# Patient Record
Sex: FEMALE | Race: OTHER | HISPANIC OR LATINO | URBAN - METROPOLITAN AREA
[De-identification: names, ages, dates, MRNs, and addresses within clinical notes are randomized per-mention and may not be internally consistent; named-entity substitution may affect disease eponyms.]

---

## 2017-01-27 ENCOUNTER — OUTPATIENT (OUTPATIENT)
Dept: OUTPATIENT SERVICES | Facility: HOSPITAL | Age: 77
LOS: 1 days | End: 2017-01-27
Payer: MEDICARE

## 2017-01-27 VITALS
RESPIRATION RATE: 16 BRPM | SYSTOLIC BLOOD PRESSURE: 153 MMHG | HEART RATE: 67 BPM | DIASTOLIC BLOOD PRESSURE: 76 MMHG | TEMPERATURE: 98 F | WEIGHT: 177.47 LBS | HEIGHT: 58 IN

## 2017-01-27 DIAGNOSIS — Z98.890 OTHER SPECIFIED POSTPROCEDURAL STATES: Chronic | ICD-10-CM

## 2017-01-27 DIAGNOSIS — R01.1 CARDIAC MURMUR, UNSPECIFIED: ICD-10-CM

## 2017-01-27 DIAGNOSIS — Z01.818 ENCOUNTER FOR OTHER PREPROCEDURAL EXAMINATION: ICD-10-CM

## 2017-01-27 DIAGNOSIS — M25.562 PAIN IN LEFT KNEE: ICD-10-CM

## 2017-01-27 DIAGNOSIS — I10 ESSENTIAL (PRIMARY) HYPERTENSION: ICD-10-CM

## 2017-01-27 LAB
ALBUMIN SERPL ELPH-MCNC: 4.1 G/DL — SIGNIFICANT CHANGE UP (ref 3.3–5.2)
ALP SERPL-CCNC: 100 U/L — SIGNIFICANT CHANGE UP (ref 40–120)
ALT FLD-CCNC: 25 U/L — SIGNIFICANT CHANGE UP
ANION GAP SERPL CALC-SCNC: 13 MMOL/L — SIGNIFICANT CHANGE UP (ref 5–17)
APTT BLD: 31.9 SEC — SIGNIFICANT CHANGE UP (ref 27.5–37.4)
AST SERPL-CCNC: 42 U/L — HIGH
BASOPHILS # BLD AUTO: 0 K/UL — SIGNIFICANT CHANGE UP (ref 0–0.2)
BASOPHILS NFR BLD AUTO: 0.1 % — SIGNIFICANT CHANGE UP (ref 0–2)
BILIRUB SERPL-MCNC: 0.6 MG/DL — SIGNIFICANT CHANGE UP (ref 0.4–2)
BLD GP AB SCN SERPL QL: SIGNIFICANT CHANGE UP
BUN SERPL-MCNC: 16 MG/DL — SIGNIFICANT CHANGE UP (ref 8–20)
CALCIUM SERPL-MCNC: 10.4 MG/DL — HIGH (ref 8.6–10.2)
CHLORIDE SERPL-SCNC: 98 MMOL/L — SIGNIFICANT CHANGE UP (ref 98–107)
CO2 SERPL-SCNC: 27 MMOL/L — SIGNIFICANT CHANGE UP (ref 22–29)
CREAT SERPL-MCNC: 0.7 MG/DL — SIGNIFICANT CHANGE UP (ref 0.5–1.3)
EOSINOPHIL # BLD AUTO: 0.1 K/UL — SIGNIFICANT CHANGE UP (ref 0–0.5)
EOSINOPHIL NFR BLD AUTO: 0.9 % — SIGNIFICANT CHANGE UP (ref 0–6)
GLUCOSE SERPL-MCNC: 93 MG/DL — SIGNIFICANT CHANGE UP (ref 70–115)
HBA1C BLD-MCNC: 7.6 % — HIGH (ref 4–5.6)
HCT VFR BLD CALC: 38 % — SIGNIFICANT CHANGE UP (ref 37–47)
HGB BLD-MCNC: 12.6 G/DL — SIGNIFICANT CHANGE UP (ref 12–16)
INR BLD: 1.21 RATIO — HIGH (ref 0.88–1.16)
LYMPHOCYTES # BLD AUTO: 2.2 K/UL — SIGNIFICANT CHANGE UP (ref 1–4.8)
LYMPHOCYTES # BLD AUTO: 24.4 % — SIGNIFICANT CHANGE UP (ref 20–55)
MCHC RBC-ENTMCNC: 30.8 PG — SIGNIFICANT CHANGE UP (ref 27–31)
MCHC RBC-ENTMCNC: 33.2 G/DL — SIGNIFICANT CHANGE UP (ref 32–36)
MCV RBC AUTO: 92.9 FL — SIGNIFICANT CHANGE UP (ref 81–99)
MONOCYTES # BLD AUTO: 0.8 K/UL — SIGNIFICANT CHANGE UP (ref 0–0.8)
MONOCYTES NFR BLD AUTO: 9.1 % — SIGNIFICANT CHANGE UP (ref 3–10)
MRSA PCR RESULT.: SIGNIFICANT CHANGE UP
NEUTROPHILS # BLD AUTO: 6 K/UL — SIGNIFICANT CHANGE UP (ref 1.8–8)
NEUTROPHILS NFR BLD AUTO: 65.4 % — SIGNIFICANT CHANGE UP (ref 37–73)
PLATELET # BLD AUTO: 191 K/UL — SIGNIFICANT CHANGE UP (ref 150–400)
POTASSIUM SERPL-MCNC: 5.1 MMOL/L — SIGNIFICANT CHANGE UP (ref 3.5–5.3)
POTASSIUM SERPL-SCNC: 5.1 MMOL/L — SIGNIFICANT CHANGE UP (ref 3.5–5.3)
PROT SERPL-MCNC: 8.5 G/DL — SIGNIFICANT CHANGE UP (ref 6.6–8.7)
PROTHROM AB SERPL-ACNC: 13.3 SEC — HIGH (ref 10–13.1)
RBC # BLD: 4.09 M/UL — LOW (ref 4.4–5.2)
RBC # FLD: 13.4 % — SIGNIFICANT CHANGE UP (ref 11–15.6)
S AUREUS DNA NOSE QL NAA+PROBE: SIGNIFICANT CHANGE UP
SODIUM SERPL-SCNC: 138 MMOL/L — SIGNIFICANT CHANGE UP (ref 135–145)
TYPE + AB SCN PNL BLD: SIGNIFICANT CHANGE UP
WBC # BLD: 9.15 K/UL — SIGNIFICANT CHANGE UP (ref 4.8–10.8)
WBC # FLD AUTO: 9.15 K/UL — SIGNIFICANT CHANGE UP (ref 4.8–10.8)

## 2017-01-27 PROCEDURE — 93010 ELECTROCARDIOGRAM REPORT: CPT

## 2017-01-27 PROCEDURE — 85730 THROMBOPLASTIN TIME PARTIAL: CPT

## 2017-01-27 PROCEDURE — 85610 PROTHROMBIN TIME: CPT

## 2017-01-27 PROCEDURE — 85027 COMPLETE CBC AUTOMATED: CPT

## 2017-01-27 PROCEDURE — 87640 STAPH A DNA AMP PROBE: CPT

## 2017-01-27 PROCEDURE — 86850 RBC ANTIBODY SCREEN: CPT

## 2017-01-27 PROCEDURE — 86900 BLOOD TYPING SEROLOGIC ABO: CPT

## 2017-01-27 PROCEDURE — 93005 ELECTROCARDIOGRAM TRACING: CPT

## 2017-01-27 PROCEDURE — 87641 MR-STAPH DNA AMP PROBE: CPT

## 2017-01-27 PROCEDURE — 80053 COMPREHEN METABOLIC PANEL: CPT

## 2017-01-27 PROCEDURE — 83036 HEMOGLOBIN GLYCOSYLATED A1C: CPT

## 2017-01-27 PROCEDURE — 86901 BLOOD TYPING SEROLOGIC RH(D): CPT

## 2017-01-27 PROCEDURE — G0463: CPT

## 2017-01-27 NOTE — H&P PST ADULT - NSANTHOSAYNRD_GEN_A_CORE
No. LUCAS screening performed.  STOP BANG Legend: 0-2 = LOW Risk; 3-4 = INTERMEDIATE Risk; 5-8 = HIGH Risk

## 2017-01-27 NOTE — H&P PST ADULT - PMH
Diabetes    GERD (gastroesophageal reflux disease)    Heart murmur    Hypercholesterolemia    Hypertension    Hypothyroidism    Knee pain, left  arthritis

## 2017-01-27 NOTE — H&P PST ADULT - HISTORY OF PRESENT ILLNESS
77 year old Turkish speaking female presents with a several year history of left knee pain. Uses cane for ambulation , takes tylenol with mild relief. Scheduled for left knee replacement with DR. Beal.

## 2017-01-27 NOTE — PATIENT PROFILE ADULT. - LEARNING ASSESSMENT (PATIENT) ADDITIONAL COMMENTS
Instructed pt verbally in Togolese via Telephone  and in writing in Ivorian on pre-op instructions, tips for  safer surgery, pain management scale, pre-surgical infection prevention instructions, MRSA/MSSA instructions, Flu vaccine risks/benefits info sheet and verbalized understanding of all. Ebola Screening: Negative,

## 2017-01-27 NOTE — PATIENT PROFILE ADULT. - PRO PAIN LIFE ADAPT
inability to sleep/decreased activity level/inability or reluctance to perform ADLs/decreased appetite

## 2017-01-30 DIAGNOSIS — Z01.818 ENCOUNTER FOR OTHER PREPROCEDURAL EXAMINATION: ICD-10-CM

## 2017-01-30 DIAGNOSIS — M17.12 UNILATERAL PRIMARY OSTEOARTHRITIS, LEFT KNEE: ICD-10-CM

## 2017-02-02 RX ORDER — GABAPENTIN 400 MG/1
300 CAPSULE ORAL ONCE
Qty: 0 | Refills: 0 | Status: COMPLETED | OUTPATIENT
Start: 2017-02-17 | End: 2017-02-17

## 2017-02-02 RX ORDER — SCOPALAMINE 1 MG/3D
1.5 PATCH, EXTENDED RELEASE TRANSDERMAL ONCE
Qty: 0 | Refills: 0 | Status: COMPLETED | OUTPATIENT
Start: 2017-02-17 | End: 2017-02-17

## 2017-02-02 RX ORDER — OXYCODONE HYDROCHLORIDE 5 MG/1
10 TABLET ORAL ONCE
Qty: 0 | Refills: 0 | Status: DISCONTINUED | OUTPATIENT
Start: 2017-02-17 | End: 2017-02-17

## 2017-02-16 ENCOUNTER — RESULT REVIEW (OUTPATIENT)
Age: 77
End: 2017-02-16

## 2017-02-17 ENCOUNTER — APPOINTMENT (OUTPATIENT)
Dept: ORTHOPEDIC SURGERY | Facility: HOSPITAL | Age: 77
End: 2017-02-17

## 2017-02-17 ENCOUNTER — TRANSCRIPTION ENCOUNTER (OUTPATIENT)
Age: 77
End: 2017-02-17

## 2017-02-17 ENCOUNTER — INPATIENT (INPATIENT)
Facility: HOSPITAL | Age: 77
LOS: 2 days | Discharge: HOME | DRG: 470 | End: 2017-02-20
Attending: ORTHOPAEDIC SURGERY | Admitting: ORTHOPAEDIC SURGERY
Payer: MEDICARE

## 2017-02-17 VITALS
TEMPERATURE: 98 F | DIASTOLIC BLOOD PRESSURE: 62 MMHG | WEIGHT: 177.47 LBS | HEART RATE: 89 BPM | OXYGEN SATURATION: 100 % | SYSTOLIC BLOOD PRESSURE: 172 MMHG | HEIGHT: 58 IN | RESPIRATION RATE: 16 BRPM

## 2017-02-17 DIAGNOSIS — M17.12 UNILATERAL PRIMARY OSTEOARTHRITIS, LEFT KNEE: ICD-10-CM

## 2017-02-17 DIAGNOSIS — E11.9 TYPE 2 DIABETES MELLITUS WITHOUT COMPLICATIONS: ICD-10-CM

## 2017-02-17 DIAGNOSIS — I10 ESSENTIAL (PRIMARY) HYPERTENSION: ICD-10-CM

## 2017-02-17 DIAGNOSIS — E78.00 PURE HYPERCHOLESTEROLEMIA, UNSPECIFIED: ICD-10-CM

## 2017-02-17 DIAGNOSIS — Z98.890 OTHER SPECIFIED POSTPROCEDURAL STATES: Chronic | ICD-10-CM

## 2017-02-17 DIAGNOSIS — M19.90 UNSPECIFIED OSTEOARTHRITIS, UNSPECIFIED SITE: ICD-10-CM

## 2017-02-17 DIAGNOSIS — K21.9 GASTRO-ESOPHAGEAL REFLUX DISEASE WITHOUT ESOPHAGITIS: ICD-10-CM

## 2017-02-17 DIAGNOSIS — E03.9 HYPOTHYROIDISM, UNSPECIFIED: ICD-10-CM

## 2017-02-17 PROCEDURE — 99222 1ST HOSP IP/OBS MODERATE 55: CPT

## 2017-02-17 PROCEDURE — 73560 X-RAY EXAM OF KNEE 1 OR 2: CPT | Mod: 26,LT

## 2017-02-17 PROCEDURE — 27447 TOTAL KNEE ARTHROPLASTY: CPT | Mod: AS,LT

## 2017-02-17 PROCEDURE — 27447 TOTAL KNEE ARTHROPLASTY: CPT | Mod: LT

## 2017-02-17 PROCEDURE — 88311 DECALCIFY TISSUE: CPT | Mod: 26

## 2017-02-17 PROCEDURE — 88305 TISSUE EXAM BY PATHOLOGIST: CPT | Mod: 26

## 2017-02-17 RX ORDER — LISINOPRIL 2.5 MG/1
1 TABLET ORAL
Qty: 0 | Refills: 0 | COMMUNITY

## 2017-02-17 RX ORDER — GLUCAGON INJECTION, SOLUTION 0.5 MG/.1ML
1 INJECTION, SOLUTION SUBCUTANEOUS ONCE
Qty: 0 | Refills: 0 | Status: DISCONTINUED | OUTPATIENT
Start: 2017-02-17 | End: 2017-02-20

## 2017-02-17 RX ORDER — ACETAMINOPHEN 500 MG
1000 TABLET ORAL ONCE
Qty: 0 | Refills: 0 | Status: COMPLETED | OUTPATIENT
Start: 2017-02-17 | End: 2017-02-17

## 2017-02-17 RX ORDER — SODIUM CHLORIDE 9 MG/ML
3 INJECTION INTRAMUSCULAR; INTRAVENOUS; SUBCUTANEOUS EVERY 8 HOURS
Qty: 0 | Refills: 0 | Status: DISCONTINUED | OUTPATIENT
Start: 2017-02-17 | End: 2017-02-17

## 2017-02-17 RX ORDER — TRANEXAMIC ACID 100 MG/ML
800 INJECTION, SOLUTION INTRAVENOUS ONCE
Qty: 0 | Refills: 0 | Status: DISCONTINUED | OUTPATIENT
Start: 2017-02-17 | End: 2017-02-17

## 2017-02-17 RX ORDER — INSULIN LISPRO 100/ML
VIAL (ML) SUBCUTANEOUS
Qty: 0 | Refills: 0 | Status: DISCONTINUED | OUTPATIENT
Start: 2017-02-17 | End: 2017-02-20

## 2017-02-17 RX ORDER — OXYCODONE HYDROCHLORIDE 5 MG/1
10 TABLET ORAL EVERY 12 HOURS
Qty: 0 | Refills: 0 | Status: DISCONTINUED | OUTPATIENT
Start: 2017-02-17 | End: 2017-02-20

## 2017-02-17 RX ORDER — VANCOMYCIN HCL 1 G
1000 VIAL (EA) INTRAVENOUS ONCE
Qty: 0 | Refills: 0 | Status: COMPLETED | OUTPATIENT
Start: 2017-02-17 | End: 2017-02-17

## 2017-02-17 RX ORDER — AMLODIPINE BESYLATE 2.5 MG/1
5 TABLET ORAL DAILY
Qty: 0 | Refills: 0 | Status: DISCONTINUED | OUTPATIENT
Start: 2017-02-17 | End: 2017-02-20

## 2017-02-17 RX ORDER — FAMOTIDINE 10 MG/ML
40 INJECTION INTRAVENOUS DAILY
Qty: 0 | Refills: 0 | Status: DISCONTINUED | OUTPATIENT
Start: 2017-02-17 | End: 2017-02-20

## 2017-02-17 RX ORDER — OXYCODONE HYDROCHLORIDE 5 MG/1
5 TABLET ORAL
Qty: 0 | Refills: 0 | Status: DISCONTINUED | OUTPATIENT
Start: 2017-02-17 | End: 2017-02-20

## 2017-02-17 RX ORDER — HYDROMORPHONE HYDROCHLORIDE 2 MG/ML
2 INJECTION INTRAMUSCULAR; INTRAVENOUS; SUBCUTANEOUS
Qty: 0 | Refills: 0 | Status: DISCONTINUED | OUTPATIENT
Start: 2017-02-17 | End: 2017-02-20

## 2017-02-17 RX ORDER — ONDANSETRON 8 MG/1
4 TABLET, FILM COATED ORAL ONCE
Qty: 0 | Refills: 0 | Status: DISCONTINUED | OUTPATIENT
Start: 2017-02-17 | End: 2017-02-17

## 2017-02-17 RX ORDER — SODIUM CHLORIDE 9 MG/ML
1000 INJECTION, SOLUTION INTRAVENOUS
Qty: 0 | Refills: 0 | Status: DISCONTINUED | OUTPATIENT
Start: 2017-02-17 | End: 2017-02-18

## 2017-02-17 RX ORDER — ENOXAPARIN SODIUM 100 MG/ML
30 INJECTION SUBCUTANEOUS EVERY 12 HOURS
Qty: 0 | Refills: 0 | Status: DISCONTINUED | OUTPATIENT
Start: 2017-02-18 | End: 2017-02-20

## 2017-02-17 RX ORDER — DEXTROSE 50 % IN WATER 50 %
12.5 SYRINGE (ML) INTRAVENOUS ONCE
Qty: 0 | Refills: 0 | Status: DISCONTINUED | OUTPATIENT
Start: 2017-02-17 | End: 2017-02-20

## 2017-02-17 RX ORDER — KETOROLAC TROMETHAMINE 30 MG/ML
15 SYRINGE (ML) INJECTION EVERY 6 HOURS
Qty: 0 | Refills: 0 | Status: DISCONTINUED | OUTPATIENT
Start: 2017-02-17 | End: 2017-02-17

## 2017-02-17 RX ORDER — ATORVASTATIN CALCIUM 80 MG/1
10 TABLET, FILM COATED ORAL AT BEDTIME
Qty: 0 | Refills: 0 | Status: DISCONTINUED | OUTPATIENT
Start: 2017-02-17 | End: 2017-02-20

## 2017-02-17 RX ORDER — ACETAMINOPHEN 500 MG
975 TABLET ORAL EVERY 8 HOURS
Qty: 0 | Refills: 0 | Status: DISCONTINUED | OUTPATIENT
Start: 2017-02-18 | End: 2017-02-20

## 2017-02-17 RX ORDER — CEFAZOLIN SODIUM 1 G
2000 VIAL (EA) INJECTION ONCE
Qty: 0 | Refills: 0 | Status: DISCONTINUED | OUTPATIENT
Start: 2017-02-17 | End: 2017-02-17

## 2017-02-17 RX ORDER — CARVEDILOL PHOSPHATE 80 MG/1
12.5 CAPSULE, EXTENDED RELEASE ORAL EVERY 12 HOURS
Qty: 0 | Refills: 0 | Status: DISCONTINUED | OUTPATIENT
Start: 2017-02-17 | End: 2017-02-20

## 2017-02-17 RX ORDER — DEXTROSE 50 % IN WATER 50 %
25 SYRINGE (ML) INTRAVENOUS ONCE
Qty: 0 | Refills: 0 | Status: DISCONTINUED | OUTPATIENT
Start: 2017-02-17 | End: 2017-02-20

## 2017-02-17 RX ORDER — ACETAMINOPHEN 500 MG
1000 TABLET ORAL ONCE
Qty: 0 | Refills: 0 | Status: DISCONTINUED | OUTPATIENT
Start: 2017-02-17 | End: 2017-02-17

## 2017-02-17 RX ORDER — HYDROMORPHONE HYDROCHLORIDE 2 MG/ML
0.5 INJECTION INTRAMUSCULAR; INTRAVENOUS; SUBCUTANEOUS
Qty: 0 | Refills: 0 | Status: DISCONTINUED | OUTPATIENT
Start: 2017-02-17 | End: 2017-02-20

## 2017-02-17 RX ORDER — FOLIC ACID 0.8 MG
1 TABLET ORAL DAILY
Qty: 0 | Refills: 0 | Status: DISCONTINUED | OUTPATIENT
Start: 2017-02-17 | End: 2017-02-20

## 2017-02-17 RX ORDER — SODIUM CHLORIDE 9 MG/ML
1000 INJECTION, SOLUTION INTRAVENOUS
Qty: 0 | Refills: 0 | Status: DISCONTINUED | OUTPATIENT
Start: 2017-02-17 | End: 2017-02-20

## 2017-02-17 RX ORDER — MAGNESIUM HYDROXIDE 400 MG/1
30 TABLET, CHEWABLE ORAL DAILY
Qty: 0 | Refills: 0 | Status: DISCONTINUED | OUTPATIENT
Start: 2017-02-17 | End: 2017-02-20

## 2017-02-17 RX ORDER — LEVOTHYROXINE SODIUM 125 MCG
25 TABLET ORAL DAILY
Qty: 0 | Refills: 0 | Status: DISCONTINUED | OUTPATIENT
Start: 2017-02-17 | End: 2017-02-20

## 2017-02-17 RX ORDER — SODIUM CHLORIDE 9 MG/ML
1000 INJECTION, SOLUTION INTRAVENOUS
Qty: 0 | Refills: 0 | Status: DISCONTINUED | OUTPATIENT
Start: 2017-02-17 | End: 2017-02-17

## 2017-02-17 RX ORDER — CELECOXIB 200 MG/1
200 CAPSULE ORAL
Qty: 0 | Refills: 0 | Status: DISCONTINUED | OUTPATIENT
Start: 2017-02-18 | End: 2017-02-18

## 2017-02-17 RX ORDER — SENNA PLUS 8.6 MG/1
2 TABLET ORAL AT BEDTIME
Qty: 0 | Refills: 0 | Status: DISCONTINUED | OUTPATIENT
Start: 2017-02-17 | End: 2017-02-20

## 2017-02-17 RX ORDER — HYDROMORPHONE HYDROCHLORIDE 2 MG/ML
0.5 INJECTION INTRAMUSCULAR; INTRAVENOUS; SUBCUTANEOUS
Qty: 0 | Refills: 0 | Status: DISCONTINUED | OUTPATIENT
Start: 2017-02-17 | End: 2017-02-17

## 2017-02-17 RX ORDER — CEFAZOLIN SODIUM 1 G
2000 VIAL (EA) INJECTION
Qty: 0 | Refills: 0 | Status: COMPLETED | OUTPATIENT
Start: 2017-02-17 | End: 2017-02-17

## 2017-02-17 RX ORDER — ONDANSETRON 8 MG/1
4 TABLET, FILM COATED ORAL EVERY 6 HOURS
Qty: 0 | Refills: 0 | Status: DISCONTINUED | OUTPATIENT
Start: 2017-02-17 | End: 2017-02-20

## 2017-02-17 RX ORDER — OXYCODONE HYDROCHLORIDE 5 MG/1
10 TABLET ORAL
Qty: 0 | Refills: 0 | Status: DISCONTINUED | OUTPATIENT
Start: 2017-02-17 | End: 2017-02-20

## 2017-02-17 RX ORDER — DOCUSATE SODIUM 100 MG
100 CAPSULE ORAL THREE TIMES A DAY
Qty: 0 | Refills: 0 | Status: DISCONTINUED | OUTPATIENT
Start: 2017-02-17 | End: 2017-02-20

## 2017-02-17 RX ORDER — VANCOMYCIN HCL 1 G
1250 VIAL (EA) INTRAVENOUS ONCE
Qty: 0 | Refills: 0 | Status: COMPLETED | OUTPATIENT
Start: 2017-02-17 | End: 2017-02-17

## 2017-02-17 RX ORDER — LISINOPRIL 2.5 MG/1
20 TABLET ORAL DAILY
Qty: 0 | Refills: 0 | Status: DISCONTINUED | OUTPATIENT
Start: 2017-02-17 | End: 2017-02-18

## 2017-02-17 RX ORDER — DEXTROSE 50 % IN WATER 50 %
1 SYRINGE (ML) INTRAVENOUS ONCE
Qty: 0 | Refills: 0 | Status: DISCONTINUED | OUTPATIENT
Start: 2017-02-17 | End: 2017-02-20

## 2017-02-17 RX ORDER — OXYCODONE HYDROCHLORIDE 5 MG/1
10 TABLET ORAL EVERY 12 HOURS
Qty: 0 | Refills: 0 | Status: DISCONTINUED | OUTPATIENT
Start: 2017-02-17 | End: 2017-02-17

## 2017-02-17 RX ORDER — FERROUS SULFATE 325(65) MG
325 TABLET ORAL
Qty: 0 | Refills: 0 | Status: DISCONTINUED | OUTPATIENT
Start: 2017-02-17 | End: 2017-02-20

## 2017-02-17 RX ADMIN — Medication 400 MILLIGRAM(S): at 18:23

## 2017-02-17 RX ADMIN — Medication 325 MILLIGRAM(S): at 18:23

## 2017-02-17 RX ADMIN — Medication 100 MILLIGRAM(S): at 20:18

## 2017-02-17 RX ADMIN — CARVEDILOL PHOSPHATE 12.5 MILLIGRAM(S): 80 CAPSULE, EXTENDED RELEASE ORAL at 20:18

## 2017-02-17 RX ADMIN — Medication 166.67 MILLIGRAM(S): at 07:22

## 2017-02-17 RX ADMIN — ATORVASTATIN CALCIUM 10 MILLIGRAM(S): 80 TABLET, FILM COATED ORAL at 22:10

## 2017-02-17 RX ADMIN — GABAPENTIN 300 MILLIGRAM(S): 400 CAPSULE ORAL at 06:43

## 2017-02-17 RX ADMIN — OXYCODONE HYDROCHLORIDE 10 MILLIGRAM(S): 5 TABLET ORAL at 06:43

## 2017-02-17 RX ADMIN — Medication 1000 MILLIGRAM(S): at 18:23

## 2017-02-17 RX ADMIN — Medication 250 MILLIGRAM(S): at 22:10

## 2017-02-17 RX ADMIN — Medication 100 MILLIGRAM(S): at 15:02

## 2017-02-17 RX ADMIN — SCOPALAMINE 1.5 MILLIGRAM(S): 1 PATCH, EXTENDED RELEASE TRANSDERMAL at 06:43

## 2017-02-17 RX ADMIN — Medication 100 MILLIGRAM(S): at 22:10

## 2017-02-17 NOTE — CONSULT NOTE ADULT - SUBJECTIVE AND OBJECTIVE BOX
CC: Left knee pain  HPI:77 year old Burundian speaking female presents with a several year history of left knee pain. Uses cane for ambulation , takes tylenol with mild relief. S/Pleft knee replacement with DR. Beal.       PAST MEDICAL & SURGICAL HISTORY:  Heart murmur  Diabetes  Knee pain, left: arthritis  Hypercholesterolemia  GERD (gastroesophageal reflux disease)  Hypothyroidism  Hypertension  History of vaginal surgery  S/P tubal ligation      MEDICATIONS  (STANDING):  lactated ringers. 1000milliLiter(s) IV Continuous <Continuous>  ceFAZolin   IVPB 2000milliGRAM(s) IV Intermittent <User Schedule>  vancomycin  IVPB 1000milliGRAM(s) IV Intermittent once  insulin lispro (HumaLOG) corrective regimen sliding scale  SubCutaneous three times a day before meals    MEDICATIONS  (PRN):  HYDROmorphone  Injectable 0.5milliGRAM(s) IV Push every 10 minutes PRN Moderate Pain (4 - 6)  ondansetron Injectable 4milliGRAM(s) IV Push once PRN Nausea and/or Vomiting  promethazine IVPB 6.25milliGRAM(s) IV Intermittent once PRN Nausea and/or Vomiting      Allergies    aspirin (Rash)    Intolerances NONE    Home Medications:     · 	Pepcid 20 mg oral tablet 2 tab(s) orally once a day  · 	glimepiride 2 mg oral tablet, 1 tab(s) orally once a day  · 	amLODIPine 5 mg oral tablet , 1 tab(s) orally once a day  · 	Tylenol 325 mg oral capsule 1 cap(s) orally 3 times a day, As Needed  · 	levothyroxine 25 mcg (0.025 mg) oral capsule , 1 cap(s) orally once a day  · 	metFORMIN 1000 mg oral tablet 1 tab(s) orally 2 times a day  · 	hydrochlorothiazide-lisinopril 25-20 ,  orally  BID  · 	pravastatin 10 mg oral tablet 1 tab(s) orally once a day (at bedtime)  · 	carvedilol 12.5mg oral tablet:  , 1 tab(s) orally 2 times a day  · 	omeprazole 40 mg oral delayed release capsule1 cap(s) orally once a day            SOCIAL HISTORY:  Former smoker  Denies ETOH  Denies IVDA    FAMILY HISTORY:  No pertinent family history in first degree relatives      Vital Signs Last 24 Hrs  T(C): 36, Max: 36.7 (02-17 @ 06:14)  T(F): 96.8, Max: 98.1 (02-17 @ 06:14)  HR: 62 (57 - 89)  BP: 137/70 (134/55 - 236/59)  BP(mean): --  RR: 11 (9 - 16)  SpO2: 98% (98% - 100%)    LABS:                  RADIOLOGY & ADDITIONAL STUDIES:    ROS: + left LE numbness, + left knee pain, + reflux  Constitutional, Eyes, ENT, Cardiovascular, Respiratory, Gastrointestinal, Genitourinary, Musculoskeletal, Integumentary, Psychiatric, Endocrine, Heme/Lymph are otherwise negative. CC: Left knee pain  HPI:77 year old Croatian speaking female presents with a several year history of left knee pain. Uses cane for ambulation , takes tylenol with mild relief. S/P left knee replacement with Dr. Beal.       PAST MEDICAL & SURGICAL HISTORY:  Heart murmur  Diabetes- type 2  Knee pain, left: arthritis  Hypercholesterolemia  GERD (gastroesophageal reflux disease)  Hypothyroidism  Hypertension  History of vaginal surgery  S/P tubal ligation      MEDICATIONS  (STANDING):  lactated ringers. 1000milliLiter(s) IV Continuous <Continuous>  ceFAZolin   IVPB 2000milliGRAM(s) IV Intermittent <User Schedule>  vancomycin  IVPB 1000milliGRAM(s) IV Intermittent once  insulin lispro (HumaLOG) corrective regimen sliding scale  SubCutaneous three times a day before meals    MEDICATIONS  (PRN):  HYDROmorphone  Injectable 0.5milliGRAM(s) IV Push every 10 minutes PRN Moderate Pain (4 - 6)  ondansetron Injectable 4milliGRAM(s) IV Push once PRN Nausea and/or Vomiting  promethazine IVPB 6.25milliGRAM(s) IV Intermittent once PRN Nausea and/or Vomiting      Allergies    aspirin (Rash)    Intolerances NONE    Home Medications:     · 	Pepcid 20 mg oral tablet 2 tab(s) orally once a day  · 	glimepiride 2 mg oral tablet, 1 tab(s) orally once a day  · 	amLODIPine 5 mg oral tablet , 1 tab(s) orally once a day  · 	Tylenol 325 mg oral capsule 1 cap(s) orally 3 times a day, As Needed  · 	levothyroxine 25 mcg (0.025 mg) oral capsule , 1 cap(s) orally once a day  · 	metFORMIN 1000 mg oral tablet 1 tab(s) orally 2 times a day  · 	hydrochlorothiazide-lisinopril 25-20 ,  orally  BID  · 	pravastatin 10 mg oral tablet 1 tab(s) orally once a day (at bedtime)  · 	carvedilol 12.5mg oral tablet:  , 1 tab(s) orally 2 times a day  · 	omeprazole 40 mg oral delayed release capsule1 cap(s) orally once a day            SOCIAL HISTORY:  Former smoker  Denies ETOH  Denies IVDA    FAMILY HISTORY:  No pertinent family history in first degree relatives      Vital Signs Last 24 Hrs  T(C): 36, Max: 36.7 (02-17 @ 06:14)  T(F): 96.8, Max: 98.1 (02-17 @ 06:14)  HR: 62 (57 - 89)  BP: 137/70 (134/55 - 236/59)  BP(mean): --  RR: 11 (9 - 16)  SpO2: 98% (98% - 100%)    LABS: pending      RADIOLOGY & ADDITIONAL STUDIES:       EXAM:  KNEE-LEFT                          PROCEDURE DATE:  02/17/2017        INTERPRETATION:  X-RAY left KNEE:    HISTORY: Post-operative.    DATE: 2/17/2017 10:08 AM    TECHNIQUE: AP and Lateral views were obtained.    FINDINGS: Status post left total knee replacement. Alignment appears to   be essentially anatomic. Air is noted in the soft tissues in keeping with   recent surgery.    IMPRESSION:    Status post total left knee replacement.      PASTORA AJ M.D., ATTENDING RADIOLOGIST  This document has been electronically signed. Feb 17 2017  3:12PM        ROS: + left LE numbness, + left knee pain, + reflux  Constitutional, Eyes, ENT, Cardiovascular, Respiratory, Gastrointestinal, Genitourinary, Musculoskeletal, Integumentary, Psychiatric, Endocrine, Heme/Lymph are otherwise negative.

## 2017-02-17 NOTE — DISCHARGE NOTE ADULT - NS AS ACTIVITY OBS
No Heavy lifting/straining/Walking-Outdoors allowed/Stairs allowed/Walking-Indoors allowed/Showering allowed/Do not drive or operate machinery

## 2017-02-17 NOTE — DISCHARGE NOTE ADULT - PLAN OF CARE
Decrease Pain, Improve Ambulation and Activities of daily living The patient will be seen in the office between 2-3 weeks for wound check. Patient may shower after post-op day #5. The dressing is to be removed on day # 10 (ten).  The patient will contact the office if the wound becomes red, has increasing pain, develops bleeding or discharge, an injury occurs, or has other concerns. The patient will continue PT consistent with total knee replacement. The patient will continue LOVENOX for 4 weeks for DVTP. The patient will take OXYCODONE and TYLENOL for pain control and titrate according to prescription and patient needs. The patient is FULL weight bearing. Elevation of the lower leg is recommended to reduce swelling. The patient will be seen in the office in 2 weeks for wound check. Patient may shower after post-op day #5. The dressing is to be removed on day # 10 (ten).  The patient will contact the office if the wound becomes red, has increasing pain, develops bleeding or discharge, an injury occurs, or has other concerns. The patient will continue PT consistent with total knee replacement. The patient will continue LOVENOX for 4 weeks for DVTP. The patient will take OXYCODONE and TYLENOL for pain control and titrate according to prescription and patient needs. The patient is FULL weight bearing. Elevation of the lower leg is recommended to reduce swelling.

## 2017-02-17 NOTE — CONSULT NOTE ADULT - PROBLEM SELECTOR RECOMMENDATION 9
S/P left Total knee arthroplasty  02/17/2017  Post Op ABX as per SCIP  DVT ppx as per Ortho  pain control  PT as per Ortho  Weight bearing restrictions as per Ortho

## 2017-02-17 NOTE — DISCHARGE NOTE ADULT - PATIENT PORTAL LINK FT
“You can access the [facility] Patient Portal, offered by [facility], by registering with the following website: [email or web address]”

## 2017-02-17 NOTE — DISCHARGE NOTE ADULT - CARE PLAN
Principal Discharge DX:	Primary osteoarthritis of left knee  Goal:	Decrease Pain, Improve Ambulation and Activities of daily living  Instructions for follow-up, activity and diet:	The patient will be seen in the office between 2-3 weeks for wound check. Patient may shower after post-op day #5. The dressing is to be removed on day # 10 (ten).  The patient will contact the office if the wound becomes red, has increasing pain, develops bleeding or discharge, an injury occurs, or has other concerns. The patient will continue PT consistent with total knee replacement. The patient will continue LOVENOX for 4 weeks for DVTP. The patient will take OXYCODONE and TYLENOL for pain control and titrate according to prescription and patient needs. The patient is FULL weight bearing. Elevation of the lower leg is recommended to reduce swelling. Principal Discharge DX:	Primary osteoarthritis of left knee  Goal:	Decrease Pain, Improve Ambulation and Activities of daily living  Instructions for follow-up, activity and diet:	The patient will be seen in the office in 2 weeks for wound check. Patient may shower after post-op day #5. The dressing is to be removed on day # 10 (ten).  The patient will contact the office if the wound becomes red, has increasing pain, develops bleeding or discharge, an injury occurs, or has other concerns. The patient will continue PT consistent with total knee replacement. The patient will continue LOVENOX for 4 weeks for DVTP. The patient will take OXYCODONE and TYLENOL for pain control and titrate according to prescription and patient needs. The patient is FULL weight bearing. Elevation of the lower leg is recommended to reduce swelling.

## 2017-02-17 NOTE — PROGRESS NOTE ADULT - SUBJECTIVE AND OBJECTIVE BOX
Ortho Post Op Check    Name: WILMER CONROY    MR #: [id number]    Procedure: Left Total Knee Arthroplasty  Surgeon: Iván Beal    Pt comfortable without complaints, pain controlled, found patient sitting up in bed with family member at bedside  Denies CP, SOB, N/V, numbness/tingling     General Exam:  Vital Signs Last 24 Hrs  T(C): 36.3, Max: 36.3 (02-17 @ 17:41)  T(F): 97.4, Max: 97.4 (02-17 @ 17:41)  HR: 75 (64 - 75)  BP: 127/71 (127/71 - 136/68)  BP(mean): --  RR: 17 (17 - 17)  SpO2: 95% (95% - 95%)  Wt(kg): --    General: Pt Alert and oriented, NAD, controlled pain.  Dressings C/D/I. No bleeding.  Pulses: 2+ dorsalis pedis pulse. Cap refill < 2 sec.  Sensation: Grossly intact to light touch without deficit.  Motor: + EHL/FHL/TA/GS    Post-op X-Ray:   EXAM:  KNEE-LEFT                          PROCEDURE DATE:  02/17/2017        INTERPRETATION:  X-RAY left KNEE:    HISTORY: Post-operative.    DATE: 2/17/2017 10:08 AM    TECHNIQUE: AP and Lateral views were obtained.    FINDINGS: Status post left total knee replacement. Alignment appears to   be essentially anatomic. Air is noted in the soft tissues in keeping with   recent surgery.    IMPRESSION:    Status post total left knee replacement.    PASTORA AJ M.D., ATTENDING RADIOLOGIST  This document has been electronically signed. Feb 17 2017  3:12PM    A/P: 77yFemale POD#0 s/p   - Stable  - Pain Control  - DVT ppx: Lovenox, SCDs  - Post op abx: Ancef, Vancomycin  - PT eval pending  - Weight bearing status: WBAT

## 2017-02-17 NOTE — DISCHARGE NOTE ADULT - CARE PROVIDERS DIRECT ADDRESSES
[email or web address]

## 2017-02-17 NOTE — DISCHARGE NOTE ADULT - CARE PROVIDER_API CALL
Iván Beal), Orthopaedic Surgery  [address] [place] [id number]  Phone: [phone]  Fax: [phone]

## 2017-02-17 NOTE — CONSULT NOTE ADULT - PROBLEM SELECTOR RECOMMENDATION 6
Would hold HCTZ for now  Continue home medications with hold parameters Would hold HCTZ for  24 hrs  Continue home medications with hold parameters

## 2017-02-17 NOTE — DISCHARGE NOTE ADULT - HOSPITAL COURSE
The patient underwent a LEFT TOTAL KNEE REPLACEMENT on 2/17/2017. The patient received antibiotics consistent with SCIP guidelines. The patient underwent the procedure and had no intra-operative complications. Post-operatively, the patient was seen by medicine and PT. The patient received LOVENOX for DVTP. The patient received pain medications per orthopedic pain management protocol and the pain was appropriately controlled. The patient did not have any post-operative medical complications. The patient was discharged in stable condition.

## 2017-02-17 NOTE — DISCHARGE NOTE ADULT - MEDICATION SUMMARY - MEDICATIONS TO TAKE
I will START or STAY ON the medications listed below when I get home from the hospital:    oxyCODONE 5 mg oral tablet  -- 1-2 tab(s) by mouth every 4 to 6 hours, As Needed - Pain MDD:7  -- Indication: For Pain    Lovenox 30 mg/0.3 mL injectable solution  -- 30 milligram(s) injectable every 12 hours x 28 days  -- It is very important that you take or use this exactly as directed.  Do not skip doses or discontinue unless directed by your doctor.    -- Indication: For Dvt prophylaxis    metFORMIN 1000 mg oral tablet  -- 1 tab(s) by mouth 2 times a day  -- Indication: For Home med    glimepiride 2 mg oral tablet  -- 1 tab(s) by mouth once a day  -- Indication: For Home med    Tradjenta 5 mg oral tablet  -- 1 tab(s) by mouth once a day  -- Indication: For Home med    pravastatin 40 mg oral tablet  -- 1 tab(s) by mouth once a day  -- Indication: For Home med    hydrochlorothiazide-lisinopril  --   25-20 mg by mouth  BID  -- Indication: For Home med    carvedilol 12.5 mg oral tablet  -- 1 tab(s) by mouth 2 times a day  -- Indication: For Home med    amLODIPine 5 mg oral tablet  -- 1 tab(s) by mouth once a day  -- Indication: For Home med    Pepcid 20 mg oral tablet  -- 2 tab(s) by mouth once a day  -- It is very important that you take or use this exactly as directed.  Do not skip doses or discontinue unless directed by your doctor.  Obtain medical advice before taking any non-prescription drugs as some may affect the action of this medication.      -- Indication: For Home med    docusate sodium 100 mg oral capsule  -- 1 cap(s) by mouth 3 times a day  -- Indication: For stool softener    omeprazole 40 mg oral delayed release capsule  -- 1 cap(s) by mouth once a day  -- Indication: For Home med    levothyroxine 25 mcg (0.025 mg) oral capsule  -- 1 cap(s) by mouth once a day  -- Indication: For Home med I will START or STAY ON the medications listed below when I get home from the hospital:    Rolling Walker  -- S/P Left total knee arthroplasty   Z96.652  -- Indication: For Dme    3 in 1 commode  -- S/P Left total knee arthroplasty   z96.652  -- Indication: For Dme    oxyCODONE 5 mg oral tablet  -- 1-2 tab(s) by mouth every 4 to 6 hours, As Needed - Pain MDD:7  -- Indication: For Pain    Lovenox 30 mg/0.3 mL injectable solution  -- 30 milligram(s) injectable every 12 hours x 28 days  -- It is very important that you take or use this exactly as directed.  Do not skip doses or discontinue unless directed by your doctor.    -- Indication: For Dvt prophylaxis    metFORMIN 1000 mg oral tablet  -- 1 tab(s) by mouth 2 times a day  -- Indication: For Home med    glimepiride 2 mg oral tablet  -- 1 tab(s) by mouth once a day  -- Indication: For Home med    Tradjenta 5 mg oral tablet  -- 1 tab(s) by mouth once a day  -- Indication: For Home med    pravastatin 40 mg oral tablet  -- 1 tab(s) by mouth once a day  -- Indication: For Home med    carvedilol 12.5 mg oral tablet  -- 1 tab(s) by mouth 2 times a day  -- Indication: For Home med    amLODIPine 5 mg oral tablet  -- 1 tab(s) by mouth once a day  -- Indication: For Home med    hydroCHLOROthiazide 25 mg oral tablet  -- 1 tab(s) by mouth once a day  -- Indication: For Home med    Pepcid 20 mg oral tablet  -- 2 tab(s) by mouth once a day  -- It is very important that you take or use this exactly as directed.  Do not skip doses or discontinue unless directed by your doctor.  Obtain medical advice before taking any non-prescription drugs as some may affect the action of this medication.      -- Indication: For Home med    docusate sodium 100 mg oral capsule  -- 1 cap(s) by mouth 3 times a day  -- Indication: For stool softener    omeprazole 40 mg oral delayed release capsule  -- 1 cap(s) by mouth once a day  -- Indication: For Home med    levothyroxine 25 mcg (0.025 mg) oral capsule  -- 1 cap(s) by mouth once a day  -- Indication: For Home med

## 2017-02-18 LAB
ANION GAP SERPL CALC-SCNC: 10 MMOL/L — SIGNIFICANT CHANGE UP (ref 5–17)
ANION GAP SERPL CALC-SCNC: 10 MMOL/L — SIGNIFICANT CHANGE UP (ref 5–17)
BLD GP AB SCN SERPL QL: SIGNIFICANT CHANGE UP
BUN SERPL-MCNC: 18 MG/DL — SIGNIFICANT CHANGE UP (ref 8–20)
BUN SERPL-MCNC: 20 MG/DL — SIGNIFICANT CHANGE UP (ref 8–20)
CALCIUM SERPL-MCNC: 8.5 MG/DL — LOW (ref 8.6–10.2)
CALCIUM SERPL-MCNC: 8.7 MG/DL — SIGNIFICANT CHANGE UP (ref 8.6–10.2)
CHLORIDE SERPL-SCNC: 101 MMOL/L — SIGNIFICANT CHANGE UP (ref 98–107)
CHLORIDE SERPL-SCNC: 97 MMOL/L — LOW (ref 98–107)
CO2 SERPL-SCNC: 26 MMOL/L — SIGNIFICANT CHANGE UP (ref 22–29)
CO2 SERPL-SCNC: 27 MMOL/L — SIGNIFICANT CHANGE UP (ref 22–29)
CREAT SERPL-MCNC: 0.97 MG/DL — SIGNIFICANT CHANGE UP (ref 0.5–1.3)
CREAT SERPL-MCNC: 1.02 MG/DL — SIGNIFICANT CHANGE UP (ref 0.5–1.3)
GLUCOSE SERPL-MCNC: 207 MG/DL — HIGH (ref 70–115)
GLUCOSE SERPL-MCNC: 212 MG/DL — HIGH (ref 70–115)
HCT VFR BLD CALC: 33 % — LOW (ref 37–47)
HGB BLD-MCNC: 10.8 G/DL — LOW (ref 12–16)
MCHC RBC-ENTMCNC: 30.7 PG — SIGNIFICANT CHANGE UP (ref 27–31)
MCHC RBC-ENTMCNC: 32.7 G/DL — SIGNIFICANT CHANGE UP (ref 32–36)
MCV RBC AUTO: 93.8 FL — SIGNIFICANT CHANGE UP (ref 81–99)
PLATELET # BLD AUTO: 161 K/UL — SIGNIFICANT CHANGE UP (ref 150–400)
POTASSIUM SERPL-MCNC: 5.3 MMOL/L — SIGNIFICANT CHANGE UP (ref 3.5–5.3)
POTASSIUM SERPL-MCNC: 5.9 MMOL/L — HIGH (ref 3.5–5.3)
POTASSIUM SERPL-SCNC: 5.3 MMOL/L — SIGNIFICANT CHANGE UP (ref 3.5–5.3)
POTASSIUM SERPL-SCNC: 5.9 MMOL/L — HIGH (ref 3.5–5.3)
RBC # BLD: 3.52 M/UL — LOW (ref 4.4–5.2)
RBC # FLD: 14 % — SIGNIFICANT CHANGE UP (ref 11–15.6)
SODIUM SERPL-SCNC: 133 MMOL/L — LOW (ref 135–145)
SODIUM SERPL-SCNC: 138 MMOL/L — SIGNIFICANT CHANGE UP (ref 135–145)
TYPE + AB SCN PNL BLD: SIGNIFICANT CHANGE UP
WBC # BLD: 7.3 K/UL — SIGNIFICANT CHANGE UP (ref 4.8–10.8)
WBC # FLD AUTO: 7.3 K/UL — SIGNIFICANT CHANGE UP (ref 4.8–10.8)

## 2017-02-18 PROCEDURE — 99233 SBSQ HOSP IP/OBS HIGH 50: CPT

## 2017-02-18 RX ORDER — LISINOPRIL 2.5 MG/1
10 TABLET ORAL DAILY
Qty: 0 | Refills: 0 | Status: DISCONTINUED | OUTPATIENT
Start: 2017-02-18 | End: 2017-02-18

## 2017-02-18 RX ORDER — SODIUM CHLORIDE 9 MG/ML
1000 INJECTION INTRAMUSCULAR; INTRAVENOUS; SUBCUTANEOUS
Qty: 0 | Refills: 0 | Status: DISCONTINUED | OUTPATIENT
Start: 2017-02-18 | End: 2017-02-20

## 2017-02-18 RX ORDER — SODIUM CHLORIDE 9 MG/ML
1000 INJECTION INTRAMUSCULAR; INTRAVENOUS; SUBCUTANEOUS
Qty: 0 | Refills: 0 | Status: COMPLETED | OUTPATIENT
Start: 2017-02-18 | End: 2017-02-18

## 2017-02-18 RX ADMIN — OXYCODONE HYDROCHLORIDE 10 MILLIGRAM(S): 5 TABLET ORAL at 06:55

## 2017-02-18 RX ADMIN — LISINOPRIL 20 MILLIGRAM(S): 2.5 TABLET ORAL at 06:54

## 2017-02-18 RX ADMIN — Medication 975 MILLIGRAM(S): at 17:06

## 2017-02-18 RX ADMIN — AMLODIPINE BESYLATE 5 MILLIGRAM(S): 2.5 TABLET ORAL at 06:54

## 2017-02-18 RX ADMIN — Medication 1: at 12:44

## 2017-02-18 RX ADMIN — FAMOTIDINE 40 MILLIGRAM(S): 10 INJECTION INTRAVENOUS at 12:46

## 2017-02-18 RX ADMIN — ATORVASTATIN CALCIUM 10 MILLIGRAM(S): 80 TABLET, FILM COATED ORAL at 21:18

## 2017-02-18 RX ADMIN — Medication 975 MILLIGRAM(S): at 06:55

## 2017-02-18 RX ADMIN — ENOXAPARIN SODIUM 30 MILLIGRAM(S): 100 INJECTION SUBCUTANEOUS at 06:53

## 2017-02-18 RX ADMIN — Medication 975 MILLIGRAM(S): at 22:10

## 2017-02-18 RX ADMIN — ENOXAPARIN SODIUM 30 MILLIGRAM(S): 100 INJECTION SUBCUTANEOUS at 17:28

## 2017-02-18 RX ADMIN — Medication 1 MILLIGRAM(S): at 12:44

## 2017-02-18 RX ADMIN — CARVEDILOL PHOSPHATE 12.5 MILLIGRAM(S): 80 CAPSULE, EXTENDED RELEASE ORAL at 06:54

## 2017-02-18 RX ADMIN — Medication 100 MILLIGRAM(S): at 21:18

## 2017-02-18 RX ADMIN — Medication 975 MILLIGRAM(S): at 14:06

## 2017-02-18 RX ADMIN — SODIUM CHLORIDE 100 MILLILITER(S): 9 INJECTION INTRAMUSCULAR; INTRAVENOUS; SUBCUTANEOUS at 11:12

## 2017-02-18 RX ADMIN — OXYCODONE HYDROCHLORIDE 10 MILLIGRAM(S): 5 TABLET ORAL at 17:27

## 2017-02-18 RX ADMIN — Medication 325 MILLIGRAM(S): at 17:27

## 2017-02-18 RX ADMIN — SODIUM CHLORIDE 50 MILLILITER(S): 9 INJECTION INTRAMUSCULAR; INTRAVENOUS; SUBCUTANEOUS at 21:19

## 2017-02-18 RX ADMIN — Medication 100 MILLIGRAM(S): at 14:06

## 2017-02-18 RX ADMIN — OXYCODONE HYDROCHLORIDE 10 MILLIGRAM(S): 5 TABLET ORAL at 06:54

## 2017-02-18 RX ADMIN — Medication 325 MILLIGRAM(S): at 12:44

## 2017-02-18 RX ADMIN — Medication 25 MICROGRAM(S): at 06:54

## 2017-02-18 RX ADMIN — CELECOXIB 200 MILLIGRAM(S): 200 CAPSULE ORAL at 11:20

## 2017-02-18 RX ADMIN — Medication 100 MILLIGRAM(S): at 06:54

## 2017-02-18 RX ADMIN — CELECOXIB 200 MILLIGRAM(S): 200 CAPSULE ORAL at 09:33

## 2017-02-18 RX ADMIN — Medication 1: at 18:30

## 2017-02-18 RX ADMIN — CARVEDILOL PHOSPHATE 12.5 MILLIGRAM(S): 80 CAPSULE, EXTENDED RELEASE ORAL at 17:28

## 2017-02-18 RX ADMIN — OXYCODONE HYDROCHLORIDE 10 MILLIGRAM(S): 5 TABLET ORAL at 18:29

## 2017-02-18 RX ADMIN — Medication 1 TABLET(S): at 12:44

## 2017-02-18 RX ADMIN — Medication 1: at 09:35

## 2017-02-18 RX ADMIN — Medication 325 MILLIGRAM(S): at 09:33

## 2017-02-18 RX ADMIN — Medication 975 MILLIGRAM(S): at 21:18

## 2017-02-18 NOTE — PROGRESS NOTE ADULT - ASSESSMENT
78 y/o femal with h/o HTN, HLD, DM, hypothyroid, OA, S/P Left TKR , pod # 1  Problem/Recommendation - 1:  Problem: Osteoarthritis. Recommendation: S/P left Total knee arthroplasty    Post Op ABX as per SCIP  DVT ppx as per Ortho  pain control  PT as per Ortho  Weight bearing restrictions as per Ortho.    Problem/Recommendation - 2:  ·  Problem: Diabetes.  Recommendation: check HGA1C  ISS/Accuchecks  Hold oral hypoglycemics  Consistent Carb diet.     Problem/Recommendation - 3:  ·  Problem: Hypercholesterolemia.  Recommendation: statin   DASH/TLC diet.     Problem/Recommendation - 4:  ·  Problem: GERD (gastroesophageal reflux disease).  Recommendation: Continue PPI.     Problem/Recommendation - 5:  ·  Problem: Hypothyroidism.  Recommendation: Continue synthroid.     Problem/Recommendation - 6:  Problem: Hypertension. Recommendation: Would hold HCTZ for  24 hrs  Continue home medications with hold parameters 76 y/o femal with h/o HTN, HLD, DM, hypothyroid, OA, S/P Left TKR , pod # 1    Problem/Recommendation - 1:  Problem: Osteoarthritis. Recommendation: S/P left Total knee arthroplasty    Post Op ABX as per SCIP  DVT ppx as per Ortho- on Lovenox  pain control  PT as per Ortho  Weight bearing restrictions as per Ortho.    Problem/Recommendation - 2:  ·  Problem: Diabetes- type 2.  Recommendation: Diet , change ivf to NS  ISS/Accuchecks  Hold oral hypoglycemics ,may restart on d/c home    Problem/Recommendation - 3:  ·  Problem: Hypercholesterolemia.  Recommendation: statin   DASH/TLC diet.     Problem/Recommendation - 4:  ·  Problem: GERD (gastroesophageal reflux disease).  Recommendation: Continue PPI.     Problem/Recommendation - 5:  ·  Problem: Hypothyroidism.  Recommendation: Continue synthroid.     Problem/Recommendation - 6:  Problem: Hypertension. Recommendation: Hold HCTZ , decrease Lisinopril to 10 mg secondary  KAYLIN , will  increase Norvasc if needed.    Problem /Plan -7 ; KAYLIN on likely CKD - stage 3 , will hydrate , avoid nephrotoxic meds ( will d/c Celebrex ) , d/c  Lisinopril , follow up BMP later today / AM     Problem /Plan - 8 : Hyperkalemia - will hydrate , hold Lisinopril , repeat bmp later today , if still high will consider Kayexalate 76 y/o femal with h/o HTN, HLD, DM, hypothyroid, OA, S/P Left TKR , pod # 1    Problem/Recommendation - 1:  Problem: Osteoarthritis. Recommendation: S/P left Total knee arthroplasty    Post Op ABX as per SCIP  DVT ppx as per Ortho- on Lovenox  pain control  PT as per Ortho  Weight bearing restrictions as per Ortho.    Problem/Recommendation - 2:  ·  Problem: Diabetes- type 2.  Recommendation: Diet , change ivf to NS  ISS/Accuchecks  Hold oral hypoglycemics ,may restart on d/c home    Problem/Recommendation - 3:  ·  Problem: Hypercholesterolemia.  Recommendation: statin   DASH/TLC diet.     Problem/Recommendation - 4:  ·  Problem: GERD (gastroesophageal reflux disease).  Recommendation: Continue PPI.     Problem/Recommendation - 5:  ·  Problem: Hypothyroidism.  Recommendation: Continue synthroid.     Problem/Recommendation - 6:  Problem: Hypertension. Recommendation: Hold HCTZ , decrease Lisinopril to 10 mg secondary  KAYLIN , will  increase Norvasc if needed.    Problem /Plan -7 ; KAYLIN on likely CKD - stage 3 , will hydrate , avoid nephrotoxic meds ( will d/c Celebrex ) , d/c  Lisinopril , follow up BMP later today / AM     Problem /Plan - 8 : Hyperkalemia - will hydrate , hold Lisinopril , repeat bmp later today , if still high will consider Kayexalate    Problem/Plan - ABLA - 9 : asymptomatic , follow up CBC in am.

## 2017-02-18 NOTE — PROGRESS NOTE ADULT - SUBJECTIVE AND OBJECTIVE BOX
CC: Left knee pain , s/p L TKA , POD # 1  HPI:77 year old [place] speaking female presents with a several year history of left knee pain. Uses cane for ambulation , takes tylenol with mild relief. S/P L TKA , POD #1.  PAST MEDICAL & SURGICAL HISTORY:  Heart murmur  Diabetes- type 2  Knee pain, left: arthritis  Hypercholesterolemia  GERD (gastroesophageal reflux disease)  Hypothyroidism  Hypertension  History of vaginal surgery  S/P tubal ligation      MEDICATIONS  (STANDING):  atorvastatin 10milliGRAM(s) Oral at bedtime  amLODIPine   Tablet 5milliGRAM(s) Oral daily  levothyroxine 25MICROGram(s) Oral daily  dextrose 5% + sodium chloride 0.45%. 1000milliLiter(s) IV Continuous <Continuous>  enoxaparin Injectable 30milliGRAM(s) SubCutaneous every 12 hours  acetaminophen   Tablet. 975milliGRAM(s) Oral every 8 hours  celecoxib 200milliGRAM(s) Oral two times a day before meals  docusate sodium 100milliGRAM(s) Oral three times a day  ferrous    sulfate 325milliGRAM(s) Oral three times a day with meals  folic acid 1milliGRAM(s) Oral daily  multivitamin 1Tablet(s) Oral daily  insulin lispro (HumaLOG) corrective regimen sliding scale  SubCutaneous three times a day before meals  dextrose 5%. 1000milliLiter(s) IV Continuous <Continuous>  dextrose 50% Injectable 12.5Gram(s) IV Push once  dextrose 50% Injectable 25Gram(s) IV Push once  dextrose 50% Injectable 25Gram(s) IV Push once  oxyCODONE ER Tablet 10milliGRAM(s) Oral every 12 hours  carvedilol 12.5milliGRAM(s) Oral every 12 hours  famotidine    Tablet 40milliGRAM(s) Oral daily  lisinopril 20milliGRAM(s) Oral daily    MEDICATIONS  (PRN):  oxyCODONE IR 5milliGRAM(s) Oral every 3 hours PRN Mild Pain  oxyCODONE IR 10milliGRAM(s) Oral every 3 hours PRN Moderate Pain  aluminum hydroxide/magnesium hydroxide/simethicone Suspension 30milliLiter(s) Oral four times a day PRN Indigestion  ondansetron Injectable 4milliGRAM(s) IV Push every 6 hours PRN Nausea and/or Vomiting  magnesium hydroxide Suspension 30milliLiter(s) Oral daily PRN Constipation  senna 2Tablet(s) Oral at bedtime PRN Constipation  dextrose Gel 1Dose(s) Oral once PRN Blood Glucose LESS THAN 70 milliGRAM(s)/deciliter  glucagon  Injectable 1milliGRAM(s) IntraMuscular once PRN Glucose LESS THAN 70 milligrams/deciliter  HYDROmorphone   Tablet 2milliGRAM(s) Oral every 3 hours PRN Severe Pain (7 - 10)  HYDROmorphone  Injectable 0.5milliGRAM(s) IV Push every 3 hours PRN breakthrough pain      LABS:                          10.8   7.3   )-----------( 161      ( 18 Feb 2017 06:12 )             33.0     18 Feb 2017 06:11    138    |  101    |  18.0   ----------------------------<  207    5.9     |  27.0   |  1.02     Ca    8.7        18 Feb 2017 06:11            RADIOLOGY & ADDITIONAL TESTS:      REVIEW OF SYSTEMS:    CONSTITUTIONAL: No fever, weight loss, or fatigue  EYES: No eye pain, visual disturbances, or discharge  ENMT:  No difficulty hearing, tinnitus, vertigo; No sinus or throat pain  NECK: No pain or stiffness  RESPIRATORY: No cough, wheezing, chills or hemoptysis; No shortness of breath  CARDIOVASCULAR: No chest pain, palpitations, dizziness, or leg swelling  GASTROINTESTINAL: No abdominal or epigastric pain. No nausea, vomiting, or hematemesis; No diarrhea or constipation. No melena or hematochezia.  GENITOURINARY: No dysuria, frequency, hematuria, or incontinence  NEUROLOGICAL: No headaches, memory loss, loss of strength, numbness, or tremors  SKIN: No itching, burning, rashes, or lesions   LYMPH NODES: No enlarged glands  ENDOCRINE: No heat or cold intolerance; No hair loss  MUSCULOSKELETAL: L knee pain  PSYCHIATRIC: No depression, anxiety, mood swings, or difficulty sleeping  HEME/LYMPH: No easy bruising, or bleeding gums  ALLERGY AND IMMUNOLOGIC: No hives or eczema    Vital Signs Last 24 Hrs  T(C): 36.7, Max: 36.8 (02-18 @ 01:21)  T(F): 98, Max: 98.3 (02-18 @ 01:21)  HR: 76 (54 - 92)  BP: 140/64 (124/62 - 236/59)  BP(mean): --  RR: 18 (9 - 18)  SpO2: 95% (92% - 100%)  PHYSICAL EXAM:    GENERAL: NAD, well-groomed, well-developed  HEAD:  Atraumatic, Normocephalic  EYES: EOMI, PERRLA, conjunctiva and sclera clear  NECK: Supple, No JVD, Normal thyroid  NERVOUS SYSTEM:  Alert & Oriented X3, no focal deficit  CHEST/LUNG: CTA b/l ,  no  rales, rhonchi, wheezing, or rubs  HEART: Regular rate and rhythm; No murmurs, rubs, or gallops  ABDOMEN: Soft, Nontender, Nondistended; Bowel sounds present  EXTREMITIES:  2+ Peripheral Pulses, No clubbing, cyanosis, or edema , L knee dressing + , C/D/I  LYMPH: No lymphadenopathy noted  SKIN: No rashes or lesions CC: Left knee pain , s/p L TKA , POD # 1  HPI:77 year old Slovenian speaking female presents with a several year history of left knee pain. Uses cane for ambulation , takes tylenol with mild relief. S/P L TKA , POD #1. Seen and examined today , NAD , daughter Daisy at bed side. Pain well controlled , no nausea , no vomiting , no other complaints .      PAST MEDICAL & SURGICAL HISTORY:  Heart murmur  Diabetes- type 2  Knee pain, left: arthritis  Hypercholesterolemia  GERD (gastroesophageal reflux disease)  Hypothyroidism  Hypertension  History of vaginal surgery  S/P tubal ligation      MEDICATIONS  (STANDING):  atorvastatin 10milliGRAM(s) Oral at bedtime  amLODIPine   Tablet 5milliGRAM(s) Oral daily  levothyroxine 25MICROGram(s) Oral daily  dextrose 5% + sodium chloride 0.45%. 1000milliLiter(s) IV Continuous <Continuous>  enoxaparin Injectable 30milliGRAM(s) SubCutaneous every 12 hours  acetaminophen   Tablet. 975milliGRAM(s) Oral every 8 hours  celecoxib 200milliGRAM(s) Oral two times a day before meals  docusate sodium 100milliGRAM(s) Oral three times a day  ferrous    sulfate 325milliGRAM(s) Oral three times a day with meals  folic acid 1milliGRAM(s) Oral daily  multivitamin 1Tablet(s) Oral daily  insulin lispro (HumaLOG) corrective regimen sliding scale  SubCutaneous three times a day before meals  dextrose 5%. 1000milliLiter(s) IV Continuous <Continuous>  dextrose 50% Injectable 12.5Gram(s) IV Push once  dextrose 50% Injectable 25Gram(s) IV Push once  dextrose 50% Injectable 25Gram(s) IV Push once  oxyCODONE ER Tablet 10milliGRAM(s) Oral every 12 hours  carvedilol 12.5milliGRAM(s) Oral every 12 hours  famotidine    Tablet 40milliGRAM(s) Oral daily  lisinopril 20milliGRAM(s) Oral daily    MEDICATIONS  (PRN):  oxyCODONE IR 5milliGRAM(s) Oral every 3 hours PRN Mild Pain  oxyCODONE IR 10milliGRAM(s) Oral every 3 hours PRN Moderate Pain  aluminum hydroxide/magnesium hydroxide/simethicone Suspension 30milliLiter(s) Oral four times a day PRN Indigestion  ondansetron Injectable 4milliGRAM(s) IV Push every 6 hours PRN Nausea and/or Vomiting  magnesium hydroxide Suspension 30milliLiter(s) Oral daily PRN Constipation  senna 2Tablet(s) Oral at bedtime PRN Constipation  dextrose Gel 1Dose(s) Oral once PRN Blood Glucose LESS THAN 70 milliGRAM(s)/deciliter  glucagon  Injectable 1milliGRAM(s) IntraMuscular once PRN Glucose LESS THAN 70 milligrams/deciliter  HYDROmorphone   Tablet 2milliGRAM(s) Oral every 3 hours PRN Severe Pain (7 - 10)  HYDROmorphone  Injectable 0.5milliGRAM(s) IV Push every 3 hours PRN breakthrough pain      LABS:                          10.8   7.3   )-----------( 161      ( 18 Feb 2017 06:12 )             33.0     18 Feb 2017 06:11    138    |  101    |  18.0   ----------------------------<  207    5.9     |  27.0   |  1.02     Ca    8.7        18 Feb 2017 06:11    HgA1C - 7.6    f/s - 189    REVIEW OF SYSTEMS:    CONSTITUTIONAL: No fever, weight loss, or fatigue  EYES: No eye pain, visual disturbances, or discharge  ENMT:  No difficulty hearing, tinnitus, vertigo; No sinus or throat pain  NECK: No pain or stiffness  RESPIRATORY: No cough, wheezing, chills or hemoptysis; No shortness of breath  CARDIOVASCULAR: No chest pain, palpitations, dizziness, or leg swelling  GASTROINTESTINAL: No abdominal or epigastric pain. No nausea, vomiting, or hematemesis; No diarrhea or constipation. No melena or hematochezia.  GENITOURINARY: No dysuria, frequency, hematuria, or incontinence  NEUROLOGICAL: No headaches, memory loss, loss of strength, numbness, or tremors  SKIN: No itching, burning, rashes, or lesions   LYMPH NODES: No enlarged glands  ENDOCRINE: No heat or cold intolerance; No hair loss  MUSCULOSKELETAL: L knee pain ,well controlled with pain meds  PSYCHIATRIC: No depression, anxiety, mood swings, or difficulty sleeping  HEME/LYMPH: No easy bruising, or bleeding gums  ALLERGY AND IMMUNOLOGIC: No hives or eczema    Vital Signs Last 24 Hrs  T(C): 36.7, Max: 36.8 (02-18 @ 01:21)  T(F): 98, Max: 98.3 (02-18 @ 01:21)  HR: 76 (54 - 92)  BP: 140/64 (124/62 - 136/59)  BP(mean): --  RR: 18 (9 - 18)  SpO2: 95% (92% - 100%)    PHYSICAL EXAM:    GENERAL: NAD, well-groomed, well-developed  HEAD:  Atraumatic, Normocephalic  EYES: EOMI, PERRLA, conjunctiva and sclera clear  NECK: Supple, No JVD, Normal thyroid  NERVOUS SYSTEM:  Alert & Oriented X3, no focal deficit  CHEST/LUNG: CTA b/l ,  no  rales, rhonchi, wheezing, or rubs  HEART: Regular rate and rhythm; No murmurs, rubs, or gallops  ABDOMEN: Soft, Nontender, Nondistended; Bowel sounds present  EXTREMITIES:  2+ Peripheral Pulses, No clubbing, cyanosis, or edema , L knee dressing + , C/D/I  LYMPH: No lymphadenopathy noted  SKIN: No rashes or lesions

## 2017-02-18 NOTE — PHYSICAL THERAPY INITIAL EVALUATION ADULT - PERTINENT HX OF CURRENT PROBLEM, REHAB EVAL
Pt presents to [facility] with reports of worsening left knee pain and difficulty ambulating

## 2017-02-18 NOTE — PHYSICAL THERAPY INITIAL EVALUATION ADULT - PLANNED THERAPY INTERVENTIONS, PT EVAL
stair traning/stretching/balance training/bed mobility training/gait training/strengthening/transfer training/ROM

## 2017-02-18 NOTE — PHYSICAL THERAPY INITIAL EVALUATION ADULT - GENERAL OBSERVATIONS, REHAB EVAL
Pt rec'd in room sitting upright in bed breathing RA in NAD, Rasheed compression devices to LEs, (+) IV in place, ACE wrap to left LE

## 2017-02-18 NOTE — PROGRESS NOTE ADULT - SUBJECTIVE AND OBJECTIVE BOX
WILMER CONROY    [id number]    History:  The patient is status post XX on XX, POD # XX. Patient is doing well. The patient's pain is controlled using the prescribed pain medications. The patient is participating in physical therapy. Denies nausea, vomiting, chest pain, shortness of breath, abdominal pain or fever. No new complaints. No acute motor or sensory changes are reported.    Vital Signs Last 24 Hrs  T(C): 36.7, Max: 36.8 (02-18 @ 01:21)  T(F): 98, Max: 98.3 (02-18 @ 01:21)  HR: 76 (54 - 92)  BP: 140/64 (124/62 - 236/59)  BP(mean): --  RR: 18 (9 - 18)  SpO2: 95% (92% - 100%)  I&O's Summary    I & Os for current day (as of 18 Feb 2017 08:45)  =============================================  IN: 1660 ml / OUT: 0 ml / NET: 1660 ml                            10.8   7.3   )-----------( 161      ( 18 Feb 2017 06:12 )             33.0     18 Feb 2017 06:11    138    |  101    |  18.0   ----------------------------<  207    5.9     |  27.0   |  1.02     Ca    8.7        18 Feb 2017 06:11        MEDICATIONS  (STANDING):  atorvastatin 10milliGRAM(s) Oral at bedtime  amLODIPine   Tablet 5milliGRAM(s) Oral daily  levothyroxine 25MICROGram(s) Oral daily  dextrose 5% + sodium chloride 0.45%. 1000milliLiter(s) IV Continuous <Continuous>  enoxaparin Injectable 30milliGRAM(s) SubCutaneous every 12 hours  acetaminophen   Tablet. 975milliGRAM(s) Oral every 8 hours  celecoxib 200milliGRAM(s) Oral two times a day before meals  docusate sodium 100milliGRAM(s) Oral three times a day  ferrous    sulfate 325milliGRAM(s) Oral three times a day with meals  folic acid 1milliGRAM(s) Oral daily  multivitamin 1Tablet(s) Oral daily  insulin lispro (HumaLOG) corrective regimen sliding scale  SubCutaneous three times a day before meals  dextrose 5%. 1000milliLiter(s) IV Continuous <Continuous>  dextrose 50% Injectable 12.5Gram(s) IV Push once  dextrose 50% Injectable 25Gram(s) IV Push once  dextrose 50% Injectable 25Gram(s) IV Push once  oxyCODONE ER Tablet 10milliGRAM(s) Oral every 12 hours  carvedilol 12.5milliGRAM(s) Oral every 12 hours  famotidine    Tablet 40milliGRAM(s) Oral daily  lisinopril 20milliGRAM(s) Oral daily    MEDICATIONS  (PRN):  oxyCODONE IR 5milliGRAM(s) Oral every 3 hours PRN Mild Pain  oxyCODONE IR 10milliGRAM(s) Oral every 3 hours PRN Moderate Pain  aluminum hydroxide/magnesium hydroxide/simethicone Suspension 30milliLiter(s) Oral four times a day PRN Indigestion  ondansetron Injectable 4milliGRAM(s) IV Push every 6 hours PRN Nausea and/or Vomiting  magnesium hydroxide Suspension 30milliLiter(s) Oral daily PRN Constipation  senna 2Tablet(s) Oral at bedtime PRN Constipation  dextrose Gel 1Dose(s) Oral once PRN Blood Glucose LESS THAN 70 milliGRAM(s)/deciliter  glucagon  Injectable 1milliGRAM(s) IntraMuscular once PRN Glucose LESS THAN 70 milligrams/deciliter  HYDROmorphone   Tablet 2milliGRAM(s) Oral every 3 hours PRN Severe Pain (7 - 10)  HYDROmorphone  Injectable 0.5milliGRAM(s) IV Push every 3 hours PRN breakthrough pain      Physical exam: The dressing is clean, dry and intact. No wound erythema, discharge, drainage is noted. Sensation to light touch is grossly intact without focal deficit and is symmetric bilaterally. No calf tenderness. Sensation to light touch is grossly intact distally. Motor function distally is grossly intact. No foot drop. + dorsalis pedis pulse. Capillary refill is less than 2 seconds. No cyanosis.    Primary Orthopedic Assessment:  • ortho stable  Secondary  Orthopedic Assessment(s):   •   Secondary  Medical Assessment(s):   •   Plan:   • DVT prophylaxis as prescribed, including use of compression devices and ankle pumps  • Continue physical therapy  • WBAT  • Pain control as clinically indicated  • Incentive spirometry encouraged  • Discharge planning – anticipated discharge is Home tomorrow once cleared by PT/Medicine

## 2017-02-19 LAB
ANION GAP SERPL CALC-SCNC: 9 MMOL/L — SIGNIFICANT CHANGE UP (ref 5–17)
BUN SERPL-MCNC: 16 MG/DL — SIGNIFICANT CHANGE UP (ref 8–20)
CALCIUM SERPL-MCNC: 8.9 MG/DL — SIGNIFICANT CHANGE UP (ref 8.6–10.2)
CHLORIDE SERPL-SCNC: 104 MMOL/L — SIGNIFICANT CHANGE UP (ref 98–107)
CO2 SERPL-SCNC: 25 MMOL/L — SIGNIFICANT CHANGE UP (ref 22–29)
CREAT SERPL-MCNC: 0.84 MG/DL — SIGNIFICANT CHANGE UP (ref 0.5–1.3)
GLUCOSE SERPL-MCNC: 182 MG/DL — HIGH (ref 70–115)
HCT VFR BLD CALC: 30 % — LOW (ref 37–47)
HGB BLD-MCNC: 9.8 G/DL — LOW (ref 12–16)
MCHC RBC-ENTMCNC: 30.6 PG — SIGNIFICANT CHANGE UP (ref 27–31)
MCHC RBC-ENTMCNC: 32.7 G/DL — SIGNIFICANT CHANGE UP (ref 32–36)
MCV RBC AUTO: 93.8 FL — SIGNIFICANT CHANGE UP (ref 81–99)
PLATELET # BLD AUTO: 141 K/UL — LOW (ref 150–400)
POTASSIUM SERPL-MCNC: 4.7 MMOL/L — SIGNIFICANT CHANGE UP (ref 3.5–5.3)
POTASSIUM SERPL-MCNC: 5.8 MMOL/L — HIGH (ref 3.5–5.3)
POTASSIUM SERPL-SCNC: 4.7 MMOL/L — SIGNIFICANT CHANGE UP (ref 3.5–5.3)
POTASSIUM SERPL-SCNC: 5.8 MMOL/L — HIGH (ref 3.5–5.3)
RBC # BLD: 3.2 M/UL — LOW (ref 4.4–5.2)
RBC # FLD: 13.9 % — SIGNIFICANT CHANGE UP (ref 11–15.6)
SODIUM SERPL-SCNC: 138 MMOL/L — SIGNIFICANT CHANGE UP (ref 135–145)
WBC # BLD: 7.8 K/UL — SIGNIFICANT CHANGE UP (ref 4.8–10.8)
WBC # FLD AUTO: 7.8 K/UL — SIGNIFICANT CHANGE UP (ref 4.8–10.8)

## 2017-02-19 PROCEDURE — 99233 SBSQ HOSP IP/OBS HIGH 50: CPT

## 2017-02-19 RX ORDER — OXYCODONE HYDROCHLORIDE 5 MG/1
1 TABLET ORAL
Qty: 50 | Refills: 0
Start: 2017-02-19

## 2017-02-19 RX ORDER — ACETAMINOPHEN 500 MG
1 TABLET ORAL
Qty: 0 | Refills: 0 | COMMUNITY

## 2017-02-19 RX ORDER — SODIUM POLYSTYRENE SULFONATE 4.1 MEQ/G
30 POWDER, FOR SUSPENSION ORAL ONCE
Qty: 0 | Refills: 0 | Status: COMPLETED | OUTPATIENT
Start: 2017-02-19 | End: 2017-02-19

## 2017-02-19 RX ORDER — ENOXAPARIN SODIUM 100 MG/ML
30 INJECTION SUBCUTANEOUS
Qty: 56 | Refills: 0
Start: 2017-02-19 | End: 2017-03-19

## 2017-02-19 RX ORDER — DOCUSATE SODIUM 100 MG
1 CAPSULE ORAL
Qty: 30 | Refills: 0
Start: 2017-02-19

## 2017-02-19 RX ADMIN — OXYCODONE HYDROCHLORIDE 10 MILLIGRAM(S): 5 TABLET ORAL at 21:30

## 2017-02-19 RX ADMIN — Medication 100 MILLIGRAM(S): at 05:37

## 2017-02-19 RX ADMIN — CARVEDILOL PHOSPHATE 12.5 MILLIGRAM(S): 80 CAPSULE, EXTENDED RELEASE ORAL at 05:37

## 2017-02-19 RX ADMIN — OXYCODONE HYDROCHLORIDE 10 MILLIGRAM(S): 5 TABLET ORAL at 09:47

## 2017-02-19 RX ADMIN — OXYCODONE HYDROCHLORIDE 10 MILLIGRAM(S): 5 TABLET ORAL at 20:34

## 2017-02-19 RX ADMIN — Medication 975 MILLIGRAM(S): at 20:20

## 2017-02-19 RX ADMIN — Medication 975 MILLIGRAM(S): at 22:16

## 2017-02-19 RX ADMIN — OXYCODONE HYDROCHLORIDE 10 MILLIGRAM(S): 5 TABLET ORAL at 10:45

## 2017-02-19 RX ADMIN — Medication 25 MICROGRAM(S): at 05:37

## 2017-02-19 RX ADMIN — Medication 325 MILLIGRAM(S): at 19:23

## 2017-02-19 RX ADMIN — Medication 975 MILLIGRAM(S): at 23:15

## 2017-02-19 RX ADMIN — CARVEDILOL PHOSPHATE 12.5 MILLIGRAM(S): 80 CAPSULE, EXTENDED RELEASE ORAL at 20:35

## 2017-02-19 RX ADMIN — OXYCODONE HYDROCHLORIDE 10 MILLIGRAM(S): 5 TABLET ORAL at 20:37

## 2017-02-19 RX ADMIN — ENOXAPARIN SODIUM 30 MILLIGRAM(S): 100 INJECTION SUBCUTANEOUS at 17:00

## 2017-02-19 RX ADMIN — Medication 975 MILLIGRAM(S): at 19:21

## 2017-02-19 RX ADMIN — Medication 325 MILLIGRAM(S): at 19:24

## 2017-02-19 RX ADMIN — OXYCODONE HYDROCHLORIDE 10 MILLIGRAM(S): 5 TABLET ORAL at 14:02

## 2017-02-19 RX ADMIN — Medication 975 MILLIGRAM(S): at 05:36

## 2017-02-19 RX ADMIN — Medication 100 MILLIGRAM(S): at 19:22

## 2017-02-19 RX ADMIN — FAMOTIDINE 40 MILLIGRAM(S): 10 INJECTION INTRAVENOUS at 19:22

## 2017-02-19 RX ADMIN — Medication 1: at 09:52

## 2017-02-19 RX ADMIN — AMLODIPINE BESYLATE 5 MILLIGRAM(S): 2.5 TABLET ORAL at 05:37

## 2017-02-19 RX ADMIN — ATORVASTATIN CALCIUM 10 MILLIGRAM(S): 80 TABLET, FILM COATED ORAL at 22:17

## 2017-02-19 RX ADMIN — SODIUM POLYSTYRENE SULFONATE 30 GRAM(S): 4.1 POWDER, FOR SUSPENSION ORAL at 14:04

## 2017-02-19 RX ADMIN — Medication 325 MILLIGRAM(S): at 09:51

## 2017-02-19 RX ADMIN — Medication 100 MILLIGRAM(S): at 22:15

## 2017-02-19 RX ADMIN — OXYCODONE HYDROCHLORIDE 10 MILLIGRAM(S): 5 TABLET ORAL at 15:00

## 2017-02-19 RX ADMIN — Medication 1 MILLIGRAM(S): at 19:23

## 2017-02-19 RX ADMIN — ENOXAPARIN SODIUM 30 MILLIGRAM(S): 100 INJECTION SUBCUTANEOUS at 05:37

## 2017-02-19 NOTE — PROGRESS NOTE ADULT - SUBJECTIVE AND OBJECTIVE BOX
MEDICINE CONSULT FOLLOW UP    hospital course reviewed    seen at bedside. patient working with PT, limited mobility with walker due to pain in Left knee.        MEDICATIONS  (STANDING):  atorvastatin 10milliGRAM(s) Oral at bedtime  amLODIPine   Tablet 5milliGRAM(s) Oral daily  levothyroxine 25MICROGram(s) Oral daily  enoxaparin Injectable 30milliGRAM(s) SubCutaneous every 12 hours  acetaminophen   Tablet. 975milliGRAM(s) Oral every 8 hours  docusate sodium 100milliGRAM(s) Oral three times a day  ferrous    sulfate 325milliGRAM(s) Oral three times a day with meals  folic acid 1milliGRAM(s) Oral daily  multivitamin 1Tablet(s) Oral daily  insulin lispro (HumaLOG) corrective regimen sliding scale  SubCutaneous three times a day before meals  dextrose 5%. 1000milliLiter(s) IV Continuous <Continuous>  dextrose 50% Injectable 12.5Gram(s) IV Push once  dextrose 50% Injectable 25Gram(s) IV Push once  dextrose 50% Injectable 25Gram(s) IV Push once  oxyCODONE ER Tablet 10milliGRAM(s) Oral every 12 hours  carvedilol 12.5milliGRAM(s) Oral every 12 hours  famotidine    Tablet 40milliGRAM(s) Oral daily  sodium chloride 0.9%. 1000milliLiter(s) IV Continuous <Continuous>    MEDICATIONS  (PRN):  oxyCODONE IR 5milliGRAM(s) Oral every 3 hours PRN Mild Pain  oxyCODONE IR 10milliGRAM(s) Oral every 3 hours PRN Moderate Pain  aluminum hydroxide/magnesium hydroxide/simethicone Suspension 30milliLiter(s) Oral four times a day PRN Indigestion  ondansetron Injectable 4milliGRAM(s) IV Push every 6 hours PRN Nausea and/or Vomiting  magnesium hydroxide Suspension 30milliLiter(s) Oral daily PRN Constipation  senna 2Tablet(s) Oral at bedtime PRN Constipation  dextrose Gel 1Dose(s) Oral once PRN Blood Glucose LESS THAN 70 milliGRAM(s)/deciliter  glucagon  Injectable 1milliGRAM(s) IntraMuscular once PRN Glucose LESS THAN 70 milligrams/deciliter  HYDROmorphone   Tablet 2milliGRAM(s) Oral every 3 hours PRN Severe Pain (7 - 10)  HYDROmorphone  Injectable 0.5milliGRAM(s) IV Push every 3 hours PRN breakthrough pain      Allergies    aspirin (Rash)    Intolerances        REVIEW OF SYSTEMS:    CONSTITUTIONAL: No fever  RESPIRATORY:No shortness of breath  CARDIOVASCULAR: No chest pain, palpitations  GASTROINTESTINAL: + constipation  NEUROLOGICAL: weakness due to pain.   MISCELLANEOUS:    Vital Signs Last 24 Hrs  T(C): 36.7, Max: 36.9 (02-18 @ 20:09)  T(F): 98, Max: 98.4 (02-18 @ 20:09)  HR: 80 (80 - 86)  BP: 135/62 (119/63 - 143/51)  BP(mean): --  RR: 18 (18 - 20)  SpO2: 96% (92% - 96%)    PHYSICAL EXAM:    GENERAL: NAD, obese  CHEST/LUNG: Clear to percussion bilaterally; No rales, rhonchi, wheezing, or rubs  HEART: Regular rate and rhythm; S1 S2; no murmurs noted  ABDOMEN: Soft, Nontender, Nondistended; Bowel sounds present  EXTREMITIES:  left knee incision c/d/i + dressing, mild edema    RLE no edema.  NERVOUS SYSTEM:  Alert & Oriented X3, nonfocal exam.  PSYCH: normal mood, appropriate response.    LABS:                        9.8    7.8   )-----------( 141      ( 19 Feb 2017 06:37 )             30.0     19 Feb 2017 06:37    138    |  104    |  16.0   ----------------------------<  182    5.8     |  25.0   |  0.84     Ca    8.9        19 Feb 2017 06:37            CAPILLARY BLOOD GLUCOSE  236 (18 Feb 2017 21:28)  188 (18 Feb 2017 16:05)  199 (18 Feb 2017 11:18)        RADIOLOGY & ADDITIONAL TESTS:

## 2017-02-19 NOTE — PROGRESS NOTE ADULT - SUBJECTIVE AND OBJECTIVE BOX
WILMER CONROY  [id number]    History: 77y Female is status post left total knee arthroplasty POD # 2. Patient is doing well and is comfortable. Patient seen with family at bedside and with .  The patient's pain is controlled using the prescribed pain medications. The patient is participating in physical therapy. Denies nausea, vomiting, chest pain, shortness of breath, abdominal pain or fever. No new complaints.  Family at bedside reports some confusion last night, but normal mental status this morning.                         9.8    7.8   )-----------( 141      ( 19 Feb 2017 06:37 )             30.0     19 Feb 2017 06:37    138    |  104    |  16.0   ----------------------------<  182    5.8     |  25.0   |  0.84     Ca    8.9        19 Feb 2017 06:37        MEDICATIONS  (STANDING):  atorvastatin 10milliGRAM(s) Oral at bedtime  amLODIPine   Tablet 5milliGRAM(s) Oral daily  levothyroxine 25MICROGram(s) Oral daily  enoxaparin Injectable 30milliGRAM(s) SubCutaneous every 12 hours  acetaminophen   Tablet. 975milliGRAM(s) Oral every 8 hours  docusate sodium 100milliGRAM(s) Oral three times a day  ferrous    sulfate 325milliGRAM(s) Oral three times a day with meals  folic acid 1milliGRAM(s) Oral daily  multivitamin 1Tablet(s) Oral daily  insulin lispro (HumaLOG) corrective regimen sliding scale  SubCutaneous three times a day before meals  dextrose 5%. 1000milliLiter(s) IV Continuous <Continuous>  dextrose 50% Injectable 12.5Gram(s) IV Push once  dextrose 50% Injectable 25Gram(s) IV Push once  dextrose 50% Injectable 25Gram(s) IV Push once  oxyCODONE ER Tablet 10milliGRAM(s) Oral every 12 hours  carvedilol 12.5milliGRAM(s) Oral every 12 hours  famotidine    Tablet 40milliGRAM(s) Oral daily  sodium chloride 0.9%. 1000milliLiter(s) IV Continuous <Continuous>    MEDICATIONS  (PRN):  oxyCODONE IR 5milliGRAM(s) Oral every 3 hours PRN Mild Pain  oxyCODONE IR 10milliGRAM(s) Oral every 3 hours PRN Moderate Pain  aluminum hydroxide/magnesium hydroxide/simethicone Suspension 30milliLiter(s) Oral four times a day PRN Indigestion  ondansetron Injectable 4milliGRAM(s) IV Push every 6 hours PRN Nausea and/or Vomiting  magnesium hydroxide Suspension 30milliLiter(s) Oral daily PRN Constipation  senna 2Tablet(s) Oral at bedtime PRN Constipation  dextrose Gel 1Dose(s) Oral once PRN Blood Glucose LESS THAN 70 milliGRAM(s)/deciliter  glucagon  Injectable 1milliGRAM(s) IntraMuscular once PRN Glucose LESS THAN 70 milligrams/deciliter  HYDROmorphone   Tablet 2milliGRAM(s) Oral every 3 hours PRN Severe Pain (7 - 10)  HYDROmorphone  Injectable 0.5milliGRAM(s) IV Push every 3 hours PRN breakthrough pain      Physical exam: The left knee dressing is clean, dry and intact. No drainage or discharge. No erythema is noted. No blistering. No ecchymosis. The calf is supple nontender. Passive range of motion is acceptable to due postoperative pain. No calf tenderness. Sensation to light touch is grossly intact distally. Motor function distally is 5/5. Extensor hallucis longus and flexor hallucis longus are intact. No foot drop. 2+ dorsalis pedis pulse. Capillary refill is less than 2 seconds. No cyanosis.    Primary Orthopedic Assessment:  •	s/p LEFT total knee replacement      Plan:   •	DVT prophylaxis as prescribed, including use of compression devices and ankle pumps.  Lovenox 30 BID x 4 weeks (allergy to aspirin).  •	Continue physical therapy  •	Weightbearing as tolerated of the right lower extremity with assistance of a walker  •	Incentive spirometry encouraged  •	Pain control as clinically indicated  •	Discharge planning – anticipated discharge is Home once cleared by medicine and PT.

## 2017-02-19 NOTE — PROGRESS NOTE ADULT - ASSESSMENT
76 y/o femal with h/o HTN, HLD, DM2, hypothyroid, OA, S/P Left TKR , pod # 2    left TKA:  pain control/dvt ppx per orthopedics      PT following    Hyperkalemia:  hold ace inhibitor,  kayexalate 30gms x 1 and repeat level either late today or tomorrow.      likely restart as outpatient in 1 week once outpatient labs stable as well with PMD     post op anemia; stable, cbc in am    HTN: stable ,c/w norvasc/coreg,  can add hctz 25mg (home dose)  DM2: raiss, hold home meds.  HLD: statin.    updated plan of care with daughter and patient at bedside via   updated orthopedics PA  d/c once cleared by PT and hyperkalemia resolved.  will continue to follow

## 2017-02-20 VITALS
DIASTOLIC BLOOD PRESSURE: 67 MMHG | OXYGEN SATURATION: 97 % | SYSTOLIC BLOOD PRESSURE: 147 MMHG | RESPIRATION RATE: 18 BRPM | HEART RATE: 72 BPM | TEMPERATURE: 98 F

## 2017-02-20 LAB
ANION GAP SERPL CALC-SCNC: 10 MMOL/L — SIGNIFICANT CHANGE UP (ref 5–17)
BUN SERPL-MCNC: 10 MG/DL — SIGNIFICANT CHANGE UP (ref 8–20)
CALCIUM SERPL-MCNC: 9.2 MG/DL — SIGNIFICANT CHANGE UP (ref 8.6–10.2)
CHLORIDE SERPL-SCNC: 103 MMOL/L — SIGNIFICANT CHANGE UP (ref 98–107)
CO2 SERPL-SCNC: 26 MMOL/L — SIGNIFICANT CHANGE UP (ref 22–29)
CREAT SERPL-MCNC: 0.69 MG/DL — SIGNIFICANT CHANGE UP (ref 0.5–1.3)
GLUCOSE SERPL-MCNC: 142 MG/DL — HIGH (ref 70–115)
HCT VFR BLD CALC: 30.5 % — LOW (ref 37–47)
HGB BLD-MCNC: 10.1 G/DL — LOW (ref 12–16)
MCHC RBC-ENTMCNC: 30.9 PG — SIGNIFICANT CHANGE UP (ref 27–31)
MCHC RBC-ENTMCNC: 33.1 G/DL — SIGNIFICANT CHANGE UP (ref 32–36)
MCV RBC AUTO: 93.3 FL — SIGNIFICANT CHANGE UP (ref 81–99)
PLATELET # BLD AUTO: 155 K/UL — SIGNIFICANT CHANGE UP (ref 150–400)
POTASSIUM SERPL-MCNC: 5 MMOL/L — SIGNIFICANT CHANGE UP (ref 3.5–5.3)
POTASSIUM SERPL-SCNC: 5 MMOL/L — SIGNIFICANT CHANGE UP (ref 3.5–5.3)
RBC # BLD: 3.27 M/UL — LOW (ref 4.4–5.2)
RBC # FLD: 13.7 % — SIGNIFICANT CHANGE UP (ref 11–15.6)
SODIUM SERPL-SCNC: 139 MMOL/L — SIGNIFICANT CHANGE UP (ref 135–145)
WBC # BLD: 9.1 K/UL — SIGNIFICANT CHANGE UP (ref 4.8–10.8)
WBC # FLD AUTO: 9.1 K/UL — SIGNIFICANT CHANGE UP (ref 4.8–10.8)

## 2017-02-20 PROCEDURE — 88311 DECALCIFY TISSUE: CPT

## 2017-02-20 PROCEDURE — C1776: CPT

## 2017-02-20 PROCEDURE — 80048 BASIC METABOLIC PNL TOTAL CA: CPT

## 2017-02-20 PROCEDURE — 86850 RBC ANTIBODY SCREEN: CPT

## 2017-02-20 PROCEDURE — 97116 GAIT TRAINING THERAPY: CPT

## 2017-02-20 PROCEDURE — T1013: CPT

## 2017-02-20 PROCEDURE — 84132 ASSAY OF SERUM POTASSIUM: CPT

## 2017-02-20 PROCEDURE — 88305 TISSUE EXAM BY PATHOLOGIST: CPT

## 2017-02-20 PROCEDURE — 86900 BLOOD TYPING SEROLOGIC ABO: CPT

## 2017-02-20 PROCEDURE — G8979: CPT

## 2017-02-20 PROCEDURE — 86901 BLOOD TYPING SEROLOGIC RH(D): CPT

## 2017-02-20 PROCEDURE — 97110 THERAPEUTIC EXERCISES: CPT

## 2017-02-20 PROCEDURE — 85027 COMPLETE CBC AUTOMATED: CPT

## 2017-02-20 PROCEDURE — 99232 SBSQ HOSP IP/OBS MODERATE 35: CPT

## 2017-02-20 PROCEDURE — 36415 COLL VENOUS BLD VENIPUNCTURE: CPT

## 2017-02-20 PROCEDURE — C1713: CPT

## 2017-02-20 PROCEDURE — G8978: CPT

## 2017-02-20 PROCEDURE — 73560 X-RAY EXAM OF KNEE 1 OR 2: CPT

## 2017-02-20 PROCEDURE — 97163 PT EVAL HIGH COMPLEX 45 MIN: CPT

## 2017-02-20 PROCEDURE — 97530 THERAPEUTIC ACTIVITIES: CPT

## 2017-02-20 RX ADMIN — Medication 975 MILLIGRAM(S): at 13:30

## 2017-02-20 RX ADMIN — ENOXAPARIN SODIUM 30 MILLIGRAM(S): 100 INJECTION SUBCUTANEOUS at 05:58

## 2017-02-20 RX ADMIN — SCOPALAMINE 1.5 MILLIGRAM(S): 1 PATCH, EXTENDED RELEASE TRANSDERMAL at 05:56

## 2017-02-20 RX ADMIN — AMLODIPINE BESYLATE 5 MILLIGRAM(S): 2.5 TABLET ORAL at 05:58

## 2017-02-20 RX ADMIN — Medication 1 TABLET(S): at 11:05

## 2017-02-20 RX ADMIN — Medication 975 MILLIGRAM(S): at 05:58

## 2017-02-20 RX ADMIN — Medication 975 MILLIGRAM(S): at 06:57

## 2017-02-20 RX ADMIN — Medication 100 MILLIGRAM(S): at 06:00

## 2017-02-20 RX ADMIN — Medication 1 MILLIGRAM(S): at 11:06

## 2017-02-20 RX ADMIN — Medication 100 MILLIGRAM(S): at 12:57

## 2017-02-20 RX ADMIN — Medication 25 MICROGRAM(S): at 05:58

## 2017-02-20 RX ADMIN — OXYCODONE HYDROCHLORIDE 10 MILLIGRAM(S): 5 TABLET ORAL at 06:57

## 2017-02-20 RX ADMIN — FAMOTIDINE 40 MILLIGRAM(S): 10 INJECTION INTRAVENOUS at 11:05

## 2017-02-20 RX ADMIN — Medication 325 MILLIGRAM(S): at 11:06

## 2017-02-20 RX ADMIN — Medication 975 MILLIGRAM(S): at 12:56

## 2017-02-20 RX ADMIN — CARVEDILOL PHOSPHATE 12.5 MILLIGRAM(S): 80 CAPSULE, EXTENDED RELEASE ORAL at 05:58

## 2017-02-20 RX ADMIN — OXYCODONE HYDROCHLORIDE 10 MILLIGRAM(S): 5 TABLET ORAL at 06:05

## 2017-02-20 RX ADMIN — Medication 2: at 12:56

## 2017-02-20 RX ADMIN — Medication 325 MILLIGRAM(S): at 07:56

## 2017-02-20 NOTE — PROGRESS NOTE ADULT - SUBJECTIVE AND OBJECTIVE BOX
WILMER CONROY  [id number]    History: 77y Female is status post left total knee arthroplasty, POD # 3. Patient is doing well and is comfortable. The patient's pain is controlled using the prescribed pain medications. The patient is participating in physical therapy. She ambulated to the [facility] with PT. Denies nausea, vomiting, chest pain, shortness of breath, abdominal pain or fever. No new complaints.                          10.1   9.1   )-----------( 155      ( 20 Feb 2017 06:09 )             30.5     20 Feb 2017 06:09    139    |  103    |  10.0   ----------------------------<  142    5.0     |  26.0   |  0.69     Ca    9.2        20 Feb 2017 06:09        MEDICATIONS  (STANDING):  atorvastatin 10milliGRAM(s) Oral at bedtime  amLODIPine   Tablet 5milliGRAM(s) Oral daily  levothyroxine 25MICROGram(s) Oral daily  enoxaparin Injectable 30milliGRAM(s) SubCutaneous every 12 hours  acetaminophen   Tablet. 975milliGRAM(s) Oral every 8 hours  docusate sodium 100milliGRAM(s) Oral three times a day  ferrous    sulfate 325milliGRAM(s) Oral three times a day with meals  folic acid 1milliGRAM(s) Oral daily  multivitamin 1Tablet(s) Oral daily  insulin lispro (HumaLOG) corrective regimen sliding scale  SubCutaneous three times a day before meals  dextrose 5%. 1000milliLiter(s) IV Continuous <Continuous>  dextrose 50% Injectable 12.5Gram(s) IV Push once  dextrose 50% Injectable 25Gram(s) IV Push once  dextrose 50% Injectable 25Gram(s) IV Push once  oxyCODONE ER Tablet 10milliGRAM(s) Oral every 12 hours  carvedilol 12.5milliGRAM(s) Oral every 12 hours  famotidine    Tablet 40milliGRAM(s) Oral daily  sodium chloride 0.9%. 1000milliLiter(s) IV Continuous <Continuous>  hydrochlorothiazide   Tablet 25milliGRAM(s) Oral daily    MEDICATIONS  (PRN):  oxyCODONE IR 5milliGRAM(s) Oral every 3 hours PRN Mild Pain  oxyCODONE IR 10milliGRAM(s) Oral every 3 hours PRN Moderate Pain  aluminum hydroxide/magnesium hydroxide/simethicone Suspension 30milliLiter(s) Oral four times a day PRN Indigestion  ondansetron Injectable 4milliGRAM(s) IV Push every 6 hours PRN Nausea and/or Vomiting  magnesium hydroxide Suspension 30milliLiter(s) Oral daily PRN Constipation  senna 2Tablet(s) Oral at bedtime PRN Constipation  dextrose Gel 1Dose(s) Oral once PRN Blood Glucose LESS THAN 70 milliGRAM(s)/deciliter  glucagon  Injectable 1milliGRAM(s) IntraMuscular once PRN Glucose LESS THAN 70 milligrams/deciliter  HYDROmorphone   Tablet 2milliGRAM(s) Oral every 3 hours PRN Severe Pain (7 - 10)  HYDROmorphone  Injectable 0.5milliGRAM(s) IV Push every 3 hours PRN breakthrough pain    Vital Signs Last 24 Hrs  T(C): 36.9, Max: 37.2 (02-19 @ 23:46)  T(F): 98.5, Max: 98.9 (02-19 @ 23:46)  HR: 79 (79 - 88)  BP: 157/69 (135/62 - 160/68)  BP(mean): --  RR: 17 (17 - 18)  SpO2: 95% (94% - 96%)    Physical exam: Left lower extremity- The left knee dressing is clean, dry and intact. No drainage or discharge. No erythema is noted. No blistering. No ecchymosis. The calf is supple nontender. Sensation to light touch is grossly intact distally. Motor function distally is 5/5. Extensor hallucis longus and flexor hallucis longus are intact. No foot drop. 2+ dorsalis pedis pulse. Capillary refill is less than 2 seconds. No cyanosis.    Primary Orthopedic Assessment:  •	s/p LEFT total knee replacement, POD#3  	    Plan:   •	DVT prophylaxis- Lovenox, including use of compression devices and ankle pumps  •	Continue physical therapy  •	Weightbearing as tolerated of the left lower extremity with assistance of a walker  •	Incentive spirometry encouraged  •	Pain control as clinically indicated  •	Discharge planning – anticipated discharge is Home today after cleared by PT WILMER CONROY  [id number]    History: 77y Female is status post left total knee arthroplasty, POD # 3. Patient is doing well and is comfortable. The patient's pain is controlled using the prescribed pain medications. The patient is participating in physical therapy. She ambulated to the [facility] with PT. Denies nausea, vomiting, chest pain, shortness of breath, abdominal pain or fever. No new complaints.                          10.1   9.1   )-----------( 155      ( 20 Feb 2017 06:09 )             30.5     20 Feb 2017 06:09    139    |  103    |  10.0   ----------------------------<  142    5.0     |  26.0   |  0.69     Ca    9.2        20 Feb 2017 06:09        MEDICATIONS  (STANDING):  atorvastatin 10milliGRAM(s) Oral at bedtime  amLODIPine   Tablet 5milliGRAM(s) Oral daily  levothyroxine 25MICROGram(s) Oral daily  enoxaparin Injectable 30milliGRAM(s) SubCutaneous every 12 hours  acetaminophen   Tablet. 975milliGRAM(s) Oral every 8 hours  docusate sodium 100milliGRAM(s) Oral three times a day  ferrous    sulfate 325milliGRAM(s) Oral three times a day with meals  folic acid 1milliGRAM(s) Oral daily  multivitamin 1Tablet(s) Oral daily  insulin lispro (HumaLOG) corrective regimen sliding scale  SubCutaneous three times a day before meals  dextrose 5%. 1000milliLiter(s) IV Continuous <Continuous>  dextrose 50% Injectable 12.5Gram(s) IV Push once  dextrose 50% Injectable 25Gram(s) IV Push once  dextrose 50% Injectable 25Gram(s) IV Push once  oxyCODONE ER Tablet 10milliGRAM(s) Oral every 12 hours  carvedilol 12.5milliGRAM(s) Oral every 12 hours  famotidine    Tablet 40milliGRAM(s) Oral daily  sodium chloride 0.9%. 1000milliLiter(s) IV Continuous <Continuous>  hydrochlorothiazide   Tablet 25milliGRAM(s) Oral daily    MEDICATIONS  (PRN):  oxyCODONE IR 5milliGRAM(s) Oral every 3 hours PRN Mild Pain  oxyCODONE IR 10milliGRAM(s) Oral every 3 hours PRN Moderate Pain  aluminum hydroxide/magnesium hydroxide/simethicone Suspension 30milliLiter(s) Oral four times a day PRN Indigestion  ondansetron Injectable 4milliGRAM(s) IV Push every 6 hours PRN Nausea and/or Vomiting  magnesium hydroxide Suspension 30milliLiter(s) Oral daily PRN Constipation  senna 2Tablet(s) Oral at bedtime PRN Constipation  dextrose Gel 1Dose(s) Oral once PRN Blood Glucose LESS THAN 70 milliGRAM(s)/deciliter  glucagon  Injectable 1milliGRAM(s) IntraMuscular once PRN Glucose LESS THAN 70 milligrams/deciliter  HYDROmorphone   Tablet 2milliGRAM(s) Oral every 3 hours PRN Severe Pain (7 - 10)  HYDROmorphone  Injectable 0.5milliGRAM(s) IV Push every 3 hours PRN breakthrough pain    Vital Signs Last 24 Hrs  T(C): 36.9, Max: 37.2 (02-19 @ 23:46)  T(F): 98.5, Max: 98.9 (02-19 @ 23:46)  HR: 79 (79 - 88)  BP: 157/69 (135/62 - 160/68)  BP(mean): --  RR: 17 (17 - 18)  SpO2: 95% (94% - 96%)    Physical exam: Left lower extremity- The left knee dressing is clean, dry and intact. No drainage or discharge. No erythema is noted. No blistering. No ecchymosis. The calf is supple nontender. Sensation to light touch is grossly intact distally. Motor function distally is 5/5. Extensor hallucis longus and flexor hallucis longus are intact. No foot drop. 2+ dorsalis pedis pulse. Capillary refill is less than 2 seconds. No cyanosis.    Primary Orthopedic Assessment:  •	s/p LEFT total knee replacement, POD#3  	    Plan:   •	DVT prophylaxis- Lovenox, including use of compression devices and ankle pumps  •	Continue physical therapy  •	Weightbearing as tolerated of the left lower extremity with assistance of a walker  •	Incentive spirometry encouraged  •	Pain control as clinically indicated  -           Hyperkalemia improved  •	Discharge planning – anticipated discharge is Home today after cleared by PT

## 2017-02-20 NOTE — PROGRESS NOTE ADULT - SUBJECTIVE AND OBJECTIVE BOX
MEDICINE CONSULT FOLLOW UP    discharge held due to PT    no acute complaints. pain tolerable, ambulating with PT.    MEDICATIONS  (STANDING):  atorvastatin 10milliGRAM(s) Oral at bedtime  amLODIPine   Tablet 5milliGRAM(s) Oral daily  levothyroxine 25MICROGram(s) Oral daily  enoxaparin Injectable 30milliGRAM(s) SubCutaneous every 12 hours  acetaminophen   Tablet. 975milliGRAM(s) Oral every 8 hours  docusate sodium 100milliGRAM(s) Oral three times a day  ferrous    sulfate 325milliGRAM(s) Oral three times a day with meals  folic acid 1milliGRAM(s) Oral daily  multivitamin 1Tablet(s) Oral daily  insulin lispro (HumaLOG) corrective regimen sliding scale  SubCutaneous three times a day before meals  dextrose 5%. 1000milliLiter(s) IV Continuous <Continuous>  dextrose 50% Injectable 12.5Gram(s) IV Push once  dextrose 50% Injectable 25Gram(s) IV Push once  dextrose 50% Injectable 25Gram(s) IV Push once  oxyCODONE ER Tablet 10milliGRAM(s) Oral every 12 hours  carvedilol 12.5milliGRAM(s) Oral every 12 hours  famotidine    Tablet 40milliGRAM(s) Oral daily  sodium chloride 0.9%. 1000milliLiter(s) IV Continuous <Continuous>  hydrochlorothiazide   Tablet 25milliGRAM(s) Oral daily    MEDICATIONS  (PRN):  oxyCODONE IR 5milliGRAM(s) Oral every 3 hours PRN Mild Pain  oxyCODONE IR 10milliGRAM(s) Oral every 3 hours PRN Moderate Pain  aluminum hydroxide/magnesium hydroxide/simethicone Suspension 30milliLiter(s) Oral four times a day PRN Indigestion  ondansetron Injectable 4milliGRAM(s) IV Push every 6 hours PRN Nausea and/or Vomiting  magnesium hydroxide Suspension 30milliLiter(s) Oral daily PRN Constipation  senna 2Tablet(s) Oral at bedtime PRN Constipation  dextrose Gel 1Dose(s) Oral once PRN Blood Glucose LESS THAN 70 milliGRAM(s)/deciliter  glucagon  Injectable 1milliGRAM(s) IntraMuscular once PRN Glucose LESS THAN 70 milligrams/deciliter  HYDROmorphone   Tablet 2milliGRAM(s) Oral every 3 hours PRN Severe Pain (7 - 10)  HYDROmorphone  Injectable 0.5milliGRAM(s) IV Push every 3 hours PRN breakthrough pain      Allergies    aspirin (Rash)    Intolerances        REVIEW OF SYSTEMS:    CONSTITUTIONAL: No fever  RESPIRATORY: No shortness of breath  CARDIOVASCULAR: No chest pain, palpitations  GASTROINTESTINAL: No abdominal or epigastric pain  NEUROLOGICAL: No headaches  MISCELLANEOUS:    Vital Signs Last 24 Hrs  T(C): 37.2, Max: 37.2 (02-19 @ 23:46)  T(F): 98.9, Max: 98.9 (02-19 @ 23:46)  HR: 74 (74 - 88)  BP: 131/66 (131/66 - 160/68)  BP(mean): --  RR: 18 (17 - 18)  SpO2: 93% (93% - 96%)    PHYSICAL EXAM:    GENERAL: NAD having breakfast  CHEST/LUNG: Clear to percussion bilaterally; No rales, rhonchi, wheezing, or rubs  HEART: Regular rate and rhythm; S1 S2; no murmurs noted  ABDOMEN: Soft, Nontender, Nondistended; Bowel sounds present  EXTREMITIES: left knee incision c/d/i with overlying bandage, trace edema.  NERVOUS SYSTEM:  Alert & Oriented X3, Good concentration; Motor Strength 5/5 B/L upper and lower extremities  PSYCH: normal mood, appropriate response.    LABS:                        10.1   9.1   )-----------( 155      ( 20 Feb 2017 06:09 )             30.5     20 Feb 2017 06:09    139    |  103    |  10.0   ----------------------------<  142    5.0     |  26.0   |  0.69     Ca    9.2        20 Feb 2017 06:09            CAPILLARY BLOOD GLUCOSE  141 (20 Feb 2017 07:55)  163 (19 Feb 2017 21:54)        RADIOLOGY & ADDITIONAL TESTS:

## 2017-02-20 NOTE — PROGRESS NOTE ADULT - ATTENDING COMMENTS
Patient seen with .  Ready for discharge home today and f/u in 2 weeks in office.  Lovenox 30mg BID x 4 weeks.
Dr Singer will take over on 2/ 19.

## 2017-02-20 NOTE — PROGRESS NOTE ADULT - ASSESSMENT
76 y/o femal with h/o HTN, HLD, DM2, hypothyroid, OA, S/P Left TKR , pod # 2    left TKA:  pain control/dvt ppx per orthopedics      PT following    Hyperkalemia: resolved: hold ace inhibitor,  s/p kayexalate 30gms x 1     likely restart as outpatient in 1 week once outpatient labs stable as well with PMD     post op anemia; stabilized.    HTN: stable ,c/w norvasc/coreg,  can add hctz 25mg (home dose)  DM2: raiss, hold home meds.  HLD: statin.    d/c once cleared by PT.   updated ortho PA

## 2017-02-23 LAB — SURGICAL PATHOLOGY FINAL REPORT - CH: SIGNIFICANT CHANGE UP

## 2017-03-06 ENCOUNTER — APPOINTMENT (OUTPATIENT)
Dept: ORTHOPEDIC SURGERY | Facility: CLINIC | Age: 77
End: 2017-03-06

## 2017-03-06 VITALS
WEIGHT: 170 LBS | HEIGHT: 64 IN | TEMPERATURE: 98.1 F | HEART RATE: 70 BPM | DIASTOLIC BLOOD PRESSURE: 75 MMHG | BODY MASS INDEX: 29.02 KG/M2 | SYSTOLIC BLOOD PRESSURE: 129 MMHG

## 2017-03-06 RX ORDER — LEVOTHYROXINE SODIUM 0.03 MG/1
25 TABLET ORAL
Refills: 0 | Status: ACTIVE | COMMUNITY

## 2017-03-23 PROBLEM — E11.9 TYPE 2 DIABETES MELLITUS WITHOUT COMPLICATIONS: Chronic | Status: ACTIVE | Noted: 2017-01-27

## 2017-03-23 PROBLEM — R01.1 CARDIAC MURMUR, UNSPECIFIED: Chronic | Status: ACTIVE | Noted: 2017-01-27

## 2017-03-28 ENCOUNTER — OTHER (OUTPATIENT)
Age: 77
End: 2017-03-28

## 2017-04-05 ENCOUNTER — APPOINTMENT (OUTPATIENT)
Dept: ORTHOPEDIC SURGERY | Facility: CLINIC | Age: 77
End: 2017-04-05

## 2017-04-05 VITALS
BODY MASS INDEX: 29.02 KG/M2 | HEART RATE: 69 BPM | HEIGHT: 64 IN | DIASTOLIC BLOOD PRESSURE: 75 MMHG | WEIGHT: 170 LBS | SYSTOLIC BLOOD PRESSURE: 160 MMHG

## 2017-04-19 ENCOUNTER — OUTPATIENT (OUTPATIENT)
Dept: OUTPATIENT SERVICES | Facility: HOSPITAL | Age: 77
LOS: 1 days | End: 2017-04-19
Payer: MEDICARE

## 2017-04-19 DIAGNOSIS — M17.12 UNILATERAL PRIMARY OSTEOARTHRITIS, LEFT KNEE: ICD-10-CM

## 2017-04-19 DIAGNOSIS — Z98.890 OTHER SPECIFIED POSTPROCEDURAL STATES: Chronic | ICD-10-CM

## 2017-04-19 DIAGNOSIS — Z51.89 ENCOUNTER FOR OTHER SPECIFIED AFTERCARE: ICD-10-CM

## 2017-04-19 DIAGNOSIS — Z47.1 AFTERCARE FOLLOWING JOINT REPLACEMENT SURGERY: ICD-10-CM

## 2017-05-08 ENCOUNTER — OTHER (OUTPATIENT)
Age: 77
End: 2017-05-08

## 2017-05-11 ENCOUNTER — APPOINTMENT (OUTPATIENT)
Dept: ORTHOPEDIC SURGERY | Facility: CLINIC | Age: 77
End: 2017-05-11

## 2017-05-11 VITALS
SYSTOLIC BLOOD PRESSURE: 172 MMHG | DIASTOLIC BLOOD PRESSURE: 76 MMHG | HEART RATE: 74 BPM | WEIGHT: 170 LBS | HEIGHT: 64 IN | BODY MASS INDEX: 29.02 KG/M2

## 2017-05-11 DIAGNOSIS — Z47.1 AFTERCARE FOLLOWING JOINT REPLACEMENT SURGERY: ICD-10-CM

## 2017-05-11 DIAGNOSIS — Z96.652 AFTERCARE FOLLOWING JOINT REPLACEMENT SURGERY: ICD-10-CM

## 2017-05-11 DIAGNOSIS — Z96.659 PRESENCE OF UNSPECIFIED ARTIFICIAL KNEE JOINT: ICD-10-CM

## 2017-06-22 ENCOUNTER — EMERGENCY (EMERGENCY)
Facility: HOSPITAL | Age: 77
LOS: 1 days | Discharge: HOME | End: 2017-06-22
Attending: EMERGENCY MEDICINE
Payer: MEDICARE

## 2017-06-22 VITALS
WEIGHT: 175.93 LBS | HEART RATE: 71 BPM | RESPIRATION RATE: 20 BRPM | SYSTOLIC BLOOD PRESSURE: 214 MMHG | OXYGEN SATURATION: 98 % | HEIGHT: 63 IN | DIASTOLIC BLOOD PRESSURE: 87 MMHG | TEMPERATURE: 98 F

## 2017-06-22 DIAGNOSIS — Z98.890 OTHER SPECIFIED POSTPROCEDURAL STATES: Chronic | ICD-10-CM

## 2017-06-22 LAB
ALBUMIN SERPL ELPH-MCNC: 3.9 G/DL — SIGNIFICANT CHANGE UP (ref 3.3–5.2)
ALP SERPL-CCNC: 97 U/L — SIGNIFICANT CHANGE UP (ref 40–120)
ALT FLD-CCNC: 24 U/L — SIGNIFICANT CHANGE UP
ANION GAP SERPL CALC-SCNC: 14 MMOL/L — SIGNIFICANT CHANGE UP (ref 5–17)
AST SERPL-CCNC: 38 U/L — HIGH
BASOPHILS # BLD AUTO: 0 K/UL — SIGNIFICANT CHANGE UP (ref 0–0.2)
BASOPHILS NFR BLD AUTO: 0.3 % — SIGNIFICANT CHANGE UP (ref 0–2)
BILIRUB SERPL-MCNC: 0.4 MG/DL — SIGNIFICANT CHANGE UP (ref 0.4–2)
BUN SERPL-MCNC: 15 MG/DL — SIGNIFICANT CHANGE UP (ref 8–20)
CALCIUM SERPL-MCNC: 9.6 MG/DL — SIGNIFICANT CHANGE UP (ref 8.6–10.2)
CHLORIDE SERPL-SCNC: 102 MMOL/L — SIGNIFICANT CHANGE UP (ref 98–107)
CO2 SERPL-SCNC: 23 MMOL/L — SIGNIFICANT CHANGE UP (ref 22–29)
CREAT SERPL-MCNC: 0.71 MG/DL — SIGNIFICANT CHANGE UP (ref 0.5–1.3)
EOSINOPHIL # BLD AUTO: 0.1 K/UL — SIGNIFICANT CHANGE UP (ref 0–0.5)
EOSINOPHIL NFR BLD AUTO: 1.4 % — SIGNIFICANT CHANGE UP (ref 0–6)
GLUCOSE SERPL-MCNC: 84 MG/DL — SIGNIFICANT CHANGE UP (ref 70–115)
HCT VFR BLD CALC: 36.1 % — LOW (ref 37–47)
HGB BLD-MCNC: 11.9 G/DL — LOW (ref 12–16)
LYMPHOCYTES # BLD AUTO: 3.2 K/UL — SIGNIFICANT CHANGE UP (ref 1–4.8)
LYMPHOCYTES # BLD AUTO: 46.4 % — SIGNIFICANT CHANGE UP (ref 20–55)
MCHC RBC-ENTMCNC: 31 PG — SIGNIFICANT CHANGE UP (ref 27–31)
MCHC RBC-ENTMCNC: 33 G/DL — SIGNIFICANT CHANGE UP (ref 32–36)
MCV RBC AUTO: 94 FL — SIGNIFICANT CHANGE UP (ref 81–99)
MONOCYTES # BLD AUTO: 0.7 K/UL — SIGNIFICANT CHANGE UP (ref 0–0.8)
MONOCYTES NFR BLD AUTO: 10.1 % — HIGH (ref 3–10)
NEUTROPHILS # BLD AUTO: 2.9 K/UL — SIGNIFICANT CHANGE UP (ref 1.8–8)
NEUTROPHILS NFR BLD AUTO: 41.8 % — SIGNIFICANT CHANGE UP (ref 37–73)
PLATELET # BLD AUTO: 187 K/UL — SIGNIFICANT CHANGE UP (ref 150–400)
POTASSIUM SERPL-MCNC: 5.8 MMOL/L — HIGH (ref 3.5–5.3)
POTASSIUM SERPL-SCNC: 5.8 MMOL/L — HIGH (ref 3.5–5.3)
PROT SERPL-MCNC: 7.8 G/DL — SIGNIFICANT CHANGE UP (ref 6.6–8.7)
RBC # BLD: 3.84 M/UL — LOW (ref 4.4–5.2)
RBC # FLD: 13.4 % — SIGNIFICANT CHANGE UP (ref 11–15.6)
SODIUM SERPL-SCNC: 139 MMOL/L — SIGNIFICANT CHANGE UP (ref 135–145)
TROPONIN T SERPL-MCNC: <0.01 NG/ML — SIGNIFICANT CHANGE UP (ref 0–0.06)
WBC # BLD: 6.9 K/UL — SIGNIFICANT CHANGE UP (ref 4.8–10.8)
WBC # FLD AUTO: 6.9 K/UL — SIGNIFICANT CHANGE UP (ref 4.8–10.8)

## 2017-06-22 PROCEDURE — 93010 ELECTROCARDIOGRAM REPORT: CPT

## 2017-06-22 PROCEDURE — 99285 EMERGENCY DEPT VISIT HI MDM: CPT

## 2017-06-22 RX ORDER — FUROSEMIDE 40 MG
40 TABLET ORAL ONCE
Qty: 0 | Refills: 0 | Status: COMPLETED | OUTPATIENT
Start: 2017-06-22 | End: 2017-06-22

## 2017-06-22 RX ORDER — SODIUM CHLORIDE 9 MG/ML
3 INJECTION INTRAMUSCULAR; INTRAVENOUS; SUBCUTANEOUS EVERY 8 HOURS
Qty: 0 | Refills: 0 | Status: DISCONTINUED | OUTPATIENT
Start: 2017-06-22 | End: 2017-06-26

## 2017-06-22 RX ORDER — SODIUM CHLORIDE 9 MG/ML
1000 INJECTION INTRAMUSCULAR; INTRAVENOUS; SUBCUTANEOUS ONCE
Qty: 0 | Refills: 0 | Status: COMPLETED | OUTPATIENT
Start: 2017-06-22 | End: 2017-06-22

## 2017-06-22 RX ADMIN — SODIUM CHLORIDE 3 MILLILITER(S): 9 INJECTION INTRAMUSCULAR; INTRAVENOUS; SUBCUTANEOUS at 22:56

## 2017-06-22 RX ADMIN — SODIUM CHLORIDE 1000 MILLILITER(S): 9 INJECTION INTRAMUSCULAR; INTRAVENOUS; SUBCUTANEOUS at 22:56

## 2017-06-22 RX ADMIN — Medication 40 MILLIGRAM(S): at 22:55

## 2017-06-22 NOTE — ED STATDOCS - PROGRESS NOTE DETAILS
76 y/o F w/ DM and hypothyroidism presents to the ED for abnormal lab results. Pt states that her PMD Dr. Albin Mayo advised her to visit the ED. Pt denies N/V. Pt denies hx of renal disorders. Will send to Main ED for further evaluation. 76 y/o F w/ DM and hypothyroidism presents to the ED for abnormal lab results. Pt states that her PMD Dr. Albin Mayo advised her to visit the ED due to a potassium reading of 6.1. Pt denies N/V or any other complaints. Pt denies hx of renal disorders. Will send to Main ED for further evaluation. no signs of hyperkalemia on ekg

## 2017-06-22 NOTE — ED PROVIDER NOTE - PROGRESS NOTE DETAILS
will give dose of kayexelate. pt given ivf and lasix and potassium is now 5.5. pt is on Valsarten and pt advised to stop Valsarten. pt appears well. no complaints. will start norvasc. pt has no Ekg changes.

## 2017-06-22 NOTE — ED PROVIDER NOTE - MEDICAL DECISION MAKING DETAILS
Check labs and re-evaluate. Check labs and re-evaluate. pt advised to stop Valsarten due to hyperkalemia with normaL RENAL FUNCTION

## 2017-06-22 NOTE — ED ADULT TRIAGE NOTE - CHIEF COMPLAINT QUOTE
Patient arrived to ED today with c/o high potassium.  Patient states that the blood was drawn yesterday.  Patient was told by Dr. Albin Mayo to come to ED.  Patient reports feeling weak.

## 2017-06-22 NOTE — ED ADULT NURSE NOTE - OBJECTIVE STATEMENT
rcd pt in no apparent distress, a./ox3, denies any complaints at this time, states MD called and said she had a high potassium level

## 2017-06-22 NOTE — ED ADULT NURSE NOTE - CHPI ED SYMPTOMS NEG
no nausea/no dizziness/no pain/no headache/no loss of consciousness/no fever/no decreased eating/drinking/no chills/no back pain/no vomiting

## 2017-06-22 NOTE — ED PROVIDER NOTE - OBJECTIVE STATEMENT
76 y/o F with PMHx of DM and hypothyroidism presents to the ED for abnormal lab results today. Pt states that she visited her PMD for routine checkup, had blood drawn which found elevated potassium reading of 6.1. Pt was instructed to seek ED evaluation by PMD. Denies fever, chills, palpitations, chest pain, SOB. No further complaints at this time.

## 2017-06-23 VITALS
RESPIRATION RATE: 18 BRPM | DIASTOLIC BLOOD PRESSURE: 75 MMHG | OXYGEN SATURATION: 100 % | SYSTOLIC BLOOD PRESSURE: 195 MMHG | HEART RATE: 61 BPM | TEMPERATURE: 97 F

## 2017-06-23 LAB
ANION GAP SERPL CALC-SCNC: 12 MMOL/L — SIGNIFICANT CHANGE UP (ref 5–17)
BUN SERPL-MCNC: 14 MG/DL — SIGNIFICANT CHANGE UP (ref 8–20)
CALCIUM SERPL-MCNC: 9.8 MG/DL — SIGNIFICANT CHANGE UP (ref 8.6–10.2)
CHLORIDE SERPL-SCNC: 101 MMOL/L — SIGNIFICANT CHANGE UP (ref 98–107)
CO2 SERPL-SCNC: 25 MMOL/L — SIGNIFICANT CHANGE UP (ref 22–29)
CREAT SERPL-MCNC: 0.77 MG/DL — SIGNIFICANT CHANGE UP (ref 0.5–1.3)
GLUCOSE SERPL-MCNC: 116 MG/DL — HIGH (ref 70–115)
POTASSIUM SERPL-MCNC: 5.5 MMOL/L — HIGH (ref 3.5–5.3)
POTASSIUM SERPL-SCNC: 5.5 MMOL/L — HIGH (ref 3.5–5.3)
SODIUM SERPL-SCNC: 138 MMOL/L — SIGNIFICANT CHANGE UP (ref 135–145)

## 2017-06-23 PROCEDURE — 84484 ASSAY OF TROPONIN QUANT: CPT

## 2017-06-23 PROCEDURE — 96374 THER/PROPH/DIAG INJ IV PUSH: CPT

## 2017-06-23 PROCEDURE — 80048 BASIC METABOLIC PNL TOTAL CA: CPT

## 2017-06-23 PROCEDURE — 99284 EMERGENCY DEPT VISIT MOD MDM: CPT | Mod: 25

## 2017-06-23 PROCEDURE — 85027 COMPLETE CBC AUTOMATED: CPT

## 2017-06-23 PROCEDURE — T1013: CPT

## 2017-06-23 PROCEDURE — 93005 ELECTROCARDIOGRAM TRACING: CPT

## 2017-06-23 PROCEDURE — 80053 COMPREHEN METABOLIC PANEL: CPT

## 2017-06-23 PROCEDURE — 36415 COLL VENOUS BLD VENIPUNCTURE: CPT

## 2017-06-23 RX ORDER — AMLODIPINE BESYLATE 2.5 MG/1
5 TABLET ORAL ONCE
Qty: 0 | Refills: 0 | Status: COMPLETED | OUTPATIENT
Start: 2017-06-23 | End: 2017-06-23

## 2017-06-23 RX ORDER — AMLODIPINE BESYLATE 2.5 MG/1
1 TABLET ORAL
Qty: 14 | Refills: 0
Start: 2017-06-23 | End: 2017-07-23

## 2017-06-23 RX ADMIN — AMLODIPINE BESYLATE 5 MILLIGRAM(S): 2.5 TABLET ORAL at 03:40

## 2017-07-17 PROCEDURE — G8978: CPT | Mod: CM

## 2017-07-17 PROCEDURE — 97010 HOT OR COLD PACKS THERAPY: CPT

## 2017-07-17 PROCEDURE — 97163 PT EVAL HIGH COMPLEX 45 MIN: CPT | Mod: CM

## 2017-07-17 PROCEDURE — 97140 MANUAL THERAPY 1/> REGIONS: CPT

## 2017-07-17 PROCEDURE — 97110 THERAPEUTIC EXERCISES: CPT | Mod: CL

## 2017-07-17 PROCEDURE — G8979: CPT | Mod: CK

## 2017-07-17 PROCEDURE — G8980: CPT | Mod: CJ

## 2017-09-20 ENCOUNTER — OTHER (OUTPATIENT)
Age: 77
End: 2017-09-20

## 2017-10-30 ENCOUNTER — EMERGENCY (EMERGENCY)
Facility: HOSPITAL | Age: 77
LOS: 1 days | Discharge: HOME | End: 2017-10-30
Attending: EMERGENCY MEDICINE
Payer: MEDICARE

## 2017-10-30 VITALS
OXYGEN SATURATION: 98 % | TEMPERATURE: 98 F | SYSTOLIC BLOOD PRESSURE: 194 MMHG | RESPIRATION RATE: 18 BRPM | DIASTOLIC BLOOD PRESSURE: 73 MMHG | HEART RATE: 70 BPM

## 2017-10-30 VITALS — WEIGHT: 179.9 LBS | HEIGHT: 64 IN

## 2017-10-30 DIAGNOSIS — Z96.652 PRESENCE OF LEFT ARTIFICIAL KNEE JOINT: Chronic | ICD-10-CM

## 2017-10-30 DIAGNOSIS — Z98.890 OTHER SPECIFIED POSTPROCEDURAL STATES: Chronic | ICD-10-CM

## 2017-10-30 PROCEDURE — 99284 EMERGENCY DEPT VISIT MOD MDM: CPT | Mod: 25

## 2017-10-30 PROCEDURE — 73552 X-RAY EXAM OF FEMUR 2/>: CPT | Mod: 26,LT

## 2017-10-30 PROCEDURE — 99284 EMERGENCY DEPT VISIT MOD MDM: CPT

## 2017-10-30 PROCEDURE — T1013: CPT

## 2017-10-30 PROCEDURE — 73502 X-RAY EXAM HIP UNI 2-3 VIEWS: CPT | Mod: 26,LT

## 2017-10-30 PROCEDURE — 93971 EXTREMITY STUDY: CPT | Mod: 26,LT

## 2017-10-30 PROCEDURE — 93971 EXTREMITY STUDY: CPT

## 2017-10-30 PROCEDURE — 73552 X-RAY EXAM OF FEMUR 2/>: CPT

## 2017-10-30 PROCEDURE — 73502 X-RAY EXAM HIP UNI 2-3 VIEWS: CPT

## 2017-10-30 NOTE — ED STATDOCS - ATTENDING CONTRIBUTION TO CARE
I, Tamara Palomo, performed the initial face to face bedside interview with this patient regarding history of present illness, review of symptoms and relevant past medical, social and family history.  I completed an independent physical examination.  I was the initial provider who evaluated this patient. I have signed out the follow up of any pending tests (i.e. labs, radiological studies) to the ACP.  I have communicated the patient’s plan of care and disposition with the ACP.  The history, relevant review of systems, past medical and surgical history, medical decision making, and physical examination was documented by the scribe in my presence and I attest to the accuracy of the documentation.

## 2017-10-30 NOTE — ED ADULT TRIAGE NOTE - CHIEF COMPLAINT QUOTE
Patient arrived ambulatory to ED, awake, alert, and oriented times 3, breathing unlabored.  Patient had right knee replacement February 2017.  Patient slipped and fell 2 weeks ago and is now complaining of pain to right hip radiating to right knee

## 2017-10-30 NOTE — ED STATDOCS - MUSCULOSKELETAL, MLM
2+ pitting edema to the b/l lower extremities. Good ROM of the L knee. Medial L thigh TTP. Hip pain with ROM. No pain on log roll of the LLE. Some pain with internal rotation of the L hip. Mild lateral hip TTP. 2+ pitting edema to the b/l lower extremities. Good ROM of the L knee. Medial L thigh TTP. Hip pain with internal rotation. No pain on log roll of the hip or  LLE. Mild lateral hip TTP.

## 2017-10-30 NOTE — ED STATDOCS - OBJECTIVE STATEMENT
78 y/o F presents to ED c/o L knee pain with radiation to the L hip x2 weeks. Pt states she is s/p L knee replacement February 15, 2017 with Dr. Ackerman. She reports she fell onto her bottom at [employer] after the pain onset but denies pain in relation to the fall. She reports she has an appointment with Dr. Ackerman in 3 days. Denies recent trauma/injury, abd pain, fever, chills, lower extremity swelling or any other complaints at this time.

## 2017-11-01 ENCOUNTER — OTHER (OUTPATIENT)
Age: 77
End: 2017-11-01

## 2017-11-02 ENCOUNTER — APPOINTMENT (OUTPATIENT)
Dept: ORTHOPEDIC SURGERY | Facility: CLINIC | Age: 77
End: 2017-11-02

## 2017-11-21 ENCOUNTER — OTHER (OUTPATIENT)
Age: 77
End: 2017-11-21

## 2017-11-30 ENCOUNTER — APPOINTMENT (OUTPATIENT)
Dept: ORTHOPEDIC SURGERY | Facility: CLINIC | Age: 77
End: 2017-11-30
Payer: MEDICARE

## 2017-11-30 VITALS
HEIGHT: 64 IN | WEIGHT: 170 LBS | BODY MASS INDEX: 29.02 KG/M2 | HEART RATE: 86 BPM | SYSTOLIC BLOOD PRESSURE: 188 MMHG | DIASTOLIC BLOOD PRESSURE: 88 MMHG

## 2017-11-30 DIAGNOSIS — M25.559 PAIN IN UNSPECIFIED HIP: ICD-10-CM

## 2017-11-30 PROCEDURE — 73562 X-RAY EXAM OF KNEE 3: CPT | Mod: LT

## 2017-11-30 PROCEDURE — 99214 OFFICE O/P EST MOD 30 MIN: CPT

## 2019-03-01 ENCOUNTER — OTHER (OUTPATIENT)
Age: 79
End: 2019-03-01

## 2019-03-04 ENCOUNTER — APPOINTMENT (OUTPATIENT)
Dept: ORTHOPEDIC SURGERY | Facility: CLINIC | Age: 79
End: 2019-03-04

## 2019-04-23 ENCOUNTER — OTHER (OUTPATIENT)
Age: 79
End: 2019-04-23

## 2019-05-02 ENCOUNTER — APPOINTMENT (OUTPATIENT)
Dept: ORTHOPEDIC SURGERY | Facility: CLINIC | Age: 79
End: 2019-05-02
Payer: MEDICARE

## 2019-05-02 VITALS
SYSTOLIC BLOOD PRESSURE: 203 MMHG | HEART RATE: 73 BPM | DIASTOLIC BLOOD PRESSURE: 74 MMHG | WEIGHT: 170 LBS | BODY MASS INDEX: 31.28 KG/M2 | HEIGHT: 62 IN

## 2019-05-02 DIAGNOSIS — Z96.652 PRESENCE OF LEFT ARTIFICIAL KNEE JOINT: ICD-10-CM

## 2019-05-02 PROCEDURE — 99215 OFFICE O/P EST HI 40 MIN: CPT

## 2019-05-02 PROCEDURE — 73564 X-RAY EXAM KNEE 4 OR MORE: CPT

## 2019-05-02 NOTE — HISTORY OF PRESENT ILLNESS
[de-identified] : The patient is a 79 year old [place]er-speaking female being seen for evaluation of her right knee. She is 2 years status post left TKR. She's noting no complaints in regard to the left knee. She denies injury or trauma. She reports developing anteromedial right knee pain. Pain is constant and worse with weightbearing activities. She reports a sensation of giving way. She's been taking Tylenol with no relief. She is utilizing a cane for safety. She has done physical therapy in the past with no improvement. She does have a history of type 2 diabetes. She reports her fasting blood sugar was 73 this morning.

## 2019-05-02 NOTE — DISCUSSION/SUMMARY
[de-identified] : The patient is a 79 year old female with bone on bone arthritis of the right knee. Based upon the patients continued symptoms and failure to respond to conservative treatment I have recommended a right total knee replacement for the patient.  A discussion was had with the patient regarding a right total knee replacement. A long discussion was had with the patient as what the total joint replacement would entail. A model was used to demonstrate the operation and to discuss bearing surfaces of the implants. The hospitalization and rehabilitation were discussed.  The use of perioperative antibiotics and DVT prophylaxis were discussed. The risks, benefits and alternatives to surgical intervention were discussed at length with the patient. Specific risks discussed included: infection, wound breakdown, numbness and damage to nerves, tendon, muscle, arteries or other blood vessels. The possibility of recurrent pain, no improvement in pain and actual worsening of the pain were also mentioned in conversation with the patient. Medical complications related to the patient's general medical health including deep vein thrombosis, pulmonary embolus, heart attack, stroke, death and other complications from anesthesia were discussed as well. The patient was told that we will take steps to minimize these risks by using sterile technique, antibiotics and DVT prophylaxis when appropriate and following the patient postoperatively in the clinic setting to monitor progress. The benefits of surgery were discussed with the patient including the potential to improve the current clinical condition through operative intervention. Alternatives to surgical intervention include continued conservative management which may yield less than optimal results in this particular patient. All questions were answered to the satisfaction of the patient.\par \par I had a long discussion with the patient regarding increased risk of perioperative complications with joint replacement in patients with obesity.  We discussed that this may include but is not limited to wound healing complications, DVT and PE, and increased infection rate.  At this point the patient's pain is debilitating and it is significantly limiting their daily function and quality of life.  The patient acknowledges this and is willing to accept this increased risk to proceed with joint replacement surgery.

## 2019-05-02 NOTE — REASON FOR VISIT
[Follow-Up Visit] : a follow-up visit for [Other: ____] : [unfilled] [FreeTextEntry2] : S/P Left total knee replacement, DOS 2/17/17. \par \par

## 2019-05-02 NOTE — ADDENDUM
[FreeTextEntry1] : This note was authored by Bryant Mak working as a medical scribe for Dr. Iván Beal. The note was reviewed, edited, and revised by Dr. Iván Beal whom is in agreement with the exam findings, imaging findings, and treatment plan. 05/02/2019.

## 2019-05-02 NOTE — PHYSICAL EXAM
[de-identified] : The patient appears well nourished  and in no apparent distress.  The patient is alert and oriented to person, place, and time.   Affect and mood appear normal. The head is normocephalic and atraumatic.  The eyes reveal normal sclera and extra ocular muscles are intact. The tongue is midline with no apparent lesions.  Skin shows normal turgor with no evidence of eczema or psoriasis.  No respiratory distress noted.  Sensation grossly intact.\par   [de-identified] : Exam of the left knee shows a well healed incision. 0-120 degrees of flexion.\par Exam of the right knee shows a moderate effusion, -5 to 120 degrees of flexion with pain. 5/5 motor strength bilaterally distally. Sensation intact distally.  [de-identified] : Xray- 4 views of the right knee shows bone on bone arthritis of the right knee.

## 2019-06-06 ENCOUNTER — EMERGENCY (EMERGENCY)
Facility: HOSPITAL | Age: 79
LOS: 1 days | Discharge: HOME | End: 2019-06-06
Attending: EMERGENCY MEDICINE
Payer: MEDICARE

## 2019-06-06 VITALS
HEIGHT: 62 IN | RESPIRATION RATE: 16 BRPM | SYSTOLIC BLOOD PRESSURE: 176 MMHG | TEMPERATURE: 98 F | DIASTOLIC BLOOD PRESSURE: 87 MMHG | OXYGEN SATURATION: 97 % | HEART RATE: 62 BPM | WEIGHT: 186.95 LBS

## 2019-06-06 DIAGNOSIS — Z98.890 OTHER SPECIFIED POSTPROCEDURAL STATES: Chronic | ICD-10-CM

## 2019-06-06 DIAGNOSIS — Z96.652 PRESENCE OF LEFT ARTIFICIAL KNEE JOINT: Chronic | ICD-10-CM

## 2019-06-06 PROCEDURE — 99284 EMERGENCY DEPT VISIT MOD MDM: CPT

## 2019-06-06 PROCEDURE — 71046 X-RAY EXAM CHEST 2 VIEWS: CPT | Mod: 26

## 2019-06-06 PROCEDURE — 99284 EMERGENCY DEPT VISIT MOD MDM: CPT | Mod: 25

## 2019-06-06 PROCEDURE — 71046 X-RAY EXAM CHEST 2 VIEWS: CPT

## 2019-06-06 PROCEDURE — 94640 AIRWAY INHALATION TREATMENT: CPT

## 2019-06-06 PROCEDURE — T1013: CPT

## 2019-06-06 RX ORDER — ALBUTEROL 90 UG/1
2 AEROSOL, METERED ORAL
Qty: 1 | Refills: 0
Start: 2019-06-06 | End: 2019-06-15

## 2019-06-06 RX ORDER — ALBUTEROL 90 UG/1
2.5 AEROSOL, METERED ORAL ONCE
Refills: 0 | Status: COMPLETED | OUTPATIENT
Start: 2019-06-06 | End: 2019-06-06

## 2019-06-06 RX ORDER — AZITHROMYCIN 500 MG/1
500 TABLET, FILM COATED ORAL ONCE
Refills: 0 | Status: COMPLETED | OUTPATIENT
Start: 2019-06-06 | End: 2019-06-06

## 2019-06-06 RX ORDER — AZITHROMYCIN 500 MG/1
1 TABLET, FILM COATED ORAL
Qty: 4 | Refills: 0
Start: 2019-06-06 | End: 2019-06-09

## 2019-06-06 RX ADMIN — ALBUTEROL 2.5 MILLIGRAM(S): 90 AEROSOL, METERED ORAL at 08:55

## 2019-06-06 RX ADMIN — AZITHROMYCIN 500 MILLIGRAM(S): 500 TABLET, FILM COATED ORAL at 09:30

## 2019-06-06 NOTE — ED STATDOCS - CARE PLAN
Principal Discharge DX:	Pneumonia of right lower lobe due to infectious organism  Secondary Diagnosis:	Cough

## 2019-06-06 NOTE — ED STATDOCS - X-RAY INTERPRETATION
increased markings rt heart border/retrocardiac. possible infiltrate,. no gross consolidation or effusions. no pulm edema/ER physician

## 2019-06-06 NOTE — ED STATDOCS - PROGRESS NOTE DETAILS
will tx for PNA based on CXR and sx -Slowey DO Pt is seen By Dr davis initially agreed wit hx - PE and plan   will d.c pt on z pack follow up pcp Pt is seen By Dr davis initially agreed wit hx - PE and plan   +mild bronchi on RLL not in respiratory distress pt hand primary care   CXR with right base early PNA d.w dr davis, will d.c pt on z pack, ,albuterol  follow up pcp Pt is seen By Dr davis initially agreed wit hx - PE and plan   +mild rhonchi on RLL on ascultation. pt is not in respiratory distress,  pt have primary care   CXR with right base early PNA d.w dr davis, however been read a s negative. will d.c pt on z pack, ,albuterol  follow up pcp  pt bee told F.u with pcp within 3-4 days if any worsen the cough congestion , weakness come back in ER , pt been agreed and understood.    chrissy

## 2019-06-06 NOTE — ED STATDOCS - CLINICAL SUMMARY MEDICAL DECISION MAKING FREE TEXT BOX
78 y/o with 2 weeks of cough, mildly productive, no exertional symptoms, well-appearing.  Give Albuterol for cough, XR to r/u Pneumonia, D/c.

## 2019-06-06 NOTE — ED STATDOCS - PHYSICAL EXAMINATION
Gen: NAD, AOx3  Head: NCAT  HEENT: PERRL, EOMI, oral mucosa moist, normal conjunctiva, neck supple  Lung: CTAB, no respiratory distress; (+) minimal rhonchi and wheezes on left   CV: rrr, no murmur, Normal perfusion  Abd: soft, NTND, no CVA tenderness  MSK: No edema, no visible deformities  Neuro: No focal neurologic deficits  Skin: No rash   Psych: normal affect Gen: NAD, AOx3  Head: NCAT  HEENT: EOMI, oral mucosa moist, normal conjunctiva, neck supple, normal orophartynx  Lung: no respiratory distress; (+) minimal rhonchi and wheezes on left   CV: rrr, no murmur, Normal perfusion  Abd: soft, NTND  MSK: No edema, no visible deformities  Neuro: No focal neurologic deficits  Skin: No rash   Psych: normal affect

## 2019-06-06 NOTE — ED STATDOCS - NS ED ROS FT
ROS: no CP/SOB. (+) cough. no fever. no n/v/d/c. no abd pain. no rash. no bleeding. no urinary complaints. no weakness. no vision changes. no HA. no neck/back pain. no extremity swelling/deformity. No change in mental status.

## 2019-06-06 NOTE — ED STATDOCS - ATTENDING CONTRIBUTION TO CARE
I, Mary Cantu, performed the initial face to face bedside interview with this patient regarding history of present illness, review of symptoms and relevant past medical, social and family history.  I completed an independent physical examination.   The medical decision making and follow-up on ordered tests (ie labs, radiologic studies) and re-evaluation of the patient's status has been communicated to the ACP.  Disposition of the patient will be based on test outcome and response to ED interventions.  The history, relevant review of systems, past medical and surgical history, medical decision making, and physical examination was documented by the scribe in my presence and I attest to the accuracy of the documentation.

## 2019-06-06 NOTE — ED STATDOCS - OBJECTIVE STATEMENT
78 y/o F pt with PMHx of DM, GERD, heart murmur, HTN, hypothyroidism presents to the ED c/o cough, onset of 2 weeks. Patient has a productive cough, with yellow phlegm initially, and now has white phlegm. States she has felt congested and has a runny nose.     Denies fever.

## 2019-08-23 ENCOUNTER — OTHER (OUTPATIENT)
Age: 79
End: 2019-08-23

## 2019-08-30 ENCOUNTER — APPOINTMENT (OUTPATIENT)
Dept: ORTHOPEDIC SURGERY | Facility: CLINIC | Age: 79
End: 2019-08-30
Payer: MEDICARE

## 2019-08-30 VITALS
BODY MASS INDEX: 34.04 KG/M2 | SYSTOLIC BLOOD PRESSURE: 155 MMHG | DIASTOLIC BLOOD PRESSURE: 75 MMHG | WEIGHT: 185 LBS | HEIGHT: 62 IN

## 2019-08-30 DIAGNOSIS — M17.11 UNILATERAL PRIMARY OSTEOARTHRITIS, RIGHT KNEE: ICD-10-CM

## 2019-08-30 DIAGNOSIS — Z96.652 PRESENCE OF LEFT ARTIFICIAL KNEE JOINT: ICD-10-CM

## 2019-08-30 PROCEDURE — 73564 X-RAY EXAM KNEE 4 OR MORE: CPT | Mod: RT

## 2019-08-30 PROCEDURE — 73562 X-RAY EXAM OF KNEE 3: CPT | Mod: LT

## 2019-08-30 PROCEDURE — 99215 OFFICE O/P EST HI 40 MIN: CPT

## 2019-08-30 NOTE — REASON FOR VISIT
[Other: ____] : [unfilled] [[employer] ] : provided by [employer]   [FreeTextEntry1] : [id number] [TWNoteComboBox1] : Armenian [FreeTextEntry2] : [email or web address]

## 2019-08-30 NOTE — DISCUSSION/SUMMARY
[de-identified] : The patient is a 79 year old female with bone on bone arthritis of the right knee. Based upon the patients continued symptoms and failure to respond to conservative treatment I have recommended a right total knee replacement for the patient.  A discussion was had with the patient regarding a right total knee replacement. A long discussion was had with the patient as what the total joint replacement would entail. A model was used to demonstrate the operation and to discuss bearing surfaces of the implants. The hospitalization and rehabilitation were discussed.  The use of perioperative antibiotics and DVT prophylaxis were discussed. The risks, benefits and alternatives to surgical intervention were discussed at length with the patient. Specific risks discussed included: infection, wound breakdown, numbness and damage to nerves, tendon, muscle, arteries or other blood vessels. The possibility of recurrent pain, no improvement in pain and actual worsening of the pain were also mentioned in conversation with the patient. Medical complications related to the patient's general medical health including deep vein thrombosis, pulmonary embolus, heart attack, stroke, death and other complications from anesthesia were discussed as well. The patient was told that we will take steps to minimize these risks by using sterile technique, antibiotics and DVT prophylaxis when appropriate and following the patient postoperatively in the clinic setting to monitor progress. The benefits of surgery were discussed with the patient including the potential to improve the current clinical condition through operative intervention. Alternatives to surgical intervention include continued conservative management which may yield less than optimal results in this particular patient. All questions were answered to the satisfaction of the patient.\par

## 2019-08-30 NOTE — HISTORY OF PRESENT ILLNESS
[de-identified] : The patient is a 79 year old female 2 years and 6 months s/ left TKR. She reports no pain to the left knee. She does note persistent pain to the right knee. She has a history of advanced osteoarthritis of the right knee. On her last visit to the office in May, patient was going to schedule joint replacement surgery. Her daughter was having surgery and was unable to care for such surgery was postponed. At this time she reports pain is persistent. Pain is global throughout the knee. She was utilize a cane for safety. She does have a history of diabetes. She reports seeing her primary medical doctor who told her her blood sugar is "normal". She reports monitoring her fasting blood sugars every other day. Fasting blood sugar will range from 100-110.

## 2019-08-30 NOTE — PHYSICAL EXAM
[de-identified] : The patient appears well nourished  and in no apparent distress.  The patient is alert and oriented to person, place, and time.   Affect and mood appear normal. The head is normocephalic and atraumatic.  The eyes reveal normal sclera and extra ocular muscles are intact. The tongue is midline with no apparent lesions.  Skin shows normal turgor with no evidence of eczema or psoriasis.  No respiratory distress noted.  Sensation grossly intact.\par   [de-identified] : Exam of the right knee shows -5 to 110 degrees of flexion with pain measured with a goniometer. Partially correctable varus deformity of 15 degrees. 5/5 motor strength bilaterally distally. Sensation intact distally.  [de-identified] : Xray- 4 views of the right knee and 3 views of the left knee shows bone on bone arthritis of the right knee.  Well fixed left TKR in excellent alignment.  Radiolucency beneath the medial tibial cement mantle without loosening.

## 2019-09-09 ENCOUNTER — OUTPATIENT (OUTPATIENT)
Dept: OUTPATIENT SERVICES | Facility: HOSPITAL | Age: 79
LOS: 1 days | End: 2019-09-09
Payer: MEDICARE

## 2019-09-09 VITALS
DIASTOLIC BLOOD PRESSURE: 80 MMHG | HEART RATE: 64 BPM | RESPIRATION RATE: 16 BRPM | SYSTOLIC BLOOD PRESSURE: 140 MMHG | WEIGHT: 187.39 LBS | HEIGHT: 57 IN | TEMPERATURE: 97 F

## 2019-09-09 DIAGNOSIS — Z01.818 ENCOUNTER FOR OTHER PREPROCEDURAL EXAMINATION: ICD-10-CM

## 2019-09-09 DIAGNOSIS — Z98.890 OTHER SPECIFIED POSTPROCEDURAL STATES: Chronic | ICD-10-CM

## 2019-09-09 DIAGNOSIS — Z96.652 PRESENCE OF LEFT ARTIFICIAL KNEE JOINT: Chronic | ICD-10-CM

## 2019-09-09 LAB
ALBUMIN SERPL ELPH-MCNC: 4 G/DL — SIGNIFICANT CHANGE UP (ref 3.3–5.2)
ALP SERPL-CCNC: 91 U/L — SIGNIFICANT CHANGE UP (ref 40–120)
ALT FLD-CCNC: 23 U/L — SIGNIFICANT CHANGE UP
ANION GAP SERPL CALC-SCNC: 11 MMOL/L — SIGNIFICANT CHANGE UP (ref 5–17)
APTT BLD: 29.7 SEC — SIGNIFICANT CHANGE UP (ref 27.5–36.3)
AST SERPL-CCNC: 34 U/L — HIGH
BASOPHILS # BLD AUTO: 0.02 K/UL — SIGNIFICANT CHANGE UP (ref 0–0.2)
BASOPHILS NFR BLD AUTO: 0.3 % — SIGNIFICANT CHANGE UP (ref 0–2)
BILIRUB SERPL-MCNC: 0.4 MG/DL — SIGNIFICANT CHANGE UP (ref 0.4–2)
BLD GP AB SCN SERPL QL: SIGNIFICANT CHANGE UP
BUN SERPL-MCNC: 16 MG/DL — SIGNIFICANT CHANGE UP (ref 8–20)
CALCIUM SERPL-MCNC: 10 MG/DL — SIGNIFICANT CHANGE UP (ref 8.6–10.2)
CHLORIDE SERPL-SCNC: 100 MMOL/L — SIGNIFICANT CHANGE UP (ref 98–107)
CO2 SERPL-SCNC: 26 MMOL/L — SIGNIFICANT CHANGE UP (ref 22–29)
CREAT SERPL-MCNC: 0.9 MG/DL — SIGNIFICANT CHANGE UP (ref 0.5–1.3)
EOSINOPHIL # BLD AUTO: 0.09 K/UL — SIGNIFICANT CHANGE UP (ref 0–0.5)
EOSINOPHIL NFR BLD AUTO: 1.3 % — SIGNIFICANT CHANGE UP (ref 0–6)
GLUCOSE SERPL-MCNC: 123 MG/DL — HIGH (ref 70–115)
HBA1C BLD-MCNC: 8.2 % — HIGH (ref 4–5.6)
HCT VFR BLD CALC: 39.5 % — SIGNIFICANT CHANGE UP (ref 34.5–45)
HGB BLD-MCNC: 12.4 G/DL — SIGNIFICANT CHANGE UP (ref 11.5–15.5)
IMM GRANULOCYTES NFR BLD AUTO: 0.3 % — SIGNIFICANT CHANGE UP (ref 0–1.5)
INR BLD: 1.14 RATIO — SIGNIFICANT CHANGE UP (ref 0.88–1.16)
LYMPHOCYTES # BLD AUTO: 2.93 K/UL — SIGNIFICANT CHANGE UP (ref 1–3.3)
LYMPHOCYTES # BLD AUTO: 40.8 % — SIGNIFICANT CHANGE UP (ref 13–44)
MCHC RBC-ENTMCNC: 30.9 PG — SIGNIFICANT CHANGE UP (ref 27–34)
MCHC RBC-ENTMCNC: 31.4 GM/DL — LOW (ref 32–36)
MCV RBC AUTO: 98.5 FL — SIGNIFICANT CHANGE UP (ref 80–100)
MONOCYTES # BLD AUTO: 0.61 K/UL — SIGNIFICANT CHANGE UP (ref 0–0.9)
MONOCYTES NFR BLD AUTO: 8.5 % — SIGNIFICANT CHANGE UP (ref 2–14)
MRSA PCR RESULT.: SIGNIFICANT CHANGE UP
NEUTROPHILS # BLD AUTO: 3.51 K/UL — SIGNIFICANT CHANGE UP (ref 1.8–7.4)
NEUTROPHILS NFR BLD AUTO: 48.8 % — SIGNIFICANT CHANGE UP (ref 43–77)
PLATELET # BLD AUTO: 158 K/UL — SIGNIFICANT CHANGE UP (ref 150–400)
POTASSIUM SERPL-MCNC: 5.8 MMOL/L — HIGH (ref 3.5–5.3)
POTASSIUM SERPL-SCNC: 5.8 MMOL/L — HIGH (ref 3.5–5.3)
PROT SERPL-MCNC: 8.3 G/DL — SIGNIFICANT CHANGE UP (ref 6.6–8.7)
PROTHROM AB SERPL-ACNC: 13.2 SEC — HIGH (ref 10–12.9)
RBC # BLD: 4.01 M/UL — SIGNIFICANT CHANGE UP (ref 3.8–5.2)
RBC # FLD: 13.1 % — SIGNIFICANT CHANGE UP (ref 10.3–14.5)
S AUREUS DNA NOSE QL NAA+PROBE: SIGNIFICANT CHANGE UP
SODIUM SERPL-SCNC: 137 MMOL/L — SIGNIFICANT CHANGE UP (ref 135–145)
WBC # BLD: 7.18 K/UL — SIGNIFICANT CHANGE UP (ref 3.8–10.5)
WBC # FLD AUTO: 7.18 K/UL — SIGNIFICANT CHANGE UP (ref 3.8–10.5)

## 2019-09-09 PROCEDURE — 93010 ELECTROCARDIOGRAM REPORT: CPT

## 2019-09-09 RX ORDER — AMLODIPINE BESYLATE 2.5 MG/1
1 TABLET ORAL
Qty: 0 | Refills: 0 | DISCHARGE

## 2019-09-09 NOTE — H&P PST ADULT - NSICDXPASTMEDICALHX_GEN_ALL_CORE_FT
PAST MEDICAL HISTORY:  Diabetes     GERD (gastroesophageal reflux disease)     Heart murmur     Hypercholesterolemia     Hypertension     Hypothyroidism     Knee pain, left arthritis

## 2019-09-09 NOTE — H&P PST ADULT - NEGATIVE GASTROINTESTINAL SYMPTOMS
Home no flatulence/no hematochezia/no steatorrhea/no jaundice/no hiccoughs/no constipation/no abdominal pain/no vomiting/no diarrhea/no melena

## 2019-09-09 NOTE — H&P PST ADULT - LAB RESULTS AND INTERPRETATION
hbga1c 8.2 s/w Alannah from Dr. Cadena office made aware of abnormal labs.  labs faxed to PCP office

## 2019-09-09 NOTE — PATIENT PROFILE ADULT - NSPROEDALEARNPREF_GEN_A_NUR
written material/Pre op teaching surgical scrub pain management instructions given to pt and her daughter via /individual instruction/verbal instruction

## 2019-09-09 NOTE — H&P PST ADULT - NSICDXFAMILYHX_GEN_ALL_CORE_FT
FAMILY HISTORY:  Family history of diabetes mellitus (DM)  FH: heart disease  FH: HTN (hypertension)

## 2019-09-09 NOTE — H&P PST ADULT - NSICDXPASTSURGICALHX_GEN_ALL_CORE_FT
PAST SURGICAL HISTORY:  History of total left knee replacement     History of vaginal surgery     S/P tubal ligation

## 2019-09-09 NOTE — H&P PST ADULT - HISTORY OF PRESENT ILLNESS
79 year old Sinhala speaking female presents with a several year history of OA right knee pain. Uses cane for ambulation , takes tylenol with mild relief. Scheduled for right knee replacement with DR. Beal.

## 2019-09-10 ENCOUNTER — OUTPATIENT (OUTPATIENT)
Dept: OUTPATIENT SERVICES | Facility: HOSPITAL | Age: 79
LOS: 1 days | End: 2019-09-10

## 2019-09-10 DIAGNOSIS — Z96.652 PRESENCE OF LEFT ARTIFICIAL KNEE JOINT: Chronic | ICD-10-CM

## 2019-09-10 DIAGNOSIS — Z98.890 OTHER SPECIFIED POSTPROCEDURAL STATES: Chronic | ICD-10-CM

## 2019-09-10 DIAGNOSIS — Z01.812 ENCOUNTER FOR PREPROCEDURAL LABORATORY EXAMINATION: ICD-10-CM

## 2019-09-10 LAB
ANION GAP SERPL CALC-SCNC: 10 MMOL/L — SIGNIFICANT CHANGE UP (ref 5–17)
BUN SERPL-MCNC: 17 MG/DL — SIGNIFICANT CHANGE UP (ref 8–20)
CALCIUM SERPL-MCNC: 9.5 MG/DL — SIGNIFICANT CHANGE UP (ref 8.6–10.2)
CHLORIDE SERPL-SCNC: 102 MMOL/L — SIGNIFICANT CHANGE UP (ref 98–107)
CO2 SERPL-SCNC: 26 MMOL/L — SIGNIFICANT CHANGE UP (ref 22–29)
CREAT SERPL-MCNC: 0.84 MG/DL — SIGNIFICANT CHANGE UP (ref 0.5–1.3)
GLUCOSE SERPL-MCNC: 157 MG/DL — HIGH (ref 70–115)
POTASSIUM SERPL-MCNC: 5.5 MMOL/L — HIGH (ref 3.5–5.3)
POTASSIUM SERPL-SCNC: 5.5 MMOL/L — HIGH (ref 3.5–5.3)
SODIUM SERPL-SCNC: 138 MMOL/L — SIGNIFICANT CHANGE UP (ref 135–145)

## 2019-09-10 PROCEDURE — 86901 BLOOD TYPING SEROLOGIC RH(D): CPT

## 2019-09-10 PROCEDURE — 86850 RBC ANTIBODY SCREEN: CPT

## 2019-09-10 PROCEDURE — 85027 COMPLETE CBC AUTOMATED: CPT

## 2019-09-10 PROCEDURE — 87640 STAPH A DNA AMP PROBE: CPT

## 2019-09-10 PROCEDURE — 80053 COMPREHEN METABOLIC PANEL: CPT

## 2019-09-10 PROCEDURE — G0463: CPT

## 2019-09-10 PROCEDURE — 80048 BASIC METABOLIC PNL TOTAL CA: CPT

## 2019-09-10 PROCEDURE — 85730 THROMBOPLASTIN TIME PARTIAL: CPT

## 2019-09-10 PROCEDURE — 86900 BLOOD TYPING SEROLOGIC ABO: CPT

## 2019-09-10 PROCEDURE — 85610 PROTHROMBIN TIME: CPT

## 2019-09-10 PROCEDURE — 36415 COLL VENOUS BLD VENIPUNCTURE: CPT

## 2019-09-10 PROCEDURE — 93005 ELECTROCARDIOGRAM TRACING: CPT

## 2019-09-10 PROCEDURE — 83036 HEMOGLOBIN GLYCOSYLATED A1C: CPT

## 2019-09-24 ENCOUNTER — INPATIENT (INPATIENT)
Facility: HOSPITAL | Age: 79
LOS: 0 days | Discharge: HOME | DRG: 247 | End: 2019-09-25
Attending: INTERNAL MEDICINE | Admitting: INTERNAL MEDICINE
Payer: MEDICARE

## 2019-09-24 ENCOUNTER — TRANSCRIPTION ENCOUNTER (OUTPATIENT)
Age: 79
End: 2019-09-24

## 2019-09-24 VITALS
DIASTOLIC BLOOD PRESSURE: 78 MMHG | HEART RATE: 60 BPM | TEMPERATURE: 97 F | SYSTOLIC BLOOD PRESSURE: 153 MMHG | HEIGHT: 62 IN | OXYGEN SATURATION: 99 % | RESPIRATION RATE: 18 BRPM | WEIGHT: 186.07 LBS

## 2019-09-24 DIAGNOSIS — I20.9 ANGINA PECTORIS, UNSPECIFIED: ICD-10-CM

## 2019-09-24 DIAGNOSIS — R07.9 CHEST PAIN, UNSPECIFIED: ICD-10-CM

## 2019-09-24 DIAGNOSIS — Z96.652 PRESENCE OF LEFT ARTIFICIAL KNEE JOINT: Chronic | ICD-10-CM

## 2019-09-24 DIAGNOSIS — Z98.890 OTHER SPECIFIED POSTPROCEDURAL STATES: Chronic | ICD-10-CM

## 2019-09-24 LAB
ALBUMIN SERPL ELPH-MCNC: 4.2 G/DL — SIGNIFICANT CHANGE UP (ref 3.3–5.2)
ALP SERPL-CCNC: 84 U/L — SIGNIFICANT CHANGE UP (ref 40–120)
ALT FLD-CCNC: 21 U/L — SIGNIFICANT CHANGE UP
ANION GAP SERPL CALC-SCNC: 11 MMOL/L — SIGNIFICANT CHANGE UP (ref 5–17)
APTT BLD: 30.2 SEC — SIGNIFICANT CHANGE UP (ref 27.5–36.3)
AST SERPL-CCNC: 40 U/L — HIGH
BILIRUB SERPL-MCNC: 0.5 MG/DL — SIGNIFICANT CHANGE UP (ref 0.4–2)
BUN SERPL-MCNC: 19 MG/DL — SIGNIFICANT CHANGE UP (ref 8–20)
CALCIUM SERPL-MCNC: 9.9 MG/DL — SIGNIFICANT CHANGE UP (ref 8.6–10.2)
CHLORIDE SERPL-SCNC: 99 MMOL/L — SIGNIFICANT CHANGE UP (ref 98–107)
CO2 SERPL-SCNC: 24 MMOL/L — SIGNIFICANT CHANGE UP (ref 22–29)
CREAT SERPL-MCNC: 0.84 MG/DL — SIGNIFICANT CHANGE UP (ref 0.5–1.3)
GLUCOSE BLDC GLUCOMTR-MCNC: 193 MG/DL — HIGH (ref 70–99)
GLUCOSE SERPL-MCNC: 169 MG/DL — HIGH (ref 70–115)
HCT VFR BLD CALC: 40.6 % — SIGNIFICANT CHANGE UP (ref 34.5–45)
HGB BLD-MCNC: 13 G/DL — SIGNIFICANT CHANGE UP (ref 11.5–15.5)
INR BLD: 1.16 RATIO — SIGNIFICANT CHANGE UP (ref 0.88–1.16)
MCHC RBC-ENTMCNC: 31.2 PG — SIGNIFICANT CHANGE UP (ref 27–34)
MCHC RBC-ENTMCNC: 32 GM/DL — SIGNIFICANT CHANGE UP (ref 32–36)
MCV RBC AUTO: 97.4 FL — SIGNIFICANT CHANGE UP (ref 80–100)
PLATELET # BLD AUTO: 172 K/UL — SIGNIFICANT CHANGE UP (ref 150–400)
POTASSIUM SERPL-MCNC: 4.5 MMOL/L — SIGNIFICANT CHANGE UP (ref 3.5–5.3)
POTASSIUM SERPL-SCNC: 4.5 MMOL/L — SIGNIFICANT CHANGE UP (ref 3.5–5.3)
PROT SERPL-MCNC: 8.3 G/DL — SIGNIFICANT CHANGE UP (ref 6.6–8.7)
PROTHROM AB SERPL-ACNC: 13.4 SEC — HIGH (ref 10–12.9)
RBC # BLD: 4.17 M/UL — SIGNIFICANT CHANGE UP (ref 3.8–5.2)
RBC # FLD: 13.1 % — SIGNIFICANT CHANGE UP (ref 10.3–14.5)
SODIUM SERPL-SCNC: 134 MMOL/L — LOW (ref 135–145)
WBC # BLD: 6.83 K/UL — SIGNIFICANT CHANGE UP (ref 3.8–10.5)
WBC # FLD AUTO: 6.83 K/UL — SIGNIFICANT CHANGE UP (ref 3.8–10.5)

## 2019-09-24 PROCEDURE — 93010 ELECTROCARDIOGRAM REPORT: CPT

## 2019-09-24 RX ORDER — SODIUM CHLORIDE 9 MG/ML
1000 INJECTION, SOLUTION INTRAVENOUS
Refills: 0 | Status: DISCONTINUED | OUTPATIENT
Start: 2019-09-24 | End: 2019-09-25

## 2019-09-24 RX ORDER — ASPIRIN/CALCIUM CARB/MAGNESIUM 324 MG
81 TABLET ORAL DAILY
Refills: 0 | Status: DISCONTINUED | OUTPATIENT
Start: 2019-09-25 | End: 2019-09-25

## 2019-09-24 RX ORDER — ATORVASTATIN CALCIUM 80 MG/1
40 TABLET, FILM COATED ORAL AT BEDTIME
Refills: 0 | Status: DISCONTINUED | OUTPATIENT
Start: 2019-09-24 | End: 2019-09-25

## 2019-09-24 RX ORDER — GLUCAGON INJECTION, SOLUTION 0.5 MG/.1ML
1 INJECTION, SOLUTION SUBCUTANEOUS ONCE
Refills: 0 | Status: DISCONTINUED | OUTPATIENT
Start: 2019-09-24 | End: 2019-09-25

## 2019-09-24 RX ORDER — CARVEDILOL PHOSPHATE 80 MG/1
12.5 CAPSULE, EXTENDED RELEASE ORAL EVERY 12 HOURS
Refills: 0 | Status: DISCONTINUED | OUTPATIENT
Start: 2019-09-24 | End: 2019-09-25

## 2019-09-24 RX ORDER — SODIUM CHLORIDE 9 MG/ML
1000 INJECTION INTRAMUSCULAR; INTRAVENOUS; SUBCUTANEOUS
Refills: 0 | Status: DISCONTINUED | OUTPATIENT
Start: 2019-09-24 | End: 2019-09-25

## 2019-09-24 RX ORDER — LINAGLIPTIN 5 MG/1
1 TABLET, FILM COATED ORAL
Qty: 0 | Refills: 0 | DISCHARGE

## 2019-09-24 RX ORDER — DEXTROSE 50 % IN WATER 50 %
25 SYRINGE (ML) INTRAVENOUS ONCE
Refills: 0 | Status: DISCONTINUED | OUTPATIENT
Start: 2019-09-24 | End: 2019-09-25

## 2019-09-24 RX ORDER — DEXTROSE 50 % IN WATER 50 %
12.5 SYRINGE (ML) INTRAVENOUS ONCE
Refills: 0 | Status: DISCONTINUED | OUTPATIENT
Start: 2019-09-24 | End: 2019-09-25

## 2019-09-24 RX ORDER — LEVOTHYROXINE SODIUM 125 MCG
25 TABLET ORAL DAILY
Refills: 0 | Status: DISCONTINUED | OUTPATIENT
Start: 2019-09-24 | End: 2019-09-25

## 2019-09-24 RX ORDER — CLOPIDOGREL BISULFATE 75 MG/1
75 TABLET, FILM COATED ORAL DAILY
Refills: 0 | Status: DISCONTINUED | OUTPATIENT
Start: 2019-09-25 | End: 2019-09-25

## 2019-09-24 RX ORDER — PANTOPRAZOLE SODIUM 20 MG/1
40 TABLET, DELAYED RELEASE ORAL
Refills: 0 | Status: DISCONTINUED | OUTPATIENT
Start: 2019-09-24 | End: 2019-09-25

## 2019-09-24 RX ORDER — DEXTROSE 50 % IN WATER 50 %
15 SYRINGE (ML) INTRAVENOUS ONCE
Refills: 0 | Status: DISCONTINUED | OUTPATIENT
Start: 2019-09-24 | End: 2019-09-25

## 2019-09-24 RX ORDER — VALSARTAN 80 MG/1
1 TABLET ORAL
Qty: 0 | Refills: 0 | DISCHARGE

## 2019-09-24 RX ORDER — INSULIN LISPRO 100/ML
VIAL (ML) SUBCUTANEOUS
Refills: 0 | Status: DISCONTINUED | OUTPATIENT
Start: 2019-09-24 | End: 2019-09-25

## 2019-09-24 RX ORDER — HYDRALAZINE HCL 50 MG
5 TABLET ORAL ONCE
Refills: 0 | Status: COMPLETED | OUTPATIENT
Start: 2019-09-24 | End: 2019-09-24

## 2019-09-24 RX ADMIN — Medication 5 MILLIGRAM(S): at 20:38

## 2019-09-24 RX ADMIN — PANTOPRAZOLE SODIUM 40 MILLIGRAM(S): 20 TABLET, DELAYED RELEASE ORAL at 18:16

## 2019-09-24 RX ADMIN — CARVEDILOL PHOSPHATE 12.5 MILLIGRAM(S): 80 CAPSULE, EXTENDED RELEASE ORAL at 18:16

## 2019-09-24 RX ADMIN — SODIUM CHLORIDE 30 MILLILITER(S): 9 INJECTION INTRAMUSCULAR; INTRAVENOUS; SUBCUTANEOUS at 16:28

## 2019-09-24 RX ADMIN — ATORVASTATIN CALCIUM 40 MILLIGRAM(S): 80 TABLET, FILM COATED ORAL at 21:49

## 2019-09-24 NOTE — CONSULT NOTE ADULT - SUBJECTIVE AND OBJECTIVE BOX
Patient is a 79y old  Female who presents with a chief complaint of CHRISTIANSON       HPI:  78 yo [place]ican speaking female with history of hypertension, hyperlipidemia and NIDDM.  Patient had recently been complaining of dyspnea with minimal exertion which is equivalent to class 3 angina.  Patient needed clearance for knee surgery and was seen by cardiologist.    Carotid duplex 9/17/19 mild carotid disease  Echo 9/13/19 Normal LV function EF 78%  Nuclear stress test 9/18/19 Abnormal stress test moderate zone lateral ischemia        PAST MEDICAL & SURGICAL HISTORY:  Heart murmur  Diabetes  Knee pain, left: arthritis  Hypercholesterolemia  GERD (gastroesophageal reflux disease)  Hypothyroidism  Hypertension  History of total left knee replacement  History of vaginal surgery  S/P tubal ligation      Allergies    amlodipine (Swelling)  aspirin (Rash)    Intolerances        MEDICATIONS  (STANDING):  atorvastatin 40 milliGRAM(s) Oral at bedtime  carvedilol 12.5 milliGRAM(s) Oral every 12 hours  dextrose 5%. 1000 milliLiter(s) (50 mL/Hr) IV Continuous <Continuous>  dextrose 50% Injectable 12.5 Gram(s) IV Push once  dextrose 50% Injectable 25 Gram(s) IV Push once  dextrose 50% Injectable 25 Gram(s) IV Push once  insulin lispro (HumaLOG) corrective regimen sliding scale   SubCutaneous three times a day before meals  levothyroxine 25 MICROGram(s) Oral daily  pantoprazole    Tablet 40 milliGRAM(s) Oral before breakfast  sodium chloride 0.9%. 1000 milliLiter(s) (30 mL/Hr) IV Continuous <Continuous>    MEDICATIONS  (PRN):  dextrose 40% Gel 15 Gram(s) Oral once PRN Blood Glucose LESS THAN 70 milliGRAM(s)/deciliter  glucagon  Injectable 1 milliGRAM(s) IntraMuscular once PRN Glucose LESS THAN 70 milligrams/deciliter    atorvastatin 40 milliGRAM(s) Oral at bedtime  carvedilol 12.5 milliGRAM(s) Oral every 12 hours  dextrose 40% Gel 15 Gram(s) Oral once PRN  dextrose 5%. 1000 milliLiter(s) IV Continuous <Continuous>  dextrose 50% Injectable 12.5 Gram(s) IV Push once  dextrose 50% Injectable 25 Gram(s) IV Push once  dextrose 50% Injectable 25 Gram(s) IV Push once  glucagon  Injectable 1 milliGRAM(s) IntraMuscular once PRN  insulin lispro (HumaLOG) corrective regimen sliding scale   SubCutaneous three times a day before meals  levothyroxine 25 MICROGram(s) Oral daily  pantoprazole    Tablet 40 milliGRAM(s) Oral before breakfast  sodium chloride 0.9%. 1000 milliLiter(s) IV Continuous <Continuous>      FAMILY HISTORY:  FH: heart disease  FH: HTN (hypertension)  Family history of diabetes mellitus (DM)      SOCIAL HISTORY:    CIGARETTES:  Former    ALCOHOL:  Rare    REVIEW OF SYSTEMS:  CONSTITUTIONAL: No fever, weight loss, or fatigue  EYES: No eye pain, visual disturbances, or discharge  ENMT:  No difficulty hearing, tinnitus, vertigo; No sinus or throat pain  NECK: No pain or stiffness  RESPIRATORY: No cough, wheezing, chills or hemoptysis;   CARDIOVASCULAR: No chest pain, palpitations, passing out, dizziness, or leg swelling  GASTROINTESTINAL: No abdominal or epigastric pain. No nausea, vomiting, or hematemesis; No diarrhea or constipation. No melena or hematochezia.  GENITOURINARY: No dysuria, frequency, hematuria, or incontinence  NEUROLOGICAL: No headaches, memory loss, loss of strength, numbness, or tremors  SKIN: No itching, burning, rashes, or lesions   LYMPH Nodes: No enlarged glands  ENDOCRINE: No heat or cold intolerance; No hair loss  MUSCULOSKELETAL: No joint pain or swelling; No muscle, back, or extremity pain  PSYCHIATRIC: No depression, anxiety, mood swings, or difficulty sleeping  HEME/LYMPH: No easy bruising, or bleeding gums  ALLERY AND IMMUNOLOGIC: No hives or eczema	    Vital Signs Last 24 Hrs  T(C): 36.3 (24 Sep 2019 12:49), Max: 36.3 (24 Sep 2019 12:49)  T(F): 97.3 (24 Sep 2019 12:49), Max: 97.3 (24 Sep 2019 12:49)  HR: 58 (24 Sep 2019 16:45) (57 - 61)  BP: 191/81 (24 Sep 2019 16:45) (153/78 - 191/81)  BP(mean): --  RR: 18 (24 Sep 2019 16:45) (18 - 18)  SpO2: 99% (24 Sep 2019 16:45) (96% - 99%)    Daily Height in cm: 157.48 (24 Sep 2019 12:49)    Daily     I&O's Detail      PHYSICAL EXAM:  Appearance: Normal, well nourished	  HEENT:   Normal oral mucosa, PERRL, EOMI, sclera non-icteric	  Lymphatic: No cervical lymphadenopathy  Cardiovascular: Normal S1 S2, No JVD, No cardiac murmurs, No carotid bruits, No peripheral edema  Respiratory: Lungs clear to auscultation	  Psychiatry: A & O x 3, Mood & affect appropriate  Gastrointestinal:  Soft, Non-tender, + BS, no bruits	  Skin: No rashes, No ecchymoses, No cyanosis  Neurologic: Grossly non-focal with full strength in all four extremities  Extremities: Normal range of motion, No clubbing, cyanosis or edema  Vascular: Peripheral pulses palpable 2+ bilaterally    LABS:                        13.0   6.83  )-----------( 172      ( 24 Sep 2019 12:57 )             40.6     09-24    134<L>  |  99  |  19.0  ----------------------------<  169<H>  4.5   |  24.0  |  0.84    Ca    9.9      24 Sep 2019 12:57    TPro  8.3  /  Alb  4.2  /  TBili  0.5  /  DBili  x   /  AST  40<H>  /  ALT  21  /  AlkPhos  84  09-24        PT/INR - ( 24 Sep 2019 12:57 )   PT: 13.4 sec;   INR: 1.16 ratio         PTT - ( 24 Sep 2019 12:57 )  PTT:30.2 sec    I&O's Summary    BNP  RADIOLOGY & ADDITIONAL STUDIES:    Assessment:  78 yo [place]ican speaking female with history of hypertension, hyperlipidemia and NIDDM.  Patient had recently been complaining of dyspnea with minimal exertion which is equivalent to class 3 angina.  Patient needed clearance for knee surgery and was seen by cardiologist.    Carotid duplex 9/17/19 mild carotid disease  Echo 9/13/19 Normal LV function EF 78%  Nuclear stress test 9/18/19 Abnormal stress test moderate zone lateral ischemia    Plan:  Cardiac catheterization and possible percutaneous intervention recommended.  Risks, benefits, and alternatives reviewed.  Risks including but not limited to MI, death, stroke, bleeding, infection, vessel injury, hematoma, renal failure, allergic reaction, urgent open heart surgery, restenosis and stent thrombosis were reviewed.  All questions answered.  Patient is agreeable to proceed.

## 2019-09-24 NOTE — PROGRESS NOTE ADULT - ASSESSMENT
ASSESSMENT/PLAN:   _Admit as outpatient in bed	  -Groin precaution  -Bedrest X 3 hours HOB 90 degrees  -Resume home meds   -Initiate atorvastatin, plavix, sliding scale regular insulin, change prilosec to protonix  -Follow up with Dr. Chirinos and Dr Gonzalez  -Check labs/EKG/site check in AM  -Probable discharge in AM  -Return 3 weeks for PCI of LAD  cardiac rehab info provided/referral and communication to cardiac rehab completed

## 2019-09-24 NOTE — DISCHARGE NOTE PROVIDER - CARE PROVIDER_API CALL
Mukund Chirinos)  Cardiovascular Disease; Interventional Cardiology  [address] [place] [address]  Phone: [phone]  Fax: [phone]  Follow Up Time: 2 weeks    Lavinia Gonzalez)  Cardiovascular Disease  [address] [place] [address]  Phone: [phone]  Fax: [phone]  Follow Up Time: 2 weeks

## 2019-09-24 NOTE — DISCHARGE NOTE PROVIDER - PROVIDER TOKENS
PROVIDER:[TOKEN:[[id number]],FOLLOWUP:[2 weeks]],PROVIDER:[TOKEN:[[id number]],FOLLOWUP:[2 weeks]]

## 2019-09-24 NOTE — DISCHARGE NOTE PROVIDER - NSDCFUSCHEDAPPT_GEN_ALL_CORE_FT
WILMER CONROY ; 10/02/2019 ; [facility] [address] [place]  WILMER CONROY ; 10/02/2019 ; [facility]  WILMER CONROY ; 10/18/2019 ; [facility] [address]  WILMER CONROY ; 11/11/2019 ; [facility] [address] [facility] WILMER CONROY ; 10/02/2019 ; [facility] [address] [place]  WILMER CONROY ; 10/02/2019 ; [facility]  WILMER CONROY ; 10/18/2019 ; [facility] [address]  WILMER CONROY ; 11/11/2019 ; [facility] [address] [facility]

## 2019-09-24 NOTE — H&P PST ADULT - HISTORY OF PRESENT ILLNESS
80 yo Turkish speaking female with history of hypertension, hyperlipidemia and NIDDM.  Patient had recently been complaining of dyspnea with minimal exertion which is equivalent to class 3 angina.  Patient needed clearance for knee surgery and was seen by cardiologist.    Carotid duplex 9/17/19 mild carotid disease  Echo 9/13/19 Normal LV function EF 78%  Nuclear stress test 9/18/19 Abnormal stress test lateral ischemia  Patient here for cardiac cath to r/o CAD.

## 2019-09-24 NOTE — DISCHARGE NOTE PROVIDER - HOSPITAL COURSE
78 yo Icelandic speaking female with history of hypertension, hyperlipidemia and NIDDM.  Patient had recently been complaining of dyspnea with minimal exertion which is equivalent to class 3 angina.  Patient needed clearance for knee surgery and was seen by cardiologist.      Carotid duplex 9/17/19 mild carotid disease    Echo 9/13/19 Normal LV function EF 78%    Nuclear stress test 9/18/19 Abnormal stress test lateral ischemia    Patient here for cardiac cath to r/o CAD.            Allergies/Medications:     Allergies:          Allergies:    	aspirin: Drug, Rash, hypersalivation, tongue swelling    	amlodipine: Drug, Swelling, LE swelling    Physical Exam:    · Constitutional    detailed exam    · Constitutional Details    well-developed; well-groomed; obese    · Eyes    EOMI; PERRL; no drainage or redness    · ENMT    No oral lesions; no gross abnormalities    · Neck    No bruits; no thyromegaly or nodules    · Breasts    No masses; no nipple discharge    · Back    No deformity or limitation of movement    · Respiratory    Breath Sounds equal & clear to percussion & auscultation, no accessory muscle use    · Cardiovascular    Regular rate & rhythm, normal S1, S2; no murmurs, gallops or rubs; no S3, S4    · Genitourinary    not examined    · Rectal    not examined    · Extremities    No cyanosis, clubbing or edema    · Vascular right groin angioseal benign    Equal and normal pulses (carotid, femoral, dorsalis pedis)    · Neurological    Alert & oriented; no sensory, motor or coordination deficits, normal reflexes    · Skin    No lesions; no rash    · Lymph Nodes    No lymphadedenopathy    · Musculoskeletal    No joint pain, swelling or deformity; no limitation of movement    · Psychiatric    Affect and characteristics of appearance, verbalizations, behaviors are appropriate    ICU Vital Signs Last 24 Hrs    T(C): 36.6 (25 Sep 2019 05:00), Max: 36.6 (24 Sep 2019 19:00)    T(F): 97.9 (25 Sep 2019 05:00), Max: 97.9 (25 Sep 2019 05:00)    HR: 62 (25 Sep 2019 07:30) (48 - 66)    BP: 130/60 (25 Sep 2019 07:30) (127/58 - 204/83)    BP(mean): --    ABP: --    ABP(mean): --    RR: 16 (25 Sep 2019 07:30) (16 - 18)    SpO2: 99% (25 Sep 2019 07:30) (95% - 99%)                            12.1     6.88  )-----------( 161      ( 25 Sep 2019 05:39 )               36.5     09-25        132<L>  |  98  |  20.0    ----------------------------<  179<H>    4.2   |  22.0  |  0.79        Ca    9.5      25 Sep 2019 05:39        TPro  7.8  /  Alb  3.9  /  TBili  0.5  /  DBili  x   /  AST  38<H>  /  ALT  20  /  AlkPhos  79  09-25    S/P PHYLLIS X 2 RCA    Plan PCI in 3 weeks as outpatient for LAD    Aspirin and plavix protocol    add atorvastatin and protonix.    D/C uneventful

## 2019-09-24 NOTE — PROGRESS NOTE ADULT - SUBJECTIVE AND OBJECTIVE BOX
Nurse Practitioner Progress note:     78 yo Swazi speaking female with history of hypertension, hyperlipidemia and NIDDM.  Patient had recently been complaining of dyspnea with minimal exertion which is equivalent to class 3 angina.  Patient needed clearance for knee surgery and was seen by cardiologist.    Carotid duplex 9/17/19 mild carotid disease  Echo 9/13/19 Normal LV function EF 78%  Nuclear stress test 9/18/19 Abnormal stress test lateral ischemia  Patient here for cardiac cath to r/o CAD.      MEDICATIONS:  carvedilol 12.5 milliGRAM(s) Oral every 12 hours  pantoprazole    Tablet 40 milliGRAM(s) Oral before breakfast  atorvastatin 40 milliGRAM(s) Oral at bedtime  dextrose 40% Gel 15 Gram(s) Oral once PRN  dextrose 50% Injectable 12.5 Gram(s) IV Push once  dextrose 50% Injectable 25 Gram(s) IV Push once  dextrose 50% Injectable 25 Gram(s) IV Push once  glucagon  Injectable 1 milliGRAM(s) IntraMuscular once PRN  insulin lispro (HumaLOG) corrective regimen sliding scale   SubCutaneous three times a day before meals  levothyroxine 25 MICROGram(s) Oral daily  dextrose 5%. 1000 milliLiter(s) IV Continuous <Continuous>  sodium chloride 0.9%. 1000 milliLiter(s) IV Continuous <Continuous>      TELEMETRY: NSR nl axis    T(C): 36.3 (09-24-19 @ 12:49), Max: 36.3 (09-24-19 @ 12:49)  HR: 58 (09-24-19 @ 16:45) (57 - 61)  BP: 191/81 (09-24-19 @ 16:45) (153/78 - 191/81)  RR: 18 (09-24-19 @ 16:45) (18 - 18)  SpO2: 99% (09-24-19 @ 16:45) (96% - 99%)  Wt(kg): --    PHYSICAL EXAM:  Appearance: Normal	  HEENT:   Normal oral mucosa, PERRL  Cardiovascular: Normal S1 S2, No JVD, No murmurs, No edema  Respiratory: Lungs clear to auscultation	  Psychiatry: A & O x 3, Mood & affect appropriate  Gastrointestinal:  Soft, Non-tender, + BS	  Skin: No rashes, No ecchymoses, No cyanosis  Neurologic: Non-focal, A&O X3.  No neuro deficits  Extremities: Normal range of motion, No clubbing, cyanosis or edema  Vascular: Peripheral pulses palpable 2+ bilaterally  Procedure Site: Right groin angioseal site benign    12 lead EKG:  	NSR with Rbbb NO CHNAGE FROM PRIOR ekg HR 60    LABS:	 	                          13.0   6.83  )-----------( 172      ( 24 Sep 2019 12:57 )             40.6     09-24    134<L>  |  99  |  19.0  ----------------------------<  169<H>  4.5   |  24.0  |  0.84    Ca    9.9      24 Sep 2019 12:57    TPro  8.3  /  Alb  4.2  /  TBili  0.5  /  DBili  x   /  AST  40<H>  /  ALT  21  /  AlkPhos  84  09-24        PROCEDURE RESULTS: S/P RCA x 2

## 2019-09-24 NOTE — H&P PST ADULT - ASSESSMENT
78 yo Greek speaking female with history of hypertension, hyperlipidemia and NIDDM.  Patient had recently been complaining of dyspnea with minimal exertion which is equivalent to class 3 angina.  Patient needed clearance for knee surgery and was seen by cardiologist.    Carotid duplex 9/17/19 mild carotid disease  Echo 9/13/19 Normal LV function EF 78%  Nuclear stress test 9/18/19 Abnormal stress test lateral ischemia  Patient here for cardiac cath to r/o CAD.

## 2019-09-25 ENCOUNTER — TRANSCRIPTION ENCOUNTER (OUTPATIENT)
Age: 79
End: 2019-09-25

## 2019-09-25 VITALS
OXYGEN SATURATION: 99 % | HEART RATE: 67 BPM | RESPIRATION RATE: 16 BRPM | SYSTOLIC BLOOD PRESSURE: 141 MMHG | DIASTOLIC BLOOD PRESSURE: 65 MMHG

## 2019-09-25 LAB
ALBUMIN SERPL ELPH-MCNC: 3.9 G/DL — SIGNIFICANT CHANGE UP (ref 3.3–5.2)
ALP SERPL-CCNC: 79 U/L — SIGNIFICANT CHANGE UP (ref 40–120)
ALT FLD-CCNC: 20 U/L — SIGNIFICANT CHANGE UP
ANION GAP SERPL CALC-SCNC: 12 MMOL/L — SIGNIFICANT CHANGE UP (ref 5–17)
AST SERPL-CCNC: 38 U/L — HIGH
BASOPHILS # BLD AUTO: 0.02 K/UL — SIGNIFICANT CHANGE UP (ref 0–0.2)
BASOPHILS NFR BLD AUTO: 0.3 % — SIGNIFICANT CHANGE UP (ref 0–2)
BILIRUB SERPL-MCNC: 0.5 MG/DL — SIGNIFICANT CHANGE UP (ref 0.4–2)
BUN SERPL-MCNC: 20 MG/DL — SIGNIFICANT CHANGE UP (ref 8–20)
CALCIUM SERPL-MCNC: 9.5 MG/DL — SIGNIFICANT CHANGE UP (ref 8.6–10.2)
CHLORIDE SERPL-SCNC: 98 MMOL/L — SIGNIFICANT CHANGE UP (ref 98–107)
CO2 SERPL-SCNC: 22 MMOL/L — SIGNIFICANT CHANGE UP (ref 22–29)
CREAT SERPL-MCNC: 0.79 MG/DL — SIGNIFICANT CHANGE UP (ref 0.5–1.3)
EOSINOPHIL # BLD AUTO: 0.04 K/UL — SIGNIFICANT CHANGE UP (ref 0–0.5)
EOSINOPHIL NFR BLD AUTO: 0.6 % — SIGNIFICANT CHANGE UP (ref 0–6)
GLUCOSE BLDC GLUCOMTR-MCNC: 157 MG/DL — HIGH (ref 70–99)
GLUCOSE SERPL-MCNC: 179 MG/DL — HIGH (ref 70–115)
HCT VFR BLD CALC: 36.5 % — SIGNIFICANT CHANGE UP (ref 34.5–45)
HGB BLD-MCNC: 12.1 G/DL — SIGNIFICANT CHANGE UP (ref 11.5–15.5)
IMM GRANULOCYTES NFR BLD AUTO: 0.3 % — SIGNIFICANT CHANGE UP (ref 0–1.5)
LYMPHOCYTES # BLD AUTO: 2.05 K/UL — SIGNIFICANT CHANGE UP (ref 1–3.3)
LYMPHOCYTES # BLD AUTO: 29.8 % — SIGNIFICANT CHANGE UP (ref 13–44)
MCHC RBC-ENTMCNC: 31.7 PG — SIGNIFICANT CHANGE UP (ref 27–34)
MCHC RBC-ENTMCNC: 33.2 GM/DL — SIGNIFICANT CHANGE UP (ref 32–36)
MCV RBC AUTO: 95.5 FL — SIGNIFICANT CHANGE UP (ref 80–100)
MONOCYTES # BLD AUTO: 0.74 K/UL — SIGNIFICANT CHANGE UP (ref 0–0.9)
MONOCYTES NFR BLD AUTO: 10.8 % — SIGNIFICANT CHANGE UP (ref 2–14)
NEUTROPHILS # BLD AUTO: 4.01 K/UL — SIGNIFICANT CHANGE UP (ref 1.8–7.4)
NEUTROPHILS NFR BLD AUTO: 58.2 % — SIGNIFICANT CHANGE UP (ref 43–77)
PLATELET # BLD AUTO: 161 K/UL — SIGNIFICANT CHANGE UP (ref 150–400)
POTASSIUM SERPL-MCNC: 4.2 MMOL/L — SIGNIFICANT CHANGE UP (ref 3.5–5.3)
POTASSIUM SERPL-SCNC: 4.2 MMOL/L — SIGNIFICANT CHANGE UP (ref 3.5–5.3)
PROT SERPL-MCNC: 7.8 G/DL — SIGNIFICANT CHANGE UP (ref 6.6–8.7)
RBC # BLD: 3.82 M/UL — SIGNIFICANT CHANGE UP (ref 3.8–5.2)
RBC # FLD: 13.2 % — SIGNIFICANT CHANGE UP (ref 10.3–14.5)
SODIUM SERPL-SCNC: 132 MMOL/L — LOW (ref 135–145)
WBC # BLD: 6.88 K/UL — SIGNIFICANT CHANGE UP (ref 3.8–10.5)
WBC # FLD AUTO: 6.88 K/UL — SIGNIFICANT CHANGE UP (ref 3.8–10.5)

## 2019-09-25 PROCEDURE — 99153 MOD SED SAME PHYS/QHP EA: CPT

## 2019-09-25 PROCEDURE — 93458 L HRT ARTERY/VENTRICLE ANGIO: CPT | Mod: XU

## 2019-09-25 PROCEDURE — 93005 ELECTROCARDIOGRAM TRACING: CPT

## 2019-09-25 PROCEDURE — C1874: CPT

## 2019-09-25 PROCEDURE — 80053 COMPREHEN METABOLIC PANEL: CPT

## 2019-09-25 PROCEDURE — C1894: CPT

## 2019-09-25 PROCEDURE — 92978 ENDOLUMINL IVUS OCT C 1ST: CPT | Mod: RC

## 2019-09-25 PROCEDURE — 85610 PROTHROMBIN TIME: CPT

## 2019-09-25 PROCEDURE — 82962 GLUCOSE BLOOD TEST: CPT

## 2019-09-25 PROCEDURE — 36415 COLL VENOUS BLD VENIPUNCTURE: CPT

## 2019-09-25 PROCEDURE — 99152 MOD SED SAME PHYS/QHP 5/>YRS: CPT

## 2019-09-25 PROCEDURE — C1887: CPT

## 2019-09-25 PROCEDURE — C1769: CPT

## 2019-09-25 PROCEDURE — C9600: CPT | Mod: RC

## 2019-09-25 PROCEDURE — C1725: CPT

## 2019-09-25 PROCEDURE — 85027 COMPLETE CBC AUTOMATED: CPT

## 2019-09-25 PROCEDURE — 85730 THROMBOPLASTIN TIME PARTIAL: CPT

## 2019-09-25 PROCEDURE — 93010 ELECTROCARDIOGRAM REPORT: CPT

## 2019-09-25 RX ORDER — ATORVASTATIN CALCIUM 80 MG/1
1 TABLET, FILM COATED ORAL
Qty: 30 | Refills: 3
Start: 2019-09-25

## 2019-09-25 RX ORDER — PANTOPRAZOLE SODIUM 20 MG/1
1 TABLET, DELAYED RELEASE ORAL
Qty: 30 | Refills: 2
Start: 2019-09-25

## 2019-09-25 RX ORDER — CLOPIDOGREL BISULFATE 75 MG/1
1 TABLET, FILM COATED ORAL
Qty: 30 | Refills: 11
Start: 2019-09-25

## 2019-09-25 RX ORDER — OMEPRAZOLE 10 MG/1
1 CAPSULE, DELAYED RELEASE ORAL
Qty: 0 | Refills: 0 | DISCHARGE

## 2019-09-25 RX ORDER — GLIMEPIRIDE 1 MG
1 TABLET ORAL
Qty: 0 | Refills: 0 | DISCHARGE

## 2019-09-25 RX ADMIN — Medication 25 MICROGRAM(S): at 05:23

## 2019-09-25 RX ADMIN — PANTOPRAZOLE SODIUM 40 MILLIGRAM(S): 20 TABLET, DELAYED RELEASE ORAL at 05:23

## 2019-09-25 RX ADMIN — Medication 2: at 08:01

## 2019-09-25 RX ADMIN — CARVEDILOL PHOSPHATE 12.5 MILLIGRAM(S): 80 CAPSULE, EXTENDED RELEASE ORAL at 05:23

## 2019-09-25 NOTE — DISCHARGE NOTE NURSING/CASE MANAGEMENT/SOCIAL WORK - PATIENT PORTAL LINK FT
You can access the FollowMyHealth Patient Portal offered by [facility] by registering at the following website: [email or web address]. By joining [employer] [facility]’s FollowMyHealth portal, you will also be able to view your health information using other applications (apps) compatible with our system.

## 2019-10-02 ENCOUNTER — APPOINTMENT (OUTPATIENT)
Dept: ORTHOPEDIC SURGERY | Facility: HOSPITAL | Age: 79
End: 2019-10-02

## 2019-10-10 ENCOUNTER — OUTPATIENT (OUTPATIENT)
Dept: OUTPATIENT SERVICES | Facility: HOSPITAL | Age: 79
LOS: 1 days | End: 2019-10-10
Payer: MEDICARE

## 2019-10-10 VITALS
WEIGHT: 184.97 LBS | HEIGHT: 62 IN | TEMPERATURE: 97 F | RESPIRATION RATE: 18 BRPM | DIASTOLIC BLOOD PRESSURE: 55 MMHG | OXYGEN SATURATION: 98 % | HEART RATE: 60 BPM | SYSTOLIC BLOOD PRESSURE: 149 MMHG

## 2019-10-10 DIAGNOSIS — Z01.818 ENCOUNTER FOR OTHER PREPROCEDURAL EXAMINATION: ICD-10-CM

## 2019-10-10 DIAGNOSIS — Z98.890 OTHER SPECIFIED POSTPROCEDURAL STATES: Chronic | ICD-10-CM

## 2019-10-10 DIAGNOSIS — Z96.652 PRESENCE OF LEFT ARTIFICIAL KNEE JOINT: Chronic | ICD-10-CM

## 2019-10-10 LAB
ALBUMIN SERPL ELPH-MCNC: 3.8 G/DL — SIGNIFICANT CHANGE UP (ref 3.3–5.2)
ALP SERPL-CCNC: 97 U/L — SIGNIFICANT CHANGE UP (ref 40–120)
ALT FLD-CCNC: 23 U/L — SIGNIFICANT CHANGE UP
ANION GAP SERPL CALC-SCNC: 11 MMOL/L — SIGNIFICANT CHANGE UP (ref 5–17)
APTT BLD: 30.7 SEC — SIGNIFICANT CHANGE UP (ref 27.5–36.3)
AST SERPL-CCNC: 40 U/L — HIGH
BILIRUB SERPL-MCNC: 0.4 MG/DL — SIGNIFICANT CHANGE UP (ref 0.4–2)
BUN SERPL-MCNC: 12 MG/DL — SIGNIFICANT CHANGE UP (ref 8–20)
CALCIUM SERPL-MCNC: 10.2 MG/DL — SIGNIFICANT CHANGE UP (ref 8.6–10.2)
CHLORIDE SERPL-SCNC: 105 MMOL/L — SIGNIFICANT CHANGE UP (ref 98–107)
CO2 SERPL-SCNC: 24 MMOL/L — SIGNIFICANT CHANGE UP (ref 22–29)
CREAT SERPL-MCNC: 0.84 MG/DL — SIGNIFICANT CHANGE UP (ref 0.5–1.3)
GLUCOSE SERPL-MCNC: 96 MG/DL — SIGNIFICANT CHANGE UP (ref 70–115)
HCT VFR BLD CALC: 37.8 % — SIGNIFICANT CHANGE UP (ref 34.5–45)
HGB BLD-MCNC: 12.2 G/DL — SIGNIFICANT CHANGE UP (ref 11.5–15.5)
INR BLD: 1.17 RATIO — HIGH (ref 0.88–1.16)
MAGNESIUM SERPL-MCNC: 1.5 MG/DL — LOW (ref 1.6–2.6)
MCHC RBC-ENTMCNC: 31.4 PG — SIGNIFICANT CHANGE UP (ref 27–34)
MCHC RBC-ENTMCNC: 32.3 GM/DL — SIGNIFICANT CHANGE UP (ref 32–36)
MCV RBC AUTO: 97.2 FL — SIGNIFICANT CHANGE UP (ref 80–100)
PLATELET # BLD AUTO: 150 K/UL — SIGNIFICANT CHANGE UP (ref 150–400)
POTASSIUM SERPL-MCNC: 5.3 MMOL/L — SIGNIFICANT CHANGE UP (ref 3.5–5.3)
POTASSIUM SERPL-SCNC: 5.3 MMOL/L — SIGNIFICANT CHANGE UP (ref 3.5–5.3)
PROT SERPL-MCNC: 8.2 G/DL — SIGNIFICANT CHANGE UP (ref 6.6–8.7)
PROTHROM AB SERPL-ACNC: 13.5 SEC — HIGH (ref 10–12.9)
RBC # BLD: 3.89 M/UL — SIGNIFICANT CHANGE UP (ref 3.8–5.2)
RBC # FLD: 13 % — SIGNIFICANT CHANGE UP (ref 10.3–14.5)
SODIUM SERPL-SCNC: 140 MMOL/L — SIGNIFICANT CHANGE UP (ref 135–145)
WBC # BLD: 5.97 K/UL — SIGNIFICANT CHANGE UP (ref 3.8–10.5)
WBC # FLD AUTO: 5.97 K/UL — SIGNIFICANT CHANGE UP (ref 3.8–10.5)

## 2019-10-10 PROCEDURE — G0463: CPT

## 2019-10-10 PROCEDURE — 93005 ELECTROCARDIOGRAM TRACING: CPT

## 2019-10-10 PROCEDURE — 85730 THROMBOPLASTIN TIME PARTIAL: CPT

## 2019-10-10 PROCEDURE — 85027 COMPLETE CBC AUTOMATED: CPT

## 2019-10-10 PROCEDURE — 83735 ASSAY OF MAGNESIUM: CPT

## 2019-10-10 PROCEDURE — 80053 COMPREHEN METABOLIC PANEL: CPT

## 2019-10-10 PROCEDURE — 85610 PROTHROMBIN TIME: CPT

## 2019-10-10 PROCEDURE — 93010 ELECTROCARDIOGRAM REPORT: CPT

## 2019-10-10 PROCEDURE — 36415 COLL VENOUS BLD VENIPUNCTURE: CPT

## 2019-10-10 NOTE — H&P PST ADULT - HISTORY OF PRESENT ILLNESS
80 yo Somali speaking female with history of hypertension, hyperlipidemia and NIDDM.  Patient had recently been complaining of dyspnea with minimal exertion which is equivalent to class 3 angina.  Patient needed clearance for knee surgery and was seen by cardiologist.  She had a abnormal stress test with ischemia on 9/19/19 > She presented to for cardiac cath on 9/24 found to have multiple blockages .on 9/24 She received 2 PHYLLIS to 90% and 85 % lesions She presents today for PST for Staged procedure and intervention  Currently feels    Mallampati  ASA   GFR   BRA   < from: Cardiac Cath Lab - Adult (09.24.19 @ 14:27) >  s. Global left ventricular function was hypercontractile. EF  estimated was 80 %.  VALVES: MITRAL VALVE: The mitral valve exhibited no regurgitation.  CORONARY VESSELS: The coronary circulation is right dominant.  LM:   --  Proximal left main: There was a discrete 20 % stenosis.  LAD:   --  Proximal LAD: Angiography showed mild atherosclerosis with no  flow limiting lesions.  --  Mid LAD: There was a diffuse 85 % stenosis.  --  Distal LAD: There was a diffuse 40 % stenosis.  --  D1: The vessel was very small sized. Angiography showed severe  atherosclerosis.  --  D2: The vessel was very small sized. Angiography showed severe  atherosclerosis.  --  D3: The vessel was very small sized. Angiography showed severe  atherosclerosis.  CX:   --  Proximal circumflex: Angiography showed mild atherosclerosis with  no flow limiting lesions.  --  Mid circumflex: Angiography showed mild atherosclerosis with no flow  limiting lesions.  --  OM1: There was a diffuse 20 % stenosis.  --  OM2: Distal vessel angiography showed a very small sized vessel and  moderate diffuse disease.  RCA:   --  Proximal RCA: There was a diffuse 90 % stenosis. MLA byIVUS is  2.3 sq mm, indicating a significant stenosis. 78 yo Guyanese speaking female(  services utilized)  with history of hypertension, hyperlipidemia and NIDDM.  Patient had recently been complaining of dyspnea with minimal exertion which is equivalent to class 3 angina.  Patient needed clearance for knee surgery and was seen by cardiologist.  She had a abnormal stress test with ischemia on 9/19/19  She presented to for cardiac cath on 9/24 found to have multiple blockages .on 9/24 She received 2 PHYLLIS to 90% and 85 % lesions She presents today for PST for Staged of procedure and intervention LAD disease  Currently feels well   She reports no chest pain at rest dizziness syncope, fever chill, groin pain , black or blood in the stool ,    She reports some exertional sob with stair climbing but is greatly improved since receiving stents   She has been taking Asprin with out issue ( unsure of allergy ). She has also been taking Plavix consistently without interruption       Mallampati 2   ASA 2  GFR   BRA    Cardiac Cath Lab - Adult (09.24.19 @ 14:27) >  s. Global left ventricular function was hypercontractile. EF  estimated was 80 %.  VALVES: MITRAL VALVE: The mitral valve exhibited no regurgitation.  CORONARY VESSELS: The coronary circulation is right dominant.  LM:   --  Proximal left main: There was a discrete 20 % stenosis.  LAD:   --  Proximal LAD: Angiography showed mild atherosclerosis with no  flow limiting lesions.  --  Mid LAD: There was a diffuse 85 % stenosis.  --  Distal LAD: There was a diffuse 40 % stenosis.  --  D1: The vessel was very small sized. Angiography showed severe  atherosclerosis.  --  D2: The vessel was very small sized. Angiography showed severe  atherosclerosis.  --  D3: The vessel was very small sized. Angiography showed severe  atherosclerosis.  CX:   --  Proximal circumflex: Angiography showed mild atherosclerosis with  no flow limiting lesions.  --  Mid circumflex: Angiography showed mild atherosclerosis with no flow  limiting lesions.  --  OM1: There was a diffuse 20 % stenosis.  --  OM2: Distal vessel angiography showed a very small sized vessel and  moderate diffuse disease.  RCA:   --  Proximal RCA: There was a diffuse 90 % stenosis. MLA by Ivus s  2.3 sq mm, indicating a significant stenosis.    Carotid duplex 9/17/19 mild carotid disease  Echo 9/13/19 Normal LV function EF 78%  Nuclear stress test 9/18/19 Abnormal stress test lateral ischemia 78 yo Emirati speaking female(  services utilized)  with history of hypertension, hyperlipidemia and NIDDM.  Patient had recently been complaining of dyspnea with minimal exertion which is equivalent to class 3 angina.  Patient needed clearance for knee surgery and was seen by cardiologist.  She had a abnormal stress test with ischemia on 9/19/19  She presented to for cardiac cath on 9/24 found to have multiple blockages .on 9/24 She received 2 PHYLLIS to 90% and 85 % lesions She presents today for PST for Staged of procedure and intervention LAD disease  Currently feels well   She reports no chest pain at rest dizziness syncope, fever chill, groin pain , black or blood in the stool ,    She reports some exertional sob with stair climbing but is greatly improved since receiving stents   She has been taking Asprin with out issue ( unsure of allergy ). She has also been taking Plavix consistently without interruption       Mallampati 2   ASA 2  GFR 65  BRA 1.6%   Cardiac Cath Lab - Adult (09.24.19 @ 14:27) >  s. Global left ventricular function was hypercontractile. EF  estimated was 80 %.  VALVES: MITRAL VALVE: The mitral valve exhibited no regurgitation.  CORONARY VESSELS: The coronary circulation is right dominant.  LM:   --  Proximal left main: There was a discrete 20 % stenosis.  LAD:   --  Proximal LAD: Angiography showed mild atherosclerosis with no  flow limiting lesions.  --  Mid LAD: There was a diffuse 85 % stenosis.  --  Distal LAD: There was a diffuse 40 % stenosis.  --  D1: The vessel was very small sized. Angiography showed severe  atherosclerosis.  --  D2: The vessel was very small sized. Angiography showed severe  atherosclerosis.  --  D3: The vessel was very small sized. Angiography showed severe  atherosclerosis.  CX:   --  Proximal circumflex: Angiography showed mild atherosclerosis with  no flow limiting lesions.  --  Mid circumflex: Angiography showed mild atherosclerosis with no flow  limiting lesions.  --  OM1: There was a diffuse 20 % stenosis.  --  OM2: Distal vessel angiography showed a very small sized vessel and  moderate diffuse disease.  RCA:   --  Proximal RCA: There was a diffuse 90 % stenosis. MLA by Ivus s  2.3 sq mm, indicating a significant stenosis.    Carotid duplex 9/17/19 mild carotid disease  Echo 9/13/19 Normal LV function EF 78%  Nuclear stress test 9/18/19 Abnormal stress test lateral ischemia

## 2019-10-10 NOTE — H&P PST ADULT - ASSESSMENT
78 yo Qatari speaking female(  services utilized)  with history of hypertension, hyperlipidemia and NIDDM.  Patient had recently been complaining of dyspnea with minimal exertion which is equivalent to class 3 angina.  Patient needed clearance for knee surgery and was seen by cardiologist.  She had a abnormal stress test with ischemia on 9/19/19  She presented to for cardiac cath on 9/24 found to have multiple blockages .on 9/24 She received 2 PHYLLIS to 90% and 85 % lesions She presents today for PST for Staged of procedure and intervention LAD disease  Currently feels well   PRE-PROCEDURE ASSESSMENT  LHC and intervention   -Patient seen and examined  -Labs reviewed  -Pre-procedure teaching completed with patient and family  -Questions answered  Pt instructed to continue ASA and Plavix   Diabetic medication instructions reviewed in Qatari   Npo except for medications with a sip of water on day of procedure

## 2019-10-15 ENCOUNTER — INPATIENT (INPATIENT)
Facility: HOSPITAL | Age: 79
LOS: 0 days | Discharge: HOME | DRG: 247 | End: 2019-10-16
Attending: INTERNAL MEDICINE | Admitting: INTERNAL MEDICINE
Payer: MEDICARE

## 2019-10-15 ENCOUNTER — TRANSCRIPTION ENCOUNTER (OUTPATIENT)
Age: 79
End: 2019-10-15

## 2019-10-15 VITALS
DIASTOLIC BLOOD PRESSURE: 82 MMHG | OXYGEN SATURATION: 100 % | TEMPERATURE: 98 F | RESPIRATION RATE: 16 BRPM | SYSTOLIC BLOOD PRESSURE: 206 MMHG | HEART RATE: 57 BPM

## 2019-10-15 DIAGNOSIS — R07.9 CHEST PAIN, UNSPECIFIED: ICD-10-CM

## 2019-10-15 DIAGNOSIS — Z98.890 OTHER SPECIFIED POSTPROCEDURAL STATES: Chronic | ICD-10-CM

## 2019-10-15 DIAGNOSIS — Z96.652 PRESENCE OF LEFT ARTIFICIAL KNEE JOINT: Chronic | ICD-10-CM

## 2019-10-15 LAB
BLD GP AB SCN SERPL QL: SIGNIFICANT CHANGE UP
GLUCOSE BLDC GLUCOMTR-MCNC: 187 MG/DL — HIGH (ref 70–99)
GLUCOSE BLDC GLUCOMTR-MCNC: 190 MG/DL — HIGH (ref 70–99)
GLUCOSE BLDC GLUCOMTR-MCNC: 257 MG/DL — HIGH (ref 70–99)
GLUCOSE BLDC GLUCOMTR-MCNC: 296 MG/DL — HIGH (ref 70–99)
MAGNESIUM SERPL-MCNC: 1.5 MG/DL — LOW (ref 1.6–2.6)

## 2019-10-15 PROCEDURE — 93010 ELECTROCARDIOGRAM REPORT: CPT

## 2019-10-15 RX ORDER — DEXTROSE 50 % IN WATER 50 %
25 SYRINGE (ML) INTRAVENOUS ONCE
Refills: 0 | Status: DISCONTINUED | OUTPATIENT
Start: 2019-10-15 | End: 2019-10-16

## 2019-10-15 RX ORDER — CLOPIDOGREL BISULFATE 75 MG/1
75 TABLET, FILM COATED ORAL DAILY
Refills: 0 | Status: DISCONTINUED | OUTPATIENT
Start: 2019-10-16 | End: 2019-10-16

## 2019-10-15 RX ORDER — DEXTROSE 50 % IN WATER 50 %
12.5 SYRINGE (ML) INTRAVENOUS ONCE
Refills: 0 | Status: DISCONTINUED | OUTPATIENT
Start: 2019-10-15 | End: 2019-10-16

## 2019-10-15 RX ORDER — LEVOTHYROXINE SODIUM 125 MCG
25 TABLET ORAL DAILY
Refills: 0 | Status: DISCONTINUED | OUTPATIENT
Start: 2019-10-16 | End: 2019-10-16

## 2019-10-15 RX ORDER — ASPIRIN/CALCIUM CARB/MAGNESIUM 324 MG
81 TABLET ORAL DAILY
Refills: 0 | Status: DISCONTINUED | OUTPATIENT
Start: 2019-10-16 | End: 2019-10-16

## 2019-10-15 RX ORDER — DEXTROSE 50 % IN WATER 50 %
15 SYRINGE (ML) INTRAVENOUS ONCE
Refills: 0 | Status: DISCONTINUED | OUTPATIENT
Start: 2019-10-15 | End: 2019-10-16

## 2019-10-15 RX ORDER — PANTOPRAZOLE SODIUM 20 MG/1
40 TABLET, DELAYED RELEASE ORAL
Refills: 0 | Status: DISCONTINUED | OUTPATIENT
Start: 2019-10-16 | End: 2019-10-16

## 2019-10-15 RX ORDER — GLUCAGON INJECTION, SOLUTION 0.5 MG/.1ML
1 INJECTION, SOLUTION SUBCUTANEOUS ONCE
Refills: 0 | Status: DISCONTINUED | OUTPATIENT
Start: 2019-10-15 | End: 2019-10-16

## 2019-10-15 RX ORDER — HYDRALAZINE HCL 50 MG
5 TABLET ORAL ONCE
Refills: 0 | Status: COMPLETED | OUTPATIENT
Start: 2019-10-15 | End: 2019-10-15

## 2019-10-15 RX ORDER — INSULIN LISPRO 100/ML
VIAL (ML) SUBCUTANEOUS
Refills: 0 | Status: DISCONTINUED | OUTPATIENT
Start: 2019-10-15 | End: 2019-10-16

## 2019-10-15 RX ORDER — CARVEDILOL PHOSPHATE 80 MG/1
12.5 CAPSULE, EXTENDED RELEASE ORAL EVERY 12 HOURS
Refills: 0 | Status: DISCONTINUED | OUTPATIENT
Start: 2019-10-15 | End: 2019-10-16

## 2019-10-15 RX ORDER — INSULIN LISPRO 100/ML
VIAL (ML) SUBCUTANEOUS AT BEDTIME
Refills: 0 | Status: DISCONTINUED | OUTPATIENT
Start: 2019-10-15 | End: 2019-10-16

## 2019-10-15 RX ORDER — ATORVASTATIN CALCIUM 80 MG/1
40 TABLET, FILM COATED ORAL AT BEDTIME
Refills: 0 | Status: DISCONTINUED | OUTPATIENT
Start: 2019-10-15 | End: 2019-10-16

## 2019-10-15 RX ORDER — SODIUM CHLORIDE 9 MG/ML
1000 INJECTION, SOLUTION INTRAVENOUS
Refills: 0 | Status: DISCONTINUED | OUTPATIENT
Start: 2019-10-15 | End: 2019-10-16

## 2019-10-15 RX ORDER — MAGNESIUM SULFATE 500 MG/ML
2 VIAL (ML) INJECTION
Refills: 0 | Status: COMPLETED | OUTPATIENT
Start: 2019-10-15 | End: 2019-10-15

## 2019-10-15 RX ADMIN — Medication 50 GRAM(S): at 15:38

## 2019-10-15 RX ADMIN — Medication 2: at 15:11

## 2019-10-15 RX ADMIN — Medication 1: at 21:26

## 2019-10-15 RX ADMIN — ATORVASTATIN CALCIUM 40 MILLIGRAM(S): 80 TABLET, FILM COATED ORAL at 21:26

## 2019-10-15 RX ADMIN — Medication 5 MILLIGRAM(S): at 10:36

## 2019-10-15 RX ADMIN — Medication 50 GRAM(S): at 14:45

## 2019-10-15 RX ADMIN — CARVEDILOL PHOSPHATE 12.5 MILLIGRAM(S): 80 CAPSULE, EXTENDED RELEASE ORAL at 17:28

## 2019-10-15 NOTE — PATIENT PROFILE ADULT - FAMILY NEEDS
Disha Stanley is here with his dad for his developmental follow up appointment. Disha Stanley is awake, alert tracking.   Per dad he is taking cereal 2x/day and taking enfacare 4 1/2oz-5oz q 3h he is sleeping about 6 hours through night elimination WNL he is cared for a no

## 2019-10-15 NOTE — DISCHARGE NOTE PROVIDER - CARE PROVIDER_API CALL
Stewart Soares)  Cardiovascular Disease; Internal Medicine; Nuclear Cardiology  [address] [place] [address]  Phone: [phone]  Fax: [phone]  Follow Up Time:

## 2019-10-15 NOTE — DISCHARGE NOTE PROVIDER - NSDCCPTREATMENT_GEN_ALL_CORE_FT
PRINCIPAL PROCEDURE  Procedure: Coronary stent placement  Findings and Treatment: Optimize cardiac function

## 2019-10-15 NOTE — DISCHARGE NOTE PROVIDER - NSDCCPCAREPLAN_GEN_ALL_CORE_FT
PRINCIPAL DISCHARGE DIAGNOSIS  Diagnosis: CAD (coronary artery disease)  Assessment and Plan of Treatment: Optimize cardiac function

## 2019-10-15 NOTE — PROGRESS NOTE ADULT - ASSESSMENT
A/P: 80 yo Nauruan speaking female(  services utilized)  with history of hypertension, hyperlipidemia and NIDDM.  Patient had recently been complaining of dyspnea with minimal exertion which is equivalent to class 3 angina.  Patient needed clearance for knee surgery and was seen by cardiologist.  She had a abnormal stress test with ischemia on 9/19/19.  She presented for cardiac cath on 9/24 and found to have multiple blockages.  On 9/24, she received 2 PHYLLIS to 90% and 85 % RCA lesions.   She presented today for a Staged of procedure and intervention LAD disease.  She reports some exertional sob with stair climbing but is greatly improved since receiving stents.  She has been taking Asprin with out issue ( unsure of allergy ).  She has also been taking Plavix consistently without interruption.  She now s/p uncomplicated placement of PHYLLIS x 3 to mLAD via LFA closed with Angioseal by Dr. Chirinos.   -Admit to tele secondary to meeting to meeting admission criteria of Age > 75 and poorly controlled HTN (SBP > 160 mmhg)  -If stable, plan to discharge to home in the am  -Follow up with Dr. Soares in one to two weeks  -Meds: Continue  Baby ASA/Plavix/Statin/Beta blocker as before  -Benefits of ASA/plavix emphasized with patient verbal understanding  -Lifestyle mods/diet/activity/med discussed with patient verbal understanding  -Discussed with Dr. Chirinos

## 2019-10-15 NOTE — DISCHARGE NOTE PROVIDER - NSDCACTIVITY_GEN_ALL_CORE
Showering allowed/Walking - Indoors allowed/Stairs allowed/No heavy lifting/straining/Walking - Outdoors allowed

## 2019-10-15 NOTE — DISCHARGE NOTE PROVIDER - NSDCFUADDAPPT_GEN_ALL_CORE_FT
Follow up with Dr. Soares in one to two weeks.    No heavy lifting, driving, sex, tub baths, swimming, or any activity that submerges the lower half of the body in water for 48 hours.  Limited walking and stairs for 48 hours.    Change the bandaid after 24 hours and every 24 hours after that.  Keep the puncture site dry and covered with a bandaid until a scab forms.    Observe the site frequently.  If bleeding or a large lump (the size of a golf ball or bigger) occurs lie flat, apply continuous direct pressure just above the puncture site for at least 10 minutes, and notify your physician immediately.  If the bleeding cannot be controlled, call 911 immediately for assistance.  Notify your physician of pain, swelling or any drainage.    Notify your physician immediately if coldness, numbness, discoloration or pain in your foot occurs.

## 2019-10-15 NOTE — DISCHARGE NOTE PROVIDER - HOSPITAL COURSE
History of Present Illness        80 yo Mongolian speaking female(  services utilized)  with history of hypertension, hyperlipidemia and NIDDM.  Patient had recently been complaining of dyspnea with minimal exertion which is equivalent to class 3 angina.  Patient needed clearance for knee surgery and was seen by cardiologist.  She had a abnormal stress test with ischemia on 9/19/19  She presented to for cardiac cath on 9/24 found to have multiple blockages .on 9/24 She received 2 PHYLLIS to 90% and 85 % lesions She presents today for PST for Staged of procedure and intervention LAD disease    Currently feels well     She reports no chest pain at rest dizziness syncope, fever chill, groin pain , black or blood in the stool ,      She reports some exertional sob with stair climbing but is greatly improved since receiving stents     She has been taking Asprin with out issue ( unsure of allergy ). She has also been taking Plavix consistently without interruption         Hospital course:     SUBJECTIVE:  Cardiology NP F/U note: via  Mongolian interperter    SP: [facility] which revealed: -s/p PHYLLIS x 3 to mLAD    denies complaints of chest pain/sob/dizziness/palps overnight     peggy diet/ OOB ambulating         ASSESSMENT:    79 F presents for staged PCI for known MV CAD and LAD disease    HX class 3 angina/ CAD/ DM2/ HTN/ HLD/ Gerd    SP: [facility] 10/15/19: PHYLLIS x 3 to mLAD    hemodynamically stable overnight,. labs . EKG reviewed no arrhythmias    Stable for d/c home         Plan:    continue current meds ASA/ Plavix/ statin/ BB/     med compliance/ groin care and out patient follow up w/ Erich Kaufman in 7-10 days discusssed    cardiac rehab info provided/referral and communication to cardiac rehab completed

## 2019-10-16 ENCOUNTER — TRANSCRIPTION ENCOUNTER (OUTPATIENT)
Age: 79
End: 2019-10-16

## 2019-10-16 VITALS
OXYGEN SATURATION: 96 % | TEMPERATURE: 99 F | HEART RATE: 71 BPM | RESPIRATION RATE: 18 BRPM | DIASTOLIC BLOOD PRESSURE: 73 MMHG | SYSTOLIC BLOOD PRESSURE: 136 MMHG

## 2019-10-16 DIAGNOSIS — I25.118 ATHEROSCLEROTIC HEART DISEASE OF NATIVE CORONARY ARTERY WITH OTHER FORMS OF ANGINA PECTORIS: ICD-10-CM

## 2019-10-16 LAB
ANION GAP SERPL CALC-SCNC: 14 MMOL/L — SIGNIFICANT CHANGE UP (ref 5–17)
BASOPHILS # BLD AUTO: 0.06 K/UL — SIGNIFICANT CHANGE UP (ref 0–0.2)
BASOPHILS NFR BLD AUTO: 0.9 % — SIGNIFICANT CHANGE UP (ref 0–2)
BUN SERPL-MCNC: 19 MG/DL — SIGNIFICANT CHANGE UP (ref 8–20)
CALCIUM SERPL-MCNC: 8.9 MG/DL — SIGNIFICANT CHANGE UP (ref 8.6–10.2)
CHLORIDE SERPL-SCNC: 99 MMOL/L — SIGNIFICANT CHANGE UP (ref 98–107)
CO2 SERPL-SCNC: 22 MMOL/L — SIGNIFICANT CHANGE UP (ref 22–29)
CREAT SERPL-MCNC: 0.82 MG/DL — SIGNIFICANT CHANGE UP (ref 0.5–1.3)
EOSINOPHIL # BLD AUTO: 0.33 K/UL — SIGNIFICANT CHANGE UP (ref 0–0.5)
EOSINOPHIL NFR BLD AUTO: 4.9 % — SIGNIFICANT CHANGE UP (ref 0–6)
GLUCOSE BLDC GLUCOMTR-MCNC: 302 MG/DL — HIGH (ref 70–99)
GLUCOSE SERPL-MCNC: 214 MG/DL — HIGH (ref 70–115)
HCT VFR BLD CALC: 32.4 % — LOW (ref 34.5–45)
HGB BLD-MCNC: 10.7 G/DL — LOW (ref 11.5–15.5)
IMM GRANULOCYTES NFR BLD AUTO: 0.4 % — SIGNIFICANT CHANGE UP (ref 0–1.5)
LYMPHOCYTES # BLD AUTO: 1.46 K/UL — SIGNIFICANT CHANGE UP (ref 1–3.3)
LYMPHOCYTES # BLD AUTO: 21.6 % — SIGNIFICANT CHANGE UP (ref 13–44)
MCHC RBC-ENTMCNC: 31.3 PG — SIGNIFICANT CHANGE UP (ref 27–34)
MCHC RBC-ENTMCNC: 33 GM/DL — SIGNIFICANT CHANGE UP (ref 32–36)
MCV RBC AUTO: 94.7 FL — SIGNIFICANT CHANGE UP (ref 80–100)
MONOCYTES # BLD AUTO: 0.68 K/UL — SIGNIFICANT CHANGE UP (ref 0–0.9)
MONOCYTES NFR BLD AUTO: 10 % — SIGNIFICANT CHANGE UP (ref 2–14)
NEUTROPHILS # BLD AUTO: 4.21 K/UL — SIGNIFICANT CHANGE UP (ref 1.8–7.4)
NEUTROPHILS NFR BLD AUTO: 62.2 % — SIGNIFICANT CHANGE UP (ref 43–77)
PLATELET # BLD AUTO: 138 K/UL — LOW (ref 150–400)
POTASSIUM SERPL-MCNC: 4.2 MMOL/L — SIGNIFICANT CHANGE UP (ref 3.5–5.3)
POTASSIUM SERPL-SCNC: 4.2 MMOL/L — SIGNIFICANT CHANGE UP (ref 3.5–5.3)
RBC # BLD: 3.42 M/UL — LOW (ref 3.8–5.2)
RBC # FLD: 13 % — SIGNIFICANT CHANGE UP (ref 10.3–14.5)
SODIUM SERPL-SCNC: 135 MMOL/L — SIGNIFICANT CHANGE UP (ref 135–145)
WBC # BLD: 6.77 K/UL — SIGNIFICANT CHANGE UP (ref 3.8–10.5)
WBC # FLD AUTO: 6.77 K/UL — SIGNIFICANT CHANGE UP (ref 3.8–10.5)

## 2019-10-16 PROCEDURE — 99153 MOD SED SAME PHYS/QHP EA: CPT

## 2019-10-16 PROCEDURE — C1894: CPT

## 2019-10-16 PROCEDURE — 83735 ASSAY OF MAGNESIUM: CPT

## 2019-10-16 PROCEDURE — C9600: CPT | Mod: LD

## 2019-10-16 PROCEDURE — 92978 ENDOLUMINL IVUS OCT C 1ST: CPT | Mod: LD

## 2019-10-16 PROCEDURE — C1753: CPT

## 2019-10-16 PROCEDURE — C1887: CPT

## 2019-10-16 PROCEDURE — 99152 MOD SED SAME PHYS/QHP 5/>YRS: CPT

## 2019-10-16 PROCEDURE — 86901 BLOOD TYPING SEROLOGIC RH(D): CPT

## 2019-10-16 PROCEDURE — 82962 GLUCOSE BLOOD TEST: CPT

## 2019-10-16 PROCEDURE — C1874: CPT

## 2019-10-16 PROCEDURE — 86850 RBC ANTIBODY SCREEN: CPT

## 2019-10-16 PROCEDURE — C1725: CPT

## 2019-10-16 PROCEDURE — T1013: CPT

## 2019-10-16 PROCEDURE — 93010 ELECTROCARDIOGRAM REPORT: CPT

## 2019-10-16 PROCEDURE — 93005 ELECTROCARDIOGRAM TRACING: CPT

## 2019-10-16 PROCEDURE — 80048 BASIC METABOLIC PNL TOTAL CA: CPT

## 2019-10-16 PROCEDURE — C1760: CPT

## 2019-10-16 PROCEDURE — 86900 BLOOD TYPING SEROLOGIC ABO: CPT

## 2019-10-16 PROCEDURE — 93458 L HRT ARTERY/VENTRICLE ANGIO: CPT | Mod: XU

## 2019-10-16 PROCEDURE — 36415 COLL VENOUS BLD VENIPUNCTURE: CPT

## 2019-10-16 PROCEDURE — 85027 COMPLETE CBC AUTOMATED: CPT

## 2019-10-16 PROCEDURE — C1769: CPT

## 2019-10-16 RX ADMIN — Medication 81 MILLIGRAM(S): at 08:44

## 2019-10-16 RX ADMIN — CLOPIDOGREL BISULFATE 75 MILLIGRAM(S): 75 TABLET, FILM COATED ORAL at 08:43

## 2019-10-16 RX ADMIN — CARVEDILOL PHOSPHATE 12.5 MILLIGRAM(S): 80 CAPSULE, EXTENDED RELEASE ORAL at 06:13

## 2019-10-16 RX ADMIN — Medication 2: at 08:44

## 2019-10-16 RX ADMIN — Medication 25 MICROGRAM(S): at 06:13

## 2019-10-16 RX ADMIN — PANTOPRAZOLE SODIUM 40 MILLIGRAM(S): 20 TABLET, DELAYED RELEASE ORAL at 06:13

## 2019-10-16 NOTE — PROGRESS NOTE ADULT - SUBJECTIVE AND OBJECTIVE BOX
SUBJECTIVE:  Cardiology NP F/U note: via  German interperter  SP: [facility] which revealed: -s/p PHYLLIS x 3 to mLAD  denies complaints of chest pain/sob/dizziness/palps overnight   peggy diet/ OOB ambulating       	  MEDICATIONS  (STANDING):  aspirin  chewable 81 milliGRAM(s) Oral daily  atorvastatin 40 milliGRAM(s) Oral at bedtime  carvedilol 12.5 milliGRAM(s) Oral every 12 hours  clopidogrel Tablet 75 milliGRAM(s) Oral daily  dextrose 5%. 1000 milliLiter(s) IV Continuous <Continuous>  dextrose 50% Injectable 12.5 Gram(s) IV Push once  dextrose 50% Injectable 25 Gram(s) IV Push once  dextrose 50% Injectable 25 Gram(s) IV Push once  insulin lispro (HumaLOG) corrective regimen sliding scale   SubCutaneous three times a day before meals  insulin lispro (HumaLOG) corrective regimen sliding scale   SubCutaneous at bedtime  levothyroxine 25 MICROGram(s) Oral daily  pantoprazole    Tablet 40 milliGRAM(s) Oral before breakfast    MEDICATIONS  (PRN):  dextrose 40% Gel 15 Gram(s) Oral once PRN Blood Glucose LESS THAN 70 milliGRAM(s)/deciliter  glucagon  Injectable 1 milliGRAM(s) IntraMuscular once PRN Glucose LESS THAN 70 milligrams/deciliter      PHYSICAL EXAM:    T(C): 37 (10-16-19 @ 10:00), Max: 37.1 (10-15-19 @ 21:25)  HR: 71 (10-16-19 @ 10:00) (54 - 84)  BP: 136/73 (10-16-19 @ 10:00) (122/64 - 155/76)  RR: 18 (10-16-19 @ 10:00) (17 - 19)  SpO2: 96% (10-16-19 @ 10:00) (94% - 100%)  Wt(kg): --    I&O's Summary    15 Oct 2019 07:  -  16 Oct 2019 07:00  --------------------------------------------------------  IN: 480 mL / OUT: 400 mL / NET: 80 mL    16 Oct 2019 07:01  -  16 Oct 2019 11:20  --------------------------------------------------------  IN: 240 mL / OUT: 0 mL / NET: 240 mL        Daily Height in cm: 157.48 (15 Oct 2019 16:20)    Daily Weight in k.2 (16 Oct 2019 04:48)    Appearance: NAD	  Cardiovascular: Normal S1 S2,RRR 60's  No JVD, No murmurs, No edema  Respiratory: Lungs clear to auscultation	  Psychiatry: A & O x 3, Mood & affect appropriate  Gastrointestinal:  Soft, Non-tender, + BS	  Skin: warm and dry  Neurologic: Non-focal  Extremities: Normal range of motion,:  Left  Groin soft /no hematoma  dressing removed slight ecchymosis  noted   Vascular: Peripheral pulses palpable 2+ bilaterally    TELEMETRY: 	RSR 60's no events overnight     ECG:  	RSR 67/ RBBB no acute STTW changes   RADIOLOGY:   DIAGNOSTIC TESTING:  [ X] Echocardiogram:    [x ]  Catheterization:  < from: Cardiac Cath Lab - Adult (10.15.19 @ 11:30) >  VENTRICLES: EF was not assessed.  CORONARY VESSELS: The coronary circulation is right dominant.  LM:   --  Proximal left main: There was a discrete 20 % stenosis.  LAD:   --  Proximal LAD: There was a tubular 70 % stenosis. IVUS showed  concentric calcificationwith MLA of 3.2 sq mm, indicating a significant  stenosis.  --  Mid LAD: There was a diffuse 85 % stenosis.  --  Distal LAD: There was a diffuse 85 % stenosis.  CX:   --  Circumflex: Angiography showed mild atherosclerosis with no flow  limiting lesions.  --  OM1: There was a tubular 30 % stenosis.  --  OM2: There was a tubular 25 % stenosis.  RCA:   --  Proximal RCA: There was a diffuse 0 % stenosis at the site of a  prior stent.  --  Mid RCA: There was a diffuse 0 % stenosis at the site of a prior stent.  --  Distal RCA: There was a tubular 40 % stenosis.  COMPLICATIONS: No complications occurred during the cath lab visit.  SUMMARY:  1ST LESION INTERVENTIONS: A successful drug-eluting stent was performed on  the 85 % lesion in the mid LAD. Following intervention there was an  excellent angiographic appearance with a 0 % residual stenosis.  2ND LESION INTERVENTIONS: A successful drug-eluting stent was performed on  the 85 % lesion in the distal LAD. Following intervention there was an  excellent angiographic appearance with a 0 % residual stenosis.  3RD LESION INTERVENTIONS: A successful drug-eluting stent was performed on  the 70 % lesion in the proximal LAD. Following intervention there was an  excellent angiographic appearance with a 0 % residual stenosis.  INTERVENTIONAL RECOMMENDATIONS: Continue clopidogrel (Plavix), 75 mg, PO,  daily. Continue aspirin, 81 mg, PO, daily.  Prepared and signed by  Mukund Chirinos MD                                  10.7   6.77  )-----------( 138      ( 16 Oct 2019 06:30 )             32.4     10-    135  |  99  |  19.0  ----------------------------<  214<H>  4.2   |  22.0  |  0.82    Ca    8.9      16 Oct 2019 06:30  Mg     1.5     10-15      ASSESSMENT:  79 F presents for staged PCI for known MV CAD and LAD disease  HX class 3 angina/ CAD/ DM2/ HTN/ HLD/ Gerd  SP: [facility] 10/15/19: PHYLLIS x 3 to mLAD  hemodynamically stable overnight,. labs . EKG reviewed no arrhythmias  Stable for d/c home     Plan:  continue current meds ASA/ Plavix/ statin/ BB/   med compliance/ groin care and out patient follow up w/ Erich Kaufman in 7-10 days discusssed  cardiac rehab info provided/referral and communication to cardiac rehab completed
Cardiology NP post procedure note:     -s/p PHYLLIS x 3 to mLAD    EKG post PCI: NSR 63 bpm RBBB with no ST elevations/depressions  TELE: NSR 60s    MEDICATIONS  (STANDING):  atorvastatin 40 milliGRAM(s) Oral at bedtime  carvedilol 12.5 milliGRAM(s) Oral every 12 hours  dextrose 5%. 1000 milliLiter(s) (50 mL/Hr) IV Continuous <Continuous>  dextrose 50% Injectable 12.5 Gram(s) IV Push once  dextrose 50% Injectable 25 Gram(s) IV Push once  dextrose 50% Injectable 25 Gram(s) IV Push once  insulin lispro (HumaLOG) corrective regimen sliding scale   SubCutaneous three times a day before meals  insulin lispro (HumaLOG) corrective regimen sliding scale   SubCutaneous at bedtime    MEDICATIONS  (PRN):  dextrose 40% Gel 15 Gram(s) Oral once PRN Blood Glucose LESS THAN 70 milliGRAM(s)/deciliter  glucagon  Injectable 1 milliGRAM(s) IntraMuscular once PRN Glucose LESS THAN 70 milligrams/deciliter      Allergies:  amlodipine (Swelling)  aspirin (Rash)      PAST MEDICAL & SURGICAL HISTORY:  Heart murmur  Diabetes  Knee pain, left: arthritis  Hypercholesterolemia  GERD (gastroesophageal reflux disease)  Hypothyroidism  Hypertension  History of total left knee replacement  History of vaginal surgery  S/P tubal ligation      Vital Signs Last 24 Hrs  T(C): 36.4 (15 Oct 2019 10:23), Max: 36.4 (15 Oct 2019 10:23)  T(F): 97.6 (15 Oct 2019 10:23), Max: 97.6 (15 Oct 2019 10:23)  HR: 60 (15 Oct 2019 13:30) (57 - 60)  BP: 136/63 (15 Oct 2019 13:15) (131/64 - 206/82)  BP(mean): --  RR: 17 (15 Oct 2019 13:30) (16 - 19)  SpO2: 99% (15 Oct 2019 13:30) (98% - 100%)    Physical Exam:  Constitutional: NAD, AAOx3  Cardiovascular: +S1S2 RRR  Pulmonary: CTA b/l, unlabored  GI: soft NTND +BS  Extremities: no pedal edema, +distal pulses b/l  Neuro: non focal, JACKSON x4  Procedure site: LFA angioseal site benign without hematoma/bleeding; no bruit; + right PP        RADIOLOGY & ADDITIONAL TESTS:
Patient is a 79y old  Female who presents with a chief complaint of     HPI:      PAST MEDICAL & SURGICAL HISTORY:  Heart murmur  Diabetes  Knee pain, left: arthritis  Hypercholesterolemia  GERD (gastroesophageal reflux disease)  Hypothyroidism  Hypertension  History of total left knee replacement  History of vaginal surgery  S/P tubal ligation      PREVIOUS DIAGNOSTIC TESTING:        Allergies    amlodipine (Swelling)  aspirin (Rash)    Intolerances        MEDICATIONS  (STANDING):    MEDICATIONS  (PRN):    	    Vital Signs Last 24 Hrs    HR: 58  BP: 206/82  RR: 18  SpO2: 99    I&O's Detail      PHYSICAL EXAM:  Appearance: Normal, well nourished	  HEENT:   Normal oral mucosa, PERRL,   Cardiovascular: Normal S1 S2, No JVD, No cardiac murmurs, No peripheral edema  Respiratory: Lungs clear to auscultation	  Psychiatry: A & O x 3, Mood & affect appropriate  Skin: No rashes, No ecchymoses, No cyanosis  Neurologic: Grossly non-focal with full strength in all four extremities  Extremities: Normal range of motion, No clubbing, cyanosis or edema  Vascular: Peripheral pulses palpable 2+ bilaterally      INTERPRETATION OF TELEMETRY:  Sinus Rhythm    Assessment and Plan   CAD sp Angiogram and LHC with 2 PHYLLIS to the RCA on 9/24/2019, and residual 85% lesion to the mLAD left to be staged today. Patient reports she has be pain free since her last cath and denies CP at this time.    Plan  PHYLLIS to the mLAD  Pt NPO  Hydralazine 5 mg IVP for elevated BP

## 2019-10-16 NOTE — DISCHARGE NOTE NURSING/CASE MANAGEMENT/SOCIAL WORK - PATIENT PORTAL LINK FT
You can access the FollowMyHealth Patient Portal offered by [facility] by registering at the following website: [email or web address]. By joining [facility]’s FollowMyHealth portal, you will also be able to view your health information using other applications (apps) compatible with our system.

## 2019-10-18 ENCOUNTER — APPOINTMENT (OUTPATIENT)
Dept: ORTHOPEDIC SURGERY | Facility: CLINIC | Age: 79
End: 2019-10-18

## 2019-11-11 ENCOUNTER — APPOINTMENT (OUTPATIENT)
Dept: ORTHOPEDIC SURGERY | Facility: CLINIC | Age: 79
End: 2019-11-11

## 2019-12-30 ENCOUNTER — FORM ENCOUNTER (OUTPATIENT)
Age: 79
End: 2019-12-30

## 2019-12-31 ENCOUNTER — FORM ENCOUNTER (OUTPATIENT)
Age: 79
End: 2019-12-31

## 2020-01-04 ENCOUNTER — EMERGENCY (EMERGENCY)
Facility: HOSPITAL | Age: 80
LOS: 1 days | Discharge: HOME | End: 2020-01-04
Attending: EMERGENCY MEDICINE
Payer: MEDICARE

## 2020-01-04 VITALS
OXYGEN SATURATION: 98 % | TEMPERATURE: 98 F | SYSTOLIC BLOOD PRESSURE: 153 MMHG | RESPIRATION RATE: 20 BRPM | DIASTOLIC BLOOD PRESSURE: 69 MMHG | HEART RATE: 66 BPM

## 2020-01-04 DIAGNOSIS — Z98.890 OTHER SPECIFIED POSTPROCEDURAL STATES: Chronic | ICD-10-CM

## 2020-01-04 DIAGNOSIS — Z96.652 PRESENCE OF LEFT ARTIFICIAL KNEE JOINT: Chronic | ICD-10-CM

## 2020-01-04 PROCEDURE — 71046 X-RAY EXAM CHEST 2 VIEWS: CPT

## 2020-01-04 PROCEDURE — 99283 EMERGENCY DEPT VISIT LOW MDM: CPT | Mod: 25

## 2020-01-04 PROCEDURE — 71046 X-RAY EXAM CHEST 2 VIEWS: CPT | Mod: 26

## 2020-01-04 PROCEDURE — 99284 EMERGENCY DEPT VISIT MOD MDM: CPT

## 2020-01-04 PROCEDURE — 94640 AIRWAY INHALATION TREATMENT: CPT

## 2020-01-04 PROCEDURE — T1013: CPT

## 2020-01-04 RX ORDER — IPRATROPIUM/ALBUTEROL SULFATE 18-103MCG
3 AEROSOL WITH ADAPTER (GRAM) INHALATION ONCE
Refills: 0 | Status: COMPLETED | OUTPATIENT
Start: 2020-01-04 | End: 2020-01-04

## 2020-01-04 RX ORDER — ALBUTEROL 90 UG/1
2 AEROSOL, METERED ORAL
Qty: 1 | Refills: 0
Start: 2020-01-04 | End: 2020-02-02

## 2020-01-04 RX ADMIN — Medication 100 MILLIGRAM(S): at 14:28

## 2020-01-04 RX ADMIN — Medication 3 MILLILITER(S): at 14:15

## 2020-01-04 NOTE — ED STATDOCS - ATTENDING CONTRIBUTION TO CARE
I, Marilee Yang, performed the initial face to face bedside interview with this patient regarding history of present illness, review of symptoms and relevant past medical, social and family history.  I completed an independent physical examination.  I was the initial provider who evaluated this patient. I have signed out the follow up of any pending tests (i.e. labs, radiological studies) to the ACP.  I have communicated the patient’s plan of care and disposition with the ACP.

## 2020-01-04 NOTE — ED STATDOCS - OBJECTIVE STATEMENT
80 yo female with co cough and something in her throat  pt had flu and pneumonia vaccines on Monday. Afterwards developed chest congestion and cough with white phlegm and "something" inher throat.  No fever chills nvd or dysuria  med hx Diabetes    GERD (gastroesophageal reflux disease)    Heart murmur    Hypercholesterolemia    Hypertension    Hypothyroidism    Knee pain, left  arthritis

## 2020-01-04 NOTE — ED STATDOCS - CLINICAL SUMMARY MEDICAL DECISION MAKING FREE TEXT BOX
elederly female with diffuse wheezing never a smoker and not asthmatic  pe as documetned  paln bronchiodilators and cxr to r/o pna, reassess

## 2020-01-04 NOTE — ED STATDOCS - PATIENT PORTAL LINK FT
You can access the FollowMyHealth Patient Portal offered by [facility] by registering at the following website: [email or web address]. By joining [employer]’s FollowMyHealth portal, you will also be able to view your health information using other applications (apps) compatible with our system.

## 2020-01-04 NOTE — ED STATDOCS - PROGRESS NOTE DETAILS
HPI and intake order and PE reviewed, patient re-assessed, CXR reviewed no e/o infiltrate, likely URI, supportive care, f/u with PCP

## 2020-01-15 NOTE — ED PROVIDER NOTE - DATE/TIME 1
Denies known Latex allergy or symptoms of Latex sensitivity.  Health Maintenance Due   Topic Date Due   • Pneumococcal Vaccine 0-64 (1 of 3 - PCV13) 08/18/1988   • Depression Screening  03/20/2020       Patient is due for topics as listed above but is not proceeding with Immunization(s) Pneumococcal at this time.   Medications verified    Estella Dawn is a 35 year old female presenting to f/u for a rash.   23-Jun-2017 03:16

## 2020-02-16 ENCOUNTER — EMERGENCY (EMERGENCY)
Facility: HOSPITAL | Age: 80
LOS: 1 days | Discharge: HOME | End: 2020-02-16
Attending: EMERGENCY MEDICINE
Payer: MEDICARE

## 2020-02-16 VITALS
OXYGEN SATURATION: 96 % | WEIGHT: 184.97 LBS | RESPIRATION RATE: 18 BRPM | DIASTOLIC BLOOD PRESSURE: 62 MMHG | HEART RATE: 57 BPM | SYSTOLIC BLOOD PRESSURE: 156 MMHG | TEMPERATURE: 98 F | HEIGHT: 55 IN

## 2020-02-16 DIAGNOSIS — Z98.890 OTHER SPECIFIED POSTPROCEDURAL STATES: Chronic | ICD-10-CM

## 2020-02-16 DIAGNOSIS — Z96.652 PRESENCE OF LEFT ARTIFICIAL KNEE JOINT: Chronic | ICD-10-CM

## 2020-02-16 PROCEDURE — T1013: CPT

## 2020-02-16 PROCEDURE — 99283 EMERGENCY DEPT VISIT LOW MDM: CPT

## 2020-02-16 RX ORDER — AZITHROMYCIN 500 MG/1
2 TABLET, FILM COATED ORAL
Qty: 10 | Refills: 0
Start: 2020-02-16 | End: 2020-02-20

## 2020-02-16 NOTE — ED STATDOCS - OBJECTIVE STATEMENT
79 y/o F pt with significant PMHx of HTN, HLD, DM, heart murmur, and hypothyroidism presents to the ED c/o productive cough, nasal congestion and sore throat for the last 3 days. She also reports non painful bruise on the wrist but denies any trauma. Denies fever, CP or SOB.

## 2020-02-16 NOTE — ED STATDOCS - NS_ ATTENDINGSCRIBEDETAILS _ED_A_ED_FT
I, Royce Gonzales, performed the initial face to face bedside interview with this patient regarding history of present illness, review of symptoms and relevant past medical, social and family history.  I completed an independent physical examination.     The history, relevant review of systems, past medical and surgical history, medical decision making, and physical examination was documented by the scribe in my presence and I attest to the accuracy of the documentation.

## 2020-02-16 NOTE — ED STATDOCS - CLINICAL SUMMARY MEDICAL DECISION MAKING FREE TEXT BOX
Pt with cough, nasal congestion and sore throat. will treat for pharyngitis give Zithromax 500mg once a day for 5 days, coricidin for nasal congestion and follow up with PMD in 3 days

## 2020-02-16 NOTE — ED STATDOCS - PATIENT PORTAL LINK FT
You can access the FollowMyHealth Patient Portal offered by [facility] by registering at the following website: [email or web address]. By joining [email or web address]’s FollowMyHealth portal, you will also be able to view your health information using other applications (apps) compatible with our system.

## 2020-03-09 NOTE — PATIENT PROFILE ADULT. - PRO INTERPRETER NEED 2
Discharge Planning Assessment   [place]     Patient Name: Jamin Hennessy  MRN: [id number]  Today's Date: 3/9/2020    Admit Date: 2020        Barriers to discharge is pre cert began today again  for rehab placement since previous precert     Carol naegele rn  Case management  Office number [phone]  Cell phone [phone]     [place]

## 2020-04-09 PROBLEM — M25.559 HIP PAIN: Status: ACTIVE | Noted: 2017-11-30

## 2022-04-28 NOTE — PHYSICAL THERAPY INITIAL EVALUATION ADULT - IMPAIRED TRANSFERS: SIT/STAND, REHAB EVAL
Patient attended Phase 2 Cardiac Rehab Exercise Session. Further documentation will be scanned into the medical record upon discharge.   impaired balance/pain/decreased strength

## 2022-06-26 NOTE — ED STATDOCS - CHPI ED TIMING
Spiritual Plan of Care    Pt Name: Yannick Magana  Pt : 1962  Date: 2022    Visit Type: Phone    Referral Source: Interdisciplinary team    Reason for Visit: Family Conference    Visited With: Patient not available   Interdisciplinary team requesting assistance contacting family. Number given for family (brother) is not in service. MHx notes indicate brother lives in home - suggested have local PD ride by for wellness check      Length of Visit: 10 minutes    Requires Follow-up: No    Spiritual Care Consult Needed: No needs at this time    Spiritual Care Visit Preference:     Taxonomy:    · Intended Effects: Establish rapport and connectedness  · Methods: Collaborate with care team member  · Interventions: Facilitate communication between patient and/or family member and care team      Patient Affect at Time of Visit: Unresponsive     Rev. Christopher Addison MA BE, [facility]   Staff    Page [phone]     gradual onset

## 2022-09-18 NOTE — DISCHARGE NOTE PROVIDER - INSTRUCTIONS
PSYCHIATRIC PROGRESS NOTE       Patient: Anu Payne MRN: [id number]  SSN: [id number]    YOB: 1966  Age: 60 y.o. Sex: female      Admit Date: 8/14/2022    LOS: 35 days     Chief Complaint:  \"I'm doing good\"    Interval History:  Anu Payne says she is doing good. She appears to be improved, stating that she is feeling good, she is more interactive, asking appropriate questions, talking more. She denies si hi or avh. She remains in agreement with ect. She states that she is looking going to go home with  tomorrow and start ECT on Tuesday. Anu Payne remains compliant with taking medications. Denies any adverse events from taking them.       Past Medical History:  Past Medical History:   Diagnosis Date    Anxiety and depression     Bipolar 1 disorder with moderate robyn ([address] 3/17/2014    Chronic back pain     Hyperlipidemia 8/22/2016    Hyponatremia     Psychotic disorder ([facility] [address]     Scoliosis        ALLERGIES:(reviewed/updated 9/18/2022)  Allergies   Allergen Reactions    Other Medication Anaphylaxis     Artificial sweeteners cause anaphylaxis    Pcn [Penicillins] Anaphylaxis    Sunscreen Contact Dermatitis     Burns and opens the skin    Ultram [Tramadol] Hives and Other (comments)     Hives and ringing of the ears    Benzoyl Peroxide Hives    Cephalexin Itching    Divalproex Itching    Maltodextrin-Glycerin Shortness of Breath    Claritin-D 12 Hour [Loratadine-Pseudoephedrine] Palpitations     palpatations and ringing in the ear     Laboratory report:  Lab Results   Component Value Date/Time    WBC 6.4 09/17/2022 04:50 AM    HGB 12.0 09/17/2022 04:50 AM    Hematocrit (POC) 34 (L) 07/17/2018 03:33 AM    HCT 36.2 09/17/2022 04:50 AM    PLATELET 713 37/54/2262 04:50 AM    MCV 93.3 09/17/2022 04:50 AM      Lab Results   Component Value Date/Time    Sodium 140 09/17/2022 04:50 AM    Potassium 3.9 09/17/2022 04:50 AM    Chloride 106 09/17/2022 04:50 AM    CO2 28 09/17/2022 04:50 AM    Anion gap 6 09/17/2022 04:50 AM    Glucose 102 (H) 09/17/2022 04:50 AM    Glucose 107 (H) 02/03/2020 05:40 AM    BUN 13 09/17/2022 04:50 AM    Creatinine 0.88 09/17/2022 04:50 AM    BUN/Creatinine ratio 15 09/17/2022 04:50 AM    GFR est AA >60 09/17/2022 04:50 AM    GFR est non-AA >60 09/17/2022 04:50 AM    Calcium 9.6 09/17/2022 04:50 AM    Bilirubin, total 0.3 09/17/2022 04:50 AM    Alk. phosphatase 52 09/17/2022 04:50 AM    Protein, total 7.3 09/17/2022 04:50 AM    Albumin 3.5 09/17/2022 04:50 AM    Globulin 3.8 09/17/2022 04:50 AM    A-G Ratio 0.9 (L) 09/17/2022 04:50 AM    ALT (SGPT) 24 09/17/2022 04:50 AM      Vitals:    09/17/22 1544 09/17/22 2013 09/18/22 0718 09/18/22 0924   BP: 124/77 119/66 107/66    Pulse: 92 90 99    Resp: 14 16 16    Temp: 98.2 °F (36.8 °C)  99.7 °F (37.6 °C)    SpO2: 99% 98% 98%    Weight:    71.1 kg (156 lb 12.8 oz)   Height:           Lab Results   Component Value Date/Time    Carbamazepine 14.1 (H) 07/26/2020 04:19 AM     Lab Results   Component Value Date/Time    Lithium level 0.79 08/14/2022 03:45 AM       Vital Signs  Patient Vitals for the past 24 hrs:   Temp Pulse Resp BP SpO2   09/18/22 0718 99.7 °F (37.6 °C) 99 16 107/66 98 %   09/17/22 2013 -- 90 16 119/66 98 %   09/17/22 1544 98.2 °F (36.8 °C) 92 14 124/77 99 %       Wt Readings from Last 3 Encounters:   09/18/22 71.1 kg (156 lb 12.8 oz)   08/11/22 72.6 kg (160 lb)   08/07/22 71.5 kg (157 lb 9.6 oz)     Temp Readings from Last 3 Encounters:   09/18/22 99.7 °F (37.6 °C)   08/14/22 98.4 °F (36.9 °C)   08/10/22 98.4 °F (36.9 °C)     BP Readings from Last 3 Encounters:   09/18/22 107/66   08/14/22 (!) 142/77   08/10/22 111/75     Pulse Readings from Last 3 Encounters:   09/18/22 99   08/14/22 92   08/10/22 78       Radiology (reviewed/updated 9/18/2022)  No results found.     Current Facility-Administered Medications   Medication Dose Route Frequency Provider Last Rate Last Admin    LORazepam (ATIVAN) tablet 0.5 mg  0.5 mg Oral BID Maci WILEY NP   0.5 mg at 09/18/22 0930    perphenazine (TRILAFON) tablet tab 8 mg  8 mg Oral BID Stephanie Aquino NP   8 mg at 09/18/22 0931    OLANZapine (ZyPREXA) tablet 10 mg  10 mg Oral QHS Stephanie Aquino, NP   10 mg at 09/17/22 2109    polyethylene glycol (MIRALAX) packet 17 g  17 g Oral BID PRN Stephanie Aquino NP        polyvinyl alcohol-povidone (NATURAL TEARS) 0.5-0.6 % ophthalmic solution 1 Drop  1 Drop Both Eyes PRN María Merino MD   1 Drop at 09/03/22 1708    mirtazapine (REMERON) tablet 45 mg  45 mg Oral QHS Stephanie Aquino NP   45 mg at 09/17/22 2109    cloNIDine HCL (CATAPRES) tablet 0.1 mg  0.1 mg Oral Q2H PRN Brigido Goldberg, NP   0.1 mg at 09/07/22 2107    hydrOXYzine HCL (ATARAX) tablet 100 mg  100 mg Oral QHS Stephanie Aquino NP   100 mg at 09/17/22 2108    buPROPion SR (WELLBUTRIN SR) tablet 150 mg  150 mg Oral DAILY Stephanie Aquino NP   150 mg at 09/18/22 0930    FLUoxetine (PROzac) capsule 60 mg  60 mg Oral DAILY Janice Rosario NP   60 mg at 09/18/22 0932    hydrOXYzine HCL (ATARAX) tablet 50 mg  50 mg Oral Q4H PRN Stephanie Aquino NP   50 mg at 09/04/22 1633    OLANZapine (ZyPREXA) tablet 5 mg  5 mg Oral Q6H PRN Stephanie Aquino, NP   5 mg at 09/04/22 1155    haloperidol lactate (HALDOL) injection 5 mg  5 mg IntraMUSCular Q6H PRN Stephanie Aquino, NP   5 mg at 08/30/22 2033    benztropine (COGENTIN) tablet 1 mg  1 mg Oral BID PRN Stephanie Aquino, NP        diphenhydrAMINE (BENADRYL) injection 50 mg  50 mg IntraMUSCular BID PRN Stephanie Aquino, NP   50 mg at 08/30/22 2033    traZODone (DESYREL) tablet 50 mg  50 mg Oral QHS PRN Stephanie Aquino NP   50 mg at 09/09/22 2133    acetaminophen (TYLENOL) tablet 650 mg  650 mg Oral Q4H PRN Stephanie Aquino NP   650 mg at 08/26/22 1914    magnesium hydroxide (MILK OF MAGNESIA) 400 mg/5 mL oral suspension 30 mL  30 mL Oral DAILY PRN Stephanie Aquino NP   30 mL at 09/15/22 1837     Side Effects: (reviewed/updated 9/18/2022)  None reported or admitted to. Review of Systems: (reviewed/updated 9/18/2022)  Appetite: good  Sleep: poor  All other Review of Systems: depression    Mental Status Exam:  Eye contact: limited eye contact  Psychomotor activity: decreased  Speech: poverty of speech  Thought process: thought blocking  Mood is \"OK\"  Affect: flat  Perception: No avh  Thought content: +delusions  Suicidal ideation: denies si  Homicidal ideation: denies hi  Insight/judgment: Poor  Cognition is grossly intact     Physical Exam:  Musculoskeletal system: steady gait  Tremor not present  Cog wheeling not present    Assessment and Plan:  Blanche Rojas meets criteria for a diagnosis of Schizoaffective disorder depressed type. Outpatient ect. ECT work up- h&p, chest xray, ekg, labs, covid all ordered. Decrease ativan to daily  Continue trilafon to 8 mg bid. Continue rest of care. We will closely monitor for safety. We will encourage reality orientation. Tentative dc Monday. I certify that this patients inpatient psychiatric hospital services furnished since the previous certification were, and continue to be, required for treatment that could reasonably be expected to improve the patient's condition, or for diagnostic study, and that the patient continues to need, on a daily basis, active treatment furnished directly by or requiring the supervision of inpatient psychiatric facility personnel. In addition, the hospital records show that services furnished were intensive treatment services, admission or related services, or equivalent services.       Signed:  Vannessa Dillon NP  9/18/2022 low salt low fat diet

## 2022-10-15 ENCOUNTER — INPATIENT (INPATIENT)
Facility: HOSPITAL | Age: 82
LOS: 5 days | Discharge: HOME | DRG: 436 | End: 2022-10-21
Attending: HOSPITALIST | Admitting: STUDENT IN AN ORGANIZED HEALTH CARE EDUCATION/TRAINING PROGRAM
Payer: MEDICARE

## 2022-10-15 VITALS
HEART RATE: 77 BPM | OXYGEN SATURATION: 99 % | RESPIRATION RATE: 18 BRPM | TEMPERATURE: 97 F | DIASTOLIC BLOOD PRESSURE: 59 MMHG | SYSTOLIC BLOOD PRESSURE: 170 MMHG | HEIGHT: 55 IN

## 2022-10-15 DIAGNOSIS — Z98.890 OTHER SPECIFIED POSTPROCEDURAL STATES: Chronic | ICD-10-CM

## 2022-10-15 DIAGNOSIS — Z96.652 PRESENCE OF LEFT ARTIFICIAL KNEE JOINT: Chronic | ICD-10-CM

## 2022-10-15 LAB
ALBUMIN SERPL ELPH-MCNC: 3.9 G/DL — SIGNIFICANT CHANGE UP (ref 3.3–5.2)
ALP SERPL-CCNC: 121 U/L — HIGH (ref 40–120)
ALT FLD-CCNC: 17 U/L — SIGNIFICANT CHANGE UP
ANION GAP SERPL CALC-SCNC: 13 MMOL/L — SIGNIFICANT CHANGE UP (ref 5–17)
APPEARANCE UR: CLEAR — SIGNIFICANT CHANGE UP
AST SERPL-CCNC: 29 U/L — SIGNIFICANT CHANGE UP
BASOPHILS # BLD AUTO: 0.02 K/UL — SIGNIFICANT CHANGE UP (ref 0–0.2)
BASOPHILS NFR BLD AUTO: 0.3 % — SIGNIFICANT CHANGE UP (ref 0–2)
BILIRUB SERPL-MCNC: 0.3 MG/DL — LOW (ref 0.4–2)
BILIRUB UR-MCNC: NEGATIVE — SIGNIFICANT CHANGE UP
BUN SERPL-MCNC: 16.8 MG/DL — SIGNIFICANT CHANGE UP (ref 8–20)
CALCIUM SERPL-MCNC: 9.8 MG/DL — SIGNIFICANT CHANGE UP (ref 8.4–10.5)
CHLORIDE SERPL-SCNC: 98 MMOL/L — SIGNIFICANT CHANGE UP (ref 98–107)
CO2 SERPL-SCNC: 21 MMOL/L — LOW (ref 22–29)
COLOR SPEC: YELLOW — SIGNIFICANT CHANGE UP
CREAT SERPL-MCNC: 0.98 MG/DL — SIGNIFICANT CHANGE UP (ref 0.5–1.3)
DIFF PNL FLD: NEGATIVE — SIGNIFICANT CHANGE UP
EGFR: 57 ML/MIN/1.73M2 — LOW
EOSINOPHIL # BLD AUTO: 0.06 K/UL — SIGNIFICANT CHANGE UP (ref 0–0.5)
EOSINOPHIL NFR BLD AUTO: 1 % — SIGNIFICANT CHANGE UP (ref 0–6)
EPI CELLS # UR: SIGNIFICANT CHANGE UP
GLUCOSE SERPL-MCNC: 326 MG/DL — HIGH (ref 70–99)
GLUCOSE UR QL: 1000 MG/DL
HCT VFR BLD CALC: 30.9 % — LOW (ref 34.5–45)
HGB BLD-MCNC: 9.1 G/DL — LOW (ref 11.5–15.5)
IMM GRANULOCYTES NFR BLD AUTO: 0.2 % — SIGNIFICANT CHANGE UP (ref 0–0.9)
KETONES UR-MCNC: NEGATIVE — SIGNIFICANT CHANGE UP
LACTATE BLDV-MCNC: 3.6 MMOL/L — HIGH (ref 0.5–2)
LEUKOCYTE ESTERASE UR-ACNC: ABNORMAL
LIDOCAIN IGE QN: 76 U/L — HIGH (ref 22–51)
LYMPHOCYTES # BLD AUTO: 1.9 K/UL — SIGNIFICANT CHANGE UP (ref 1–3.3)
LYMPHOCYTES # BLD AUTO: 32.8 % — SIGNIFICANT CHANGE UP (ref 13–44)
MCHC RBC-ENTMCNC: 24.9 PG — LOW (ref 27–34)
MCHC RBC-ENTMCNC: 29.4 GM/DL — LOW (ref 32–36)
MCV RBC AUTO: 84.4 FL — SIGNIFICANT CHANGE UP (ref 80–100)
MONOCYTES # BLD AUTO: 0.56 K/UL — SIGNIFICANT CHANGE UP (ref 0–0.9)
MONOCYTES NFR BLD AUTO: 9.7 % — SIGNIFICANT CHANGE UP (ref 2–14)
NEUTROPHILS # BLD AUTO: 3.24 K/UL — SIGNIFICANT CHANGE UP (ref 1.8–7.4)
NEUTROPHILS NFR BLD AUTO: 56 % — SIGNIFICANT CHANGE UP (ref 43–77)
NITRITE UR-MCNC: NEGATIVE — SIGNIFICANT CHANGE UP
PH UR: 7 — SIGNIFICANT CHANGE UP (ref 5–8)
PLATELET # BLD AUTO: 165 K/UL — SIGNIFICANT CHANGE UP (ref 150–400)
POTASSIUM SERPL-MCNC: 5.7 MMOL/L — HIGH (ref 3.5–5.3)
POTASSIUM SERPL-SCNC: 5.7 MMOL/L — HIGH (ref 3.5–5.3)
PROT SERPL-MCNC: 8 G/DL — SIGNIFICANT CHANGE UP (ref 6.6–8.7)
PROT UR-MCNC: 15
RAPID RVP RESULT: SIGNIFICANT CHANGE UP
RBC # BLD: 3.66 M/UL — LOW (ref 3.8–5.2)
RBC # FLD: 16.7 % — HIGH (ref 10.3–14.5)
RBC CASTS # UR COMP ASSIST: NEGATIVE /HPF — SIGNIFICANT CHANGE UP (ref 0–4)
SARS-COV-2 RNA SPEC QL NAA+PROBE: SIGNIFICANT CHANGE UP
SODIUM SERPL-SCNC: 131 MMOL/L — LOW (ref 135–145)
SP GR SPEC: 1 — LOW (ref 1.01–1.02)
TROPONIN T SERPL-MCNC: <0.01 NG/ML — SIGNIFICANT CHANGE UP (ref 0–0.06)
UROBILINOGEN FLD QL: NEGATIVE MG/DL — SIGNIFICANT CHANGE UP
WBC # BLD: 5.79 K/UL — SIGNIFICANT CHANGE UP (ref 3.8–10.5)
WBC # FLD AUTO: 5.79 K/UL — SIGNIFICANT CHANGE UP (ref 3.8–10.5)
WBC UR QL: SIGNIFICANT CHANGE UP /HPF (ref 0–5)

## 2022-10-15 PROCEDURE — 99285 EMERGENCY DEPT VISIT HI MDM: CPT

## 2022-10-15 PROCEDURE — 93010 ELECTROCARDIOGRAM REPORT: CPT

## 2022-10-15 PROCEDURE — 76705 ECHO EXAM OF ABDOMEN: CPT | Mod: 26

## 2022-10-15 PROCEDURE — 71045 X-RAY EXAM CHEST 1 VIEW: CPT | Mod: 26

## 2022-10-15 RX ORDER — METOCLOPRAMIDE HCL 10 MG
10 TABLET ORAL ONCE
Refills: 0 | Status: COMPLETED | OUTPATIENT
Start: 2022-10-15 | End: 2022-10-15

## 2022-10-15 RX ORDER — FAMOTIDINE 10 MG/ML
20 INJECTION INTRAVENOUS ONCE
Refills: 0 | Status: COMPLETED | OUTPATIENT
Start: 2022-10-15 | End: 2022-10-15

## 2022-10-15 RX ORDER — SODIUM CHLORIDE 9 MG/ML
500 INJECTION INTRAMUSCULAR; INTRAVENOUS; SUBCUTANEOUS ONCE
Refills: 0 | Status: COMPLETED | OUTPATIENT
Start: 2022-10-15 | End: 2022-10-15

## 2022-10-15 RX ADMIN — Medication 10 MILLIGRAM(S): at 18:38

## 2022-10-15 RX ADMIN — SODIUM CHLORIDE 500 MILLILITER(S): 9 INJECTION INTRAMUSCULAR; INTRAVENOUS; SUBCUTANEOUS at 18:38

## 2022-10-15 RX ADMIN — FAMOTIDINE 20 MILLIGRAM(S): 10 INJECTION INTRAVENOUS at 18:38

## 2022-10-15 NOTE — ED ADULT NURSE REASSESSMENT NOTE - NS ED NURSE REASSESS COMMENT FT1
assumed care for pt at 1930, charting as noted. pt back from ultrasound. family at bedside. NSR on telemetry respirations even and unlabored. pt shows no s/s of acute distress at this time. safety precautions maintained. pt updated on plan of care.

## 2022-10-15 NOTE — ED PROVIDER NOTE - PROGRESS NOTE DETAILS
Labs and CT results as noted.  case d/w Surgery and recommending medicine admission with GI eval.  Case d/w Hospitalist and will admit

## 2022-10-15 NOTE — ED PROVIDER NOTE - NS ED MD DISPO SPECIAL CONSIDERATION1
Subjective   Natividad Montgomery is a 51 y.o. female.  Establish primary care.  Has been having a really hard time for about the past year with her ex-.  Has been treated in the past for anxiety and depression has gotten worse since  with her .  At one point she had her ex- moved back in her house only to find out that he was unfaithful again.  Has felt down and depressed but is not bringing of hurting herself.    Anxiety   Presents for initial visit. Onset was more than 5 years ago. The problem has been gradually worsening. Symptoms include depressed mood and excessive worry. Symptoms occur constantly. The severity of symptoms is causing significant distress and moderate. The symptoms are aggravated by family issues. The quality of sleep is fair. Nighttime awakenings: occasional.     Risk factors include marital problems. Her past medical history is significant for depression. Past treatments include nothing.   Depression   Visit Type: initial  Patient presents with the following symptoms: depressed mood, excessive worry and fatigue.  Frequency of symptoms: constantly   Severity: moderate   Sleep per night: 6 hours  Sleep quality: fair  Nighttime awakenings: occasional  Risk factors: marital problems and major life event         The following portions of the patient's history were reviewed and updated as appropriate: allergies, current medications, past family history, past medical history, past social history, past surgical history and problem list.    Review of Systems   Constitutional: Negative.    HENT: Negative.    Eyes: Negative.    Respiratory: Negative.    Cardiovascular: Negative.    Gastrointestinal: Negative.    Genitourinary: Negative.    Musculoskeletal: Negative.    Skin: Negative.    Neurological: Negative.    Hematological: Negative.        Objective   Physical Exam   Constitutional: She is oriented to person, place, and time. She appears well-developed and well-nourished.    HENT:   Head: Normocephalic.   Right Ear: External ear normal.   Left Ear: External ear normal.   Mouth/Throat: Oropharynx is clear and moist.   Eyes: EOM are normal. Pupils are equal, round, and reactive to light.   Neck: Normal range of motion. Neck supple.   Cardiovascular: Normal rate, regular rhythm and normal heart sounds.    Pulmonary/Chest: Effort normal and breath sounds normal.   Abdominal: Soft. Bowel sounds are normal.   Musculoskeletal: Normal range of motion.   Neurological: She is alert and oriented to person, place, and time.   Skin: Skin is warm and dry.   Psychiatric: Her speech is normal and behavior is normal. Judgment and thought content normal. She exhibits a depressed mood.   Nursing note and vitals reviewed.      Assessment/Plan   Problems Addressed this Visit     None      Visit Diagnoses     Moderate episode of recurrent major depressive disorder    -  Primary    Relevant Medications    escitalopram (LEXAPRO) 10 MG tablet    busPIRone (BUSPAR) 5 MG tablet    Anxiety        Relevant Medications    busPIRone (BUSPAR) 5 MG tablet    Screening for cervical cancer        Relevant Orders    Ambulatory Referral to Gynecology    Fatigue, unspecified type        Relevant Orders    CBC w AUTO Differential (Completed)    Comprehensive metabolic panel (Completed)    TSH (Completed)    CBC Auto Differential (Completed)    Screening for hypercholesterolemia        Relevant Orders    Lipid panel (Completed)    Screening for breast cancer        Relevant Orders    Mammo Screening Bilateral With CAD    Encounter to establish care            1.  Moderate episode of recurrent major depressive disorder:  Begin Lexapro 10 mg as prescribed to be taken 1 by mouth daily  Begin BuSpar 5 mg as prescribed to be taken 1 by mouth 3 times a day when necessary for anxiety  Educated on possible side effects of Lexapro and BuSpar including but not limited to possible suicidal ideations  Encourage patient to discontinue  medication immediately and seek emergency medical treatment should suicidal ideations occurred    2.  Anxiety:  Begin prescription for BuSpar as discussed above    3.  Screening for cervical cancer:  Ambulatory referral placed to gynecology and will call to schedule appointment for annual GYN exam with Pap smear    4.  Fatigue, unspecified type:  Complete lab work as ordered including CBC, chemistry panel, TSH and will call with results when available    5.  Screening for hypercholesterolemia:  Complete lab work is ordered including fasting lipid panel will call with results when available    6.  Screening for breast cancer:  Radiology will call to schedule bilateral mammogram    7.  Encounter to establish care:  Schedule follow-up appointment with this office in 4 weeks for recheck of depression and anxiety        This document has been electronically signed by DHARMESH Garza on February 26, 2018 3:40 PM             None

## 2022-10-15 NOTE — ED PROVIDER NOTE - OBJECTIVE STATEMENT
83 y/o F with h/o htn, hdl, dm , cad with stents on aspirin, plavix p/w epigastric pressure but no pain since this morning, no nausea, vomiting, diarrhea. No fever, chills, dysuria. No syncope pr vertigo but pt feels dizzy    ED  Kristofer

## 2022-10-15 NOTE — ED PROVIDER NOTE - CLINICAL SUMMARY MEDICAL DECISION MAKING FREE TEXT BOX
pt has epigastric pressure, will treat as gastritis, r/o gallstones, acs, pancreatitis, pna and reassess

## 2022-10-16 DIAGNOSIS — R10.9 UNSPECIFIED ABDOMINAL PAIN: ICD-10-CM

## 2022-10-16 DIAGNOSIS — R10.13 EPIGASTRIC PAIN: ICD-10-CM

## 2022-10-16 DIAGNOSIS — R93.89 ABNORMAL FINDINGS ON DIAGNOSTIC IMAGING OF OTHER SPECIFIED BODY STRUCTURES: ICD-10-CM

## 2022-10-16 LAB
AFP-TM SERPL-MCNC: 186 NG/ML — HIGH
ALBUMIN SERPL ELPH-MCNC: 3.4 G/DL — SIGNIFICANT CHANGE UP (ref 3.3–5.2)
ALP SERPL-CCNC: 107 U/L — SIGNIFICANT CHANGE UP (ref 40–120)
ALT FLD-CCNC: 15 U/L — SIGNIFICANT CHANGE UP
ANION GAP SERPL CALC-SCNC: 10 MMOL/L — SIGNIFICANT CHANGE UP (ref 5–17)
ANION GAP SERPL CALC-SCNC: 9 MMOL/L — SIGNIFICANT CHANGE UP (ref 5–17)
APTT BLD: 27.8 SEC — SIGNIFICANT CHANGE UP (ref 27.5–35.5)
AST SERPL-CCNC: 23 U/L — SIGNIFICANT CHANGE UP
BASE EXCESS BLDV CALC-SCNC: -3.5 MMOL/L — LOW (ref -2–3)
BILIRUB DIRECT SERPL-MCNC: 0.1 MG/DL — SIGNIFICANT CHANGE UP (ref 0–0.3)
BILIRUB INDIRECT FLD-MCNC: 0.2 MG/DL — SIGNIFICANT CHANGE UP (ref 0.2–1)
BILIRUB SERPL-MCNC: 0.3 MG/DL — LOW (ref 0.4–2)
BUN SERPL-MCNC: 11.8 MG/DL — SIGNIFICANT CHANGE UP (ref 8–20)
BUN SERPL-MCNC: 12.2 MG/DL — SIGNIFICANT CHANGE UP (ref 8–20)
CA-I SERPL-SCNC: 1.26 MMOL/L — SIGNIFICANT CHANGE UP (ref 1.15–1.33)
CALCIUM SERPL-MCNC: 8.9 MG/DL — SIGNIFICANT CHANGE UP (ref 8.4–10.5)
CALCIUM SERPL-MCNC: 9.2 MG/DL — SIGNIFICANT CHANGE UP (ref 8.4–10.5)
CEA SERPL-MCNC: 4.2 NG/ML — HIGH (ref 0–3.8)
CHLORIDE BLDV-SCNC: 107 MMOL/L — SIGNIFICANT CHANGE UP (ref 98–107)
CHLORIDE SERPL-SCNC: 102 MMOL/L — SIGNIFICANT CHANGE UP (ref 98–107)
CHLORIDE SERPL-SCNC: 103 MMOL/L — SIGNIFICANT CHANGE UP (ref 98–107)
CO2 SERPL-SCNC: 23 MMOL/L — SIGNIFICANT CHANGE UP (ref 22–29)
CO2 SERPL-SCNC: 23 MMOL/L — SIGNIFICANT CHANGE UP (ref 22–29)
CREAT SERPL-MCNC: 0.8 MG/DL — SIGNIFICANT CHANGE UP (ref 0.5–1.3)
CREAT SERPL-MCNC: 0.86 MG/DL — SIGNIFICANT CHANGE UP (ref 0.5–1.3)
EGFR: 67 ML/MIN/1.73M2 — SIGNIFICANT CHANGE UP
EGFR: 73 ML/MIN/1.73M2 — SIGNIFICANT CHANGE UP
GAS PNL BLDV: 138 MMOL/L — SIGNIFICANT CHANGE UP (ref 136–145)
GAS PNL BLDV: SIGNIFICANT CHANGE UP
GLUCOSE BLDC GLUCOMTR-MCNC: 190 MG/DL — HIGH (ref 70–99)
GLUCOSE BLDC GLUCOMTR-MCNC: 285 MG/DL — HIGH (ref 70–99)
GLUCOSE BLDC GLUCOMTR-MCNC: 297 MG/DL — HIGH (ref 70–99)
GLUCOSE BLDV-MCNC: 110 MG/DL — HIGH (ref 70–99)
GLUCOSE SERPL-MCNC: 197 MG/DL — HIGH (ref 70–99)
GLUCOSE SERPL-MCNC: 298 MG/DL — HIGH (ref 70–99)
HAV IGG SER QL IA: REACTIVE
HAV IGM SER-ACNC: SIGNIFICANT CHANGE UP
HBV CORE AB SER-ACNC: REACTIVE
HBV CORE IGM SER-ACNC: SIGNIFICANT CHANGE UP
HBV SURFACE AB SER-ACNC: SIGNIFICANT CHANGE UP
HBV SURFACE AG SER-ACNC: SIGNIFICANT CHANGE UP
HCO3 BLDV-SCNC: 23 MMOL/L — SIGNIFICANT CHANGE UP (ref 22–29)
HCT VFR BLDA CALC: 25 % — SIGNIFICANT CHANGE UP
HCV AB S/CO SERPL IA: 0.4 S/CO — SIGNIFICANT CHANGE UP (ref 0–0.99)
HCV AB SERPL-IMP: SIGNIFICANT CHANGE UP
HGB BLD CALC-MCNC: 8.4 G/DL — LOW (ref 11.7–16.1)
INR BLD: 1.33 RATIO — HIGH (ref 0.88–1.16)
LACTATE BLDV-MCNC: 1.3 MMOL/L — SIGNIFICANT CHANGE UP (ref 0.5–2)
PCO2 BLDV: 48 MMHG — HIGH (ref 39–42)
PH BLDV: 7.29 — LOW (ref 7.32–7.43)
PO2 BLDV: 76 MMHG — HIGH (ref 25–45)
POTASSIUM BLDV-SCNC: 4.7 MMOL/L — SIGNIFICANT CHANGE UP (ref 3.5–5.1)
POTASSIUM SERPL-MCNC: 4.2 MMOL/L — SIGNIFICANT CHANGE UP (ref 3.5–5.3)
POTASSIUM SERPL-MCNC: 5.4 MMOL/L — HIGH (ref 3.5–5.3)
POTASSIUM SERPL-SCNC: 4.2 MMOL/L — SIGNIFICANT CHANGE UP (ref 3.5–5.3)
POTASSIUM SERPL-SCNC: 5.4 MMOL/L — HIGH (ref 3.5–5.3)
PROT SERPL-MCNC: 7 G/DL — SIGNIFICANT CHANGE UP (ref 6.6–8.7)
PROTHROM AB SERPL-ACNC: 15.5 SEC — HIGH (ref 10.5–13.4)
SAO2 % BLDV: 96 % — SIGNIFICANT CHANGE UP
SODIUM SERPL-SCNC: 135 MMOL/L — SIGNIFICANT CHANGE UP (ref 135–145)
SODIUM SERPL-SCNC: 135 MMOL/L — SIGNIFICANT CHANGE UP (ref 135–145)
TROPONIN T SERPL-MCNC: <0.01 NG/ML — SIGNIFICANT CHANGE UP (ref 0–0.06)

## 2022-10-16 PROCEDURE — 99223 1ST HOSP IP/OBS HIGH 75: CPT

## 2022-10-16 PROCEDURE — 74174 CTA ABD&PLVS W/CONTRAST: CPT | Mod: 26,MA

## 2022-10-16 RX ORDER — GLUCAGON INJECTION, SOLUTION 0.5 MG/.1ML
1 INJECTION, SOLUTION SUBCUTANEOUS ONCE
Refills: 0 | Status: DISCONTINUED | OUTPATIENT
Start: 2022-10-16 | End: 2022-10-21

## 2022-10-16 RX ORDER — SENNA PLUS 8.6 MG/1
2 TABLET ORAL AT BEDTIME
Refills: 0 | Status: DISCONTINUED | OUTPATIENT
Start: 2022-10-16 | End: 2022-10-21

## 2022-10-16 RX ORDER — PANTOPRAZOLE SODIUM 20 MG/1
40 TABLET, DELAYED RELEASE ORAL
Refills: 0 | Status: DISCONTINUED | OUTPATIENT
Start: 2022-10-16 | End: 2022-10-21

## 2022-10-16 RX ORDER — CHLORTHALIDONE 50 MG
1 TABLET ORAL
Qty: 0 | Refills: 0 | DISCHARGE

## 2022-10-16 RX ORDER — NIFEDIPINE 30 MG
60 TABLET, EXTENDED RELEASE 24 HR ORAL DAILY
Refills: 0 | Status: DISCONTINUED | OUTPATIENT
Start: 2022-10-16 | End: 2022-10-21

## 2022-10-16 RX ORDER — INSULIN LISPRO 100/ML
VIAL (ML) SUBCUTANEOUS
Refills: 0 | Status: DISCONTINUED | OUTPATIENT
Start: 2022-10-16 | End: 2022-10-18

## 2022-10-16 RX ORDER — SPIRONOLACTONE 25 MG/1
50 TABLET, FILM COATED ORAL ONCE
Refills: 0 | Status: DISCONTINUED | OUTPATIENT
Start: 2022-10-16 | End: 2022-10-16

## 2022-10-16 RX ORDER — DEXTROSE 50 % IN WATER 50 %
15 SYRINGE (ML) INTRAVENOUS ONCE
Refills: 0 | Status: DISCONTINUED | OUTPATIENT
Start: 2022-10-16 | End: 2022-10-21

## 2022-10-16 RX ORDER — HYDRALAZINE HCL 50 MG
50 TABLET ORAL
Refills: 0 | Status: DISCONTINUED | OUTPATIENT
Start: 2022-10-16 | End: 2022-10-21

## 2022-10-16 RX ORDER — DEXTROSE 50 % IN WATER 50 %
50 SYRINGE (ML) INTRAVENOUS ONCE
Refills: 0 | Status: COMPLETED | OUTPATIENT
Start: 2022-10-16 | End: 2022-10-16

## 2022-10-16 RX ORDER — SODIUM ZIRCONIUM CYCLOSILICATE 10 G/10G
5 POWDER, FOR SUSPENSION ORAL ONCE
Refills: 0 | Status: COMPLETED | OUTPATIENT
Start: 2022-10-16 | End: 2022-10-16

## 2022-10-16 RX ORDER — LABETALOL HCL 100 MG
10 TABLET ORAL EVERY 6 HOURS
Refills: 0 | Status: DISCONTINUED | OUTPATIENT
Start: 2022-10-16 | End: 2022-10-21

## 2022-10-16 RX ORDER — RANITIDINE HYDROCHLORIDE 150 MG/1
1 TABLET, FILM COATED ORAL
Qty: 0 | Refills: 0 | DISCHARGE

## 2022-10-16 RX ORDER — SODIUM CHLORIDE 9 MG/ML
1000 INJECTION INTRAMUSCULAR; INTRAVENOUS; SUBCUTANEOUS ONCE
Refills: 0 | Status: COMPLETED | OUTPATIENT
Start: 2022-10-16 | End: 2022-10-16

## 2022-10-16 RX ORDER — SODIUM CHLORIDE 9 MG/ML
1000 INJECTION, SOLUTION INTRAVENOUS
Refills: 0 | Status: DISCONTINUED | OUTPATIENT
Start: 2022-10-16 | End: 2022-10-21

## 2022-10-16 RX ORDER — GLIMEPIRIDE 1 MG
1 TABLET ORAL
Qty: 0 | Refills: 0 | DISCHARGE

## 2022-10-16 RX ORDER — LEVOTHYROXINE SODIUM 125 MCG
25 TABLET ORAL DAILY
Refills: 0 | Status: DISCONTINUED | OUTPATIENT
Start: 2022-10-16 | End: 2022-10-21

## 2022-10-16 RX ORDER — INSULIN HUMAN 100 [IU]/ML
10 INJECTION, SOLUTION SUBCUTANEOUS ONCE
Refills: 0 | Status: COMPLETED | OUTPATIENT
Start: 2022-10-16 | End: 2022-10-16

## 2022-10-16 RX ORDER — CARVEDILOL PHOSPHATE 80 MG/1
12.5 CAPSULE, EXTENDED RELEASE ORAL EVERY 12 HOURS
Refills: 0 | Status: DISCONTINUED | OUTPATIENT
Start: 2022-10-16 | End: 2022-10-21

## 2022-10-16 RX ORDER — DEXTROSE 50 % IN WATER 50 %
12.5 SYRINGE (ML) INTRAVENOUS ONCE
Refills: 0 | Status: DISCONTINUED | OUTPATIENT
Start: 2022-10-16 | End: 2022-10-21

## 2022-10-16 RX ORDER — DEXTROSE 50 % IN WATER 50 %
25 SYRINGE (ML) INTRAVENOUS ONCE
Refills: 0 | Status: DISCONTINUED | OUTPATIENT
Start: 2022-10-16 | End: 2022-10-21

## 2022-10-16 RX ORDER — HYDRALAZINE HCL 50 MG
50 TABLET ORAL
Qty: 0 | Refills: 0 | DISCHARGE

## 2022-10-16 RX ORDER — ACETAMINOPHEN 500 MG
2 TABLET ORAL
Qty: 0 | Refills: 0 | DISCHARGE

## 2022-10-16 RX ORDER — HYDRALAZINE HCL 50 MG
50 TABLET ORAL ONCE
Refills: 0 | Status: COMPLETED | OUTPATIENT
Start: 2022-10-16 | End: 2022-10-16

## 2022-10-16 RX ORDER — INSULIN GLARGINE 100 [IU]/ML
5 INJECTION, SOLUTION SUBCUTANEOUS AT BEDTIME
Refills: 0 | Status: DISCONTINUED | OUTPATIENT
Start: 2022-10-16 | End: 2022-10-19

## 2022-10-16 RX ORDER — ATORVASTATIN CALCIUM 80 MG/1
40 TABLET, FILM COATED ORAL AT BEDTIME
Refills: 0 | Status: DISCONTINUED | OUTPATIENT
Start: 2022-10-16 | End: 2022-10-21

## 2022-10-16 RX ORDER — SPIRONOLACTONE 25 MG/1
50 TABLET, FILM COATED ORAL DAILY
Refills: 0 | Status: DISCONTINUED | OUTPATIENT
Start: 2022-10-16 | End: 2022-10-16

## 2022-10-16 RX ORDER — BUDESONIDE AND FORMOTEROL FUMARATE DIHYDRATE 160; 4.5 UG/1; UG/1
2 AEROSOL RESPIRATORY (INHALATION)
Refills: 0 | Status: DISCONTINUED | OUTPATIENT
Start: 2022-10-16 | End: 2022-10-21

## 2022-10-16 RX ADMIN — INSULIN GLARGINE 5 UNIT(S): 100 INJECTION, SOLUTION SUBCUTANEOUS at 23:04

## 2022-10-16 RX ADMIN — Medication 50 MILLIGRAM(S): at 17:27

## 2022-10-16 RX ADMIN — SODIUM ZIRCONIUM CYCLOSILICATE 5 GRAM(S): 10 POWDER, FOR SUSPENSION ORAL at 17:27

## 2022-10-16 RX ADMIN — INSULIN HUMAN 10 UNIT(S): 100 INJECTION, SOLUTION SUBCUTANEOUS at 07:01

## 2022-10-16 RX ADMIN — SODIUM ZIRCONIUM CYCLOSILICATE 5 GRAM(S): 10 POWDER, FOR SUSPENSION ORAL at 08:48

## 2022-10-16 RX ADMIN — Medication 60 MILLIGRAM(S): at 09:48

## 2022-10-16 RX ADMIN — Medication 6: at 11:48

## 2022-10-16 RX ADMIN — Medication 6: at 17:29

## 2022-10-16 RX ADMIN — SODIUM CHLORIDE 1000 MILLILITER(S): 9 INJECTION INTRAMUSCULAR; INTRAVENOUS; SUBCUTANEOUS at 03:29

## 2022-10-16 RX ADMIN — CARVEDILOL PHOSPHATE 12.5 MILLIGRAM(S): 80 CAPSULE, EXTENDED RELEASE ORAL at 17:27

## 2022-10-16 RX ADMIN — ATORVASTATIN CALCIUM 40 MILLIGRAM(S): 80 TABLET, FILM COATED ORAL at 23:03

## 2022-10-16 RX ADMIN — Medication 50 MILLIGRAM(S): at 11:48

## 2022-10-16 RX ADMIN — Medication 50 MILLILITER(S): at 06:57

## 2022-10-16 RX ADMIN — SENNA PLUS 2 TABLET(S): 8.6 TABLET ORAL at 23:03

## 2022-10-16 NOTE — PROGRESS NOTE ADULT - SUBJECTIVE AND OBJECTIVE BOX
WILMER RAFIQ  [id number]      HPI: 82F with PMH of HLD, HTN, DM, CAD s/p PHYLLIS to mLAD 2019 presenting to the ED with 1 day history of epigastric discomfort found to have 3CM hepatic nodule concerning for malignancy. She has negative tropoin I. She needs preoperative evaluation for EUS/liver biopsy.    REVIEW OF SYSTEMS:  NECK: No pain or stiffness  RESPIRATORY: No cough, wheezing, chills or hemoptysis; No Shortness of Breath  CARDIOVASCULAR: No chest pain, palpitations, passing out, dizziness, or leg swelling  GASTROINTESTINAL: No abdominal or epigastric pain. No nausea, vomiting, or hematemesis; No diarrhea or constipation. No melena or hematochezia.  NEUROLOGICAL: No headaches, memory loss, loss of strength, numbness, or tremors  MUSCULOSKELETAL: No joint pain or swelling; No muscle, back, or extremity pain  	      PAST MEDICAL & SURGICAL HISTORY:  Hypertension      Hypothyroidism      GERD (gastroesophageal reflux disease)      Hypercholesterolemia      Knee pain, left  arthritis      Diabetes      Heart murmur      S/P tubal ligation      History of vaginal surgery      History of total left knee replacement          Allergies    amlodipine (Swelling)  aspirin (Rash)    Intolerances        MEDICATIONS  (STANDING):  atorvastatin 40 milliGRAM(s) Oral at bedtime  budesonide 160 MICROgram(s)/formoterol 4.5 MICROgram(s) Inhaler 2 Puff(s) Inhalation two times a day  carvedilol 12.5 milliGRAM(s) Oral every 12 hours  dextrose 5%. 1000 milliLiter(s) (50 mL/Hr) IV Continuous <Continuous>  dextrose 5%. 1000 milliLiter(s) (100 mL/Hr) IV Continuous <Continuous>  dextrose 50% Injectable 25 Gram(s) IV Push once  dextrose 50% Injectable 12.5 Gram(s) IV Push once  dextrose 50% Injectable 25 Gram(s) IV Push once  glucagon  Injectable 1 milliGRAM(s) IntraMuscular once  hydrALAZINE 50 milliGRAM(s) Oral two times a day  insulin glargine Injectable (LANTUS) 5 Unit(s) SubCutaneous at bedtime  insulin lispro (ADMELOG) corrective regimen sliding scale   SubCutaneous three times a day before meals  levothyroxine 25 MICROGram(s) Oral daily  NIFEdipine XL 60 milliGRAM(s) Oral daily  pantoprazole    Tablet 40 milliGRAM(s) Oral before breakfast  senna 2 Tablet(s) Oral at bedtime    MEDICATIONS  (PRN):  dextrose Oral Gel 15 Gram(s) Oral once PRN Blood Glucose LESS THAN 70 milliGRAM(s)/deciliter  labetalol Injectable 10 milliGRAM(s) IV Push every 6 hours PRN Systolic blood pressure > 180      FAMILY HISTORY:  Family history of diabetes mellitus (DM)    FH: HTN (hypertension)    FH: heart disease      SOCIAL HISTORY: Non smoker.       EKG: NSR      Vital Signs Last 24 Hrs  T(C): 36.4 (16 Oct 2022 11:40), Max: 37.1 (16 Oct 2022 07:41)  T(F): 97.5 (16 Oct 2022 11:40), Max: 98.7 (16 Oct 2022 07:41)  HR: 62 (16 Oct 2022 11:40) (61 - 77)  BP: 157/62 (16 Oct 2022 11:40) (135/56 - 177/73)  BP(mean): --  RR: 18 (16 Oct 2022 11:40) (17 - 18)  SpO2: 98% (16 Oct 2022 11:40) (96% - 99%)    Parameters below as of 16 Oct 2022 11:40  Patient On (Oxygen Delivery Method): room air        Daily Height in cm: 134.62 (15 Oct 2022 17:15)    Daily     I&O's Detail      PHYSICAL EXAM:  Appearance: NAD  HEENT:   Normal oral mucosa, PERRL, sclera non-icteric	  Cardiovascular: Normal S1 S2, No JVD,   Respiratory: Lungs clear to auscultation	  Gastrointestinal:  Soft, Non-tender, + BS, no bruits	  Neurologic: Grossly non-focal with full strength in all four extremities  Extremities: No edema       LABS:                        9.1    5.79  )-----------( 165      ( 15 Oct 2022 19:00 )             30.9     10-16    135  |  102  |  12.2  ----------------------------<  298<H>  5.4<H>   |  23.0  |  0.86    Ca    9.2      16 Oct 2022 11:25    TPro  7.0  /  Alb  3.4  /  TBili  0.3<L>  /  DBili  0.1  /  AST  23  /  ALT  15  /  AlkPhos  107  10-16    CARDIAC MARKERS ( 16 Oct 2022 11:25 )  x     / <0.01 ng/mL / x     / x     / x      CARDIAC MARKERS ( 15 Oct 2022 19:00 )  x     / <0.01 ng/mL / x     / x     / x          PT/INR - ( 16 Oct 2022 03:26 )   PT: 15.5 sec;   INR: 1.33 ratio         PTT - ( 16 Oct 2022 03:26 )  PTT:27.8 sec  Urinalysis Basic - ( 15 Oct 2022 19:00 )    Color: Yellow / Appearance: Clear / S.005 / pH: x  Gluc: x / Ketone: Negative  / Bili: Negative / Urobili: Negative mg/dL   Blood: x / Protein: 15 / Nitrite: Negative   Leuk Esterase: Trace / RBC: Negative /HPF / WBC 0-2 /HPF   Sq Epi: x / Non Sq Epi: Occasional / Bacteria: x

## 2022-10-16 NOTE — PATIENT PROFILE ADULT - FALL HARM RISK - UNIVERSAL INTERVENTIONS
Bed in lowest position, wheels locked, appropriate side rails in place/Call bell, personal items and telephone in reach/Instruct patient to call for assistance before getting out of bed or chair/Non-slip footwear when patient is out of bed/Brick to call system/Physically safe environment - no spills, clutter or unnecessary equipment/Purposeful Proactive Rounding/Room/bathroom lighting operational, light cord in reach

## 2022-10-16 NOTE — ED ADULT NURSE REASSESSMENT NOTE - NS ED NURSE REASSESS COMMENT FT1
Patient received at 0730; awake; alert and oriented x4. Denies SOB, dizziness. No distress noted. VSS. Respirations unlabored. Report received at bedside. Cardiac monitor in place. Rhythm reviewed and report given to monitor tech. Call bell and personal items in reach. Continue to monitor patient and maintain safety. IV patent and flushing without difficulty, no sign of infiltration.

## 2022-10-16 NOTE — CONSULT NOTE ADULT - ASSESSMENT
82F with PMH of HLD, HTN, DM, CAD s/p PHYLLIS to mLAD 2019 presenting to the ED with 1 day history of epigastric discomfort found to have hepatic nodule.

## 2022-10-16 NOTE — PROGRESS NOTE ADULT - ASSESSMENT
82F with PMH of HLD, HTN, DM, CAD s/p PHYLLIS to mLAD 2019 presenting to the ED with 1 day history of epigastric discomfort found to have 3CM hepatic nodule concerning for malignancy. She has negative tropoin I. She needs preoperative evaluation for EUS/liver biopsy.  1. Abnormal EKG    Plan:   Continue current regimen.   Continue atorvastatin, nifedipine xl and coreg   Echocardiogram

## 2022-10-16 NOTE — ED ADULT NURSE REASSESSMENT NOTE - NS ED NURSE REASSESS COMMENT FT1
pt is A&Ox4, daughter at bedside. pt awaiting GI consult. respirations even and unlabored. pt updated on plan of care. pt shows no s/s of acute distress at this time. safety precautions maintained.

## 2022-10-16 NOTE — H&P ADULT - HISTORY OF PRESENT ILLNESS
PMH HLD, HTN, DM, CAD s/p PHYLLIS to mLAD 2019      Presenting with abdominal pain  Found to have  82F with PMH of HLD, HTN, DM, CAD s/p PHYLLIS to mLAD 2019 presenting to the ED with 1 day history of epigastric discomfort. Patient is unable to thoroughly categorize her symptoms. Describes it as not pain but more like weakness or pressure. It is of moderate intensity non radiating but feels better compared to yesterday. Initially correlated her symptoms with climbing stairs but then retracted this statement. Does have exertional dyspnea with 1 flight.    Denies CP, SOB, palpitations, nausea, vomiting diarrhea     Presenting with abdominal pain  Found to have mildly elevated lipase to 76. CTA with no pancreatic inflammation, 2 suspected hepatic hemangiomas and 1x 3-cm hepatic nodule with vascularity concerning for HCC.

## 2022-10-16 NOTE — H&P ADULT - NSHPPHYSICALEXAM_GEN_ALL_CORE
VITALS:   T(C): 37.1 (10-16-22 @ 07:41), Max: 37.1 (10-16-22 @ 07:41)  HR: 63 (10-16-22 @ 07:41) (61 - 77)  BP: 162/70 (10-16-22 @ 07:41) (162/70 - 177/73)  RR: 18 (10-16-22 @ 07:41) (17 - 18)  SpO2: 99% (10-16-22 @ 07:41) (98% - 99%)    GENERAL: NAD, lying in bed comfortably  HEAD:  Atraumatic, Normocephalic  EYES: EOMI, PERRLA, conjunctiva and sclera clear  ENT: Moist mucous membranes  NECK: Supple, No JVD  CHEST/LUNG: Clear to auscultation bilaterally; No rales, rhonchi, wheezing, or rubs. Unlabored respirations  HEART: Regular rate and rhythm; No murmurs, rubs, or gallops  ABDOMEN: BSx4; Soft, nontender, nondistended  EXTREMITIES:  2+ Peripheral Pulses, brisk capillary refill. No clubbing, cyanosis, or edema  NERVOUS SYSTEM:  A&Ox3, no focal deficits   SKIN: No rashes or lesions  PSYCH: Normal affect, euthymic mood

## 2022-10-16 NOTE — CONSULT NOTE ADULT - PROBLEM SELECTOR RECOMMENDATION 9
Epigastric fullness and pressure x1 day. CT abd revealed liver lesion 2.9 cm. Normal liver enzymes. no thrombocytopenia. CT shown nodular appearance of liver ?MARTIN cirrhosis.  MRI liver protocol for further evaluation   CEA. CA19, 125  Hepatic serology  Further plan pending MR reports.  Hepatitis panel    Trend LFTs, coags  Plan explained to the patient and her daughter with  line.

## 2022-10-16 NOTE — CONSULT NOTE ADULT - SUBJECTIVE AND OBJECTIVE BOX
HISTORY OF PRESENT ILLNESS: 83 y/o F with PMH of HTN, HLD, DM, CAD s/p stent placement on Aspirin and Plavix presented to ED with epigastric pressure like discomfort x 1 day. Reports some LUQ "? pain or discomfort". Denies abdominal pain, nausea, vomiting, chest pain, palpitation, shortness of breath. Never had endoscopy in the past. Last colonoscopy was , reported normal and was requested to repeat in 10 years. Reports occasional constipation with diarrhea 3-4 loose BM which resolves without any medical management. Denies family history of liver disease or cancer. Denies use of alcohol.    line  ID [id number] was used for interview.     Review of Systems:  . Constitutional: No fever, chills, no weight gain, weight loss   · ENMT: no vertigo, no throat pain  . Neck: No pain or stiffness  · Respiratory and Thorax: No shortness of breath, no cough, no wheezing  · Cardiovascular: No chest pain, palpitation, no dizziness, no orthopnea,   · Gastrointestinal: see above.  · Genitourinary: No hematuria, no dysuria  · Musculoskeletal: Negative  · Neurological: no headache, no vision changes, no slurred speech  · Psychiatric: no agitation, no anxiety  · Hematology/Lymphatics: negative  · Endocrine: negative      PAST MEDICAL/SURGICAL HISTORY:  Hypertension    Hypothyroidism    GERD (gastroesophageal reflux disease)    Hypercholesterolemia    Knee pain, left  arthritis    Diabetes    Heart murmur    S/P tubal ligation    History of vaginal surgery    History of total left knee replacement      SOCIAL HISTORY:  - TOBACCO: Denies  - ALCOHOL: Denies  - ILLICIT DRUG USE: Denies    FAMILY HISTORY:  No known history of gastrointestinal or liver disease;  Family history of diabetes mellitus (DM)    FH: HTN (hypertension)    FH: heart disease        HOME MEDICATIONS:  aspirin 81 mg oral tablet: 1 tab(s) orally once a day (16 Oct 2022 08:59)  carvedilol 12.5 mg oral tablet: 1 tab(s) orally 2 times a day (16 Oct 2022 08:59)  Farxiga 10 mg oral tablet: 1 tab(s) orally once a day (16 Oct 2022 08:59)  hydrALAZINE 50 mg oral tablet: 1 tab(s) orally 2 times a day (16 Oct 2022 08:59)  levothyroxine 25 mcg (0.025 mg) oral capsule: 1 cap(s) orally once a day (16 Oct 2022 08:59)  metFORMIN 1000 mg oral tablet: 1 tab(s) orally 2 times a day (16 Oct 2022 08:59)  omeprazole 20 mg oral delayed release capsule: 1 cap(s) orally once a day (16 Oct 2022 08:59)  spironolactone 50 mg oral tablet: 1 tab(s) orally once a day (16 Oct 2022 08:59)    INPATIENT MEDICATIONS:  MEDICATIONS  (STANDING):  atorvastatin 40 milliGRAM(s) Oral at bedtime  carvedilol 12.5 milliGRAM(s) Oral every 12 hours  dextrose 5%. 1000 milliLiter(s) (50 mL/Hr) IV Continuous <Continuous>  dextrose 5%. 1000 milliLiter(s) (100 mL/Hr) IV Continuous <Continuous>  dextrose 50% Injectable 25 Gram(s) IV Push once  dextrose 50% Injectable 12.5 Gram(s) IV Push once  dextrose 50% Injectable 25 Gram(s) IV Push once  glucagon  Injectable 1 milliGRAM(s) IntraMuscular once  hydrALAZINE 50 milliGRAM(s) Oral two times a day  hydrALAZINE 50 milliGRAM(s) Oral once  insulin glargine Injectable (LANTUS) 5 Unit(s) SubCutaneous at bedtime  insulin lispro (ADMELOG) corrective regimen sliding scale   SubCutaneous three times a day before meals  levothyroxine 25 MICROGram(s) Oral daily  NIFEdipine XL 60 milliGRAM(s) Oral daily  pantoprazole    Tablet 40 milliGRAM(s) Oral before breakfast    MEDICATIONS  (PRN):  dextrose Oral Gel 15 Gram(s) Oral once PRN Blood Glucose LESS THAN 70 milliGRAM(s)/deciliter  labetalol Injectable 10 milliGRAM(s) IV Push every 6 hours PRN Systolic blood pressure > 180    ALLERGIES:  amlodipine (Swelling)  aspirin (Rash)    T(C): 37.1 (10-16-22 @ 07:41), Max: 37.1 (10-16-22 @ 07:41)  HR: 63 (10-16-22 @ 07:41) (61 - 77)  BP: 162/70 (10-16-22 @ 07:41) (162/70 - 177/73)  RR: 18 (10-16-22 @ 07:41) (17 - 18)  SpO2: 99% (10-16-22 @ 07:41) (98% - 99%)      PHYSICAL EXAM:      Constitutional:  Eyes:  ENMT:  Neck:  Breasts:  Back:  Respiratory:  Cardiovascular:  Gastrointestinal:  Genitourinary:  Rectal:  Extremities:  Vascular:  Neurological:  Skin:  Lymph Nodes:  Musculoskeletal:  Psychiatric:      LABS:             9.1    5.79  )-----------( 165      ( 10-15 @ 19:00 )             30.9       PT/INR - ( 16 Oct 2022 03:26 )   PT: 15.5 sec;   INR: 1.33 ratio         PTT - ( 16 Oct 2022 03:26 )  PTT:27.8 sec  10-16    135  |  103  |  11.8  ----------------------------<  197<H>  4.2   |  23.0  |  0.80    Ca    8.9      16 Oct 2022 07:34    TPro  7.0  /  Alb  3.4  /  TBili  0.3<L>  /  DBili  0.1  /  AST  23  /  ALT  15  /  AlkPhos  107  10-16    LIVER FUNCTIONS - ( 16 Oct 2022 07:34 )  Alb: 3.4 g/dL / Pro: 7.0 g/dL / ALK PHOS: 107 U/L / ALT: 15 U/L / AST: 23 U/L / GGT: x             Lipase, Serum: 76 U/L (10-15-22 @ 19:00)      MELD-Na  Dialysis at least twice in past week: Y/N?  Creatinine:  0.80  Total Bili:  0.3  INR: --  Sodium: 135  MELD-Na  Dialysis at least twice in past week: Y/N?  Creatinine:  --  Total Bili:  --  INR: 1.33  Sodium: --  MELD-Na  Dialysis at least twice in past week: Y/N?  Creatinine:  0.98  Total Bili:  0.3  INR: --  Sodium: 131    Urinalysis Basic - ( 15 Oct 2022 19:00 )    Color: Yellow / Appearance: Clear / S.005 / pH: x  Gluc: x / Ketone: Negative  / Bili: Negative / Urobili: Negative mg/dL   Blood: x / Protein: 15 / Nitrite: Negative   Leuk Esterase: Trace / RBC: Negative /HPF / WBC 0-2 /HPF   Sq Epi: x / Non Sq Epi: Occasional / Bacteria: x        IMAGING: I personally reviewed the XXXX, and I agree with the radiologist's interpretation as described below:   HISTORY OF PRESENT ILLNESS: 83 y/o F with PMH of HTN, HLD, DM, CAD s/p stent placement on Aspirin and Plavix presented to ED with epigastric pressure like discomfort x 1 day. Reports some LUQ "? pain or discomfort". Denies abdominal pain, nausea, vomiting, chest pain, palpitation, shortness of breath. Never had endoscopy in the past. Last colonoscopy was , reported normal and was requested to repeat in 10 years. Reports occasional constipation with diarrhea 3-4 loose BM which resolves without any medical management. Denies family history of cancer prior liver disease. Denies use of alcohol. CT abdomen revealed Hypervascular lesions in liver. Atherosclerotic changes of the arterial structures, with 50-70% stenosis. Her liver enzymes are normal, normal total bili. No thrombocytopenia. INR 1.3. Normal albumin.       line used for assessment. ID [id number] and [id number].      Review of Systems:  . Constitutional: No fever, chills, no weight gain, weight loss   · ENMT: no vertigo, no throat pain  . Neck: No pain or stiffness  · Respiratory and Thorax: No shortness of breath, no cough, no wheezing  · Cardiovascular: No chest pain, palpitation, no dizziness, no orthopnea,   · Gastrointestinal: see above.  · Genitourinary: No hematuria, no dysuria  · Musculoskeletal: Negative  · Neurological: no headache, no vision changes, no slurred speech  · Psychiatric: no agitation, no anxiety  · Hematology/Lymphatics: negative  · Endocrine: negative      PAST MEDICAL/SURGICAL HISTORY:  Hypertension    Hypothyroidism    GERD (gastroesophageal reflux disease)    Hypercholesterolemia    Knee pain, left  arthritis    Diabetes    Heart murmur    S/P tubal ligation    History of vaginal surgery    History of total left knee replacement      SOCIAL HISTORY:  - TOBACCO: Denies  - ALCOHOL: Denies  - ILLICIT DRUG USE: Denies    FAMILY HISTORY:  No known history of gastrointestinal or liver disease;  Family history of diabetes mellitus (DM)    FH: HTN (hypertension)    FH: heart disease        HOME MEDICATIONS:  aspirin 81 mg oral tablet: 1 tab(s) orally once a day (16 Oct 2022 08:59)  carvedilol 12.5 mg oral tablet: 1 tab(s) orally 2 times a day (16 Oct 2022 08:59)  Farxiga 10 mg oral tablet: 1 tab(s) orally once a day (16 Oct 2022 08:59)  hydrALAZINE 50 mg oral tablet: 1 tab(s) orally 2 times a day (16 Oct 2022 08:59)  levothyroxine 25 mcg (0.025 mg) oral capsule: 1 cap(s) orally once a day (16 Oct 2022 08:59)  metFORMIN 1000 mg oral tablet: 1 tab(s) orally 2 times a day (16 Oct 2022 08:59)  omeprazole 20 mg oral delayed release capsule: 1 cap(s) orally once a day (16 Oct 2022 08:59)  spironolactone 50 mg oral tablet: 1 tab(s) orally once a day (16 Oct 2022 08:59)    INPATIENT MEDICATIONS:  MEDICATIONS  (STANDING):  atorvastatin 40 milliGRAM(s) Oral at bedtime  carvedilol 12.5 milliGRAM(s) Oral every 12 hours  dextrose 5%. 1000 milliLiter(s) (50 mL/Hr) IV Continuous <Continuous>  dextrose 5%. 1000 milliLiter(s) (100 mL/Hr) IV Continuous <Continuous>  dextrose 50% Injectable 25 Gram(s) IV Push once  dextrose 50% Injectable 12.5 Gram(s) IV Push once  dextrose 50% Injectable 25 Gram(s) IV Push once  glucagon  Injectable 1 milliGRAM(s) IntraMuscular once  hydrALAZINE 50 milliGRAM(s) Oral two times a day  hydrALAZINE 50 milliGRAM(s) Oral once  insulin glargine Injectable (LANTUS) 5 Unit(s) SubCutaneous at bedtime  insulin lispro (ADMELOG) corrective regimen sliding scale   SubCutaneous three times a day before meals  levothyroxine 25 MICROGram(s) Oral daily  NIFEdipine XL 60 milliGRAM(s) Oral daily  pantoprazole    Tablet 40 milliGRAM(s) Oral before breakfast    MEDICATIONS  (PRN):  dextrose Oral Gel 15 Gram(s) Oral once PRN Blood Glucose LESS THAN 70 milliGRAM(s)/deciliter  labetalol Injectable 10 milliGRAM(s) IV Push every 6 hours PRN Systolic blood pressure > 180    ALLERGIES:  amlodipine (Swelling)  aspirin (Rash)    T(C): 37.1 (10-16-22 @ 07:41), Max: 37.1 (10-16-22 @ 07:41)  HR: 63 (10-16-22 @ 07:41) (61 - 77)  BP: 162/70 (10-16-22 @ 07:41) (162/70 - 177/73)  RR: 18 (10-16-22 @ 07:41) (17 - 18)  SpO2: 99% (10-16-22 @ 07:41) (98% - 99%)      PHYSICAL EXAM:    Constitutional: No acute distress  Neuro: Awake alert, oriented to person, place and situation, non-focal, speech clear and intact  HEENT: PERRL, anicteric sclerae, oral mucosa pink and moist  Neck: supple, no JVD  CV: regular rate, regular rhythm, +S1S2,   Pulm/chest: lung sounds clear bilaterally, no accessory muscle use noted  Abd: soft, obese, NT, ND, +BS. No rigidity, rebound tenderness   Ext:  no Cyanosis, clubbing or edema  Skin: warm, well perfused, no jaundice   Psych: calm, appropriate affect      LABS:             9.1    5.79  )-----------( 165      ( 10-15 @ 19:00 )             30.9       PT/INR - ( 16 Oct 2022 03:26 )   PT: 15.5 sec;   INR: 1.33 ratio         PTT - ( 16 Oct 2022 03:26 )  PTT:27.8 sec  10-16    135  |  103  |  11.8  ----------------------------<  197<H>  4.2   |  23.0  |  0.80    Ca    8.9      16 Oct 2022 07:34    TPro  7.0  /  Alb  3.4  /  TBili  0.3<L>  /  DBili  0.1  /  AST  23  /  ALT  15  /  AlkPhos  107  10-16    LIVER FUNCTIONS - ( 16 Oct 2022 07:34 )  Alb: 3.4 g/dL / Pro: 7.0 g/dL / ALK PHOS: 107 U/L / ALT: 15 U/L / AST: 23 U/L / GGT: x             Lipase, Serum: 76 U/L (10-15-22 @ 19:00)      Urinalysis Basic - ( 15 Oct 2022 19:00 )    Color: Yellow / Appearance: Clear / S.005 / pH: x  Gluc: x / Ketone: Negative  / Bili: Negative / Urobili: Negative mg/dL   Blood: x / Protein: 15 / Nitrite: Negative   Leuk Esterase: Trace / RBC: Negative /HPF / WBC 0-2 /HPF   Sq Epi: x / Non Sq Epi: Occasional / Bacteria: x      < from: CT Angio Abdomen and Pelvis w/ IV Cont (10.16.22 @ 01:14) >  FINDINGS:  LOWER CHEST: Heart is borderline in size. Coronary calcifications are   noted..    LIVER: Nodularity of the hepatic contour with preferential enlargement of   the left hepatic lobe is consistent with cirrhosis. There is a 2.9 cm   hypervascular lesion of segment 2 of the left hepatic lobe, with lungs   and subsequent portal venous phase imaging. This is likely a flash   filling hemangioma. An additional subcentimeter hypervascular focus more   anteriorly within the left hepatic lobe is noted and is indeterminate   nature. A third hypervascular lesion is seen within the right hepatic   lobe measuring 3 cm. This demonstrates decreased attenuation on venous   phase imaging suggestive of washout. This is a finding associated with   hepatocellular carcinoma. There is diffuse fatty replacement of liver.  BILE DUCTS: Normal caliber.  GALLBLADDER: Within normal limits.  SPLEEN: Within normallimits.  PANCREAS: Within normal limits.  ADRENALS: Within normal limits.  KIDNEYS/URETERS: Within normal limits.    BLADDER: Within normal limits.  REPRODUCTIVE ORGANS:    BOWEL: There is no bowel obstruction.. Appendix is normal. There is   colonicdiverticulosis. There is no focal diverticulitis. A large amount   of stool seen throughout the colon. There is a small hiatal hernia.  PERITONEUM: No ascites.  VESSELS: Atherosclerotic ossifications of the aorta and iliac arteries   are appreciated.Atherosclerotic calcifications are seen at the origins   of the celiac axis and SMA. 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 The BC is patent. There is no venous occlusive disease.  RETROPERITONEUM/LYMPH NODES: No lymphadenopathy.  ABDOMINAL WALL: Within normal limits.  BONES: Degenerative changes.      IMPRESSION:  Hypervascular lesions seen in liver, at least one of which is typical for   a flash filling hemangioma. A lesion within the right hepatic lobe   demonstrates washout, suspicious for hepatocellular carcinoma. Additional   subcentimeter hypervascular lesion of left hepatic lobe is indeterminate.   Confirmation with multiphasic hepatic protocol MRI or CT is advised for   more definitive evaluation.    Cirrhosis.    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 No mesenteric venous occlusive   disease.    Constipation.    < end of copied text >

## 2022-10-16 NOTE — H&P ADULT - NSHPLABSRESULTS_GEN_ALL_CORE
9.1    5.79   )----------(  165       ( 15 Oct 2022 19:00 )               30.9      135    |  103    |  11.8   ----------------------------<  197        ( 16 Oct 2022 07:34 )  4.2     |  23.0   |  0.80     Ca    8.9        ( 16 Oct 2022 07:34 )    TPro  7.0    /  Alb  3.4    /  TBili  0.3    /  DBili  0.1    /  AST  23     /  ALT  15     /  AlkPhos  107    ( 16 Oct 2022 07:34 )    LIVER FUNCTIONS - ( 16 Oct 2022 07:34 )  Alb: 3.4 g/dL / Pro: 7.0 g/dL / ALK PHOS: 107 U/L / ALT: 15 U/L / AST: 23 U/L / GGT: x           PT/INR -  15.5 sec / 1.33 ratio   ( 16 Oct 2022 03:26 )       PTT -  27.8 sec   ( 16 Oct 2022 03:26 )  CAPILLARY BLOOD GLUCOSE    CARDIAC MARKERS ( 15 Oct 2022 19:00 )  x     / <0.01 ng/mL / x     / x     / x          Urinalysis Basic - ( 15 Oct 2022 19:00 )    Color: Yellow / Appearance: Clear / S.005 / pH: x  Gluc: x / Ketone: Negative  / Bili: Negative / Urobili: Negative mg/dL   Blood: x / Protein: 15 / Nitrite: Negative   Leuk Esterase: Trace / RBC: Negative /HPF / WBC 0-2 /HPF   Sq Epi: x / Non Sq Epi: Occasional / Bacteria: x

## 2022-10-16 NOTE — PATIENT PROFILE ADULT - NSTOBACCONEVERSMOKERY/N_GEN_A
Regular Diet - No restrictions/Consistent Carbohydrate Diabetic Diets Low Sodium Diet/Consistent Carbohydrate Diabetic Diets No

## 2022-10-16 NOTE — CONSULT NOTE ADULT - NS ATTEND AMEND GEN_ALL_CORE FT
Epigastric discomfort with new findings of liver lesions on CT abd--2.9 cm hemangioma and 3 cm right hep lesion concerning for HCC. No prior liver disease. serologic studies ordered. MRI abd ordered.

## 2022-10-17 LAB
A1C WITH ESTIMATED AVERAGE GLUCOSE RESULT: 8.9 % — HIGH (ref 4–5.6)
ALBUMIN SERPL ELPH-MCNC: 3.6 G/DL — SIGNIFICANT CHANGE UP (ref 3.3–5.2)
ALP SERPL-CCNC: 110 U/L — SIGNIFICANT CHANGE UP (ref 40–120)
ALT FLD-CCNC: 16 U/L — SIGNIFICANT CHANGE UP
ANION GAP SERPL CALC-SCNC: 12 MMOL/L — SIGNIFICANT CHANGE UP (ref 5–17)
APTT BLD: 26.9 SEC — LOW (ref 27.5–35.5)
AST SERPL-CCNC: 24 U/L — SIGNIFICANT CHANGE UP
BASOPHILS # BLD AUTO: 0.03 K/UL — SIGNIFICANT CHANGE UP (ref 0–0.2)
BASOPHILS NFR BLD AUTO: 0.4 % — SIGNIFICANT CHANGE UP (ref 0–2)
BILIRUB DIRECT SERPL-MCNC: 0.1 MG/DL — SIGNIFICANT CHANGE UP (ref 0–0.3)
BILIRUB INDIRECT FLD-MCNC: 0.3 MG/DL — SIGNIFICANT CHANGE UP (ref 0.2–1)
BILIRUB SERPL-MCNC: 0.4 MG/DL — SIGNIFICANT CHANGE UP (ref 0.4–2)
BUN SERPL-MCNC: 15.4 MG/DL — SIGNIFICANT CHANGE UP (ref 8–20)
CALCIUM SERPL-MCNC: 9.4 MG/DL — SIGNIFICANT CHANGE UP (ref 8.4–10.5)
CHLORIDE SERPL-SCNC: 101 MMOL/L — SIGNIFICANT CHANGE UP (ref 98–107)
CHOLEST SERPL-MCNC: 148 MG/DL — SIGNIFICANT CHANGE UP
CO2 SERPL-SCNC: 23 MMOL/L — SIGNIFICANT CHANGE UP (ref 22–29)
CREAT SERPL-MCNC: 1 MG/DL — SIGNIFICANT CHANGE UP (ref 0.5–1.3)
EGFR: 56 ML/MIN/1.73M2 — LOW
EOSINOPHIL # BLD AUTO: 0.08 K/UL — SIGNIFICANT CHANGE UP (ref 0–0.5)
EOSINOPHIL NFR BLD AUTO: 1.1 % — SIGNIFICANT CHANGE UP (ref 0–6)
ESTIMATED AVERAGE GLUCOSE: 209 MG/DL — HIGH (ref 68–114)
FERRITIN SERPL-MCNC: 18 NG/ML — SIGNIFICANT CHANGE UP (ref 15–150)
GLUCOSE BLDC GLUCOMTR-MCNC: 190 MG/DL — HIGH (ref 70–99)
GLUCOSE BLDC GLUCOMTR-MCNC: 192 MG/DL — HIGH (ref 70–99)
GLUCOSE BLDC GLUCOMTR-MCNC: 250 MG/DL — HIGH (ref 70–99)
GLUCOSE BLDC GLUCOMTR-MCNC: 270 MG/DL — HIGH (ref 70–99)
GLUCOSE SERPL-MCNC: 150 MG/DL — HIGH (ref 70–99)
HCT VFR BLD CALC: 29.2 % — LOW (ref 34.5–45)
HDLC SERPL-MCNC: 62 MG/DL — SIGNIFICANT CHANGE UP
HGB BLD-MCNC: 8.9 G/DL — LOW (ref 11.5–15.5)
IMM GRANULOCYTES NFR BLD AUTO: 0.4 % — SIGNIFICANT CHANGE UP (ref 0–0.9)
INR BLD: 1.3 RATIO — HIGH (ref 0.88–1.16)
IRON SATN MFR SERPL: 30 UG/DL — LOW (ref 37–145)
IRON SATN MFR SERPL: 7 % — LOW (ref 14–50)
LIPID PNL WITH DIRECT LDL SERPL: 73 MG/DL — SIGNIFICANT CHANGE UP
LYMPHOCYTES # BLD AUTO: 2.46 K/UL — SIGNIFICANT CHANGE UP (ref 1–3.3)
LYMPHOCYTES # BLD AUTO: 32.9 % — SIGNIFICANT CHANGE UP (ref 13–44)
MAGNESIUM SERPL-MCNC: 1.6 MG/DL — SIGNIFICANT CHANGE UP (ref 1.6–2.6)
MCHC RBC-ENTMCNC: 24.9 PG — LOW (ref 27–34)
MCHC RBC-ENTMCNC: 30.5 GM/DL — LOW (ref 32–36)
MCV RBC AUTO: 81.8 FL — SIGNIFICANT CHANGE UP (ref 80–100)
MONOCYTES # BLD AUTO: 0.7 K/UL — SIGNIFICANT CHANGE UP (ref 0–0.9)
MONOCYTES NFR BLD AUTO: 9.4 % — SIGNIFICANT CHANGE UP (ref 2–14)
NEUTROPHILS # BLD AUTO: 4.17 K/UL — SIGNIFICANT CHANGE UP (ref 1.8–7.4)
NEUTROPHILS NFR BLD AUTO: 55.8 % — SIGNIFICANT CHANGE UP (ref 43–77)
NON HDL CHOLESTEROL: 86 MG/DL — SIGNIFICANT CHANGE UP
PHOSPHATE SERPL-MCNC: 3 MG/DL — SIGNIFICANT CHANGE UP (ref 2.4–4.7)
PLATELET # BLD AUTO: 207 K/UL — SIGNIFICANT CHANGE UP (ref 150–400)
POTASSIUM SERPL-MCNC: 4.4 MMOL/L — SIGNIFICANT CHANGE UP (ref 3.5–5.3)
POTASSIUM SERPL-SCNC: 4.4 MMOL/L — SIGNIFICANT CHANGE UP (ref 3.5–5.3)
PROT SERPL-MCNC: 7.4 G/DL — SIGNIFICANT CHANGE UP (ref 6.6–8.7)
PROTHROM AB SERPL-ACNC: 15.1 SEC — HIGH (ref 10.5–13.4)
RBC # BLD: 3.57 M/UL — LOW (ref 3.8–5.2)
RBC # FLD: 16.8 % — HIGH (ref 10.3–14.5)
SODIUM SERPL-SCNC: 136 MMOL/L — SIGNIFICANT CHANGE UP (ref 135–145)
TIBC SERPL-MCNC: 402 UG/DL — SIGNIFICANT CHANGE UP (ref 220–430)
TRANSFERRIN SERPL-MCNC: 281 MG/DL — SIGNIFICANT CHANGE UP (ref 192–382)
TRIGL SERPL-MCNC: 64 MG/DL — SIGNIFICANT CHANGE UP
TSH SERPL-MCNC: 1.11 UIU/ML — SIGNIFICANT CHANGE UP (ref 0.27–4.2)
VIT B12 SERPL-MCNC: 584 PG/ML — SIGNIFICANT CHANGE UP (ref 232–1245)
WBC # BLD: 7.47 K/UL — SIGNIFICANT CHANGE UP (ref 3.8–10.5)
WBC # FLD AUTO: 7.47 K/UL — SIGNIFICANT CHANGE UP (ref 3.8–10.5)

## 2022-10-17 PROCEDURE — 93306 TTE W/DOPPLER COMPLETE: CPT | Mod: 26

## 2022-10-17 PROCEDURE — 99233 SBSQ HOSP IP/OBS HIGH 50: CPT

## 2022-10-17 RX ADMIN — CARVEDILOL PHOSPHATE 12.5 MILLIGRAM(S): 80 CAPSULE, EXTENDED RELEASE ORAL at 05:26

## 2022-10-17 RX ADMIN — INSULIN GLARGINE 5 UNIT(S): 100 INJECTION, SOLUTION SUBCUTANEOUS at 22:22

## 2022-10-17 RX ADMIN — Medication 2: at 08:35

## 2022-10-17 RX ADMIN — Medication 60 MILLIGRAM(S): at 05:26

## 2022-10-17 RX ADMIN — ATORVASTATIN CALCIUM 40 MILLIGRAM(S): 80 TABLET, FILM COATED ORAL at 22:20

## 2022-10-17 RX ADMIN — PANTOPRAZOLE SODIUM 40 MILLIGRAM(S): 20 TABLET, DELAYED RELEASE ORAL at 05:27

## 2022-10-17 RX ADMIN — Medication 50 MILLIGRAM(S): at 05:26

## 2022-10-17 RX ADMIN — SENNA PLUS 2 TABLET(S): 8.6 TABLET ORAL at 22:20

## 2022-10-17 RX ADMIN — Medication 6: at 17:20

## 2022-10-17 RX ADMIN — Medication 50 MILLIGRAM(S): at 17:20

## 2022-10-17 RX ADMIN — Medication 2: at 11:23

## 2022-10-17 RX ADMIN — Medication 25 MICROGRAM(S): at 05:26

## 2022-10-17 RX ADMIN — CARVEDILOL PHOSPHATE 12.5 MILLIGRAM(S): 80 CAPSULE, EXTENDED RELEASE ORAL at 17:20

## 2022-10-17 NOTE — PROGRESS NOTE ADULT - SUBJECTIVE AND OBJECTIVE BOX
WILMER CONROY    [id number]    82y      Female    CC: hepatic mass    INTERVAL HPI/OVERNIGHT EVENTS: pt seen and examined. pt's daughter at bedside. denies cp, sob, abd pain, n/v/c/d. no acute events reported o/n    REVIEW OF SYSTEMS:    CONSTITUTIONAL: No fever, weight loss  RESPIRATORY: No cough, wheezing, hemoptysis; No shortness of breath  CARDIOVASCULAR: No chest pain, palpitations  GASTROINTESTINAL: No abdominal or epigastric pain. No nausea, vomiting  NEUROLOGICAL: No headaches    Vital Signs Last 24 Hrs  T(C): 36.7 (17 Oct 2022 11:27), Max: 36.7 (17 Oct 2022 11:27)  T(F): 98 (17 Oct 2022 11:27), Max: 98 (17 Oct 2022 11:27)  HR: 68 (17 Oct 2022 11:27) (67 - 84)  BP: 133/62 (17 Oct 2022 11:27) (120/61 - 156/64)  BP(mean): 83 (16 Oct 2022 19:56) (83 - 83)  RR: 18 (17 Oct 2022 11:27) (18 - 18)  SpO2: 95% (17 Oct 2022 11:27) (92% - 97%)    Parameters below as of 17 Oct 2022 11:27  Patient On (Oxygen Delivery Method): room air        PHYSICAL EXAM:    GENERAL: NAD  HEENT: +EOMI  NECK: soft, supple  CHEST/LUNG: Clear to auscultation bilaterally  HEART: S1S2+, Regular rate and rhythm  ABDOMEN: Soft, Nontender, Nondistended; Bowel sounds present  SKIN: warm, dry  NEURO: AAOX3, grossly non-focal   PSYCH: normal affect     LABS:                        8.9    7.47  )-----------( 207      ( 17 Oct 2022 05:31 )             29.2     10-    136  |  101  |  15.4  ----------------------------<  150<H>  4.4   |  23.0  |  1.00    Ca    9.4      17 Oct 2022 05:31  Phos  3.0     10-17  Mg     1.6     10-    TPro  7.4  /  Alb  3.6  /  TBili  0.4  /  DBili  0.1  /  AST  24  /  ALT  16  /  AlkPhos  110  10-17    PT/INR - ( 17 Oct 2022 05:31 )   PT: 15.1 sec;   INR: 1.30 ratio         PTT - ( 17 Oct 2022 05:31 )  PTT:26.9 sec  Urinalysis Basic - ( 15 Oct 2022 19:00 )    Color: Yellow / Appearance: Clear / S.005 / pH: x  Gluc: x / Ketone: Negative  / Bili: Negative / Urobili: Negative mg/dL   Blood: x / Protein: 15 / Nitrite: Negative   Leuk Esterase: Trace / RBC: Negative /HPF / WBC 0-2 /HPF   Sq Epi: x / Non Sq Epi: Occasional / Bacteria: x          MEDICATIONS  (STANDING):  atorvastatin 40 milliGRAM(s) Oral at bedtime  budesonide 160 MICROgram(s)/formoterol 4.5 MICROgram(s) Inhaler 2 Puff(s) Inhalation two times a day  carvedilol 12.5 milliGRAM(s) Oral every 12 hours  dextrose 5%. 1000 milliLiter(s) (50 mL/Hr) IV Continuous <Continuous>  dextrose 5%. 1000 milliLiter(s) (100 mL/Hr) IV Continuous <Continuous>  dextrose 50% Injectable 25 Gram(s) IV Push once  dextrose 50% Injectable 12.5 Gram(s) IV Push once  dextrose 50% Injectable 25 Gram(s) IV Push once  glucagon  Injectable 1 milliGRAM(s) IntraMuscular once  hydrALAZINE 50 milliGRAM(s) Oral two times a day  insulin glargine Injectable (LANTUS) 5 Unit(s) SubCutaneous at bedtime  insulin lispro (ADMELOG) corrective regimen sliding scale   SubCutaneous three times a day before meals  levothyroxine 25 MICROGram(s) Oral daily  NIFEdipine XL 60 milliGRAM(s) Oral daily  pantoprazole    Tablet 40 milliGRAM(s) Oral before breakfast  senna 2 Tablet(s) Oral at bedtime    MEDICATIONS  (PRN):  dextrose Oral Gel 15 Gram(s) Oral once PRN Blood Glucose LESS THAN 70 milliGRAM(s)/deciliter  labetalol Injectable 10 milliGRAM(s) IV Push every 6 hours PRN Systolic blood pressure > 180      RADIOLOGY & ADDITIONAL TESTS:  < from: CT Angio Abdomen and Pelvis w/ IV Cont (10.16.22 @ 01:14) >  IMPRESSION:  Hypervascular lesions seen in liver, at least one of which is typical for   a flash filling hemangioma. A lesion within the right hepatic lobe   demonstrates washout, suspicious for hepatocellular carcinoma. Additional   subcentimeter hypervascular lesion of left hepatic lobe is indeterminate.   Confirmation with multiphasic hepatic protocol MRI or CT is advised for   more definitive evaluation.    Cirrhosis.    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 No mesenteric venous occlusive   disease.    Constipation.    < end of copied text >

## 2022-10-17 NOTE — PROGRESS NOTE ADULT - SUBJECTIVE AND OBJECTIVE BOX
Patient is a 82y old  Female who presents with a chief complaint of Hepatic Mass (17 Oct 2022 08:42)      INTERVAL HPI/OVERNIGHT EVENTS: Patient seen and evaluated at bedside, reporting no complaints, no overnight events, tolerating oral intake, NPO since am for TTE. Denies nausea, vomiting, abdominal pain, chest pain, shortness of breath, hematemesis, hematochezia, melena.      MEDICATIONS  (STANDING):  atorvastatin 40 milliGRAM(s) Oral at bedtime  budesonide 160 MICROgram(s)/formoterol 4.5 MICROgram(s) Inhaler 2 Puff(s) Inhalation two times a day  carvedilol 12.5 milliGRAM(s) Oral every 12 hours  dextrose 5%. 1000 milliLiter(s) (50 mL/Hr) IV Continuous <Continuous>  dextrose 5%. 1000 milliLiter(s) (100 mL/Hr) IV Continuous <Continuous>  dextrose 50% Injectable 25 Gram(s) IV Push once  dextrose 50% Injectable 12.5 Gram(s) IV Push once  dextrose 50% Injectable 25 Gram(s) IV Push once  glucagon  Injectable 1 milliGRAM(s) IntraMuscular once  hydrALAZINE 50 milliGRAM(s) Oral two times a day  insulin glargine Injectable (LANTUS) 5 Unit(s) SubCutaneous at bedtime  insulin lispro (ADMELOG) corrective regimen sliding scale   SubCutaneous three times a day before meals  levothyroxine 25 MICROGram(s) Oral daily  NIFEdipine XL 60 milliGRAM(s) Oral daily  pantoprazole    Tablet 40 milliGRAM(s) Oral before breakfast  senna 2 Tablet(s) Oral at bedtime    MEDICATIONS  (PRN):  dextrose Oral Gel 15 Gram(s) Oral once PRN Blood Glucose LESS THAN 70 milliGRAM(s)/deciliter  labetalol Injectable 10 milliGRAM(s) IV Push every 6 hours PRN Systolic blood pressure > 180      Allergies    amlodipine (Swelling)  aspirin (Rash)    Intolerances    Review of Systems:  · ENMT: negative  · Respiratory and Thorax: negative  · Cardiovascular: negative  · Gastrointestinal: see above.  · Genitourinary:	negative  · Musculoskeletal: negative  · Neurological: negative  · Psychiatric: negative  · Hematology/Lymphatics: negative  · Endocrine: negative        Vital Signs Last 24 Hrs  T(C): 36.6 (17 Oct 2022 04:35), Max: 36.6 (17 Oct 2022 04:35)  T(F): 97.9 (17 Oct 2022 04:35), Max: 97.9 (17 Oct 2022 04:35)  HR: 84 (17 Oct 2022 04:35) (62 - 84)  BP: 120/61 (17 Oct 2022 04:35) (120/61 - 157/62)  BP(mean): 83 (16 Oct 2022 19:56) (83 - 83)  RR: 18 (17 Oct 2022 04:35) (18 - 18)  SpO2: 94% (17 Oct 2022 04:35) (92% - 98%)    Parameters below as of 17 Oct 2022 04:35  Patient On (Oxygen Delivery Method): room air        PHYSICAL EXAM:  · Constitutional:  obese woman, in no acute distress.   · Eyes: EOMI; PERRL; no drainage or redness  · ENMT: No oral lesions; no gross abnormalities  · Neck:	No bruits; no thyromegaly or nodules  · Back:	No deformity or limitation of movement  · Respiratory: Breath Sounds equal & clear to percussion & auscultation, no accessory muscle use  · Cardiovascular: Regular rate & rhythm, normal S1, S2; no murmurs, gallops or rubs; no S3, S4  · Gastrointestinal: Soft, non-tender, no hepatosplenomegaly, normal bowel sounds      LABS:                        8.9    7.47  )-----------( 207      ( 17 Oct 2022 05:31 )             29.2     10-17    136  |  101  |  15.4  ----------------------------<  150<H>  4.4   |  23.0  |  1.00    Ca    9.4      17 Oct 2022 05:31  Phos  3.0     10-17  Mg     1.6     10-17    TPro  7.4  /  Alb  3.6  /  TBili  0.4  /  DBili  0.1  /  AST  24  /  ALT  16  /  AlkPhos  110  10-17    PT/INR - ( 17 Oct 2022 05:31 )   PT: 15.1 sec;   INR: 1.30 ratio         PTT - ( 17 Oct 2022 05:31 )  PTT:26.9 sec  Urinalysis Basic - ( 15 Oct 2022 19:00 )    Color: Yellow / Appearance: Clear / S.005 / pH: x  Gluc: x / Ketone: Negative  / Bili: Negative / Urobili: Negative mg/dL   Blood: x / Protein: 15 / Nitrite: Negative   Leuk Esterase: Trace / RBC: Negative /HPF / WBC 0-2 /HPF   Sq Epi: x / Non Sq Epi: Occasional / Bacteria: x      LIVER FUNCTIONS - ( 17 Oct 2022 05:31 )  Alb: 3.6 g/dL / Pro: 7.4 g/dL / ALK PHOS: 110 U/L / ALT: 16 U/L / AST: 24 U/L / GGT: x             RADIOLOGY & ADDITIONAL TESTS:  < from: CT Angio Abdomen and Pelvis w/ IV Cont (10.16.22 @ 01:14) >  ACC: [id number] EXAM:  CT ANGIO ABD PELV (W)AW IC                          PROCEDURE DATE:  10/16/2022          INTERPRETATION:  CLINICAL INFORMATION: Abdominal pain. Evaluate for   mesenteric ischemia.    COMPARISON: None.    CONTRAST/COMPLICATIONS:  IV Contrast: Omnipaque 350  95 cc administered   5 cc discarded  Oral Contrast: NONE  Complications: None reported at time of study completion    PROCEDURE:  CT Angiography of the Abdomen and Pelvis was performed.  Arterial and venous phases were acquired.  Sagittal and coronal reformats were performed as well as 3D (MIP)   reconstructions.    FINDINGS:  LOWER CHEST: Heart is borderline in size. Coronary calcifications are   noted..    LIVER: Nodularity of the hepatic contour with preferential enlargement of   the left hepatic lobe is consistent with cirrhosis. There is a 2.9 cm   hypervascular lesion of segment 2 of the left hepatic lobe, with lungs   and subsequent portal venous phase imaging. This is likely a flash   filling hemangioma. An additional subcentimeter hypervascular focus more   anteriorly within the left hepatic lobe is noted and is indeterminate   nature. A third hypervascular lesion is seen within the right hepatic   lobe measuring 3 cm. This demonstrates decreased attenuation on venous   phase imaging suggestive of washout. This is a finding associated with   hepatocellular carcinoma. There is diffuse fatty replacement of liver.  BILE DUCTS: Normal caliber.  GALLBLADDER: Within normal limits.  SPLEEN: Within normallimits.  PANCREAS: Within normal limits.  ADRENALS: Within normal limits.  KIDNEYS/URETERS: Within normal limits.    BLADDER: Within normal limits.  REPRODUCTIVE ORGANS:    BOWEL: There is no bowel obstruction.. Appendix is normal. There is   colonicdiverticulosis. There is no focal diverticulitis. A large amount   of stool seen throughout the colon. There is a small hiatal hernia.  PERITONEUM: No ascites.  VESSELS: Atherosclerotic ossifications of the aorta and iliac arteries   are appreciated.Atherosclerotic calcifications are seen at the origins   of the celiac axis and SMA. 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 The BC is patent. There is no venous occlusive disease.  RETROPERITONEUM/LYMPH NODES: No lymphadenopathy.  ABDOMINAL WALL: Within normal limits.  BONES: Degenerative changes.      IMPRESSION:  Hypervascular lesions seen in liver, at least one of which is typical for   a flash filling hemangioma. A lesion within the right hepatic lobe   demonstrates washout, suspicious for hepatocellular carcinoma. Additional   subcentimeter hypervascular lesion of left hepatic lobe is indeterminate.   Confirmation with multiphasic hepatic protocol MRI or CT is advised for   more definitive evaluation.    Cirrhosis.    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 No mesenteric venous occlusive   disease.    Constipation.    < end of copied text >   Patient is a 82y old  Female who presents with a chief complaint of Hepatic Mass (17 Oct 2022 08:42)      INTERVAL HPI/OVERNIGHT EVENTS: Patient seen and evaluated at bedside, reporting no complaints, no overnight events, tolerating oral intake, NPO since am for TTE. Denies nausea, vomiting, abdominal pain, chest pain, shortness of breath, hematemesis, hematochezia, melena. .      MEDICATIONS  (STANDING):  atorvastatin 40 milliGRAM(s) Oral at bedtime  budesonide 160 MICROgram(s)/formoterol 4.5 MICROgram(s) Inhaler 2 Puff(s) Inhalation two times a day  carvedilol 12.5 milliGRAM(s) Oral every 12 hours  dextrose 5%. 1000 milliLiter(s) (50 mL/Hr) IV Continuous <Continuous>  dextrose 5%. 1000 milliLiter(s) (100 mL/Hr) IV Continuous <Continuous>  dextrose 50% Injectable 25 Gram(s) IV Push once  dextrose 50% Injectable 12.5 Gram(s) IV Push once  dextrose 50% Injectable 25 Gram(s) IV Push once  glucagon  Injectable 1 milliGRAM(s) IntraMuscular once  hydrALAZINE 50 milliGRAM(s) Oral two times a day  insulin glargine Injectable (LANTUS) 5 Unit(s) SubCutaneous at bedtime  insulin lispro (ADMELOG) corrective regimen sliding scale   SubCutaneous three times a day before meals  levothyroxine 25 MICROGram(s) Oral daily  NIFEdipine XL 60 milliGRAM(s) Oral daily  pantoprazole    Tablet 40 milliGRAM(s) Oral before breakfast  senna 2 Tablet(s) Oral at bedtime    MEDICATIONS  (PRN):  dextrose Oral Gel 15 Gram(s) Oral once PRN Blood Glucose LESS THAN 70 milliGRAM(s)/deciliter  labetalol Injectable 10 milliGRAM(s) IV Push every 6 hours PRN Systolic blood pressure > 180      Allergies    amlodipine (Swelling)  aspirin (Rash)    Intolerances    Review of Systems:  · ENMT: negative  · Respiratory and Thorax: negative  · Cardiovascular: negative  · Gastrointestinal: see above.  · Genitourinary:	negative  · Musculoskeletal: negative  · Neurological: negative  · Psychiatric: negative  · Hematology/Lymphatics: negative  · Endocrine: negative        Vital Signs Last 24 Hrs  T(C): 36.6 (17 Oct 2022 04:35), Max: 36.6 (17 Oct 2022 04:35)  T(F): 97.9 (17 Oct 2022 04:35), Max: 97.9 (17 Oct 2022 04:35)  HR: 84 (17 Oct 2022 04:35) (62 - 84)  BP: 120/61 (17 Oct 2022 04:35) (120/61 - 157/62)  BP(mean): 83 (16 Oct 2022 19:56) (83 - 83)  RR: 18 (17 Oct 2022 04:35) (18 - 18)  SpO2: 94% (17 Oct 2022 04:35) (92% - 98%)    Parameters below as of 17 Oct 2022 04:35  Patient On (Oxygen Delivery Method): room air        PHYSICAL EXAM:  · Constitutional:  obese woman, in no acute distress.   · Eyes: EOMI; PERRL; no drainage or redness  · ENMT: No oral lesions; no gross abnormalities  · Neck:	No bruits; no thyromegaly or nodules  · Back:	No deformity or limitation of movement  · Respiratory: Breath Sounds equal & clear to percussion & auscultation, no accessory muscle use  · Cardiovascular: Regular rate & rhythm, normal S1, S2; no murmurs, gallops or rubs; no S3, S4  · Gastrointestinal: Soft, non-tender, no hepatosplenomegaly, normal bowel sounds      LABS:                        8.9    7.47  )-----------( 207      ( 17 Oct 2022 05:31 )             29.2     10-17    136  |  101  |  15.4  ----------------------------<  150<H>  4.4   |  23.0  |  1.00    Ca    9.4      17 Oct 2022 05:31  Phos  3.0     10-17  Mg     1.6     10-17    TPro  7.4  /  Alb  3.6  /  TBili  0.4  /  DBili  0.1  /  AST  24  /  ALT  16  /  AlkPhos  110  10-17    PT/INR - ( 17 Oct 2022 05:31 )   PT: 15.1 sec;   INR: 1.30 ratio         PTT - ( 17 Oct 2022 05:31 )  PTT:26.9 sec  Urinalysis Basic - ( 15 Oct 2022 19:00 )    Color: Yellow / Appearance: Clear / S.005 / pH: x  Gluc: x / Ketone: Negative  / Bili: Negative / Urobili: Negative mg/dL   Blood: x / Protein: 15 / Nitrite: Negative   Leuk Esterase: Trace / RBC: Negative /HPF / WBC 0-2 /HPF   Sq Epi: x / Non Sq Epi: Occasional / Bacteria: x      LIVER FUNCTIONS - ( 17 Oct 2022 05:31 )  Alb: 3.6 g/dL / Pro: 7.4 g/dL / ALK PHOS: 110 U/L / ALT: 16 U/L / AST: 24 U/L / GGT: x             RADIOLOGY & ADDITIONAL TESTS:  < from: CT Angio Abdomen and Pelvis w/ IV Cont (10.16.22 @ 01:14) >  ACC: [id number] EXAM:  CT ANGIO ABD PELV (W)AW IC                          PROCEDURE DATE:  10/16/2022          INTERPRETATION:  CLINICAL INFORMATION: Abdominal pain. Evaluate for   mesenteric ischemia.    COMPARISON: None.    CONTRAST/COMPLICATIONS:  IV Contrast: Omnipaque 350  95 cc administered   5 cc discarded  Oral Contrast: NONE  Complications: None reported at time of study completion    PROCEDURE:  CT Angiography of the Abdomen and Pelvis was performed.  Arterial and venous phases were acquired.  Sagittal and coronal reformats were performed as well as 3D (MIP)   reconstructions.    FINDINGS:  LOWER CHEST: Heart is borderline in size. Coronary calcifications are   noted..    LIVER: Nodularity of the hepatic contour with preferential enlargement of   the left hepatic lobe is consistent with cirrhosis. There is a 2.9 cm   hypervascular lesion of segment 2 of the left hepatic lobe, with lungs   and subsequent portal venous phase imaging. This is likely a flash   filling hemangioma. An additional subcentimeter hypervascular focus more   anteriorly within the left hepatic lobe is noted and is indeterminate   nature. A third hypervascular lesion is seen within the right hepatic   lobe measuring 3 cm. This demonstrates decreased attenuation on venous   phase imaging suggestive of washout. This is a finding associated with   hepatocellular carcinoma. There is diffuse fatty replacement of liver.  BILE DUCTS: Normal caliber.  GALLBLADDER: Within normal limits.  SPLEEN: Within normallimits.  PANCREAS: Within normal limits.  ADRENALS: Within normal limits.  KIDNEYS/URETERS: Within normal limits.    BLADDER: Within normal limits.  REPRODUCTIVE ORGANS:    BOWEL: There is no bowel obstruction.. Appendix is normal. There is   colonicdiverticulosis. There is no focal diverticulitis. A large amount   of stool seen throughout the colon. There is a small hiatal hernia.  PERITONEUM: No ascites.  VESSELS: Atherosclerotic ossifications of the aorta and iliac arteries   are appreciated.Atherosclerotic calcifications are seen at the origins   of the celiac axis and SMA. 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 The BC is patent. There is no venous occlusive disease.  RETROPERITONEUM/LYMPH NODES: No lymphadenopathy.  ABDOMINAL WALL: Within normal limits.  BONES: Degenerative changes.      IMPRESSION:  Hypervascular lesions seen in liver, at least one of which is typical for   a flash filling hemangioma. A lesion within the right hepatic lobe   demonstrates washout, suspicious for hepatocellular carcinoma. Additional   subcentimeter hypervascular lesion of left hepatic lobe is indeterminate.   Confirmation with multiphasic hepatic protocol MRI or CT is advised for   more definitive evaluation.    Cirrhosis.    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 No mesenteric venous occlusive   disease.    Constipation.    < end of copied text >

## 2022-10-17 NOTE — PROGRESS NOTE ADULT - ASSESSMENT
82y/oF PMH HTN, HLD, DM, CAD s/p PHYLLIS to mLAD 2019 presenting to ER with 1 day of epigastric discomfort, found to have liver lesions, admitted for ACS r/o and GI w/u of liver lesions     Liver lesions  CT findings with:  -2.9cm hypervascular lesion, likely flash filling hemangioma   -L hepatic lobe subcentimeter hypervascular focus, indeterminate   -3cm R hepatic lobe lesion suspicious for hepatocellular carcinoma   -f/u MRI with liver protocol   -GI recs appreciated     Epigastric pain, resolved   r/o ACS   -f/u tte and imaging   -cont ppi  -trops neg  -cont statin, nifedipine, coreg, hydralazine   -cardio recs appreciated     DM2  -holding metformin, farxiga  -a1c 8.9  -cont lantus, iss, adjust as needed in house     Hyperkalemia, resolved   -s/p lokelma, insulin, dextrose   -holding spironolactone   -cont to monitor     CAD s/p PHYLLIS mLAD 2019   HTN, HLD   -holding DAPT   -holding spironolactone   -cont coreg, nifedipine, statin     vte ppx: scds  82y/oF PMH HTN, HLD, DM, CAD s/p PHYLLIS to mLAD 2019 presenting to ER with 1 day of epigastric discomfort, found to have liver lesions, admitted for ACS r/o and GI w/u of liver lesions     Liver lesions  CT findings with:  -2.9cm hypervascular lesion, likely flash filling hemangioma   -L hepatic lobe subcentimeter hypervascular focus, indeterminate   -3cm R hepatic lobe lesion suspicious for hepatocellular carcinoma   -CEA 4.2,   -f/u MRI with liver protocol   -GI recs appreciated     Cirrhosis: MARTIN vs viral   -hepatitis panel: +hepA IgG Ab (likely hx of HAV or vaccination);  +anti-HBc so will repeat hepB abs  -monitor LFTs    Epigastric pain, resolved   r/o ACS   -f/u tte and imaging   -cont ppi  -trops neg  -cont statin, nifedipine, coreg, hydralazine   -cardio recs appreciated     DM2  -holding metformin, farxiga  -a1c 8.9  -cont lantus, iss, adjust as needed in house     Hyperkalemia, resolved   -s/p lokelma, insulin, dextrose   -holding spironolactone   -cont to monitor     CAD s/p PHYLLIS mLAD 2019   HTN, HLD   -holding DAPT   -holding spironolactone   -cont coreg, nifedipine, statin     vte ppx: scds

## 2022-10-17 NOTE — PROGRESS NOTE ADULT - ASSESSMENT
82F with PMH of HLD, HTN, DM, CAD s/p PHYLLIS to mLAD 2019 presenting to the ED with 1 day history of epigastric discomfort found to have 3CM hepatic nodule concerning for malignancy. She has negative tropoin I. She needs preoperative evaluation for EUS/liver biopsy.  1. HTN - well controlled  2. CAD - no sign of ACS. Last PCI 2019  3. Liver mass - planned liver MRI with further plans depending on results.     Plan:   Continue atorvastatin, nifedipine xl and coreg    82F with PMH of HLD, HTN, DM, CAD s/p PHYLLIS to mLAD 2019 presenting to the ED with 1 day history of epigastric discomfort found to have 3CM hepatic nodule concerning for malignancy. She has negative tropoin I. She needs preoperative evaluation for EUS/liver biopsy.  1. HTN - well controlled  2. CAD - no sign of ACS. Last PCI 2019  3. Liver mass - planned liver MRI with further plans depending on results.     Plan:   Continue atorvastatin, nifedipine xl and coreg   Echo still pending

## 2022-10-17 NOTE — PROGRESS NOTE ADULT - SUBJECTIVE AND OBJECTIVE BOX
Patient is lying supine without symptoms    MEDICATIONS  (STANDING):  atorvastatin 40 milliGRAM(s) Oral at bedtime  budesonide 160 MICROgram(s)/formoterol 4.5 MICROgram(s) Inhaler 2 Puff(s) Inhalation two times a day  carvedilol 12.5 milliGRAM(s) Oral every 12 hours  dextrose 5%. 1000 milliLiter(s) (50 mL/Hr) IV Continuous <Continuous>  dextrose 5%. 1000 milliLiter(s) (100 mL/Hr) IV Continuous <Continuous>  dextrose 50% Injectable 25 Gram(s) IV Push once  dextrose 50% Injectable 12.5 Gram(s) IV Push once  dextrose 50% Injectable 25 Gram(s) IV Push once  glucagon  Injectable 1 milliGRAM(s) IntraMuscular once  hydrALAZINE 50 milliGRAM(s) Oral two times a day  insulin glargine Injectable (LANTUS) 5 Unit(s) SubCutaneous at bedtime  insulin lispro (ADMELOG) corrective regimen sliding scale   SubCutaneous three times a day before meals  levothyroxine 25 MICROGram(s) Oral daily  NIFEdipine XL 60 milliGRAM(s) Oral daily  pantoprazole    Tablet 40 milliGRAM(s) Oral before breakfast  senna 2 Tablet(s) Oral at bedtime    Vital Signs Last 24 Hrs  T(C): 36.6 (17 Oct 2022 04:35), Max: 36.6 (17 Oct 2022 04:35)  T(F): 97.9 (17 Oct 2022 04:35), Max: 97.9 (17 Oct 2022 04:35)  HR: 84 (17 Oct 2022 04:35) (62 - 84)  BP: 120/61 (17 Oct 2022 04:35) (120/61 - 157/62)  BP(mean): 83 (16 Oct 2022 19:56) (83 - 83)  RR: 18 (17 Oct 2022 04:35) (18 - 18)  SpO2: 94% (17 Oct 2022 04:35) (92% - 98%)    Parameters below as of 17 Oct 2022 04:35  Patient On (Oxygen Delivery Method): room air      Gen;  no distress  CV: regular, normal S1 and S2  Resp: Lungs CTA    10-17    136  |  101  |  15.4  ----------------------------<  150<H>  4.4   |  23.0  |  1.00    Ca    9.4      17 Oct 2022 05:31  Phos  3.0     10-17  Mg     1.6     10-17    TPro  7.4  /  Alb  3.6  /  TBili  0.4  /  DBili  0.1  /  AST  24  /  ALT  16  /  AlkPhos  110  10-17                          8.9    7.47  )-----------( 207      ( 17 Oct 2022 05:31 )             29.2

## 2022-10-18 DIAGNOSIS — R76.8 OTHER SPECIFIED ABNORMAL IMMUNOLOGICAL FINDINGS IN SERUM: ICD-10-CM

## 2022-10-18 DIAGNOSIS — K59.00 CONSTIPATION, UNSPECIFIED: ICD-10-CM

## 2022-10-18 LAB
ALBUMIN SERPL ELPH-MCNC: 3.4 G/DL — SIGNIFICANT CHANGE UP (ref 3.3–5.2)
ALP SERPL-CCNC: 116 U/L — SIGNIFICANT CHANGE UP (ref 40–120)
ALT FLD-CCNC: 15 U/L — SIGNIFICANT CHANGE UP
ANION GAP SERPL CALC-SCNC: 13 MMOL/L — SIGNIFICANT CHANGE UP (ref 5–17)
AST SERPL-CCNC: 21 U/L — SIGNIFICANT CHANGE UP
BASOPHILS # BLD AUTO: 0.01 K/UL — SIGNIFICANT CHANGE UP (ref 0–0.2)
BASOPHILS NFR BLD AUTO: 0.1 % — SIGNIFICANT CHANGE UP (ref 0–2)
BILIRUB DIRECT SERPL-MCNC: 0.1 MG/DL — SIGNIFICANT CHANGE UP (ref 0–0.3)
BILIRUB INDIRECT FLD-MCNC: 0.2 MG/DL — SIGNIFICANT CHANGE UP (ref 0.2–1)
BILIRUB SERPL-MCNC: 0.3 MG/DL — LOW (ref 0.4–2)
BUN SERPL-MCNC: 15.4 MG/DL — SIGNIFICANT CHANGE UP (ref 8–20)
CALCIUM SERPL-MCNC: 9.4 MG/DL — SIGNIFICANT CHANGE UP (ref 8.4–10.5)
CHLORIDE SERPL-SCNC: 102 MMOL/L — SIGNIFICANT CHANGE UP (ref 98–107)
CO2 SERPL-SCNC: 22 MMOL/L — SIGNIFICANT CHANGE UP (ref 22–29)
CREAT SERPL-MCNC: 0.98 MG/DL — SIGNIFICANT CHANGE UP (ref 0.5–1.3)
EGFR: 57 ML/MIN/1.73M2 — LOW
EOSINOPHIL # BLD AUTO: 0.07 K/UL — SIGNIFICANT CHANGE UP (ref 0–0.5)
EOSINOPHIL NFR BLD AUTO: 1 % — SIGNIFICANT CHANGE UP (ref 0–6)
GLUCOSE BLDC GLUCOMTR-MCNC: 234 MG/DL — HIGH (ref 70–99)
GLUCOSE BLDC GLUCOMTR-MCNC: 288 MG/DL — HIGH (ref 70–99)
GLUCOSE BLDC GLUCOMTR-MCNC: 350 MG/DL — HIGH (ref 70–99)
GLUCOSE BLDC GLUCOMTR-MCNC: 381 MG/DL — HIGH (ref 70–99)
GLUCOSE SERPL-MCNC: 194 MG/DL — HIGH (ref 70–99)
HBV CORE AB SER-ACNC: REACTIVE
HBV CORE IGM SER-ACNC: SIGNIFICANT CHANGE UP
HBV E AB SER-ACNC: SIGNIFICANT CHANGE UP
HBV SURFACE AB SER-ACNC: ABNORMAL
HBV SURFACE AG SER-ACNC: SIGNIFICANT CHANGE UP
HCT VFR BLD CALC: 27.1 % — LOW (ref 34.5–45)
HGB BLD-MCNC: 8.3 G/DL — LOW (ref 11.5–15.5)
IMM GRANULOCYTES NFR BLD AUTO: 0.1 % — SIGNIFICANT CHANGE UP (ref 0–0.9)
LYMPHOCYTES # BLD AUTO: 3 K/UL — SIGNIFICANT CHANGE UP (ref 1–3.3)
LYMPHOCYTES # BLD AUTO: 42.1 % — SIGNIFICANT CHANGE UP (ref 13–44)
MAGNESIUM SERPL-MCNC: 1.7 MG/DL — LOW (ref 1.8–2.6)
MCHC RBC-ENTMCNC: 25 PG — LOW (ref 27–34)
MCHC RBC-ENTMCNC: 30.6 GM/DL — LOW (ref 32–36)
MCV RBC AUTO: 81.6 FL — SIGNIFICANT CHANGE UP (ref 80–100)
MONOCYTES # BLD AUTO: 0.78 K/UL — SIGNIFICANT CHANGE UP (ref 0–0.9)
MONOCYTES NFR BLD AUTO: 10.9 % — SIGNIFICANT CHANGE UP (ref 2–14)
NEUTROPHILS # BLD AUTO: 3.26 K/UL — SIGNIFICANT CHANGE UP (ref 1.8–7.4)
NEUTROPHILS NFR BLD AUTO: 45.8 % — SIGNIFICANT CHANGE UP (ref 43–77)
PHOSPHATE SERPL-MCNC: 3.4 MG/DL — SIGNIFICANT CHANGE UP (ref 2.4–4.7)
PLATELET # BLD AUTO: 176 K/UL — SIGNIFICANT CHANGE UP (ref 150–400)
POTASSIUM SERPL-MCNC: 4.5 MMOL/L — SIGNIFICANT CHANGE UP (ref 3.5–5.3)
POTASSIUM SERPL-SCNC: 4.5 MMOL/L — SIGNIFICANT CHANGE UP (ref 3.5–5.3)
PROT SERPL-MCNC: 7 G/DL — SIGNIFICANT CHANGE UP (ref 6.6–8.7)
RBC # BLD: 3.32 M/UL — LOW (ref 3.8–5.2)
RBC # FLD: 16.8 % — HIGH (ref 10.3–14.5)
SODIUM SERPL-SCNC: 137 MMOL/L — SIGNIFICANT CHANGE UP (ref 135–145)
WBC # BLD: 7.13 K/UL — SIGNIFICANT CHANGE UP (ref 3.8–10.5)
WBC # FLD AUTO: 7.13 K/UL — SIGNIFICANT CHANGE UP (ref 3.8–10.5)

## 2022-10-18 PROCEDURE — 99233 SBSQ HOSP IP/OBS HIGH 50: CPT

## 2022-10-18 RX ORDER — INSULIN LISPRO 100/ML
3 VIAL (ML) SUBCUTANEOUS
Refills: 0 | Status: DISCONTINUED | OUTPATIENT
Start: 2022-10-18 | End: 2022-10-21

## 2022-10-18 RX ORDER — INSULIN LISPRO 100/ML
VIAL (ML) SUBCUTANEOUS
Refills: 0 | Status: DISCONTINUED | OUTPATIENT
Start: 2022-10-18 | End: 2022-10-21

## 2022-10-18 RX ORDER — INSULIN LISPRO 100/ML
VIAL (ML) SUBCUTANEOUS AT BEDTIME
Refills: 0 | Status: DISCONTINUED | OUTPATIENT
Start: 2022-10-18 | End: 2022-10-21

## 2022-10-18 RX ORDER — MAGNESIUM SULFATE 500 MG/ML
2 VIAL (ML) INJECTION ONCE
Refills: 0 | Status: COMPLETED | OUTPATIENT
Start: 2022-10-18 | End: 2022-10-18

## 2022-10-18 RX ORDER — LACTULOSE 10 G/15ML
20 SOLUTION ORAL
Refills: 0 | Status: DISCONTINUED | OUTPATIENT
Start: 2022-10-18 | End: 2022-10-21

## 2022-10-18 RX ADMIN — Medication 50 MILLIGRAM(S): at 05:24

## 2022-10-18 RX ADMIN — SENNA PLUS 2 TABLET(S): 8.6 TABLET ORAL at 22:11

## 2022-10-18 RX ADMIN — LACTULOSE 20 GRAM(S): 10 SOLUTION ORAL at 17:07

## 2022-10-18 RX ADMIN — BUDESONIDE AND FORMOTEROL FUMARATE DIHYDRATE 2 PUFF(S): 160; 4.5 AEROSOL RESPIRATORY (INHALATION) at 20:58

## 2022-10-18 RX ADMIN — PANTOPRAZOLE SODIUM 40 MILLIGRAM(S): 20 TABLET, DELAYED RELEASE ORAL at 05:25

## 2022-10-18 RX ADMIN — Medication 8: at 17:07

## 2022-10-18 RX ADMIN — CARVEDILOL PHOSPHATE 12.5 MILLIGRAM(S): 80 CAPSULE, EXTENDED RELEASE ORAL at 05:24

## 2022-10-18 RX ADMIN — Medication 60 MILLIGRAM(S): at 05:25

## 2022-10-18 RX ADMIN — Medication 25 GRAM(S): at 08:56

## 2022-10-18 RX ADMIN — Medication 3 UNIT(S): at 17:07

## 2022-10-18 RX ADMIN — Medication 4: at 07:48

## 2022-10-18 RX ADMIN — CARVEDILOL PHOSPHATE 12.5 MILLIGRAM(S): 80 CAPSULE, EXTENDED RELEASE ORAL at 17:07

## 2022-10-18 RX ADMIN — Medication 6: at 22:09

## 2022-10-18 RX ADMIN — Medication 6: at 11:19

## 2022-10-18 RX ADMIN — INSULIN GLARGINE 5 UNIT(S): 100 INJECTION, SOLUTION SUBCUTANEOUS at 22:11

## 2022-10-18 RX ADMIN — Medication 50 MILLIGRAM(S): at 17:07

## 2022-10-18 RX ADMIN — Medication 25 MICROGRAM(S): at 05:24

## 2022-10-18 RX ADMIN — ATORVASTATIN CALCIUM 40 MILLIGRAM(S): 80 TABLET, FILM COATED ORAL at 22:11

## 2022-10-18 NOTE — PROGRESS NOTE ADULT - SUBJECTIVE AND OBJECTIVE BOX
S: Patient denies chest pain or syncope.     TELEMETRY: sr    MEDICATIONS  (STANDING):  atorvastatin 40 milliGRAM(s) Oral at bedtime  budesonide 160 MICROgram(s)/formoterol 4.5 MICROgram(s) Inhaler 2 Puff(s) Inhalation two times a day  carvedilol 12.5 milliGRAM(s) Oral every 12 hours  dextrose 5%. 1000 milliLiter(s) (50 mL/Hr) IV Continuous <Continuous>  dextrose 5%. 1000 milliLiter(s) (100 mL/Hr) IV Continuous <Continuous>  dextrose 50% Injectable 25 Gram(s) IV Push once  dextrose 50% Injectable 12.5 Gram(s) IV Push once  dextrose 50% Injectable 25 Gram(s) IV Push once  glucagon  Injectable 1 milliGRAM(s) IntraMuscular once  hydrALAZINE 50 milliGRAM(s) Oral two times a day  insulin glargine Injectable (LANTUS) 5 Unit(s) SubCutaneous at bedtime  insulin lispro (ADMELOG) corrective regimen sliding scale   SubCutaneous three times a day before meals  insulin lispro (ADMELOG) corrective regimen sliding scale   SubCutaneous at bedtime  levothyroxine 25 MICROGram(s) Oral daily  NIFEdipine XL 60 milliGRAM(s) Oral daily  pantoprazole    Tablet 40 milliGRAM(s) Oral before breakfast  senna 2 Tablet(s) Oral at bedtime    MEDICATIONS  (PRN):  dextrose Oral Gel 15 Gram(s) Oral once PRN Blood Glucose LESS THAN 70 milliGRAM(s)/deciliter  labetalol Injectable 10 milliGRAM(s) IV Push every 6 hours PRN Systolic blood pressure > 180        Vital Signs Last 24 Hrs  T(C): 36.6 (18 Oct 2022 05:03), Max: 36.7 (17 Oct 2022 11:27)  T(F): 97.9 (18 Oct 2022 05:03), Max: 98 (17 Oct 2022 11:27)  HR: 107 (18 Oct 2022 05:03) (68 - 107)  BP: 130/53 (18 Oct 2022 05:03) (130/53 - 149/66)  BP(mean): --  RR: 18 (18 Oct 2022 05:03) (18 - 19)  SpO2: 96% (18 Oct 2022 05:03) (95% - 96%)    Parameters below as of 18 Oct 2022 05:03  Patient On (Oxygen Delivery Method): room air        Daily     Daily     I&O's Detail      PHYSICAL EXAM:  Appearance: In NAD  Cardiovascular: Normal S1 S2  Respiratory: Lungs clear to auscultation	  Gastrointestinal:  Soft, Non-tender, + BS, no bruits	  Neurologic: Grossly non-focal.  Extremities: No edema    LABS:                        8.9    7.47  )-----------( 207      ( 17 Oct 2022 05:31 )             29.2     10-18    137  |  102  |  15.4  ----------------------------<  194<H>  4.5   |  22.0  |  0.98    Ca    9.4      18 Oct 2022 05:36  Phos  3.4     10-18  Mg     1.7     10-18    TPro  7.0  /  Alb  3.4  /  TBili  0.3<L>  /  DBili  0.1  /  AST  21  /  ALT  15  /  AlkPhos  116  10-18    CARDIAC MARKERS ( 16 Oct 2022 11:25 )  x     / <0.01 ng/mL / x     / x     / x          PT/INR - ( 17 Oct 2022 05:31 )   PT: 15.1 sec;   INR: 1.30 ratio         PTT - ( 17 Oct 2022 05:31 )  PTT:26.9 sec

## 2022-10-18 NOTE — PROGRESS NOTE ADULT - ASSESSMENT
82F with PMH of HLD, HTN, DM, CAD s/p PHYLLIS to mLAD 2019 presenting to the ED with 1 day history of epigastric discomfort found to have 3CM hepatic nodule concerning for malignancy. She has negative tropoin I. She needs preoperative evaluation for EUS/liver biopsy.  1. HTN - well controlled  2. CAD - no sign of ACS. Last PCI 2019  3. Liver mass - planned liver MRI with further plans depending on results.     Plan:   Continue atorvastatin, nifedipine xl and coreg   Echo still pending

## 2022-10-18 NOTE — PROGRESS NOTE ADULT - PROBLEM SELECTOR PLAN 1
CT 10/16/22 cirrhosis, hypervascular lesions in liver suspicious for HCC, constipation. No prior hx of liver disease. , CEA 4.2    - MRI abdomen w/ liver protocol for further evaluation, pending at this time  - Recommend checking CA 19-9 and    - Trend LFTs, coags   - Further plan pending results of MRI  - Likely will need outpatient follow up with our Hepatologist, Dr. Kelley, for further assessment and monitoring
CT of abdomen and pelvis revealed 2.9 cm hemangioma and 3 cm right hep lesion concerning for HCC, also Cirrhosis possible NALF, Atherosclerotic changes of the arterial structures, with 50-70% stenosis of the origins of the celiac axis and SMA, and Constipation.  Awaiting MRI liver protocol for further recommendations.  Miralax daily

## 2022-10-18 NOTE — PROGRESS NOTE ADULT - PROBLEM SELECTOR PLAN 2
+ Hep A IgG ab, + anti-HBc    - Hepatitis serologies ordered, pending at this time
Now resolved, awaiting MRI, and  TTE  PPI daily

## 2022-10-18 NOTE — PROGRESS NOTE ADULT - ASSESSMENT
83 y/o F with PMH of HLD, HTN, DM, CAD s/p stent presenting with epigastric discomfort, found to have cirrhosis and hepatic nodule concerning for HCC

## 2022-10-18 NOTE — PROGRESS NOTE ADULT - NS ATTEND AMEND GEN_ALL_CORE FT
I evaluated this pt. with my NP and agree with the above assessment and management plan. Awaiting MRI of Abdomen with and w/o IV contrast for HCC rule out. Serial LFT's while here.
82 F  constipated, agree with lactulose in addition to senna  cirrhotic, incidentally noted liver lesion suspicious for HCC, AFP elevation noted  will be able to make further plans once MRI back  continue regular diet

## 2022-10-18 NOTE — PROGRESS NOTE ADULT - PROBLEM SELECTOR PLAN 3
No BM x 3 days    - C/w Senna  - Recommend adding Lactulose 20mg BID    _________________________________________________________________  Assessment and recommendations are final when note is signed by the attending physician.

## 2022-10-18 NOTE — PROGRESS NOTE ADULT - SUBJECTIVE AND OBJECTIVE BOX
WILMER CONROY    [id number]    82y      Female    CC: hepatic mass     INTERVAL HPI/OVERNIGHT EVENTS: pt seen and examined with Darrington  #[id number]. denies abd pain, n/v/c/d.     REVIEW OF SYSTEMS:    CONSTITUTIONAL: No fever, weight loss  RESPIRATORY: No cough, wheezing, hemoptysis; No shortness of breath  CARDIOVASCULAR: No chest pain, palpitations  GASTROINTESTINAL: No abdominal or epigastric pain. No nausea, vomiting  NEUROLOGICAL: No headaches    Vital Signs Last 24 Hrs  T(C): 36.3 (18 Oct 2022 11:54), Max: 36.6 (17 Oct 2022 16:32)  T(F): 97.3 (18 Oct 2022 11:54), Max: 97.9 (18 Oct 2022 05:03)  HR: 70 (18 Oct 2022 11:54) (70 - 107)  BP: 129/53 (18 Oct 2022 11:54) (129/53 - 149/66)  BP(mean): --  RR: 18 (18 Oct 2022 11:54) (18 - 19)  SpO2: 95% (18 Oct 2022 11:54) (95% - 96%)    Parameters below as of 18 Oct 2022 11:54  Patient On (Oxygen Delivery Method): room air        PHYSICAL EXAM:    GENERAL: NAD  HEENT: +EOMI  NECK: soft, supple  CHEST/LUNG: Clear to auscultation bilaterally  HEART: S1S2+, Regular rate and rhythm  ABDOMEN: Soft, Nontender, Nondistended; Bowel sounds present  SKIN: warm, dry  NEURO: AAOX3, grossly non-focal   PSYCH: normal affect     LABS:                        8.3    7.13  )-----------( 176      ( 18 Oct 2022 05:36 )             27.1     10-18    137  |  102  |  15.4  ----------------------------<  194<H>  4.5   |  22.0  |  0.98    Ca    9.4      18 Oct 2022 05:36  Phos  3.4     10-18  Mg     1.7     10-18    TPro  7.0  /  Alb  3.4  /  TBili  0.3<L>  /  DBili  0.1  /  AST  21  /  ALT  15  /  AlkPhos  116  10-18    PT/INR - ( 17 Oct 2022 05:31 )   PT: 15.1 sec;   INR: 1.30 ratio         PTT - ( 17 Oct 2022 05:31 )  PTT:26.9 sec        MEDICATIONS  (STANDING):  atorvastatin 40 milliGRAM(s) Oral at bedtime  budesonide 160 MICROgram(s)/formoterol 4.5 MICROgram(s) Inhaler 2 Puff(s) Inhalation two times a day  carvedilol 12.5 milliGRAM(s) Oral every 12 hours  dextrose 5%. 1000 milliLiter(s) (100 mL/Hr) IV Continuous <Continuous>  dextrose 5%. 1000 milliLiter(s) (50 mL/Hr) IV Continuous <Continuous>  dextrose 50% Injectable 25 Gram(s) IV Push once  dextrose 50% Injectable 12.5 Gram(s) IV Push once  dextrose 50% Injectable 25 Gram(s) IV Push once  glucagon  Injectable 1 milliGRAM(s) IntraMuscular once  hydrALAZINE 50 milliGRAM(s) Oral two times a day  insulin glargine Injectable (LANTUS) 5 Unit(s) SubCutaneous at bedtime  insulin lispro (ADMELOG) corrective regimen sliding scale   SubCutaneous three times a day before meals  insulin lispro (ADMELOG) corrective regimen sliding scale   SubCutaneous at bedtime  levothyroxine 25 MICROGram(s) Oral daily  NIFEdipine XL 60 milliGRAM(s) Oral daily  pantoprazole    Tablet 40 milliGRAM(s) Oral before breakfast  senna 2 Tablet(s) Oral at bedtime    MEDICATIONS  (PRN):  dextrose Oral Gel 15 Gram(s) Oral once PRN Blood Glucose LESS THAN 70 milliGRAM(s)/deciliter  labetalol Injectable 10 milliGRAM(s) IV Push every 6 hours PRN Systolic blood pressure > 180      RADIOLOGY & ADDITIONAL TESTS:   WILMER CONROY    [id number]    82y      Female    CC: hepatic mass     INTERVAL HPI/OVERNIGHT EVENTS: pt seen and examined with [place]  #[id number]. denies abd pain, n/v/c/d.     REVIEW OF SYSTEMS:    CONSTITUTIONAL: No fever, weight loss  RESPIRATORY: No cough, wheezing, hemoptysis; No shortness of breath  CARDIOVASCULAR: No chest pain, palpitations  GASTROINTESTINAL: No abdominal or epigastric pain. No nausea, vomiting  NEUROLOGICAL: No headaches    Vital Signs Last 24 Hrs  T(C): 36.3 (18 Oct 2022 11:54), Max: 36.6 (17 Oct 2022 16:32)  T(F): 97.3 (18 Oct 2022 11:54), Max: 97.9 (18 Oct 2022 05:03)  HR: 70 (18 Oct 2022 11:54) (70 - 107)  BP: 129/53 (18 Oct 2022 11:54) (129/53 - 149/66)  BP(mean): --  RR: 18 (18 Oct 2022 11:54) (18 - 19)  SpO2: 95% (18 Oct 2022 11:54) (95% - 96%)    Parameters below as of 18 Oct 2022 11:54  Patient On (Oxygen Delivery Method): room air        PHYSICAL EXAM:    GENERAL: NAD  HEENT: +EOMI  NECK: soft, supple  CHEST/LUNG: Clear to auscultation bilaterally  HEART: S1S2+, Regular rate and rhythm  ABDOMEN: Soft, Nontender, Nondistended; Bowel sounds present  SKIN: warm, dry  NEURO: AAOX3, grossly non-focal   PSYCH: normal affect     LABS:                        8.3    7.13  )-----------( 176      ( 18 Oct 2022 05:36 )             27.1     10-18    137  |  102  |  15.4  ----------------------------<  194<H>  4.5   |  22.0  |  0.98    Ca    9.4      18 Oct 2022 05:36  Phos  3.4     10-18  Mg     1.7     10-18    TPro  7.0  /  Alb  3.4  /  TBili  0.3<L>  /  DBili  0.1  /  AST  21  /  ALT  15  /  AlkPhos  116  10-18    PT/INR - ( 17 Oct 2022 05:31 )   PT: 15.1 sec;   INR: 1.30 ratio         PTT - ( 17 Oct 2022 05:31 )  PTT:26.9 sec        MEDICATIONS  (STANDING):  atorvastatin 40 milliGRAM(s) Oral at bedtime  budesonide 160 MICROgram(s)/formoterol 4.5 MICROgram(s) Inhaler 2 Puff(s) Inhalation two times a day  carvedilol 12.5 milliGRAM(s) Oral every 12 hours  dextrose 5%. 1000 milliLiter(s) (100 mL/Hr) IV Continuous <Continuous>  dextrose 5%. 1000 milliLiter(s) (50 mL/Hr) IV Continuous <Continuous>  dextrose 50% Injectable 25 Gram(s) IV Push once  dextrose 50% Injectable 12.5 Gram(s) IV Push once  dextrose 50% Injectable 25 Gram(s) IV Push once  glucagon  Injectable 1 milliGRAM(s) IntraMuscular once  hydrALAZINE 50 milliGRAM(s) Oral two times a day  insulin glargine Injectable (LANTUS) 5 Unit(s) SubCutaneous at bedtime  insulin lispro (ADMELOG) corrective regimen sliding scale   SubCutaneous three times a day before meals  insulin lispro (ADMELOG) corrective regimen sliding scale   SubCutaneous at bedtime  levothyroxine 25 MICROGram(s) Oral daily  NIFEdipine XL 60 milliGRAM(s) Oral daily  pantoprazole    Tablet 40 milliGRAM(s) Oral before breakfast  senna 2 Tablet(s) Oral at bedtime    MEDICATIONS  (PRN):  dextrose Oral Gel 15 Gram(s) Oral once PRN Blood Glucose LESS THAN 70 milliGRAM(s)/deciliter  labetalol Injectable 10 milliGRAM(s) IV Push every 6 hours PRN Systolic blood pressure > 180      RADIOLOGY & ADDITIONAL TESTS:    < from: TTE Echo Complete w/ Contrast w/ Doppler (10.17.22 @ 18:46) >  Summary:   1. Left ventricular ejection fraction, by visual estimation, is >75%.   2. Hyperdynamic global left ventricular systolic function.   3. Spectral Doppler shows impaired relaxation pattern of left   ventricular myocardial filling (Grade I diastolic dysfunction).   4. There is moderate left ventricular hypertrophy.   5. Basal-mid cavity obliteration with peak systolic velocity of 2.27 m/s.   6. There is some evidence of chordal MATHEUS (systolic anterior motion) with   septal bulge with peak gradient of 12 mmHg.   7. The left atrium is normal in size.   8. Mild mitral annular calcification.   9. Mild mitral valve regurgitation.  10. Sclerotic aortic valve with normal opening.  11. There is no evidence of pericardial effusion.  12. There are no prior studies on this patient for comparison purposes.    < end of copied text >

## 2022-10-18 NOTE — PROGRESS NOTE ADULT - ASSESSMENT
82y/oF PMH HTN, HLD, DM, CAD s/p PHYLLIS to mLAD 2019 presenting to ER with 1 day of epigastric discomfort, found to have liver lesions, admitted for ACS r/o and GI w/u of liver lesions     Liver lesions  CT findings with:  -2.9cm hypervascular lesion, likely flash filling hemangioma   -L hepatic lobe subcentimeter hypervascular focus, indeterminate   -3cm R hepatic lobe lesion suspicious for hepatocellular carcinoma   -CEA 4.2,   -f/u MRI with liver protocol   -GI recs appreciated     Cirrhosis: MATRIN vs viral   -hepatitis panel: +hepA IgG Ab (likely hx of HAV or vaccination);  +anti-HBc, repeat serology pending   -monitor LFTs  -start lactulose 20mg bid    Epigastric pain, resolved   ACS ruled out  -cont ppi  -trops neg  -cont statin, nifedipine, coreg, hydralazine   -cardio recs appreciated   -TTE: LVEF 75%, grade I ddx     DM2  -holding metformin, farxiga  -a1c 8.9  -cont lantus, iss, adjust as needed in house     Hyperkalemia, resolved   -s/p lokelma, insulin, dextrose   -holding spironolactone   -cont to monitor     CAD s/p PHYLLIS mLAD 2019   HTN, HLD   -holding DAPT   -holding spironolactone   -cont coreg, nifedipine, statin     vte ppx: scds

## 2022-10-19 LAB
ALBUMIN SERPL ELPH-MCNC: 3.8 G/DL — SIGNIFICANT CHANGE UP (ref 3.3–5.2)
ALP SERPL-CCNC: 132 U/L — HIGH (ref 40–120)
ALT FLD-CCNC: 29 U/L — SIGNIFICANT CHANGE UP
ANION GAP SERPL CALC-SCNC: 10 MMOL/L — SIGNIFICANT CHANGE UP (ref 5–17)
ANION GAP SERPL CALC-SCNC: 11 MMOL/L — SIGNIFICANT CHANGE UP (ref 5–17)
APPEARANCE UR: CLEAR — SIGNIFICANT CHANGE UP
AST SERPL-CCNC: 51 U/L — HIGH
BACTERIA # UR AUTO: ABNORMAL
BILIRUB DIRECT SERPL-MCNC: 0.1 MG/DL — SIGNIFICANT CHANGE UP (ref 0–0.3)
BILIRUB INDIRECT FLD-MCNC: 0.2 MG/DL — SIGNIFICANT CHANGE UP (ref 0.2–1)
BILIRUB SERPL-MCNC: 0.3 MG/DL — LOW (ref 0.4–2)
BILIRUB UR-MCNC: NEGATIVE — SIGNIFICANT CHANGE UP
BUN SERPL-MCNC: 16.7 MG/DL — SIGNIFICANT CHANGE UP (ref 8–20)
BUN SERPL-MCNC: 18.1 MG/DL — SIGNIFICANT CHANGE UP (ref 8–20)
CALCIUM SERPL-MCNC: 9.1 MG/DL — SIGNIFICANT CHANGE UP (ref 8.4–10.5)
CALCIUM SERPL-MCNC: 9.5 MG/DL — SIGNIFICANT CHANGE UP (ref 8.4–10.5)
CHLORIDE SERPL-SCNC: 101 MMOL/L — SIGNIFICANT CHANGE UP (ref 98–107)
CHLORIDE SERPL-SCNC: 102 MMOL/L — SIGNIFICANT CHANGE UP (ref 98–107)
CO2 SERPL-SCNC: 21 MMOL/L — LOW (ref 22–29)
CO2 SERPL-SCNC: 23 MMOL/L — SIGNIFICANT CHANGE UP (ref 22–29)
COLOR SPEC: YELLOW — SIGNIFICANT CHANGE UP
CREAT SERPL-MCNC: 0.96 MG/DL — SIGNIFICANT CHANGE UP (ref 0.5–1.3)
CREAT SERPL-MCNC: 1 MG/DL — SIGNIFICANT CHANGE UP (ref 0.5–1.3)
DIFF PNL FLD: NEGATIVE — SIGNIFICANT CHANGE UP
EGFR: 56 ML/MIN/1.73M2 — LOW
EGFR: 58 ML/MIN/1.73M2 — LOW
EPI CELLS # UR: SIGNIFICANT CHANGE UP
GLUCOSE BLDC GLUCOMTR-MCNC: 224 MG/DL — HIGH (ref 70–99)
GLUCOSE BLDC GLUCOMTR-MCNC: 307 MG/DL — HIGH (ref 70–99)
GLUCOSE BLDC GLUCOMTR-MCNC: 326 MG/DL — HIGH (ref 70–99)
GLUCOSE BLDC GLUCOMTR-MCNC: 364 MG/DL — HIGH (ref 70–99)
GLUCOSE SERPL-MCNC: 246 MG/DL — HIGH (ref 70–99)
GLUCOSE SERPL-MCNC: 336 MG/DL — HIGH (ref 70–99)
GLUCOSE UR QL: 1000 MG/DL
HCT VFR BLD CALC: 29.4 % — LOW (ref 34.5–45)
HGB BLD-MCNC: 9 G/DL — LOW (ref 11.5–15.5)
KETONES UR-MCNC: NEGATIVE — SIGNIFICANT CHANGE UP
LEUKOCYTE ESTERASE UR-ACNC: ABNORMAL
MAGNESIUM SERPL-MCNC: 2.2 MG/DL — SIGNIFICANT CHANGE UP (ref 1.6–2.6)
MCHC RBC-ENTMCNC: 25.4 PG — LOW (ref 27–34)
MCHC RBC-ENTMCNC: 30.6 GM/DL — LOW (ref 32–36)
MCV RBC AUTO: 82.8 FL — SIGNIFICANT CHANGE UP (ref 80–100)
NITRITE UR-MCNC: NEGATIVE — SIGNIFICANT CHANGE UP
PH UR: 6 — SIGNIFICANT CHANGE UP (ref 5–8)
PLATELET # BLD AUTO: 177 K/UL — SIGNIFICANT CHANGE UP (ref 150–400)
POTASSIUM SERPL-MCNC: 5.4 MMOL/L — HIGH (ref 3.5–5.3)
POTASSIUM SERPL-MCNC: 5.4 MMOL/L — HIGH (ref 3.5–5.3)
POTASSIUM SERPL-SCNC: 5.4 MMOL/L — HIGH (ref 3.5–5.3)
POTASSIUM SERPL-SCNC: 5.4 MMOL/L — HIGH (ref 3.5–5.3)
PROT SERPL-MCNC: 7.5 G/DL — SIGNIFICANT CHANGE UP (ref 6.6–8.7)
PROT UR-MCNC: NEGATIVE — SIGNIFICANT CHANGE UP
RBC # BLD: 3.55 M/UL — LOW (ref 3.8–5.2)
RBC # FLD: 16.6 % — HIGH (ref 10.3–14.5)
RBC CASTS # UR COMP ASSIST: SIGNIFICANT CHANGE UP /HPF (ref 0–4)
SODIUM SERPL-SCNC: 134 MMOL/L — LOW (ref 135–145)
SODIUM SERPL-SCNC: 134 MMOL/L — LOW (ref 135–145)
SP GR SPEC: 1.01 — SIGNIFICANT CHANGE UP (ref 1.01–1.02)
UROBILINOGEN FLD QL: NEGATIVE MG/DL — SIGNIFICANT CHANGE UP
WBC # BLD: 7.07 K/UL — SIGNIFICANT CHANGE UP (ref 3.8–10.5)
WBC # FLD AUTO: 7.07 K/UL — SIGNIFICANT CHANGE UP (ref 3.8–10.5)
WBC UR QL: SIGNIFICANT CHANGE UP /HPF (ref 0–5)

## 2022-10-19 PROCEDURE — 93010 ELECTROCARDIOGRAM REPORT: CPT

## 2022-10-19 PROCEDURE — 99232 SBSQ HOSP IP/OBS MODERATE 35: CPT

## 2022-10-19 RX ORDER — INSULIN GLARGINE 100 [IU]/ML
8 INJECTION, SOLUTION SUBCUTANEOUS AT BEDTIME
Refills: 0 | Status: DISCONTINUED | OUTPATIENT
Start: 2022-10-19 | End: 2022-10-21

## 2022-10-19 RX ORDER — SODIUM ZIRCONIUM CYCLOSILICATE 10 G/10G
10 POWDER, FOR SUSPENSION ORAL ONCE
Refills: 0 | Status: COMPLETED | OUTPATIENT
Start: 2022-10-19 | End: 2022-10-19

## 2022-10-19 RX ADMIN — Medication 3 UNIT(S): at 16:44

## 2022-10-19 RX ADMIN — BUDESONIDE AND FORMOTEROL FUMARATE DIHYDRATE 2 PUFF(S): 160; 4.5 AEROSOL RESPIRATORY (INHALATION) at 22:27

## 2022-10-19 RX ADMIN — LACTULOSE 20 GRAM(S): 10 SOLUTION ORAL at 05:18

## 2022-10-19 RX ADMIN — PANTOPRAZOLE SODIUM 40 MILLIGRAM(S): 20 TABLET, DELAYED RELEASE ORAL at 05:18

## 2022-10-19 RX ADMIN — INSULIN GLARGINE 8 UNIT(S): 100 INJECTION, SOLUTION SUBCUTANEOUS at 22:32

## 2022-10-19 RX ADMIN — CARVEDILOL PHOSPHATE 12.5 MILLIGRAM(S): 80 CAPSULE, EXTENDED RELEASE ORAL at 05:17

## 2022-10-19 RX ADMIN — LACTULOSE 20 GRAM(S): 10 SOLUTION ORAL at 16:42

## 2022-10-19 RX ADMIN — Medication 10: at 11:49

## 2022-10-19 RX ADMIN — BUDESONIDE AND FORMOTEROL FUMARATE DIHYDRATE 2 PUFF(S): 160; 4.5 AEROSOL RESPIRATORY (INHALATION) at 08:46

## 2022-10-19 RX ADMIN — Medication 50 MILLIGRAM(S): at 05:17

## 2022-10-19 RX ADMIN — Medication 8: at 16:43

## 2022-10-19 RX ADMIN — SENNA PLUS 2 TABLET(S): 8.6 TABLET ORAL at 22:31

## 2022-10-19 RX ADMIN — Medication 4: at 08:45

## 2022-10-19 RX ADMIN — Medication 4: at 22:31

## 2022-10-19 RX ADMIN — SODIUM ZIRCONIUM CYCLOSILICATE 10 GRAM(S): 10 POWDER, FOR SUSPENSION ORAL at 14:32

## 2022-10-19 RX ADMIN — Medication 50 MILLIGRAM(S): at 16:42

## 2022-10-19 RX ADMIN — Medication 3 UNIT(S): at 08:45

## 2022-10-19 RX ADMIN — ATORVASTATIN CALCIUM 40 MILLIGRAM(S): 80 TABLET, FILM COATED ORAL at 22:31

## 2022-10-19 RX ADMIN — CARVEDILOL PHOSPHATE 12.5 MILLIGRAM(S): 80 CAPSULE, EXTENDED RELEASE ORAL at 16:42

## 2022-10-19 RX ADMIN — Medication 25 MICROGRAM(S): at 05:17

## 2022-10-19 RX ADMIN — Medication 3 UNIT(S): at 11:50

## 2022-10-19 RX ADMIN — Medication 60 MILLIGRAM(S): at 05:17

## 2022-10-19 NOTE — PROGRESS NOTE ADULT - SUBJECTIVE AND OBJECTIVE BOX
WILMER CONROY    [id number]    82y      Female    CC: hepatic mass    INTERVAL HPI/OVERNIGHT EVENTS: pt seen and examined. no acute events o/n. denies cp, sob, abd pain, n/v/c/d    REVIEW OF SYSTEMS:    CONSTITUTIONAL: No fever, weight loss  RESPIRATORY: No cough, wheezing, hemoptysis; No shortness of breath  CARDIOVASCULAR: No chest pain, palpitations  GASTROINTESTINAL: No abdominal or epigastric pain. No nausea, vomiting  NEUROLOGICAL: No headaches    Vital Signs Last 24 Hrs  T(C): 36.3 (19 Oct 2022 11:21), Max: 36.5 (19 Oct 2022 04:23)  T(F): 97.4 (19 Oct 2022 11:21), Max: 97.7 (19 Oct 2022 04:23)  HR: 75 (19 Oct 2022 11:21) (71 - 85)  BP: 126/68 (19 Oct 2022 11:21) (126/68 - 155/65)  BP(mean): --  RR: 18 (19 Oct 2022 11:21) (18 - 18)  SpO2: 96% (19 Oct 2022 11:21) (92% - 97%)    Parameters below as of 19 Oct 2022 11:21  Patient On (Oxygen Delivery Method): room air        PHYSICAL EXAM:    GENERAL: NAD  HEENT: +EOMI  NECK: soft, supple  CHEST/LUNG: Clear to auscultation bilaterally  HEART: S1S2+, Regular rate and rhythm  ABDOMEN: Soft, Nontender, Nondistended; Bowel sounds present  SKIN: warm, dry  NEURO: AAOX3, grossly non-focal   PSYCH: normal affect     LABS:                        9.0    7.07  )-----------( 177      ( 19 Oct 2022 06:10 )             29.4     10-19    134<L>  |  102  |  18.1  ----------------------------<  336<H>  5.4<H>   |  21.0<L>  |  1.00    Ca    9.1      19 Oct 2022 11:27  Phos  3.4     10-18  Mg     2.2     10-19    TPro  7.5  /  Alb  3.8  /  TBili  0.3<L>  /  DBili  0.1  /  AST  51<H>  /  ALT  29  /  AlkPhos  132<H>  10-19            MEDICATIONS  (STANDING):  atorvastatin 40 milliGRAM(s) Oral at bedtime  budesonide 160 MICROgram(s)/formoterol 4.5 MICROgram(s) Inhaler 2 Puff(s) Inhalation two times a day  carvedilol 12.5 milliGRAM(s) Oral every 12 hours  dextrose 5%. 1000 milliLiter(s) (50 mL/Hr) IV Continuous <Continuous>  dextrose 5%. 1000 milliLiter(s) (100 mL/Hr) IV Continuous <Continuous>  dextrose 50% Injectable 25 Gram(s) IV Push once  dextrose 50% Injectable 12.5 Gram(s) IV Push once  dextrose 50% Injectable 25 Gram(s) IV Push once  glucagon  Injectable 1 milliGRAM(s) IntraMuscular once  hydrALAZINE 50 milliGRAM(s) Oral two times a day  insulin glargine Injectable (LANTUS) 8 Unit(s) SubCutaneous at bedtime  insulin lispro (ADMELOG) corrective regimen sliding scale   SubCutaneous three times a day before meals  insulin lispro (ADMELOG) corrective regimen sliding scale   SubCutaneous at bedtime  insulin lispro Injectable (ADMELOG) 3 Unit(s) SubCutaneous three times a day before meals  lactulose Syrup 20 Gram(s) Oral two times a day  levothyroxine 25 MICROGram(s) Oral daily  NIFEdipine XL 60 milliGRAM(s) Oral daily  pantoprazole    Tablet 40 milliGRAM(s) Oral before breakfast  senna 2 Tablet(s) Oral at bedtime  sodium zirconium cyclosilicate 10 Gram(s) Oral once    MEDICATIONS  (PRN):  dextrose Oral Gel 15 Gram(s) Oral once PRN Blood Glucose LESS THAN 70 milliGRAM(s)/deciliter  labetalol Injectable 10 milliGRAM(s) IV Push every 6 hours PRN Systolic blood pressure > 180      RADIOLOGY & ADDITIONAL TESTS:

## 2022-10-19 NOTE — PROGRESS NOTE ADULT - ASSESSMENT
82y/oF PMH HTN, HLD, DM, CAD s/p PHYLLIS to mLAD 2019 presenting to ER with 1 day of epigastric discomfort, found to have liver lesions, admitted for ACS r/o and GI w/u of liver lesions     Liver lesions  CT findings with:  -2.9cm hypervascular lesion, likely flash filling hemangioma   -L hepatic lobe subcentimeter hypervascular focus, indeterminate   -3cm R hepatic lobe lesion suspicious for hepatocellular carcinoma   -CEA 4.2,   -f/u MRI with liver protocol   -GI recs appreciated     Cirrhosis: MARTIN vs viral   -hepatitis panel: +hepA IgG Ab (likely hx of HAV or vaccination); only +anti-HBc, on repeat +anti-HBc, anti-HBs indeterminate, suspect prior infection   -monitor LFTs  -cont lactulose 20mg bid    Epigastric pain, resolved   ACS ruled out  -cont ppi  -trops neg  -cont statin, nifedipine, coreg, hydralazine   -cardio recs appreciated   -TTE: LVEF 75%, grade I ddx     DM2  -holding metformin, farxiga  -a1c 8.9  -cont lantus, iss, adjust as needed in house     Hyperkalemia, resolved   -s/p lokelma, insulin, dextrose   -holding spironolactone   -additional lokelma 10/19  -cont to monitor     CAD s/p PHYLLIS mLAD 2019   HTN, HLD   -holding DAPT   -holding spironolactone   -cont coreg, nifedipine, statin     vte ppx: scds

## 2022-10-19 NOTE — PROGRESS NOTE ADULT - SUBJECTIVE AND OBJECTIVE BOX
Patient seen and examined. She is without symptoms    MEDICATIONS  (STANDING):  atorvastatin 40 milliGRAM(s) Oral at bedtime  budesonide 160 MICROgram(s)/formoterol 4.5 MICROgram(s) Inhaler 2 Puff(s) Inhalation two times a day  carvedilol 12.5 milliGRAM(s) Oral every 12 hours  dextrose 5%. 1000 milliLiter(s) (50 mL/Hr) IV Continuous <Continuous>  dextrose 5%. 1000 milliLiter(s) (100 mL/Hr) IV Continuous <Continuous>  dextrose 50% Injectable 25 Gram(s) IV Push once  dextrose 50% Injectable 12.5 Gram(s) IV Push once  dextrose 50% Injectable 25 Gram(s) IV Push once  glucagon  Injectable 1 milliGRAM(s) IntraMuscular once  hydrALAZINE 50 milliGRAM(s) Oral two times a day  insulin glargine Injectable (LANTUS) 8 Unit(s) SubCutaneous at bedtime  insulin lispro (ADMELOG) corrective regimen sliding scale   SubCutaneous three times a day before meals  insulin lispro (ADMELOG) corrective regimen sliding scale   SubCutaneous at bedtime  insulin lispro Injectable (ADMELOG) 3 Unit(s) SubCutaneous three times a day before meals  lactulose Syrup 20 Gram(s) Oral two times a day  levothyroxine 25 MICROGram(s) Oral daily  NIFEdipine XL 60 milliGRAM(s) Oral daily  pantoprazole    Tablet 40 milliGRAM(s) Oral before breakfast  senna 2 Tablet(s) Oral at bedtime    Vital Signs Last 24 Hrs  T(C): 36.7 (19 Oct 2022 08:15), Max: 36.7 (19 Oct 2022 08:15)  T(F): 98 (19 Oct 2022 08:15), Max: 98 (19 Oct 2022 08:15)  HR: 70 (19 Oct 2022 08:15) (70 - 85)  BP: 120/44 (19 Oct 2022 08:15) (120/44 - 155/65)  RR: 18 (19 Oct 2022 08:15) (18 - 18)  SpO2: 97% (19 Oct 2022 04:23) (92% - 97%)    Parameters below as of 19 Oct 2022 04:23  Patient On (Oxygen Delivery Method): room air    Gen: no distress  CV: normal S1 and S2, 2/6 MARISELA LLSB  Resp: Lungs CTA    10-19    134<L>  |  101  |  16.7  ----------------------------<  246<H>  5.4<H>   |  23.0  |  0.96    Ca    9.5      19 Oct 2022 06:10  Phos  3.4     10-18  Mg     2.2     10-19    TPro  7.5  /  Alb  3.8  /  TBili  0.3<L>  /  DBili  0.1  /  AST  51<H>  /  ALT  29  /  AlkPhos  132<H>  10-19

## 2022-10-19 NOTE — PROGRESS NOTE ADULT - ASSESSMENT
82F with PMH of HLD, HTN, DM, CAD s/p PHYLLIS to mLAD 2019 presenting to the ED with 1 day history of epigastric discomfort found to have 3CM hepatic nodule concerning for malignancy. She has negative tropoin I. She needs preoperative evaluation for EUS/liver biopsy.  1. HTN - well controlled  2. CAD - no sign of ACS. Last PCI 2019  3. Liver mass suspicious for HCC  4. Mod LVH by TTE, EF 75% - BP controlled    Plan:   Continue atorvastatin, nifedipine xl and coreg   Cleared to proceed with liver biopsy if so planned.   82F with PMH of HLD, HTN, DM, CAD s/p PHYLLIS to mLAD 2019 presenting to the ED with 1 day history of epigastric discomfort found to have 3CM hepatic nodule concerning for malignancy. She has negative tropoin I. She needs preoperative evaluation for EUS/liver biopsy.  1. HTN - well controlled  2. CAD - no sign of ACS. Last PCI 2019  3. Liver mass suspicious for HCC  4. Mod LVH by TTE, EF 75% - BP controlled    Plan:   Continue atorvastatin, nifedipine xl and coreg   Cleared to proceed with liver biopsy if so planned.  Resume ASA if no planned procedures.   No need to resume plavix  Feel free to call or reconsult.   Follow up in 4 weeks.

## 2022-10-20 ENCOUNTER — TRANSCRIPTION ENCOUNTER (OUTPATIENT)
Age: 82
End: 2022-10-20

## 2022-10-20 LAB
ALBUMIN SERPL ELPH-MCNC: 3.6 G/DL — SIGNIFICANT CHANGE UP (ref 3.3–5.2)
ALP SERPL-CCNC: 147 U/L — HIGH (ref 40–120)
ALT FLD-CCNC: 53 U/L — HIGH
ANION GAP SERPL CALC-SCNC: 12 MMOL/L — SIGNIFICANT CHANGE UP (ref 5–17)
AST SERPL-CCNC: 73 U/L — HIGH
BILIRUB DIRECT SERPL-MCNC: 0.1 MG/DL — SIGNIFICANT CHANGE UP (ref 0–0.3)
BILIRUB INDIRECT FLD-MCNC: 0.2 MG/DL — SIGNIFICANT CHANGE UP (ref 0.2–1)
BILIRUB SERPL-MCNC: 0.3 MG/DL — LOW (ref 0.4–2)
BUN SERPL-MCNC: 14.8 MG/DL — SIGNIFICANT CHANGE UP (ref 8–20)
CALCIUM SERPL-MCNC: 9.3 MG/DL — SIGNIFICANT CHANGE UP (ref 8.4–10.5)
CHLORIDE SERPL-SCNC: 103 MMOL/L — SIGNIFICANT CHANGE UP (ref 98–107)
CO2 SERPL-SCNC: 21 MMOL/L — LOW (ref 22–29)
CREAT SERPL-MCNC: 0.8 MG/DL — SIGNIFICANT CHANGE UP (ref 0.5–1.3)
EGFR: 73 ML/MIN/1.73M2 — SIGNIFICANT CHANGE UP
GLUCOSE BLDC GLUCOMTR-MCNC: 203 MG/DL — HIGH (ref 70–99)
GLUCOSE BLDC GLUCOMTR-MCNC: 234 MG/DL — HIGH (ref 70–99)
GLUCOSE BLDC GLUCOMTR-MCNC: 314 MG/DL — HIGH (ref 70–99)
GLUCOSE BLDC GLUCOMTR-MCNC: 399 MG/DL — HIGH (ref 70–99)
GLUCOSE SERPL-MCNC: 195 MG/DL — HIGH (ref 70–99)
HCT VFR BLD CALC: 29.3 % — LOW (ref 34.5–45)
HGB BLD-MCNC: 8.7 G/DL — LOW (ref 11.5–15.5)
MAGNESIUM SERPL-MCNC: 1.8 MG/DL — SIGNIFICANT CHANGE UP (ref 1.8–2.6)
MCHC RBC-ENTMCNC: 24.6 PG — LOW (ref 27–34)
MCHC RBC-ENTMCNC: 29.7 GM/DL — LOW (ref 32–36)
MCV RBC AUTO: 82.8 FL — SIGNIFICANT CHANGE UP (ref 80–100)
PLATELET # BLD AUTO: 179 K/UL — SIGNIFICANT CHANGE UP (ref 150–400)
POTASSIUM SERPL-MCNC: 4.8 MMOL/L — SIGNIFICANT CHANGE UP (ref 3.5–5.3)
POTASSIUM SERPL-SCNC: 4.8 MMOL/L — SIGNIFICANT CHANGE UP (ref 3.5–5.3)
PROT SERPL-MCNC: 7.2 G/DL — SIGNIFICANT CHANGE UP (ref 6.6–8.7)
RBC # BLD: 3.54 M/UL — LOW (ref 3.8–5.2)
RBC # FLD: 16.7 % — HIGH (ref 10.3–14.5)
SODIUM SERPL-SCNC: 136 MMOL/L — SIGNIFICANT CHANGE UP (ref 135–145)
WBC # BLD: 7.51 K/UL — SIGNIFICANT CHANGE UP (ref 3.8–10.5)
WBC # FLD AUTO: 7.51 K/UL — SIGNIFICANT CHANGE UP (ref 3.8–10.5)

## 2022-10-20 PROCEDURE — 99233 SBSQ HOSP IP/OBS HIGH 50: CPT

## 2022-10-20 PROCEDURE — 99232 SBSQ HOSP IP/OBS MODERATE 35: CPT

## 2022-10-20 RX ORDER — SPIRONOLACTONE 25 MG/1
1 TABLET, FILM COATED ORAL
Qty: 0 | Refills: 0 | DISCHARGE

## 2022-10-20 RX ORDER — LACTULOSE 10 G/15ML
30 SOLUTION ORAL
Qty: 1800 | Refills: 0
Start: 2022-10-20 | End: 2022-11-18

## 2022-10-20 RX ORDER — BENZOCAINE AND MENTHOL 5; 1 G/100ML; G/100ML
1 LIQUID ORAL EVERY 6 HOURS
Refills: 0 | Status: DISCONTINUED | OUTPATIENT
Start: 2022-10-20 | End: 2022-10-21

## 2022-10-20 RX ADMIN — SENNA PLUS 2 TABLET(S): 8.6 TABLET ORAL at 21:34

## 2022-10-20 RX ADMIN — Medication 25 MICROGRAM(S): at 06:33

## 2022-10-20 RX ADMIN — Medication 3 UNIT(S): at 11:49

## 2022-10-20 RX ADMIN — CARVEDILOL PHOSPHATE 12.5 MILLIGRAM(S): 80 CAPSULE, EXTENDED RELEASE ORAL at 06:33

## 2022-10-20 RX ADMIN — PANTOPRAZOLE SODIUM 40 MILLIGRAM(S): 20 TABLET, DELAYED RELEASE ORAL at 06:33

## 2022-10-20 RX ADMIN — ATORVASTATIN CALCIUM 40 MILLIGRAM(S): 80 TABLET, FILM COATED ORAL at 21:32

## 2022-10-20 RX ADMIN — Medication 50 MILLIGRAM(S): at 17:07

## 2022-10-20 RX ADMIN — LACTULOSE 20 GRAM(S): 10 SOLUTION ORAL at 17:07

## 2022-10-20 RX ADMIN — BUDESONIDE AND FORMOTEROL FUMARATE DIHYDRATE 2 PUFF(S): 160; 4.5 AEROSOL RESPIRATORY (INHALATION) at 21:34

## 2022-10-20 RX ADMIN — Medication 4: at 11:49

## 2022-10-20 RX ADMIN — BUDESONIDE AND FORMOTEROL FUMARATE DIHYDRATE 2 PUFF(S): 160; 4.5 AEROSOL RESPIRATORY (INHALATION) at 08:10

## 2022-10-20 RX ADMIN — Medication 4: at 08:08

## 2022-10-20 RX ADMIN — Medication 60 MILLIGRAM(S): at 06:33

## 2022-10-20 RX ADMIN — Medication 3 UNIT(S): at 17:09

## 2022-10-20 RX ADMIN — Medication 6: at 21:32

## 2022-10-20 RX ADMIN — BENZOCAINE AND MENTHOL 1 LOZENGE: 5; 1 LIQUID ORAL at 09:38

## 2022-10-20 RX ADMIN — LACTULOSE 20 GRAM(S): 10 SOLUTION ORAL at 06:34

## 2022-10-20 RX ADMIN — Medication 50 MILLIGRAM(S): at 06:33

## 2022-10-20 RX ADMIN — INSULIN GLARGINE 8 UNIT(S): 100 INJECTION, SOLUTION SUBCUTANEOUS at 21:32

## 2022-10-20 RX ADMIN — Medication 3 UNIT(S): at 08:09

## 2022-10-20 RX ADMIN — Medication 8: at 17:09

## 2022-10-20 RX ADMIN — CARVEDILOL PHOSPHATE 12.5 MILLIGRAM(S): 80 CAPSULE, EXTENDED RELEASE ORAL at 17:07

## 2022-10-20 NOTE — PROGRESS NOTE ADULT - SUBJECTIVE AND OBJECTIVE BOX
WILMER MERYDAVIDA    [id number]    82y      Female    CC: hepatic lesion     INTERVAL HPI/OVERNIGHT EVENTS: pt seen and examined. no acute events o/n. pending mri     REVIEW OF SYSTEMS:    CONSTITUTIONAL: No fever, weight loss  RESPIRATORY: No cough, wheezing, hemoptysis; No shortness of breath  CARDIOVASCULAR: No chest pain, palpitations  GASTROINTESTINAL: No abdominal or epigastric pain. No nausea, vomiting  NEUROLOGICAL: No headaches    Vital Signs Last 24 Hrs  T(C): 36.5 (20 Oct 2022 10:52), Max: 36.5 (19 Oct 2022 20:08)  T(F): 97.7 (20 Oct 2022 10:52), Max: 97.7 (19 Oct 2022 20:08)  HR: 71 (20 Oct 2022 10:52) (71 - 75)  BP: 154/71 (20 Oct 2022 10:52) (126/68 - 171/67)  BP(mean): --  RR: 18 (20 Oct 2022 10:52) (18 - 18)  SpO2: 98% (20 Oct 2022 10:52) (94% - 98%)    Parameters below as of 20 Oct 2022 10:52  Patient On (Oxygen Delivery Method): room air        PHYSICAL EXAM:    GENERAL: NAD  HEENT: +EOMI  NECK: soft, supple  CHEST/LUNG: Clear to auscultation bilaterally  HEART: S1S2+, Regular rate and rhythm  ABDOMEN: Soft, Nontender, Nondistended; Bowel sounds present  SKIN: warm, dry  NEURO: awake, alert; grossly non-focal   PSYCH: normal affect     LABS:                        8.7    7.51  )-----------( 179      ( 20 Oct 2022 05:47 )             29.3     10-20    136  |  103  |  14.8  ----------------------------<  195<H>  4.8   |  21.0<L>  |  0.80    Ca    9.3      20 Oct 2022 05:47  Mg     1.8     10-20    TPro  7.2  /  Alb  3.6  /  TBili  0.3<L>  /  DBili  0.1  /  AST  73<H>  /  ALT  53<H>  /  AlkPhos  147<H>  10-20      Urinalysis Basic - ( 19 Oct 2022 14:30 )    Color: Yellow / Appearance: Clear / S.015 / pH: x  Gluc: x / Ketone: Negative  / Bili: Negative / Urobili: Negative mg/dL   Blood: x / Protein: Negative / Nitrite: Negative   Leuk Esterase: Small / RBC: 0-2 /HPF / WBC 3-5 /HPF   Sq Epi: x / Non Sq Epi: Occasional / Bacteria: Occasional          MEDICATIONS  (STANDING):  atorvastatin 40 milliGRAM(s) Oral at bedtime  budesonide 160 MICROgram(s)/formoterol 4.5 MICROgram(s) Inhaler 2 Puff(s) Inhalation two times a day  carvedilol 12.5 milliGRAM(s) Oral every 12 hours  dextrose 5%. 1000 milliLiter(s) (50 mL/Hr) IV Continuous <Continuous>  dextrose 5%. 1000 milliLiter(s) (100 mL/Hr) IV Continuous <Continuous>  dextrose 50% Injectable 25 Gram(s) IV Push once  dextrose 50% Injectable 12.5 Gram(s) IV Push once  dextrose 50% Injectable 25 Gram(s) IV Push once  glucagon  Injectable 1 milliGRAM(s) IntraMuscular once  hydrALAZINE 50 milliGRAM(s) Oral two times a day  insulin glargine Injectable (LANTUS) 8 Unit(s) SubCutaneous at bedtime  insulin lispro (ADMELOG) corrective regimen sliding scale   SubCutaneous three times a day before meals  insulin lispro (ADMELOG) corrective regimen sliding scale   SubCutaneous at bedtime  insulin lispro Injectable (ADMELOG) 3 Unit(s) SubCutaneous three times a day before meals  lactulose Syrup 20 Gram(s) Oral two times a day  levothyroxine 25 MICROGram(s) Oral daily  NIFEdipine XL 60 milliGRAM(s) Oral daily  pantoprazole    Tablet 40 milliGRAM(s) Oral before breakfast  senna 2 Tablet(s) Oral at bedtime    MEDICATIONS  (PRN):  benzocaine 15 mG/menthol 3.6 mG Lozenge 1 Lozenge Oral every 6 hours PRN Sore Throat  dextrose Oral Gel 15 Gram(s) Oral once PRN Blood Glucose LESS THAN 70 milliGRAM(s)/deciliter  labetalol Injectable 10 milliGRAM(s) IV Push every 6 hours PRN Systolic blood pressure > 180      RADIOLOGY & ADDITIONAL TESTS:

## 2022-10-20 NOTE — DISCHARGE NOTE PROVIDER - CARE PROVIDER_API CALL
Susan Kelley)  Internal Medicine  [address] [place] [address]  Phone: [phone]  Fax: [phone]  Follow Up Time: 1-3 days    Fabrice Dee)  Gastroenterology; Internal Medicine  [address], [address]  [place] [address]  Phone: [phone]  Fax: [phone]  Follow Up Time:     John Garibay)  Cardiac Electrophysiology  [address], [address]  [place] [address]  Phone: [phone]  Fax: [phone]  Follow Up Time:     PMD,   Phone: (   )    -  Fax: (   )    -  Follow Up Time:

## 2022-10-20 NOTE — PROGRESS NOTE ADULT - ASSESSMENT
Right lobe liver lesion suspicious for HCC. Awaiting MRI of Abdomen with liver protocol to r/o HCC. Can be done as OPT if MRI not done today. She has been waiting days for this MRI to be done.

## 2022-10-20 NOTE — DISCHARGE NOTE PROVIDER - HOSPITAL COURSE
82y/oF PMH HTN, HLD, DM, CAD s/p PHYLLIS to mLAD 2019 presenting to ER with 1 day of epigastric discomfort, found to have liver lesions, admitted for ACS r/o and GI w/u of liver lesions 82y/oF PMH HTN, HLD, DM, CAD s/p PHYLLIS to mLAD 2019 presenting to ER with 1 day of epigastric discomfort, found to have liver lesions, admitted for ACS r/o and GI w/u of liver lesions. ACS ruled out. TTE done with LVEF 75%, grade I ddx. Epigastric pain resolved. MRI with liver protocol ordered as per GI, however, unable to be performed. D/w GI and pt, can have MRI as outpatient. Pt informed of importance to obtain and follow up as findings suspicious for hepatocellular carcinoma. Additionally, pt with +hepA IgG Ab, indicating likely hx of HAV or vaccination), as well as +anti-HBc, repeat serology with +anti-HBc, anti-HBs indeterminate, suspect prior infection. Pt started on lactulose for cirrhosis. Pt to f/u outpt. DC home. 82y/oF PMH HTN, HLD, DM, CAD s/p PHYLLIS to mLAD 2019 presenting to ER with 1 day of epigastric discomfort, found to have liver lesions, admitted for ACS r/o and GI w/u of liver lesions. ACS ruled out. TTE done with LVEF 75%, grade I ddx. Epigastric pain resolved. Pt informed of importance to obtain and follow up as findings suspicious for hepatocellular carcinoma. Additionally, pt with +hepA IgG Ab, indicating likely hx of HAV or vaccination), as well as +anti-HBc, repeat serology with +anti-HBc, anti-HBs indeterminate, suspect prior infection. Pt started on lactulose for cirrhosis. Pt to f/u outpt. DC home. 82y/oF PMH HTN, HLD, DM, CAD s/p PHYLLIS to mLAD 2019 presenting to ER with 1 day of epigastric discomfort, found to have liver lesions, admitted for ACS r/o and GI w/u of liver lesions. ACS ruled out. TTE done with LVEF 75%, grade I ddx. Epigastric pain resolved. Pt informed of importance to obtain and follow up as findings suspicious for hepatocellular carcinoma. MRI abd w/IV contrast with liver protocol: evidence of multifocal HCC with cirrhotic liver; pt informed. to f/u with Dr. Kelley, may need IR embolization of tumor. Additionally, pt with +hepA IgG Ab, indicating likely hx of HAV or vaccination), as well as +anti-HBc, repeat serology with +anti-HBc, anti-HBs indeterminate, suspect prior infection. Pt started on lactulose. Pt to f/u outpt. DC home.

## 2022-10-20 NOTE — DISCHARGE NOTE PROVIDER - NSDCMRMEDTOKEN_GEN_ALL_CORE_FT
albuterol 90 mcg/inh inhalation powder: 2 puff(s) inhaled every 4 hours   aspirin 81 mg oral tablet: 1 tab(s) orally once a day  atorvastatin 40 mg oral tablet: 1 tab(s) orally once a day (at bedtime)  carvedilol 12.5 mg oral tablet: 1 tab(s) orally 2 times a day  clopidogrel 75 mg oral tablet: 1 tab(s) orally once a day  Farxiga 10 mg oral tablet: 1 tab(s) orally once a day  hydrALAZINE 50 mg oral tablet: 1 tab(s) orally 2 times a day  lactulose 10 g/15 mL oral syrup: 30 milliliter(s) orally 2 times a day  levothyroxine 25 mcg (0.025 mg) oral capsule: 1 cap(s) orally once a day  metFORMIN 1000 mg oral tablet: 1 tab(s) orally 2 times a day  omeprazole 20 mg oral delayed release capsule: 1 cap(s) orally once a day   albuterol 90 mcg/inh inhalation powder: 2 puff(s) inhaled every 4 hours   aspirin 81 mg oral tablet: 1 tab(s) orally once a day  atorvastatin 40 mg oral tablet: 1 tab(s) orally once a day (at bedtime)  carvedilol 12.5 mg oral tablet: 1 tab(s) orally 2 times a day  clopidogrel 75 mg oral tablet: 1 tab(s) orally once a day  Farxiga 10 mg oral tablet: 1 tab(s) orally once a day  hydrALAZINE 50 mg oral tablet: 1 tab(s) orally 2 times a day  lactulose 10 g/15 mL oral syrup: 30 milliliter(s) orally 2 times a day  levothyroxine 25 mcg (0.025 mg) oral capsule: 1 cap(s) orally once a day  metFORMIN 1000 mg oral tablet: 1 tab(s) orally 2 times a day  NIFEdipine 60 mg oral tablet, extended release: 1 tab(s) orally once a day  omeprazole 20 mg oral delayed release capsule: 1 cap(s) orally once a day

## 2022-10-20 NOTE — PROGRESS NOTE ADULT - ASSESSMENT
82y/oF PMH HTN, HLD, DM, CAD s/p PHYLLIS to mLAD 2019 presenting to ER with 1 day of epigastric discomfort, found to have liver lesions, admitted for ACS r/o and GI w/u of liver lesions     Liver lesions  CT findings with:  -2.9cm hypervascular lesion, likely flash filling hemangioma   -L hepatic lobe subcentimeter hypervascular focus, indeterminate   -3cm R hepatic lobe lesion suspicious for hepatocellular carcinoma   -CEA 4.2,   -f/u MRI with liver protocol, still pending; if not done today 10/20, will dc home for outpt f/u. pt aware   -GI recs appreciated     Cirrhosis: MARTIN vs viral   -hepatitis panel: +hepA IgG Ab (likely hx of HAV or vaccination); only +anti-HBc, on repeat +anti-HBc, anti-HBs indeterminate, suspect prior infection   -monitor LFTs  -cont lactulose 20mg bid    Epigastric pain, resolved   ACS ruled out  -cont ppi  -trops neg  -cont statin, nifedipine, coreg, hydralazine   -cardio recs appreciated   -TTE: LVEF 75%, grade I ddx     DM2  -holding metformin, farxiga  -a1c 8.9  -cont lantus, iss, adjust as needed in house     Hyperkalemia, resolved   -s/p lokelma, insulin, dextrose   -holding spironolactone   -additional lokelma 10/19  -cont to monitor     CAD s/p PHYLLIS mLAD 2019   HTN, HLD   -holding DAPT   -holding spironolactone   -cont coreg, nifedipine, statin     vte ppx: scds

## 2022-10-20 NOTE — DISCHARGE NOTE PROVIDER - NSDCCPCAREPLAN_GEN_ALL_CORE_FT
PRINCIPAL DISCHARGE DIAGNOSIS  Diagnosis: Liver lesion  Assessment and Plan of Treatment: CT findings with: 2.9cm hypervascular lesion, likely flash filling hemangioma; L hepatic lobe subcentimeter hypervascular focus, indeterminate; 3cm R hepatic lobe lesion suspicious for hepatocellular carcinoma.  You need an MRI of the abdomen with liver protocol for further work up of hepatocellular carcinoma.      SECONDARY DISCHARGE DIAGNOSES  Diagnosis: Elevated CEA  Assessment and Plan of Treatment:     Diagnosis: Elevated AFP  Assessment and Plan of Treatment:     Diagnosis: Cirrhosis  Assessment and Plan of Treatment: Follow up with hepatologist, Dr. Kelley    Diagnosis: Positive hepatitis serology  Assessment and Plan of Treatment:     Diagnosis: Atherosclerosis of celiac artery  Assessment and Plan of Treatment: 50-70% stenosis of celiac axis and SMA    Diagnosis: Diabetes  Assessment and Plan of Treatment: hgba1c 8.9  follow up with your primary care physician    Diagnosis: Constipation  Assessment and Plan of Treatment:      PRINCIPAL DISCHARGE DIAGNOSIS  Diagnosis: Liver lesion  Assessment and Plan of Treatment: CT findings with: 2.9cm hypervascular lesion, likely flash filling hemangioma; L hepatic lobe subcentimeter hypervascular focus, indeterminate; 3cm R hepatic lobe lesion suspicious for hepatocellular carcinoma.  MRI of the abdomen with liver protocol with findings comptaible for hepatocellular carcinoma. you need to follow up with Dr. Kelley, hepatologist for further work up.      SECONDARY DISCHARGE DIAGNOSES  Diagnosis: Elevated CEA  Assessment and Plan of Treatment:     Diagnosis: Elevated AFP  Assessment and Plan of Treatment:     Diagnosis: Cirrhosis  Assessment and Plan of Treatment: Follow up with hepatologist, Dr. Kelley    Diagnosis: Positive hepatitis serology  Assessment and Plan of Treatment:     Diagnosis: Atherosclerosis of celiac artery  Assessment and Plan of Treatment: 50-70% stenosis of celiac axis and SMA    Diagnosis: Diabetes  Assessment and Plan of Treatment: hgba1c 8.9  follow up with your primary care physician    Diagnosis: Constipation  Assessment and Plan of Treatment:

## 2022-10-20 NOTE — DISCHARGE NOTE PROVIDER - ATTENDING DISCHARGE PHYSICAL EXAMINATION:
Vital Signs Last 24 Hrs  T(C): 36.7 (21 Oct 2022 04:20), Max: 36.7 (21 Oct 2022 04:20)  T(F): 98 (21 Oct 2022 04:20), Max: 98 (21 Oct 2022 04:20)  HR: 80 (21 Oct 2022 04:20) (71 - 80)  BP: 143/65 (21 Oct 2022 04:20) (143/65 - 174/71)  BP(mean): --  RR: 18 (21 Oct 2022 04:20) (18 - 18)  SpO2: 93% (21 Oct 2022 04:20) (93% - 98%)    Parameters below as of 21 Oct 2022 04:20  Patient On (Oxygen Delivery Method): room air    GENERAL: NAD  HEENT: +EOMI  NECK: soft, supple  CHEST/LUNG: Clear to auscultation bilaterally  HEART: S1S2+, Regular rate and rhythm  ABDOMEN: Soft, Nontender, Nondistended; Bowel sounds present  SKIN: warm, dry  NEURO: awake, alert; grossly non-focal   PSYCH: normal affect

## 2022-10-20 NOTE — DISCHARGE NOTE PROVIDER - PROVIDER TOKENS
PROVIDER:[TOKEN:[[id number]],FOLLOWUP:[1-3 days]],PROVIDER:[TOKEN:[[id number]]],PROVIDER:[TOKEN:[[id number]]],FREE:[LAST:[PMD],PHONE:[(   )    -],FAX:[(   )    -]]

## 2022-10-20 NOTE — DISCHARGE NOTE PROVIDER - CARE PROVIDERS DIRECT ADDRESSES
,DirectAddress_Unknown,[email or web address],DirectAddress_Unknown,DirectAddress_Unknown

## 2022-10-20 NOTE — PROGRESS NOTE ADULT - SUBJECTIVE AND OBJECTIVE BOX
Pt seen and examined. No GI complaints. Still awaiting MR of Abdomen with liver protocol for further evaluation of right lobe liver lesion suspicious for HCC. AFP elevated @ 186.     REVIEW OF SYSTEMS:  Constitutional: No fever, weight loss or fatigue  Cardiovascular: No chest pain, palpitations, dizziness or leg swelling  Gastrointestinal: As noted above. No abdominal or epigastric pain. No nausea, vomiting or hematemesis; No diarrhea or constipation. No melena or hematochezia.  Skin: No itching, burning, rashes or lesions       MEDICATIONS:  MEDICATIONS  (STANDING):  atorvastatin 40 milliGRAM(s) Oral at bedtime  budesonide 160 MICROgram(s)/formoterol 4.5 MICROgram(s) Inhaler 2 Puff(s) Inhalation two times a day  carvedilol 12.5 milliGRAM(s) Oral every 12 hours  dextrose 5%. 1000 milliLiter(s) (50 mL/Hr) IV Continuous <Continuous>  dextrose 5%. 1000 milliLiter(s) (100 mL/Hr) IV Continuous <Continuous>  dextrose 50% Injectable 25 Gram(s) IV Push once  dextrose 50% Injectable 12.5 Gram(s) IV Push once  dextrose 50% Injectable 25 Gram(s) IV Push once  glucagon  Injectable 1 milliGRAM(s) IntraMuscular once  hydrALAZINE 50 milliGRAM(s) Oral two times a day  insulin glargine Injectable (LANTUS) 8 Unit(s) SubCutaneous at bedtime  insulin lispro (ADMELOG) corrective regimen sliding scale   SubCutaneous at bedtime  insulin lispro (ADMELOG) corrective regimen sliding scale   SubCutaneous three times a day before meals  insulin lispro Injectable (ADMELOG) 3 Unit(s) SubCutaneous three times a day before meals  lactulose Syrup 20 Gram(s) Oral two times a day  levothyroxine 25 MICROGram(s) Oral daily  NIFEdipine XL 60 milliGRAM(s) Oral daily  pantoprazole    Tablet 40 milliGRAM(s) Oral before breakfast  senna 2 Tablet(s) Oral at bedtime    MEDICATIONS  (PRN):  benzocaine 15 mG/menthol 3.6 mG Lozenge 1 Lozenge Oral every 6 hours PRN Sore Throat  dextrose Oral Gel 15 Gram(s) Oral once PRN Blood Glucose LESS THAN 70 milliGRAM(s)/deciliter  labetalol Injectable 10 milliGRAM(s) IV Push every 6 hours PRN Systolic blood pressure > 180      Allergies    amlodipine (Swelling)  aspirin (Rash)    Intolerances        Vital Signs Last 24 Hrs  T(C): 36.5 (20 Oct 2022 04:15), Max: 36.5 (19 Oct 2022 20:08)  T(F): 97.7 (20 Oct 2022 04:15), Max: 97.7 (19 Oct 2022 20:08)  HR: 74 (20 Oct 2022 04:15) (71 - 75)  BP: 154/65 (20 Oct 2022 04:15) (126/68 - 171/67)  BP(mean): --  RR: 18 (20 Oct 2022 04:15) (18 - 18)  SpO2: 94% (20 Oct 2022 04:15) (94% - 96%)    Parameters below as of 20 Oct 2022 04:15  Patient On (Oxygen Delivery Method): room air          PHYSICAL EXAM:    General: Well developed; overweight; in no acute distress  HEENT: MMM, conjunctiva pink and sclera anicteric.  Lungs: clear to auscultation bilaterally.  Cor: RRR S1, S2 only.  Gastrointestinal: Abdomen: Soft non-tender non-distended; Normal bowel sounds; No hepatosplenomegaly.  Extremities: no cyanosis, clubbing or edema.  Skin: Warm and dry. No obvious rash  Neuro: Pt. a + o x 3.    LABS:  CBC Full  -  ( 20 Oct 2022 05:47 )  WBC Count : 7.51 K/uL  RBC Count : 3.54 M/uL  Hemoglobin : 8.7 g/dL  Hematocrit : 29.3 %  Platelet Count - Automated : 179 K/uL  Mean Cell Volume : 82.8 fl  Mean Cell Hemoglobin : 24.6 pg  Mean Cell Hemoglobin Concentration : 29.7 gm/dL  Auto Neutrophil # : x  Auto Lymphocyte # : x  Auto Monocyte # : x  Auto Eosinophil # : x  Auto Basophil # : x  Auto Neutrophil % : x  Auto Lymphocyte % : x  Auto Monocyte % : x  Auto Eosinophil % : x  Auto Basophil % : x    10-20    136  |  103  |  14.8  ----------------------------<  195<H>  4.8   |  21.0<L>  |  0.80    Ca    9.3      20 Oct 2022 05:47  Mg     1.8     10-20    TPro  7.2  /  Alb  3.6  /  TBili  0.3<L>  /  DBili  0.1  /  AST  73<H>  /  ALT  53<H>  /  AlkPhos  147<H>  10-20          Urinalysis Basic - ( 19 Oct 2022 14:30 )    Color: Yellow / Appearance: Clear / S.015 / pH: x  Gluc: x / Ketone: Negative  / Bili: Negative / Urobili: Negative mg/dL   Blood: x / Protein: Negative / Nitrite: Negative   Leuk Esterase: Small / RBC: 0-2 /HPF / WBC 3-5 /HPF   Sq Epi: x / Non Sq Epi: Occasional / Bacteria: Occasional                RADIOLOGY & ADDITIONAL STUDIES (The following images were personally reviewed):< from: CT Angio Abdomen and Pelvis w/ IV Cont (10.16.22 @ 01:14) >    CT ANGIO ABD PELV (W)AW IC                          PROCEDURE DATE:  10/16/2022          INTERPRETATION:  CLINICAL INFORMATION: Abdominal pain. Evaluate for   mesenteric ischemia.    COMPARISON: None.    CONTRAST/COMPLICATIONS:  IV Contrast: Omnipaque 350  95 cc administered   5 cc discarded  Oral Contrast: NONE  Complications: None reported at time of study completion    PROCEDURE:  CT Angiography of the Abdomen and Pelvis was performed.  Arterial and venous phases were acquired.  Sagittal and coronal reformats were performed as well as 3D (MIP)   reconstructions.    FINDINGS:  LOWER CHEST: Heart is borderline in size. Coronary calcifications are   noted..    LIVER: Nodularity of the hepatic contour with preferential enlargement of   the left hepatic lobe is consistent with cirrhosis. There is a 2.9 cm   hypervascular lesion of segment 2 of the left hepatic lobe, with lungs   and subsequent portal venous phase imaging. This is likely a flash   filling hemangioma. An additional subcentimeter hypervascular focus more   anteriorly within the left hepatic lobe is noted and is indeterminate   nature. A third hypervascular lesion is seen within the right hepatic   lobe measuring 3 cm. This demonstrates decreased attenuation on venous   phase imaging suggestive of washout. This is a finding associated with   hepatocellular carcinoma. There is diffuse fatty replacement of liver.  BILE DUCTS: Normal caliber.  GALLBLADDER: Within normal limits.  SPLEEN: Within normallimits.  PANCREAS: Within normal limits.  ADRENALS: Within normal limits.  KIDNEYS/URETERS: Within normal limits.    BLADDER: Within normal limits.  REPRODUCTIVE ORGANS:    BOWEL: There is no bowel obstruction.. Appendix is normal. There is   colonicdiverticulosis. There is no focal diverticulitis. A large amount   of stool seen throughout the colon. There is a small hiatal hernia.  PERITONEUM: No ascites.  VESSELS: Atherosclerotic ossifications of the aorta and iliac arteries   are appreciated.Atherosclerotic calcifications are seen at the origins   of the celiac axis and SMA. 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 The BC is patent. There is no venous occlusive disease.  RETROPERITONEUM/LYMPH NODES: No lymphadenopathy.  ABDOMINAL WALL: Within normal limits.  BONES: Degenerative changes.      IMPRESSION:  Hypervascular lesions seen in liver, at least one of which is typical for   a flash filling hemangioma. A lesion within the right hepatic lobe   demonstrates washout, suspicious for hepatocellular carcinoma. Additional   subcentimeter hypervascular lesion of left hepatic lobe is indeterminate.   Confirmation with multiphasic hepatic protocol MRI or CT is advised for   more definitive evaluation.    Cirrhosis.    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 No mesenteric venous occlusive   disease.    Constipation.          --- End of Report ---            MONI ENG MD; Attending Radiologist  This document has been electronically signed. Oct 16 2022  1:38AM    < end of copied text >

## 2022-10-21 ENCOUNTER — TRANSCRIPTION ENCOUNTER (OUTPATIENT)
Age: 82
End: 2022-10-21

## 2022-10-21 VITALS
SYSTOLIC BLOOD PRESSURE: 157 MMHG | HEART RATE: 78 BPM | RESPIRATION RATE: 16 BRPM | OXYGEN SATURATION: 97 % | TEMPERATURE: 98 F | DIASTOLIC BLOOD PRESSURE: 69 MMHG

## 2022-10-21 LAB
GLUCOSE BLDC GLUCOMTR-MCNC: 234 MG/DL — HIGH (ref 70–99)
GLUCOSE BLDC GLUCOMTR-MCNC: 240 MG/DL — HIGH (ref 70–99)

## 2022-10-21 PROCEDURE — 99233 SBSQ HOSP IP/OBS HIGH 50: CPT

## 2022-10-21 PROCEDURE — 74182 MRI ABDOMEN W/CONTRAST: CPT | Mod: 26

## 2022-10-21 PROCEDURE — 99239 HOSP IP/OBS DSCHRG MGMT >30: CPT

## 2022-10-21 RX ORDER — NIFEDIPINE 30 MG
1 TABLET, EXTENDED RELEASE 24 HR ORAL
Qty: 30 | Refills: 0
Start: 2022-10-21 | End: 2022-11-19

## 2022-10-21 RX ADMIN — Medication 60 MILLIGRAM(S): at 05:16

## 2022-10-21 RX ADMIN — CARVEDILOL PHOSPHATE 12.5 MILLIGRAM(S): 80 CAPSULE, EXTENDED RELEASE ORAL at 05:16

## 2022-10-21 RX ADMIN — Medication 3 UNIT(S): at 12:56

## 2022-10-21 RX ADMIN — Medication 4: at 09:59

## 2022-10-21 RX ADMIN — LACTULOSE 20 GRAM(S): 10 SOLUTION ORAL at 05:17

## 2022-10-21 RX ADMIN — Medication 25 MICROGRAM(S): at 05:16

## 2022-10-21 RX ADMIN — Medication 50 MILLIGRAM(S): at 05:16

## 2022-10-21 RX ADMIN — Medication 4: at 12:55

## 2022-10-21 RX ADMIN — PANTOPRAZOLE SODIUM 40 MILLIGRAM(S): 20 TABLET, DELAYED RELEASE ORAL at 05:16

## 2022-10-21 RX ADMIN — Medication 3 UNIT(S): at 10:00

## 2022-10-21 NOTE — DISCHARGE NOTE NURSING/CASE MANAGEMENT/SOCIAL WORK - NSDCPEFALRISK_GEN_ALL_CORE
For information on Fall & Injury Prevention, visit: [email or web address] OR  [email or web address] OR  https://www.cdc.gov/steadi/patient.html

## 2022-10-21 NOTE — PROGRESS NOTE ADULT - ASSESSMENT
82 year old female with DM, CAD, HTN with cirrhosis (?MARTIN) now with multifocal HCC.    Multifocal HCC   Discussed findings and treatment options with patient and daughter via   Please have patient follow up with Dr. Kelley in Hepatology Office for evaluation for ablation of HCC  Continue diet as tolerated

## 2022-10-21 NOTE — DISCHARGE NOTE NURSING/CASE MANAGEMENT/SOCIAL WORK - INFLUENZA IMMUNIZATION DATE (APPROXIMATE):
Patient : Lemuel Esposito Age: 77 year old Sex: male   MRN: [id number] Encounter Date: 6/18/2022      History     Chief Complaint   Patient presents with   • Skin Problem     Multiple bed sores     HPI     77-year-old male history of dementia heart failure seizure disorder hypertension contraction chronic Coreas who was just discharged from the nursing facility back home sent back to the emergency department due to wounds family was unable to take care of him they apparently had called the nursing facility who told the family that they were going to send a wound care specialist out but never did so sent him to the emergency department.  Patient denies any complaints at this time.  Unable to give surgical family and social history given dementia/stroke    Allergies   Allergen Reactions   • Enalapril RASH       Current Discharge Medication List      Prior to Admission Medications    Details   polyethylene glycol (MIRALAX) 17 g packet Take 17 g by mouth daily. Stir and dissolve powder in any 4 to 8 ounces of beverage, then drink.      metoPROLOL tartrate (LOPRESSOR) 50 MG tablet Take 50 mg by mouth 2 times daily.      oxcarbazepine (TRILEPTAL) 600 MG tablet Take 600 mg by mouth 2 times daily.      tamsulosin (FLOMAX) 0.4 MG Cap Take 0.4 mg by mouth daily.      acetaminophen (TYLENOL) 325 MG tablet Take 650 mg by mouth every 4 hours as needed for Pain. Indications: Pain      levETIRAcetam (KepPRA) 500 MG tablet Take 500 mg by mouth 2 times daily.             Current Discharge Medication List          Past Medical History:   Diagnosis Date   • BPH (benign prostatic hyperplasia)    • CKD (chronic kidney disease)    • Essential (primary) hypertension    • Seizures (CMS/HCC)    • Stroke (CMS/HCC)    • Uncomplicated senile dementia (CMS/HCC)        No past surgical history on file.    No family history on file.    Social History     Tobacco Use   • Smoking status: Never Smoker   • Smokeless tobacco: Never Used   Vaping Use   •  Vaping Use: never used   Substance Use Topics   • Alcohol use: Never   • Drug use: Never       Review of Systems   Constitutional: Negative for fever.   HENT: Negative for ear pain and sinus pain.    Eyes: Negative for pain.   Respiratory: Negative for cough, chest tightness and shortness of breath.    Cardiovascular: Negative for chest pain and palpitations.   Gastrointestinal: Negative for abdominal pain, diarrhea and vomiting.   Genitourinary: Negative for dysuria and flank pain.   Musculoskeletal: Negative for back pain and neck pain.   Skin: Positive for wound. Negative for rash.   Allergic/Immunologic: Positive for immunocompromised state.   Neurological: Negative for dizziness, weakness and numbness.        Physical Exam     ED Triage Vitals [06/18/22 1234]   ED Triage Vitals Group      Temp 98.1 °F (36.7 °C)      Heart Rate 61      Resp 18      /80      SpO2 100 %      EtCO2 mmHg       Height       Weight 111 lb 8.8 oz (50.6 kg)      Weight Scale Used Scale in bed      BMI (Calculated)       IBW/kg (Calculated)        Physical Exam  HENT:      Head: Normocephalic and atraumatic.      Nose: No congestion.      Mouth/Throat:      Mouth: Mucous membranes are moist.      Pharynx: Oropharynx is clear.      Neck: Normal range of motion.   Eyes:      Extraocular Movements: Extraocular movements intact.      Pupils: Pupils are equal, round, and reactive to light.   Cardiovascular:      Rate and Rhythm: Normal rate and regular rhythm.      Pulses: Normal pulses.   Pulmonary:      Effort: Pulmonary effort is normal.      Breath sounds: Normal breath sounds.   Abdominal:      General: Abdomen is flat.      Palpations: Abdomen is soft.   Musculoskeletal:      Comments: Contractures in bilateral upper and bilateral lower extremities    Patient with bilateral midfoot skin breakdown and left heel blister with no erythema do not believe that this is necrosis superficial right shoulder wound left ulceration left  buttock ulceration   Neurological:      General: No focal deficit present.      Mental Status: He is alert. Mental status is at baseline.   Psychiatric:         Behavior: Behavior normal.           Lab Results     Results for orders placed or performed during the hospital encounter of 06/18/22   Basic Metabolic Panel   Result Value Ref Range    Fasting Status      Sodium 125 (L) 135 - 145 mmol/L    Potassium 4.1 3.4 - 5.1 mmol/L    Chloride 92 (L) 97 - 110 mmol/L    Carbon Dioxide 30 21 - 32 mmol/L    Anion Gap 7 7 - 19 mmol/L    Glucose 90 70 - 99 mg/dL    BUN 11 6 - 20 mg/dL    Creatinine 0.66 (L) 0.67 - 1.17 mg/dL    Glomerular Filtration Rate >90 >=60    BUN/ Creatinine Ratio 17 7 - 25    Calcium 8.8 8.4 - 10.2 mg/dL   COVID/Flu/RSV panel   Result Value Ref Range    Rapid SARS-COV-2 by PCR Not Detected Not Detected / Detected / Presumptive Positive / Inhibitors present    Influenza A by PCR Not Detected Not Detected    Influenza B by PCR Not Detected Not Detected    RSV BY PCR Not Detected Not Detected    Isolation Guidelines      Procedural Comment     CBC with Automated Differential (performable only)   Result Value Ref Range    WBC 8.4 4.2 - 11.0 K/mcL    RBC 3.15 (L) 4.50 - 5.90 mil/mcL    HGB 9.5 (L) 13.0 - 17.0 g/dL    HCT 29.6 (L) 39.0 - 51.0 %    MCV 94.0 78.0 - 100.0 fl    MCH 30.2 26.0 - 34.0 pg    MCHC 32.1 32.0 - 36.5 g/dL    RDW-CV 14.0 11.0 - 15.0 %    RDW-SD 47.1 39.0 - 50.0 fL     140 - 450 K/mcL    NRBC 0 <=0 /100 WBC    Neutrophil, Percent 74 %    Lymphocytes, Percent 14 %    Mono, Percent 8 %    Eosinophils, Percent 2 %    Basophils, Percent 1 %    Immature Granulocytes 1 %    Absolute Neutrophils 6.3 1.8 - 7.7 K/mcL    Absolute Lymphocytes 1.2 1.0 - 4.0 K/mcL    Absolute Monocytes 0.7 0.3 - 0.9 K/mcL    Absolute Eosinophils  0.1 0.0 - 0.5 K/mcL    Absolute Basophils 0.1 0.0 - 0.3 K/mcL    Absolute Immmature Granulocytes 0.1 0.0 - 0.2 K/mcL   Osmolality   Result Value Ref Range     Osmolality 272 (L) 275 - 300 mOsm/kg   Magnesium   Result Value Ref Range    Magnesium 1.9 1.7 - 2.4 mg/dL         Radiology Results     No images are attached to the encounter.     No orders to display        ED Course     MDM     Bed sore evaluation  -Patient does have multiple bedsores but all seem to be in multiple stages of healing no necrosis or signs of necrotizing soft tissue infection attempted to call care management to get the patient home health but was unable to patient incidentally noted to be hyponatremic urinalysis was sent we will fluid restrict for the time being discussed with Dr. Denton who will order fluid as appropriate should urine electrolytes to be consistent with hypoeuvolemic hyponatremia otherwise patient appears to be acting at his baseline I do not believe the patient will be appropriate to go back home as daughter is unable to take care of him           Disposition          The case was reviewed with the patient. Nursing notes reviewed.    Results for orders placed or performed during the hospital encounter of 06/18/22   Basic Metabolic Panel   Result Value    Fasting Status     Sodium 125 (L)    Potassium 4.1    Chloride 92 (L)    Carbon Dioxide 30    Anion Gap 7    Glucose 90    BUN 11    Creatinine 0.66 (L)    Glomerular Filtration Rate >90     Comment: eGFR results = or >60 mL/min/1.73m2 = Normal kidney function. Estimated GFR calculated using the CKD-EPI-R (2021) equation that does not include race in the creatinine calculation.    BUN/ Creatinine Ratio 17    Calcium 8.8   COVID/Flu/RSV panel   Result Value    Rapid SARS-COV-2 by PCR Not Detected    Influenza A by PCR Not Detected    Influenza B by PCR Not Detected    RSV BY PCR Not Detected    Isolation Guidelines      Comment: Do not use this test result as the sole decision-maker for discontinuation of isolation.   Clinical evaluation should be considered for other respiratory illness requiring transmission-based  isolation.    -    No fever (<99.0 F/37.2 C) for at least 24 hours without the use of fever-reducing medications    AND  -    Respiratory symptoms have improved or resolved (e.g. cough, shortness of breath)     AND  -    COVID-19 negative test    See COVID-19 Deisolation Resource Guide    Procedural Comment      Comment: SARS-COV-2 nucleic acid has not been detected indicating the absence of COVID-19.    This test was performed using the [employer] Xpert Xpress SARS-CoV-2/Flu/RSV RT-PCR test that has been given Emergency Use Authorization (EUA) by the [place] Food and Drug Administration (FDA).  These tests are considered definitive and do not need to be confirmed by another method.   CBC with Automated Differential (performable only)   Result Value    WBC 8.4    RBC 3.15 (L)    HGB 9.5 (L)    HCT 29.6 (L)    MCV 94.0    MCH 30.2    MCHC 32.1    RDW-CV 14.0    RDW-SD 47.1        NRBC 0    Neutrophil, Percent 74    Lymphocytes, Percent 14    Mono, Percent 8    Eosinophils, Percent 2    Basophils, Percent 1    Immature Granulocytes 1    Absolute Neutrophils 6.3    Absolute Lymphocytes 1.2    Absolute Monocytes 0.7    Absolute Eosinophils  0.1    Absolute Basophils 0.1    Absolute Immmature Granulocytes 0.1   Osmolality   Result Value    Osmolality 272 (L)   Magnesium   Result Value    Magnesium 1.9        Medications - No data to display    ED Diagnoses     Diagnosis Comment Associated Orders       Final diagnoses    Hyponatremia -- --    Skin ulcer, unspecified ulcer stage (CMS/[facility]) -- --           No follow-up provider specified.       Summary of your Discharge Medications      You have not been prescribed any medications.                      Wili Contreras MD  06/18/22 [id number]     15[birth year]2019

## 2022-10-21 NOTE — PROGRESS NOTE ADULT - SUBJECTIVE AND OBJECTIVE BOX
ID [id number] Amari    Chief Complaint:  Patient is a 82y old  Female who presents with a chief complaint of Hepatic Mass (20 Oct 2022 16:55)      Interval Events / Subjective: Patient seen and examined at bedside. No acute events overnight. She is tolerating regular diet. She denies acute complaints. MRI read 2 HCC lesions ~2cm in cirrhotic liver.       REVIEW OF SYSTEMS:   General: Negative  HEENT: Negative  CV: Negative  Respiratory: Negative  GI: See HPI  : Negative  MSK: Negative  Hematologic: Negative  Skin: Negative    MEDICATIONS:   MEDICATIONS  (STANDING):  atorvastatin 40 milliGRAM(s) Oral at bedtime  budesonide 160 MICROgram(s)/formoterol 4.5 MICROgram(s) Inhaler 2 Puff(s) Inhalation two times a day  carvedilol 12.5 milliGRAM(s) Oral every 12 hours  dextrose 5%. 1000 milliLiter(s) (50 mL/Hr) IV Continuous <Continuous>  dextrose 5%. 1000 milliLiter(s) (100 mL/Hr) IV Continuous <Continuous>  dextrose 50% Injectable 25 Gram(s) IV Push once  dextrose 50% Injectable 12.5 Gram(s) IV Push once  dextrose 50% Injectable 25 Gram(s) IV Push once  glucagon  Injectable 1 milliGRAM(s) IntraMuscular once  hydrALAZINE 50 milliGRAM(s) Oral two times a day  insulin glargine Injectable (LANTUS) 8 Unit(s) SubCutaneous at bedtime  insulin lispro (ADMELOG) corrective regimen sliding scale   SubCutaneous three times a day before meals  insulin lispro (ADMELOG) corrective regimen sliding scale   SubCutaneous at bedtime  insulin lispro Injectable (ADMELOG) 3 Unit(s) SubCutaneous three times a day before meals  lactulose Syrup 20 Gram(s) Oral two times a day  levothyroxine 25 MICROGram(s) Oral daily  NIFEdipine XL 60 milliGRAM(s) Oral daily  pantoprazole    Tablet 40 milliGRAM(s) Oral before breakfast  senna 2 Tablet(s) Oral at bedtime    MEDICATIONS  (PRN):  benzocaine 15 mG/menthol 3.6 mG Lozenge 1 Lozenge Oral every 6 hours PRN Sore Throat  dextrose Oral Gel 15 Gram(s) Oral once PRN Blood Glucose LESS THAN 70 milliGRAM(s)/deciliter  labetalol Injectable 10 milliGRAM(s) IV Push every 6 hours PRN Systolic blood pressure > 180      ALLERGIES:   Allergies    amlodipine (Swelling)  aspirin (Rash)    Intolerances        VITAL SIGNS:   Vital Signs Last 24 Hrs  T(C): 36.7 (21 Oct 2022 11:20), Max: 36.7 (21 Oct 2022 04:20)  T(F): 98 (21 Oct 2022 11:20), Max: 98 (21 Oct 2022 04:20)  HR: 76 (21 Oct 2022 11:20) (76 - 80)  BP: 167/69 (21 Oct 2022 11:20) (143/65 - 174/71)  BP(mean): --  RR: 18 (21 Oct 2022 11:20) (18 - 18)  SpO2: 98% (21 Oct 2022 11:20) (93% - 98%)    Parameters below as of 21 Oct 2022 11:20  Patient On (Oxygen Delivery Method): room air      I&O's Summary      PHYSICAL EXAM:   GENERAL:  No acute distress  HEENT:  NC/AT,  conjunctivae clear, sclera -anicteric  CHEST:  Full & symmetric excursion, no increased effort, breath sounds clear  HEART:  Regular rhythm, rate regular no edema  ABDOMEN:  Soft, non-tender, non-distended, normoactive bowel sounds,  no rebound or guarding  EXTREMITIES: No cyanosis, clubbing or edema  SKIN:  warm/dry  NEURO:  calm, cooperative    LABS:  CBC Full  -  ( 20 Oct 2022 05:47 )  WBC Count : 7.51 K/uL  RBC Count : 3.54 M/uL  Hemoglobin : 8.7 g/dL  Hematocrit : 29.3 %  Platelet Count - Automated : 179 K/uL  Mean Cell Volume : 82.8 fl  Mean Cell Hemoglobin : 24.6 pg  Mean Cell Hemoglobin Concentration : 29.7 gm/dL  Auto Neutrophil # : x  Auto Lymphocyte # : x  Auto Monocyte # : x  Auto Eosinophil # : x  Auto Basophil # : x  Auto Neutrophil % : x  Auto Lymphocyte % : x  Auto Monocyte % : x  Auto Eosinophil % : x  Auto Basophil % : x    10-20    136  |  103  |  14.8  ----------------------------<  195<H>  4.8   |  21.0<L>  |  0.80    Ca    9.3      20 Oct 2022 05:47  Mg     1.8     10-20    TPro  7.2  /  Alb  3.6  /  TBili  0.3<L>  /  DBili  0.1  /  AST  73<H>  /  ALT  53<H>  /  AlkPhos  147<H>  10-20    LIVER FUNCTIONS - ( 20 Oct 2022 05:47 )  Alb: 3.6 g/dL / Pro: 7.2 g/dL / ALK PHOS: 147 U/L / ALT: 53 U/L / AST: 73 U/L / GGT: x                   RADIOLOGY & ADDITIONAL STUDIES (The following images were personally reviewed): MRI    ACC: [id number] EXAM:  MR ABDOMEN IC                          PROCEDURE DATE:  10/21/2022          INTERPRETATION:  CLINICAL INFORMATION: Right hepatic hepatoma.    COMPARISON: None contrast-enhanced CT of the abdomen and pelvis October 16, 2022.    CONTRAST/COMPLICATIONS:  IV Contrast: 8 cc of Gadavist intravenous contrast.  Oral Contrast: None  Complications: None    PROCEDURE:  MRI of the abdomen was performed. There is significant patient   respiratory motion artifact.  MRCP was performed.    FINDINGS:  LOWER CHEST: Within normal limits.    LIVER: Liver is shrunken with prominence of the left lobe and caudate   lobe and a subtle nodular contour, compatible with cirrhosis. There is a   2.2 x 2.0 cm mildly T2 hyperintense lesion at the junction of segments 6   and 7 which demonstrates restricted diffusion. Dynamic postcontrast   imaging is markedly degraded by respiratory motion artifact, however   there is evidence of early enhancement and washout. These findings are   compatible with HCC. There is a similar 2.7 x 1.9 cm mildly T2 bright   subcapsular lesion at the junction of segments 2 and 3 posteriorly which   also demonstrates early enhancement with washout. The remainder of the   liver is mildly heterogeneous in signal and enhancement. The hepatic   veins and portal vein are patent.  BILE DUCTS: There is no intra or extra hepatic biliary duct dilatation.   The common bile duct measures 3 mm.  GALLBLADDER: Within normal limits.  SPLEEN: Within normal limits.  PANCREAS: Within normal limits. The pancreatic duct is normal in course   and caliber.  ADRENALS: Within normal limits.  KIDNEYS/URETERS: Within normal limits.    VISUALIZED PORTIONS:  BOWEL: Within normal limits.  PERITONEUM: No ascites.  VESSELS: Within normal limits.  RETROPERITONEUM/LYMPH NODES: No lymphadenopathy.  ABDOMINAL WALL: Within normal limits.  BONES: Within normal limits.    IMPRESSION: Technically suboptimal study due to significant respiratory   motion artifact on dynamic postcontrast imaging. Otherwise, evidence of   multifocal HCC within a cirrhotic liver, as described.    --- End of Report ---            RUPALI BELLO MD; Attending Radiologist  This document has been electronically signed. Oct 21 2022  1:07PM

## 2022-10-21 NOTE — DISCHARGE NOTE NURSING/CASE MANAGEMENT/SOCIAL WORK - PATIENT PORTAL LINK FT
You can access the FollowMyHealth Patient Portal offered by [facility] by registering at the following website: [email or web address]. By joining [employer]’s FollowMyHealth portal, you will also be able to view your health information using other applications (apps) compatible with our system.

## 2022-10-21 NOTE — PROGRESS NOTE ADULT - PROVIDER SPECIALTY LIST ADULT
Cardiology
Cardiology
Hospitalist
Hospitalist
Gastroenterology
Hospitalist
Cardiology
Gastroenterology
Cardiology
Hospitalist
Gastroenterology
Gastroenterology

## 2022-10-21 NOTE — PROGRESS NOTE ADULT - REASON FOR ADMISSION
Hepatic Mass
Alert and oriented to person, place, time/situation. normal mood and affect. no apparent risk to self or others.

## 2022-10-24 LAB — GLUCOSE BLDC GLUCOMTR-MCNC: 132 MG/DL — HIGH (ref 70–99)

## 2022-10-26 NOTE — ED PROVIDER NOTE - RESPIRATORY, MLM
Attending Progress Note      Subjective  More alert , family at bedside , tolerating diet    Objective    Current Meds  Current Facility-Administered Medications   Medication Dose Route Frequency Provider Last Rate Last Admin   • ampicillin-sulbactam (UNASYN) 1.5 g in sodium chloride 0.9 % 100 mL IVPB  1.5 g Intravenous 4 times per day Surjit Braswell MD       • metoPROLOL tartrate (LOPRESSOR) tablet 12.5 mg  12.5 mg Oral 2 times per day Kaitlyn Ovalles CNP   12.5 mg at 10/25/22 0952   • metoPROLOL (LOPRESSOR) injection 2.5 mg  2.5 mg Intravenous Q6H PRN Kaitlyn Ovalles CNP       • sodium chloride 0.9 % flush bag 25 mL  25 mL Intravenous PRN Vishnu Hawthorne MD       • sodium chloride (PF) 0.9 % injection 2 mL  2 mL Intracatheter 2 times per day Vishnu Hawthorne MD   2 mL at 10/25/22 0953   • allopurinol (ZYLOPRIM) tablet 300 mg  300 mg Oral Daily Eddy Batista MD   300 mg at 10/25/22 0951   • ascorbic acid (VITAMIN C) tablet 500 mg  500 mg Oral Daily Eddy Batista MD   500 mg at 10/25/22 0952   • aspirin (ECOTRIN) enteric coated tablet 81 mg  81 mg Oral Daily Eddy Batista MD   81 mg at 10/25/22 0951   • atorvastatin (LIPITOR) tablet 80 mg  80 mg Oral Nightly Eddy Batista MD   80 mg at 10/24/22 2146   • bicalutamide (CASODEX) tablet 50 mg  50 mg Oral Daily Eddy Batista MD   50 mg at 10/25/22 0951   • calcium carbonate-vitamin D (CALTRATE+D) 600-10 MG-MCG tablet 1 tablet  1 tablet Oral Daily Eddy Batista MD   1 tablet at 10/25/22 0951   • cholecalciferol (VITAMIN D) tablet 50 mcg  50 mcg Oral Daily Eddy Batista MD   50 mcg at 10/25/22 0952   • cyanocobalamin (Vitamin B-12) tablet 500 mcg  500 mcg Oral Daily Eddy Batista MD   500 mcg at 10/25/22 0951   • finasteride (PROSCAR) tablet 5 mg  5 mg Oral Daily Eddy Batista MD   5 mg at 10/25/22 0951   • FLUoxetine (PROzac) capsule 20 mg  20 mg Oral Daily Eddy Batista MD   20 mg at 10/25/22 0953   • hydroCORTisone (ANUSOL-HC) 2.5 % rectal cream 1 application  1 application Rectal BID  Eddy Batista MD   1 application at 10/25/22 0953   • levothyroxine (SYNTHROID, LEVOTHROID) tablet 100 mcg  100 mcg Oral QAM AC Eddy Batista MD   100 mcg at 10/25/22 0952   • tamsulosin (FLOMAX) capsule 0.8 mg  0.8 mg Oral Nightly Eddy Batista MD   0.8 mg at 10/24/22 2145   • enoxaparin (LOVENOX) injection 40 mg  40 mg Subcutaneous Daily Eddy Batista MD   40 mg at 10/25/22 0950   • sodium chloride 0.9 % flush bag 25 mL  25 mL Intravenous PRN Eddy Batista MD       • polyethylene glycol (MIRALAX) packet 17 g  17 g Oral Daily PRN Eddy Batista MD       • docusate sodium-sennosides (SENOKOT S) 50-8.6 MG 2 tablet  2 tablet Oral Daily PRN Eddy Batista MD   2 tablet at 10/22/22 1627   • bisacodyl (DULCOLAX) suppository 10 mg  10 mg Rectal Daily PRN Eddy Batista MD   10 mg at 10/22/22 1624   • magnesium hydroxide (MILK OF MAGNESIA) 400 MG/5ML suspension 30 mL  30 mL Oral Daily PRN Eddy Batista MD       • acetaminophen (TYLENOL) tablet 650 mg  650 mg Oral Q4H PRN Eddy Batista MD       • ondansetron (ZOFRAN) injection 4 mg  4 mg Intravenous BID PRN Eddy Batista MD       • Potassium Standard Replacement Protocol (Levels 3.5 and lower)   Does not apply See Admin Instructions Eddy Batista MD       • Magnesium Standard Replacement Protocol   Does not apply See Admin Instructions Eddy Batista MD       • polyethylene glycol (MIRALAX) packet 17 g  17 g Oral Daily Denys Smith MD   17 g at 10/24/22 0938   • potassium CHLORIDE (KLOR-CON) packet 20 mEq  20 mEq Oral Daily with breakfast Eddy Batista MD   20 mEq at 10/25/22 0951        I/O's    Intake/Output Summary (Last 24 hours) at 10/25/2022 2007  Last data filed at 10/24/2022 2145  Gross per 24 hour   Intake 50 ml   Output --   Net 50 ml       Last Recorded Vitals  Vitals with min/max:    Vital Last Value 24 Hour Range   Temperature 97.2 °F (36.2 °C) (10/25/22 1924) Temp  Min: 96.8 °F (36 °C)  Max: 98.1 °F (36.7 °C)   Pulse 65 (10/25/22 1924) Pulse  Min: 61  Max: 73   Respiratory 20 (10/25/22  0741) Resp  Min: 16  Max: 20   Non-Invasive  Blood Pressure 90/52 (10/25/22 1924) BP  Min: 80/51  Max: 111/69   Pulse Oximetry 93 % (10/25/22 1924) SpO2  Min: 93 %  Max: 95 %   Arterial   Blood Pressure   No data recorded        Body mass index is 19.82 kg/m².    Physical Exam   GENERAL:  In no apparent distress  CHEST:  Chest with clear breath sounds bilaterally.   CARDIAC:  Regular rate and rhythm.  S1 and S2  VASCULAR:  No Edema.  Peripheral pulses normal and equal in all extremities  ABDOMEN:  Soft, without detectable tenderness.  No sign of distention.  No   rebound or guarding, and no masses palpated.  MUSCULOSKELETAL:  Good range of motion of all major joints. no calf tenderness    Labs   Recent Results (from the past 48 hour(s))   Light Green Top    Collection Time: 10/24/22  5:21 AM   Result Value Ref Range    Extra Tube Hold for Add Ons    Lavender Top    Collection Time: 10/24/22  5:21 AM   Result Value Ref Range    Extra Tube Hold for Add Ons        Imaging  XR CHEST PA OR AP 1 VIEW   Final Result       Improved edema.         Electronically Signed by: CRISTIANE JIMENEZ M.D.    Signed on: 10/25/2022 12:12 PM          CT ABDOMEN PELVIS W CONTRAST - IV contrast only   Final Result       CHEST:   1.  Negative for an acute central pulmonary embolus.     2.  Small to moderate bilateral pleural effusions and associated   compressive atelectasis/consolidation.  Correlate for mild fluid overload.       3.  Coronary aortic atherosclerosis.      ABDOMEN/PELVIS:   1.  Large amount of fecal retention in the rectum with surrounding   inflammatory stranding.  Correlate for fecal impaction and/or stercoral   colitis.     2.  Diffuse bony metastatic disease in the chest, abdomen and pelvis.   3.  Mild residual hydronephrosis right kidney, although improved since   prior CT 01/24/2022.  Numerous nonobstructing renal calculi are similar.    Probable cysts bilaterally.   4.  Prostatomegaly with irregular wall thickening of the  posterior lateral   aspect of the bladder.  This could be secondary to prior treatment or   related to known prostate malignancy.   5.  Gallstones.      Patient Name: DARY RODRIGUEZ   Patient MRN: [id number]   Patient : 1942         Electronically Signed by: DOMENICO MESSER    Signed on: 10/22/2022 10:13 AM          CTA CHEST PULMONARY EMBOLISM W CONTRAST   Final Result       CHEST:   1.  Negative for an acute central pulmonary embolus.     2.  Small to moderate bilateral pleural effusions and associated   compressive atelectasis/consolidation.  Correlate for mild fluid overload.       3.  Coronary aortic atherosclerosis.      ABDOMEN/PELVIS:   1.  Large amount of fecal retention in the rectum with surrounding   inflammatory stranding.  Correlate for fecal impaction and/or stercoral   colitis.     2.  Diffuse bony metastatic disease in the chest, abdomen and pelvis.   3.  Mild residual hydronephrosis right kidney, although improved since   prior CT 2022.  Numerous nonobstructing renal calculi are similar.    Probable cysts bilaterally.   4.  Prostatomegaly with irregular wall thickening of the posterior lateral   aspect of the bladder.  This could be secondary to prior treatment or   related to known prostate malignancy.   5.  Gallstones.      Patient Name: DARY RODRIGUEZ   Patient MRN: [id number]   Patient : 1942         Electronically Signed by: DOMENICO MESSER    Signed on: 10/22/2022 10:13 AM          CT HEAD WO CONTRAST   Final Result         1.  Negative for a large vessel territorial acute ischemia, intracranial   hemorrhage or new mass lesion.   2.  Stable appearance of extra-axial masses, likely meningiomas, as   described above.          Patient Name: DARY RODRIGUEZ   Patient MRN: [id number]   Patient : 1942                   Electronically Signed by: DOMENICO MESSER    Signed on: 10/22/2022 9:58 AM          XR CHEST PA OR AP 1 VIEW   Final Result         1.  Nonspecific  interstitial prominence.  Small right effusion.     2.  Small patchy opacity in the right lung base, were infection is not   excluded.  Follow-up recommended.   3.  Mildly prominent ascending aortic contours.  Suspected ascending aortic   aneurysm.         Electronically Signed by: DOMENICO MESSER    Signed on: 10/22/2022 9:34 AM                 Assessment/Plan  Principal Problem:    Acute respiratory failure with hypoxia (CMS/HCC)    Acute hypoxic resp failure  Aspiration pneyumonia  uti  HFpEF  Severe protein calorie malnutrition  Code Status  Code Status Information     Code Status    Do Not Resuscitate    Modified Resuscitation Specifics:    CPR in case of Cardiac Arrest?: No     Intubation  ( Pre-Arrest ) ?: No     Antiarrhythmics (Pre-Arrest)?: No     Cardioversion (Pre-Arrest)?: No     Vasopressor (Pre-Arrest)?: No            PCP  Reese Dillard MD    Continue meds  Monitor labs    Eddy Batista MD, MD  10/25/2022 8:07 PM         Breath sounds clear and equal bilaterally.

## 2022-11-09 PROCEDURE — 93005 ELECTROCARDIOGRAM TRACING: CPT

## 2022-11-09 PROCEDURE — 82947 ASSAY GLUCOSE BLOOD QUANT: CPT

## 2022-11-09 PROCEDURE — 85025 COMPLETE CBC W/AUTO DIFF WBC: CPT

## 2022-11-09 PROCEDURE — 85027 COMPLETE CBC AUTOMATED: CPT

## 2022-11-09 PROCEDURE — 86709 HEPATITIS A IGM ANTIBODY: CPT

## 2022-11-09 PROCEDURE — 82378 CARCINOEMBRYONIC ANTIGEN: CPT

## 2022-11-09 PROCEDURE — 80076 HEPATIC FUNCTION PANEL: CPT

## 2022-11-09 PROCEDURE — 84443 ASSAY THYROID STIM HORMONE: CPT

## 2022-11-09 PROCEDURE — 74174 CTA ABD&PLVS W/CONTRAST: CPT | Mod: MA

## 2022-11-09 PROCEDURE — 86708 HEPATITIS A ANTIBODY: CPT

## 2022-11-09 PROCEDURE — 74182 MRI ABDOMEN W/CONTRAST: CPT

## 2022-11-09 PROCEDURE — 85730 THROMBOPLASTIN TIME PARTIAL: CPT

## 2022-11-09 PROCEDURE — 82728 ASSAY OF FERRITIN: CPT

## 2022-11-09 PROCEDURE — 82607 VITAMIN B-12: CPT

## 2022-11-09 PROCEDURE — 83605 ASSAY OF LACTIC ACID: CPT

## 2022-11-09 PROCEDURE — C8929: CPT

## 2022-11-09 PROCEDURE — 82435 ASSAY OF BLOOD CHLORIDE: CPT

## 2022-11-09 PROCEDURE — 86705 HEP B CORE ANTIBODY IGM: CPT

## 2022-11-09 PROCEDURE — 86803 HEPATITIS C AB TEST: CPT

## 2022-11-09 PROCEDURE — 83735 ASSAY OF MAGNESIUM: CPT

## 2022-11-09 PROCEDURE — 86704 HEP B CORE ANTIBODY TOTAL: CPT

## 2022-11-09 PROCEDURE — A9579: CPT

## 2022-11-09 PROCEDURE — 84295 ASSAY OF SERUM SODIUM: CPT

## 2022-11-09 PROCEDURE — 86706 HEP B SURFACE ANTIBODY: CPT

## 2022-11-09 PROCEDURE — 94640 AIRWAY INHALATION TREATMENT: CPT

## 2022-11-09 PROCEDURE — 83540 ASSAY OF IRON: CPT

## 2022-11-09 PROCEDURE — 87340 HEPATITIS B SURFACE AG IA: CPT

## 2022-11-09 PROCEDURE — 84484 ASSAY OF TROPONIN QUANT: CPT

## 2022-11-09 PROCEDURE — 83550 IRON BINDING TEST: CPT

## 2022-11-09 PROCEDURE — 84132 ASSAY OF SERUM POTASSIUM: CPT

## 2022-11-09 PROCEDURE — 85018 HEMOGLOBIN: CPT

## 2022-11-09 PROCEDURE — 82962 GLUCOSE BLOOD TEST: CPT

## 2022-11-09 PROCEDURE — 0225U NFCT DS DNA&RNA 21 SARSCOV2: CPT

## 2022-11-09 PROCEDURE — 96374 THER/PROPH/DIAG INJ IV PUSH: CPT

## 2022-11-09 PROCEDURE — 82105 ALPHA-FETOPROTEIN SERUM: CPT

## 2022-11-09 PROCEDURE — 36415 COLL VENOUS BLD VENIPUNCTURE: CPT

## 2022-11-09 PROCEDURE — 71045 X-RAY EXAM CHEST 1 VIEW: CPT

## 2022-11-09 PROCEDURE — 76705 ECHO EXAM OF ABDOMEN: CPT

## 2022-11-09 PROCEDURE — 99285 EMERGENCY DEPT VISIT HI MDM: CPT | Mod: 25

## 2022-11-09 PROCEDURE — 86707 HEPATITIS BE ANTIBODY: CPT

## 2022-11-09 PROCEDURE — 83036 HEMOGLOBIN GLYCOSYLATED A1C: CPT

## 2022-11-09 PROCEDURE — 84100 ASSAY OF PHOSPHORUS: CPT

## 2022-11-09 PROCEDURE — 81001 URINALYSIS AUTO W/SCOPE: CPT

## 2022-11-09 PROCEDURE — 96375 TX/PRO/DX INJ NEW DRUG ADDON: CPT

## 2022-11-09 PROCEDURE — 80048 BASIC METABOLIC PNL TOTAL CA: CPT

## 2022-11-09 PROCEDURE — 80061 LIPID PANEL: CPT

## 2022-11-09 PROCEDURE — 85610 PROTHROMBIN TIME: CPT

## 2022-11-09 PROCEDURE — 83690 ASSAY OF LIPASE: CPT

## 2022-11-09 PROCEDURE — 82803 BLOOD GASES ANY COMBINATION: CPT

## 2022-11-09 PROCEDURE — 84466 ASSAY OF TRANSFERRIN: CPT

## 2022-11-09 PROCEDURE — 80053 COMPREHEN METABOLIC PANEL: CPT

## 2022-11-09 PROCEDURE — 85014 HEMATOCRIT: CPT

## 2022-11-09 PROCEDURE — 82330 ASSAY OF CALCIUM: CPT

## 2022-12-01 ENCOUNTER — APPOINTMENT (OUTPATIENT)
Dept: GASTROENTEROLOGY | Facility: CLINIC | Age: 82
End: 2022-12-01

## 2022-12-01 VITALS
HEART RATE: 78 BPM | BODY MASS INDEX: 30.91 KG/M2 | RESPIRATION RATE: 16 BRPM | SYSTOLIC BLOOD PRESSURE: 126 MMHG | OXYGEN SATURATION: 99 % | DIASTOLIC BLOOD PRESSURE: 58 MMHG | WEIGHT: 168 LBS | TEMPERATURE: 97.6 F | HEIGHT: 62 IN

## 2022-12-01 DIAGNOSIS — Z09 ENCOUNTER FOR FOLLOW-UP EXAMINATION AFTER COMPLETED TREATMENT FOR CONDITIONS OTHER THAN MALIGNANT NEOPLASM: ICD-10-CM

## 2022-12-01 PROCEDURE — 99205 OFFICE O/P NEW HI 60 MIN: CPT

## 2022-12-01 RX ORDER — ATORVASTATIN CALCIUM 40 MG/1
40 TABLET, FILM COATED ORAL
Refills: 0 | Status: ACTIVE | COMMUNITY

## 2022-12-01 RX ORDER — ENOXAPARIN SODIUM 30 MG/.3ML
30 INJECTION SUBCUTANEOUS
Refills: 0 | Status: DISCONTINUED | COMMUNITY
End: 2022-12-01

## 2022-12-01 RX ORDER — HYDRALAZINE HYDROCHLORIDE 50 MG/1
50 TABLET ORAL
Refills: 0 | Status: ACTIVE | COMMUNITY

## 2022-12-01 RX ORDER — OXYCODONE 5 MG/1
5 TABLET ORAL
Refills: 0 | Status: DISCONTINUED | COMMUNITY
End: 2022-12-01

## 2022-12-01 RX ORDER — OMEPRAZOLE 40 MG/1
40 CAPSULE, DELAYED RELEASE ORAL
Refills: 0 | Status: DISCONTINUED | COMMUNITY
End: 2022-12-01

## 2022-12-01 RX ORDER — FAMOTIDINE 40 MG/1
TABLET, FILM COATED ORAL
Refills: 0 | Status: DISCONTINUED | COMMUNITY
End: 2022-12-01

## 2022-12-01 RX ORDER — ASPIRIN/CALCIUM/MAG/ALUMINUM 81 MG
81 TABLET, DELAYED RELEASE (ENTERIC COATED) ORAL
Refills: 0 | Status: ACTIVE | COMMUNITY

## 2022-12-01 RX ORDER — DOCUSATE SODIUM 100 MG
TABLET ORAL
Refills: 0 | Status: DISCONTINUED | COMMUNITY
End: 2022-12-01

## 2022-12-01 RX ORDER — CHLORTHALIDONE 50 MG/1
50 TABLET ORAL
Refills: 0 | Status: ACTIVE | COMMUNITY

## 2022-12-01 RX ORDER — OMEPRAZOLE 20 MG/1
20 CAPSULE, DELAYED RELEASE ORAL
Refills: 0 | Status: ACTIVE | COMMUNITY

## 2022-12-01 RX ORDER — OXYCODONE 5 MG/1
5 TABLET ORAL
Qty: 90 | Refills: 0 | Status: DISCONTINUED | COMMUNITY
Start: 2017-03-06 | End: 2022-12-01

## 2022-12-01 RX ORDER — CLOPIDOGREL BISULFATE 75 MG/1
75 TABLET, FILM COATED ORAL
Refills: 0 | Status: ACTIVE | COMMUNITY

## 2022-12-02 ENCOUNTER — LABORATORY RESULT (OUTPATIENT)
Age: 82
End: 2022-12-02

## 2022-12-04 ENCOUNTER — NON-APPOINTMENT (OUTPATIENT)
Age: 82
End: 2022-12-04

## 2022-12-05 RX ORDER — CHLORHEXIDINE GLUCONATE 213 G/1000ML
1 SOLUTION TOPICAL ONCE
Refills: 0 | Status: DISCONTINUED | OUTPATIENT
Start: 2022-12-06 | End: 2022-12-07

## 2022-12-05 NOTE — H&P PST ADULT - ASSESSMENT
Risk Assessments:  ASA:  Mallampati:  Bleeding Risk:  Creatinine:  GFR:    Impression:      Plan:  -plan for LHC via RA vs FA  -patient seen and examined  -confirmed appropriate NPO duration  -TOBY prophylaxis 250cc NS bolus ordered  -ECG and Labs reviewed  -Aspirin 81mg po pre-cath  -procedure discussed with patient; risks and benefits explained, questions answered  -consent obtained by attending IC

## 2022-12-05 NOTE — H&P PST ADULT - HISTORY OF PRESENT ILLNESS
Department of Cardiology                                                                  [facility]/[place] [id number]                                                            Telephone: [phone]. Fax:[phone]                                                                             Pre- Procedure H + P/Progress Note      HPI:  83 yo female with h/o HTN, HLD, DM, CAD s/p PHYLLIS prox/mid RCA 9/24/19 and p/m/d LAD 10/15/19. Patient with worsening b/l LE edema. Had abnormal NST with posterolateral ischemia and referred for C.      Symptoms:        Angina (Class):        Ischemic Symptoms:     Heart Failure:        Systolic/Diastolic/Combined:        NYHA Class (within 2 weeks):     Assessment of LVEF:       EF: Normal       Assessed by: TTE       Date: 11/17/22    Prior Cardiac Interventions:  CATH 9/24/29  SUMMARY:  1ST LESION INTERVENTIONS: A successful drug-eluting stent was performed on  the 90 % lesion in the proximal RCA. Following intervention there was an  excellent angiographic appearance with a 0 % residualstenosis.  2ND LESION INTERVENTIONS: A successful drug-eluting stent was performed on  the 85 % lesion in the mid RCA. Following intervention there was an  excellent angiographic appearance with a 0 % residual stenosis.  DIAGNOSTIC RECOMMENDATIONS: Patient will return in 2-4 weeks for PC of the  LAD.  INTERVENTIONAL RECOMMENDATIONS: Patient will return in 2-4 weeks for PC of  the LAD. Add clopidogrel (Plavix), 75 mg, PO, daily. Add aspirin, 81 mg,  PO, daily.  Prepared and signed by  Mukund Chirinos MD  Signed 09/24/2019 17:30:32    CATH 10/15/19      SUMMARY:  1ST LESION INTERVENTIONS: A successful drug-eluting stent was performed on  the 85 % lesion in the mid LAD. Following intervention there was an  excellent angiographic appearance with a 0 % residual stenosis.  2ND LESION INTERVENTIONS: A successful drug-eluting stent was performed on  the 85 % lesion in the distal LAD. Following intervention there was an  excellent angiographic appearance with a 0 % residual stenosis.  3RD LESION INTERVENTIONS: A successful drug-eluting stent was performed on  the 70 % lesion in the proximal LAD. Following intervention there was an  excellent angiographic appearance with a 0 % residual stenosis.  INTERVENTIONAL RECOMMENDATIONS: Continue clopidogrel (Plavix), 75 mg, PO,  daily. Continue aspirin, 81 mg, PO, daily.  Prepared and signed by  Mukund Chirinos MD    Noninvasive Testing:   Stress Test: Date: 11/2/22        Protocol: lexiscan       Duration of Exercise:        Symptoms: none       EKG Changes: 1-2 mm ST depression inferolateral leads       DTS:        Myocardial Imaging: posterolateral       Risk Assessment (Low, Medium, High):     Echo:   11/17/22  Normal LVEF, LVH, LA enlargement, diastolic dysfunction        Associated Risk Factors:        Cerebrovascular Disease: N/A       Chronic Lung Disease: N/A       Peripheral Arterial Disease: N/A       Chronic Kidney Disease (if yes, what is GFR): N/A       Uncontrolled Diabetes (if yes, what is HgbA1C or FBS): N/A       Poorly Controlled Hypertension (if yes, what is SBP): N/A       Morbid Obesity (if yes, what is BMI): N/A       History of Recent Ventricular Arrhythmia: N/A       Inability to Ambulate Safely: N/A       Need for Therapeutic Anticoagulation: N/A       Antiplatelet or Contrast Allergy: N/A       Fraility: Mild/Moderate/Severe    Antianginal Therapies:        Beta Blockers:         Calcium Channel Blockers:        Long Acting Nitrates:        Ranexa:     	  MEDICATIONS:                    ROS: as stated above, otherwise negative    PHYSICAL EXAM:  Constitutional: A & O x 3  HEENT:   Normal oral mucosa, PERRL, EOMI	  Cardiovascular: Normal S1 S2, No JVD, No murmurs, No edema  Respiratory: Lungs clear to auscultation	  Gastrointestinal:  Soft, Non-tender, + BS	  Skin: No rashes, No ecchymoses, No cyanosis  Neurologic: Non-focal  Extremities: Normal range of motion, No clubbing, cyanosis or edema  Vascular: Peripheral pulses palpable 2+ bilaterally      T(C): --  HR: --  BP: --  RR: --  SpO2: --  Wt(kg): --      I&O's Summary      Daily     Daily     TELEMETRY: 	      ECG:  	    LABS:	 	                        Tnl:    Lipid Profile:   TC  TG  LDL  HDL    HgA1c:     proBNP:     TSH:

## 2022-12-06 ENCOUNTER — INPATIENT (INPATIENT)
Facility: HOSPITAL | Age: 82
LOS: 0 days | Discharge: HOME | DRG: 436 | End: 2022-12-07
Attending: HOSPITALIST | Admitting: HOSPITALIST
Payer: MEDICARE

## 2022-12-06 VITALS
WEIGHT: 164.91 LBS | TEMPERATURE: 99 F | DIASTOLIC BLOOD PRESSURE: 67 MMHG | HEIGHT: 62 IN | OXYGEN SATURATION: 98 % | RESPIRATION RATE: 16 BRPM | SYSTOLIC BLOOD PRESSURE: 149 MMHG | HEART RATE: 79 BPM

## 2022-12-06 DIAGNOSIS — R94.39 ABNORMAL RESULT OF OTHER CARDIOVASCULAR FUNCTION STUDY: ICD-10-CM

## 2022-12-06 DIAGNOSIS — Z96.652 PRESENCE OF LEFT ARTIFICIAL KNEE JOINT: Chronic | ICD-10-CM

## 2022-12-06 DIAGNOSIS — Z98.890 OTHER SPECIFIED POSTPROCEDURAL STATES: Chronic | ICD-10-CM

## 2022-12-06 LAB
ALBUMIN SERPL ELPH-MCNC: 3.6 G/DL — SIGNIFICANT CHANGE UP (ref 3.3–5.2)
ALP SERPL-CCNC: 147 U/L — HIGH (ref 40–120)
ALT FLD-CCNC: 17 U/L — SIGNIFICANT CHANGE UP
ANION GAP SERPL CALC-SCNC: 15 MMOL/L — SIGNIFICANT CHANGE UP (ref 5–17)
AST SERPL-CCNC: 32 U/L — HIGH
BASOPHILS # BLD AUTO: 0.02 K/UL — SIGNIFICANT CHANGE UP (ref 0–0.2)
BASOPHILS NFR BLD AUTO: 0.3 % — SIGNIFICANT CHANGE UP (ref 0–2)
BILIRUB SERPL-MCNC: 0.4 MG/DL — SIGNIFICANT CHANGE UP (ref 0.4–2)
BLD GP AB SCN SERPL QL: SIGNIFICANT CHANGE UP
BUN SERPL-MCNC: 26.7 MG/DL — HIGH (ref 8–20)
CALCIUM SERPL-MCNC: 9.2 MG/DL — SIGNIFICANT CHANGE UP (ref 8.4–10.5)
CHLORIDE SERPL-SCNC: 90 MMOL/L — LOW (ref 96–108)
CHOLEST SERPL-MCNC: 157 MG/DL — SIGNIFICANT CHANGE UP
CO2 SERPL-SCNC: 25 MMOL/L — SIGNIFICANT CHANGE UP (ref 22–29)
CREAT SERPL-MCNC: 1.04 MG/DL — SIGNIFICANT CHANGE UP (ref 0.5–1.3)
EGFR: 53 ML/MIN/1.73M2 — LOW
EOSINOPHIL # BLD AUTO: 0.02 K/UL — SIGNIFICANT CHANGE UP (ref 0–0.5)
EOSINOPHIL NFR BLD AUTO: 0.3 % — SIGNIFICANT CHANGE UP (ref 0–6)
FERRITIN SERPL-MCNC: 15 NG/ML — SIGNIFICANT CHANGE UP (ref 15–150)
GLUCOSE BLDC GLUCOMTR-MCNC: 331 MG/DL — HIGH (ref 70–99)
GLUCOSE BLDC GLUCOMTR-MCNC: 444 MG/DL — HIGH (ref 70–99)
GLUCOSE SERPL-MCNC: 534 MG/DL — CRITICAL HIGH (ref 70–99)
HCT VFR BLD CALC: 23 % — LOW (ref 34.5–45)
HCT VFR BLD CALC: 23.1 % — LOW (ref 34.5–45)
HDLC SERPL-MCNC: 62 MG/DL — SIGNIFICANT CHANGE UP
HGB BLD-MCNC: 7.1 G/DL — LOW (ref 11.5–15.5)
HGB BLD-MCNC: 7.2 G/DL — LOW (ref 11.5–15.5)
IMM GRANULOCYTES NFR BLD AUTO: 0.1 % — SIGNIFICANT CHANGE UP (ref 0–0.9)
IRON SATN MFR SERPL: 18 UG/DL — LOW (ref 37–145)
IRON SATN MFR SERPL: 4 % — LOW (ref 14–50)
LIPID PNL WITH DIRECT LDL SERPL: 81 MG/DL — SIGNIFICANT CHANGE UP
LYMPHOCYTES # BLD AUTO: 1.65 K/UL — SIGNIFICANT CHANGE UP (ref 1–3.3)
LYMPHOCYTES # BLD AUTO: 24.2 % — SIGNIFICANT CHANGE UP (ref 13–44)
MAGNESIUM SERPL-MCNC: 1.3 MG/DL — LOW (ref 1.8–2.6)
MCHC RBC-ENTMCNC: 22.6 PG — LOW (ref 27–34)
MCHC RBC-ENTMCNC: 23.2 PG — LOW (ref 27–34)
MCHC RBC-ENTMCNC: 30.7 GM/DL — LOW (ref 32–36)
MCHC RBC-ENTMCNC: 31.3 GM/DL — LOW (ref 32–36)
MCV RBC AUTO: 73.6 FL — LOW (ref 80–100)
MCV RBC AUTO: 74.2 FL — LOW (ref 80–100)
MONOCYTES # BLD AUTO: 0.7 K/UL — SIGNIFICANT CHANGE UP (ref 0–0.9)
MONOCYTES NFR BLD AUTO: 10.2 % — SIGNIFICANT CHANGE UP (ref 2–14)
NEUTROPHILS # BLD AUTO: 4.43 K/UL — SIGNIFICANT CHANGE UP (ref 1.8–7.4)
NEUTROPHILS NFR BLD AUTO: 64.9 % — SIGNIFICANT CHANGE UP (ref 43–77)
NON HDL CHOLESTEROL: 95 MG/DL — SIGNIFICANT CHANGE UP
PLATELET # BLD AUTO: 214 K/UL — SIGNIFICANT CHANGE UP (ref 150–400)
PLATELET # BLD AUTO: 217 K/UL — SIGNIFICANT CHANGE UP (ref 150–400)
POTASSIUM SERPL-MCNC: 3.9 MMOL/L — SIGNIFICANT CHANGE UP (ref 3.5–5.3)
POTASSIUM SERPL-SCNC: 3.9 MMOL/L — SIGNIFICANT CHANGE UP (ref 3.5–5.3)
PROT SERPL-MCNC: 7.5 G/DL — SIGNIFICANT CHANGE UP (ref 6.6–8.7)
RBC # BLD: 3.1 M/UL — LOW (ref 3.8–5.2)
RBC # BLD: 3.14 M/UL — LOW (ref 3.8–5.2)
RBC # FLD: 15.9 % — HIGH (ref 10.3–14.5)
RBC # FLD: 16 % — HIGH (ref 10.3–14.5)
SODIUM SERPL-SCNC: 130 MMOL/L — LOW (ref 135–145)
TIBC SERPL-MCNC: 419 UG/DL — SIGNIFICANT CHANGE UP (ref 220–430)
TRANSFERRIN SERPL-MCNC: 293 MG/DL — SIGNIFICANT CHANGE UP (ref 192–382)
TRIGL SERPL-MCNC: 70 MG/DL — SIGNIFICANT CHANGE UP
WBC # BLD: 6.83 K/UL — SIGNIFICANT CHANGE UP (ref 3.8–10.5)
WBC # BLD: 7.05 K/UL — SIGNIFICANT CHANGE UP (ref 3.8–10.5)
WBC # FLD AUTO: 6.83 K/UL — SIGNIFICANT CHANGE UP (ref 3.8–10.5)
WBC # FLD AUTO: 7.05 K/UL — SIGNIFICANT CHANGE UP (ref 3.8–10.5)

## 2022-12-06 PROCEDURE — 93010 ELECTROCARDIOGRAM REPORT: CPT

## 2022-12-06 PROCEDURE — 99223 1ST HOSP IP/OBS HIGH 75: CPT

## 2022-12-06 RX ORDER — CARVEDILOL PHOSPHATE 80 MG/1
12.5 CAPSULE, EXTENDED RELEASE ORAL EVERY 12 HOURS
Refills: 0 | Status: DISCONTINUED | OUTPATIENT
Start: 2022-12-06 | End: 2022-12-07

## 2022-12-06 RX ORDER — ASPIRIN/CALCIUM CARB/MAGNESIUM 324 MG
81 TABLET ORAL DAILY
Refills: 0 | Status: DISCONTINUED | OUTPATIENT
Start: 2022-12-06 | End: 2022-12-07

## 2022-12-06 RX ORDER — INSULIN LISPRO 100/ML
VIAL (ML) SUBCUTANEOUS
Refills: 0 | Status: DISCONTINUED | OUTPATIENT
Start: 2022-12-06 | End: 2022-12-07

## 2022-12-06 RX ORDER — DEXTROSE 50 % IN WATER 50 %
25 SYRINGE (ML) INTRAVENOUS ONCE
Refills: 0 | Status: DISCONTINUED | OUTPATIENT
Start: 2022-12-06 | End: 2022-12-07

## 2022-12-06 RX ORDER — CLOPIDOGREL BISULFATE 75 MG/1
75 TABLET, FILM COATED ORAL DAILY
Refills: 0 | Status: DISCONTINUED | OUTPATIENT
Start: 2022-12-06 | End: 2022-12-07

## 2022-12-06 RX ORDER — SODIUM CHLORIDE 9 MG/ML
1000 INJECTION, SOLUTION INTRAVENOUS
Refills: 0 | Status: DISCONTINUED | OUTPATIENT
Start: 2022-12-06 | End: 2022-12-07

## 2022-12-06 RX ORDER — ATORVASTATIN CALCIUM 80 MG/1
40 TABLET, FILM COATED ORAL AT BEDTIME
Refills: 0 | Status: DISCONTINUED | OUTPATIENT
Start: 2022-12-06 | End: 2022-12-07

## 2022-12-06 RX ORDER — GLUCAGON INJECTION, SOLUTION 0.5 MG/.1ML
1 INJECTION, SOLUTION SUBCUTANEOUS ONCE
Refills: 0 | Status: DISCONTINUED | OUTPATIENT
Start: 2022-12-06 | End: 2022-12-07

## 2022-12-06 RX ORDER — MAGNESIUM SULFATE 500 MG/ML
2 VIAL (ML) INJECTION
Refills: 0 | Status: COMPLETED | OUTPATIENT
Start: 2022-12-06 | End: 2022-12-06

## 2022-12-06 RX ORDER — INSULIN LISPRO 100/ML
10 VIAL (ML) SUBCUTANEOUS ONCE
Refills: 0 | Status: COMPLETED | OUTPATIENT
Start: 2022-12-06 | End: 2022-12-06

## 2022-12-06 RX ORDER — LEVOTHYROXINE SODIUM 125 MCG
25 TABLET ORAL DAILY
Refills: 0 | Status: DISCONTINUED | OUTPATIENT
Start: 2022-12-06 | End: 2022-12-07

## 2022-12-06 RX ORDER — PANTOPRAZOLE SODIUM 20 MG/1
40 TABLET, DELAYED RELEASE ORAL
Refills: 0 | Status: DISCONTINUED | OUTPATIENT
Start: 2022-12-06 | End: 2022-12-07

## 2022-12-06 RX ORDER — DEXTROSE 50 % IN WATER 50 %
15 SYRINGE (ML) INTRAVENOUS ONCE
Refills: 0 | Status: DISCONTINUED | OUTPATIENT
Start: 2022-12-06 | End: 2022-12-07

## 2022-12-06 RX ORDER — DEXTROSE 50 % IN WATER 50 %
12.5 SYRINGE (ML) INTRAVENOUS ONCE
Refills: 0 | Status: DISCONTINUED | OUTPATIENT
Start: 2022-12-06 | End: 2022-12-07

## 2022-12-06 RX ADMIN — ATORVASTATIN CALCIUM 40 MILLIGRAM(S): 80 TABLET, FILM COATED ORAL at 21:23

## 2022-12-06 RX ADMIN — Medication 100 GRAM(S): at 14:15

## 2022-12-06 RX ADMIN — Medication 100 GRAM(S): at 13:00

## 2022-12-06 RX ADMIN — Medication 10 UNIT(S): at 15:30

## 2022-12-06 RX ADMIN — Medication 8: at 17:27

## 2022-12-06 RX ADMIN — CARVEDILOL PHOSPHATE 12.5 MILLIGRAM(S): 80 CAPSULE, EXTENDED RELEASE ORAL at 17:27

## 2022-12-06 NOTE — CHART NOTE - NSCHARTNOTEFT_GEN_A_CORE
Plan of care discussed with Dr. Chirinos, given patient worsening anemia with H/H 7.1/23.1, hyponatremia, hyperglycemia in setting of possible HCC found on recent admission, Left heart catheterization has been cancelled and decision for admission to hospital for blood transfusion and further evaluation has been made. Discussed with Dr. Kevin Judge for admission and transfer level of care.

## 2022-12-06 NOTE — H&P ADULT - ASSESSMENT
82F with a history of coronary artery disease, cirrhosis, diabetes, hypertension, and anemia who presented for an elective cardiac catheterization but was found to have anemia and hyperglycemia.    Anemia - For transfusion of packed red blood cells. Review of prior studies noted that the patient had a history of anemia in the past. For transfusion of packed red blood cells. Iron studies requested. Discussed with the patient and daughter at the bedside, they were agreeable to the transfusion.    Cirrhosis - Prior hospitalization and studies noted cirrhosis and suspicion of underlying hepatocellular carcinoma. Follows with Hepatology on an outpatient basis. No signs of encephalopathy.    Diabetes - Insulin coverage, close monitoring of blood glucose levels. Metformin to be held for now. Hba1c requested.    Coronary artery disease - Without chest pain or dyspnea. On aspirin and clopidogrel. Cardiac catheterization deferred.    Hypertension - Close blood pressure monitoring.    Hypothyroidism - On levothyroxine. 82F with a history of coronary artery disease, cirrhosis, diabetes, hypertension, and anemia who presented for an elective cardiac catheterization but was found to have anemia and hyperglycemia.    Anemia - For transfusion of packed red blood cells. Review of prior studies noted that the patient had a history of anemia in the past. For transfusion of packed red blood cells. Iron studies requested. Discussed with the patient and daughter at the bedside, they were agreeable to the transfusion.    Cirrhosis - Prior hospitalization and studies noted cirrhosis and suspicion of underlying hepatocellular carcinoma. Follows with Hepatology on an outpatient basis. No signs of encephalopathy.    Diabetes - Insulin coverage, close monitoring of blood glucose levels. Metformin to be held for now. Hba1c requested.    Coronary artery disease - Without chest pain or dyspnea. On aspirin and clopidogrel. Cardiac catheterization deferred.    Hypertension - Close blood pressure monitoring.    Hypothyroidism - On levothyroxine.    Hyponatremia - Secondary to hyperglycemia.

## 2022-12-06 NOTE — PHYSICAL EXAM
[Scleral Icterus] : Scleral Icterus noted [Non-Tender] : non-tender [Irregular] : irregular [General Appearance - Alert] : alert [General Appearance - In No Acute Distress] : in no acute distress [Sclera] : the sclera and conjunctiva were normal [Outer Ear] : the ears and nose were normal in appearance [Oropharynx] : the oropharynx was normal [Neck Appearance] : the appearance of the neck was normal [Neck Cervical Mass (___cm)] : no neck mass was observed [Jugular Venous Distention Increased] : there was no jugular-venous distention [Thyroid Diffuse Enlargement] : the thyroid was not enlarged [Thyroid Nodule] : there were no palpable thyroid nodules [Auscultation Breath Sounds / Voice Sounds] : lungs were clear to auscultation bilaterally [Heart Rate And Rhythm] : heart rate was normal and rhythm regular [Edema] : there was no peripheral edema [Bowel Sounds] : normal bowel sounds [Abdomen Soft] : soft [Abdomen Tenderness] : non-tender [Abdomen Mass (___ Cm)] : no abdominal mass palpated [Abnormal Walk] : normal gait [Nail Clubbing] : no clubbing  or cyanosis of the fingernails [Musculoskeletal - Swelling] : no joint swelling seen [Motor Tone] : muscle strength and tone were normal [Skin Color & Pigmentation] : normal skin color and pigmentation [Skin Turgor] : normal skin turgor [] : no rash [No Focal Deficits] : no focal deficits [Oriented To Time, Place, And Person] : oriented to person, place, and time [Impaired Insight] : insight and judgment were intact [Affect] : the affect was normal [Spider Angioma] : No spider angioma(s) were observed [Abdominal  Ascites] : no ascites [Asterixis] : no asterixis observed [Jaundice] : No jaundice [Palmar Erythema] : no Palmar Erythema [Depression] : no depression [Hallucinations] : ~T no ~M hallucinations

## 2022-12-06 NOTE — H&P ADULT - NSHPPHYSICALEXAM_GEN_ALL_CORE
Vital Signs Last 24 Hrs  T(C): 37.1 (06 Dec 2022 11:45), Max: 37.1 (06 Dec 2022 11:45)  T(F): 98.7 (06 Dec 2022 11:45), Max: 98.7 (06 Dec 2022 11:45)  HR: 79 (06 Dec 2022 11:45) (79 - 79)  BP: 149/67 (06 Dec 2022 11:45) (149/67 - 149/67)  BP(mean): --  RR: 16 (06 Dec 2022 11:45) (16 - 16)  SpO2: 98% (06 Dec 2022 11:45) (98% - 98%)    Parameters below as of 06 Dec 2022 11:45  Patient On (Oxygen Delivery Method): room air    General appearance: No acute distress, Awake, Alert  HEENT: Normocephalic, Atraumatic, Conjunctiva clear, EOMI  Neck: Supple, No JVD, No tenderness  Lungs: Breath sound equal bilaterally, No wheezes, No rales  Cardiovascular: S1S2, Regular rhythm  Abdomen: Soft, Nontender, Nondistended, No guarding/rebound, Positive bowel sounds  Extremities: No clubbing, No cyanosis, No edema, No calf tenderness  Neuro: Strength equal bilaterally, No tremors  Psychiatric: Appropriate mood, Normal affect

## 2022-12-06 NOTE — H&P ADULT - HISTORY OF PRESENT ILLNESS
Information obtained with the assistance of the  service as well as the patient's daughter at the bedside.  82F who presented for an elective cardiac catheterization. The patient was noted to have a history of lower extremity edema and found to have an abnormal stress test. She denied any chest pain or palpitations. She denied any dyspnea or orthopnea. The procedure was cancelled after laboratory studies noted significant anemia and hyperglycemia.  Information obtained with the assistance of the  service as well as the patient's daughter at the bedside.  82F who presented for an elective cardiac catheterization. The patient was noted to have a history of lower extremity edema and found to have an abnormal stress test. She denied any chest pain or palpitations. She denied any dyspnea or orthopnea. The procedure was cancelled after laboratory studies noted significant anemia and hyperglycemia. The patient reported a history of anemia when she was seen by the hepatologist. She denied any history of bleeding or transfusions in the past. She reported a prior colonoscopy four years in past. She reports compliance with her medications as home. She was noted to have cirrhosis on the prior hospitalization and had seen the hepatologist who recommended further testing in the future. She denied any abdominal pain, nausea, diarrhea, or confusion at home. There were no recent fevers, chills, or sick contacts.

## 2022-12-06 NOTE — H&P ADULT - NSHPSOCIALHISTORY_GEN_ALL_CORE
Denied tobacco, alcohol, or illicit drug use  Lives at home with her carolyn Denied tobacco, alcohol, or illicit drug use  Lives at home with her daughter

## 2022-12-06 NOTE — ASSESSMENT
[FreeTextEntry1] : WILMER CONROY is a 82 year old female with a PMH significant for newly diagnosed Cirrhosis, imaging concerning for multifocal HCC, HLD, HTN, DM, and CAD s/p stent. \par \par Cirrhosis\par -Etiology likely MARTIN\par -Disease progression of cirrhosis discussed, including but not limited to hepatocellular carcinoma, varices with or without bleeding, hepatic encephalopathy, ascites, liver failure and death.\par -Labs ordered to rule out competing etiologies of liver disease. \par \par HCC Screening\par -I have explained the need for imaging every 6 months to assess for HCC.\par -MRI Abdomen w/IC 10/21/22 - LIVER: Liver is shrunken with prominence of the left lobe and caudate lobe and a subtle nodular contour, compatible with cirrhosis. There is a 2.2 x 2.0 cm mildly T2 hyperintense lesion at the junction of segments 6 and 7 which demonstrates restricted diffusion. Dynamic postcontrast imaging is markedly degraded by respiratory motion artifact, however there is evidence of early enhancement and washout. These findings are compatible with HCC. There is a similar 2.7 x 1.9 cm mildly T2 bright subcapsular lesion at the junction of segments 2 and 3 posteriorly which also demonstrates early enhancement with washout. The remainder of the liver is mildly heterogeneous in signal and enhancement. The hepatic veins and portal vein are patent. BILE DUCTS: There is no intra or extra hepatic biliary duct dilatation. The common bile duct measures 3 mm.\par -AFP 10/17/22 - 186\par Will consult IR for locoregional therapy.\par Ascites\par -no ascites noted on imaging or physical exam\par -Contact provider for increased abdominal distension\par \par Variceal Screening\par -Last EGD was 3 years ago which we do not have records. Pt will need repeat.\par -if pt experiences hematemesis, advised to go to ER immediately\par \par Encephalopathy\par -notify provider if new onset confusion\par -at least 1-2 bms/day\par -titrate lactulose to 1-2 bms/day\par \par Transplant\par -Pt is not a candidate for transplant\par -MELDNa - 12\par \par Hepatitis\par -Additional workup done indicated HBV core antibody positive, surface antigen indeterminate, core IgM nonreactive, Be antibody nonreactive. \par -HAV antibody positive but IgM negative indicated past resolved infection\par -HCV and autoimmune serologies ordered\par \par Diet\par -High Protein Low Fat Low Sugar Low Salt (up to 2G Na/day) Diet\par -No prolonged fasting\par -Mediterranean Diet info provided\par -No alcohol consumption\par \par Pain\par -NO NSAIDs as these can lead to diuretic resistance and precipitate renal dysfunction in patients with advanced liver disease\par -Can take up to 2G Tylenol per day\par \par Follow Up\par -Follow up in 3 weeks. Request pt to bring medication list to next visit.

## 2022-12-06 NOTE — HISTORY OF PRESENT ILLNESS
[MELD Score: ___] : MELD Score is [unfilled] [de-identified] : WILMER CONROY is a 82 year old female with a PMH significant for newly diagnosed Cirrhosis, imaging concerning for multifocal HCC, HLD, HTN, DM, and CAD s/p stent. \par \par 12/1/22: She presents today for evaluation of newly diagnosed Cirrhosis and imaging concerning for multifocal HCC. Pt presented to [facility] on 10/16/22 with complaints of epigastric pain. During workup, pt was found of have cirrhotic liver and lesions suspicious for HCC on imaging. ACS ruled out. Pt went on to have additional imaging which further indicated HCC , 2 lesions, with cirrhotic liver. Additional workup done indicated HBV core antibody positive, surface antigen indeterminate, core IgM nonreactive, Be antibody nonreactive. HAV antibody positive but IgM negative indicated past resolved infection. Labs done during admission - bilirubin 0.3, AST 73, ALT 53, , INR 1.3, , , Iron serum 30, ferritin 18 and transferrin saturation 7%.\par \par CT Angio Abd Pelv w/wo IC 10/16/22 - Hypervascular lesions seen in liver, at least one of which is typical for a flash filling hemangioma. A lesion within the right hepatic lobe demonstrates washout, suspicious for hepatocellular carcinoma. Additional subcentimeter hypervascular lesion of left hepatic lobe is indeterminate. Confirmation with multiphasic hepatic protocol MRI or CT is advised for more definitive evaluation. Cirrhosis. Atherosclerotic changes of the arterial structures, with 50-70% stenosis \par of the origins of the celiac axis and SMA. No mesenteric venous occlusive disease.\par \par MRI Abdomen w/IC 10/21/22 - LIVER: Liver is shrunken with prominence of the left lobe and caudate lobe and a subtle nodular contour, compatible with cirrhosis. There is a 2.2 x 2.0 cm mildly T2 hyperintense lesion at the junction of segments 6 and 7 which demonstrates restricted diffusion. Dynamic postcontrast imaging is markedly degraded by respiratory motion artifact, however there is evidence of early enhancement and washout. These findings are compatible with HCC. There is a similar 2.7 x 1.9 cm mildly T2 bright subcapsular lesion at the junction of segments 2 and 3 posteriorly which also demonstrates early enhancement with washout. The remainder of the liver is mildly heterogeneous in signal and enhancement. The hepatic veins and portal vein are patent. BILE DUCTS: There is no intra or extra hepatic biliary duct dilatation. The common bile duct measures 3 mm.\par \par Denies fatigue, malaise, arthralgias, myalgias, pruritus, recent infection, abdominal pain or distension, jaundice, hematemesis, hematochezia, dark urine, confusion, unintentional weight loss or gain. Denies alcohol consumption. Denies OTC/herbal supplements. Pt had EGD about 3 years ago but unknown results and where it was done. Pt reports a family of history of liver disease but is unsure what kind.

## 2022-12-06 NOTE — REASON FOR VISIT
[Consultation] : a consultation visit [[employer] ] : provided by [employer]   [Time Spent: ____ minutes] : Total time spent using  services: [unfilled] minutes. The patient's primary language is not English thus required  services. [FreeTextEntry1] : newly diagnosed cirrhosis, imaging concerning for HCC [Interpreters_IDNumber] : [id number] [TWNoteComboBox1] : Latvian

## 2022-12-07 ENCOUNTER — TRANSCRIPTION ENCOUNTER (OUTPATIENT)
Age: 82
End: 2022-12-07

## 2022-12-07 VITALS
OXYGEN SATURATION: 94 % | RESPIRATION RATE: 18 BRPM | DIASTOLIC BLOOD PRESSURE: 76 MMHG | HEART RATE: 64 BPM | SYSTOLIC BLOOD PRESSURE: 156 MMHG | TEMPERATURE: 98 F

## 2022-12-07 DIAGNOSIS — D64.9 ANEMIA, UNSPECIFIED: ICD-10-CM

## 2022-12-07 DIAGNOSIS — K74.60 UNSPECIFIED CIRRHOSIS OF LIVER: ICD-10-CM

## 2022-12-07 DIAGNOSIS — N18.6 END STAGE RENAL DISEASE: ICD-10-CM

## 2022-12-07 DIAGNOSIS — D63.1 END STAGE RENAL DISEASE: ICD-10-CM

## 2022-12-07 DIAGNOSIS — Z99.2 END STAGE RENAL DISEASE: ICD-10-CM

## 2022-12-07 DIAGNOSIS — D50.8 OTHER IRON DEFICIENCY ANEMIAS: ICD-10-CM

## 2022-12-07 LAB
ALBUMIN SERPL ELPH-MCNC: 3.6 G/DL — SIGNIFICANT CHANGE UP (ref 3.3–5.2)
ALP SERPL-CCNC: 140 U/L — HIGH (ref 40–120)
ALT FLD-CCNC: 16 U/L — SIGNIFICANT CHANGE UP
ANION GAP SERPL CALC-SCNC: 13 MMOL/L — SIGNIFICANT CHANGE UP (ref 5–17)
AST SERPL-CCNC: 34 U/L — HIGH
BILIRUB SERPL-MCNC: 0.7 MG/DL — SIGNIFICANT CHANGE UP (ref 0.4–2)
BUN SERPL-MCNC: 20.8 MG/DL — HIGH (ref 8–20)
CALCIUM SERPL-MCNC: 9.1 MG/DL — SIGNIFICANT CHANGE UP (ref 8.4–10.5)
CHLORIDE SERPL-SCNC: 93 MMOL/L — LOW (ref 96–108)
CO2 SERPL-SCNC: 30 MMOL/L — HIGH (ref 22–29)
CREAT SERPL-MCNC: 0.9 MG/DL — SIGNIFICANT CHANGE UP (ref 0.5–1.3)
EGFR: 63 ML/MIN/1.73M2 — SIGNIFICANT CHANGE UP
GLUCOSE BLDC GLUCOMTR-MCNC: 192 MG/DL — HIGH (ref 70–99)
GLUCOSE BLDC GLUCOMTR-MCNC: 229 MG/DL — HIGH (ref 70–99)
GLUCOSE BLDC GLUCOMTR-MCNC: 311 MG/DL — HIGH (ref 70–99)
GLUCOSE BLDC GLUCOMTR-MCNC: 357 MG/DL — HIGH (ref 70–99)
GLUCOSE SERPL-MCNC: 180 MG/DL — HIGH (ref 70–99)
HCT VFR BLD CALC: 27.3 % — LOW (ref 34.5–45)
HGB BLD-MCNC: 8.7 G/DL — LOW (ref 11.5–15.5)
MCHC RBC-ENTMCNC: 23.9 PG — LOW (ref 27–34)
MCHC RBC-ENTMCNC: 31.9 GM/DL — LOW (ref 32–36)
MCV RBC AUTO: 75 FL — LOW (ref 80–100)
PLATELET # BLD AUTO: 193 K/UL — SIGNIFICANT CHANGE UP (ref 150–400)
POTASSIUM SERPL-MCNC: 3.4 MMOL/L — LOW (ref 3.5–5.3)
POTASSIUM SERPL-SCNC: 3.4 MMOL/L — LOW (ref 3.5–5.3)
PROT SERPL-MCNC: 7.3 G/DL — SIGNIFICANT CHANGE UP (ref 6.6–8.7)
RBC # BLD: 3.64 M/UL — LOW (ref 3.8–5.2)
RBC # FLD: 15.4 % — HIGH (ref 10.3–14.5)
SODIUM SERPL-SCNC: 136 MMOL/L — SIGNIFICANT CHANGE UP (ref 135–145)
WBC # BLD: 6.99 K/UL — SIGNIFICANT CHANGE UP (ref 3.8–10.5)
WBC # FLD AUTO: 6.99 K/UL — SIGNIFICANT CHANGE UP (ref 3.8–10.5)

## 2022-12-07 PROCEDURE — 82962 GLUCOSE BLOOD TEST: CPT

## 2022-12-07 PROCEDURE — 80061 LIPID PANEL: CPT

## 2022-12-07 PROCEDURE — 83735 ASSAY OF MAGNESIUM: CPT

## 2022-12-07 PROCEDURE — 36430 TRANSFUSION BLD/BLD COMPNT: CPT

## 2022-12-07 PROCEDURE — 83540 ASSAY OF IRON: CPT

## 2022-12-07 PROCEDURE — 86901 BLOOD TYPING SEROLOGIC RH(D): CPT

## 2022-12-07 PROCEDURE — 99221 1ST HOSP IP/OBS SF/LOW 40: CPT

## 2022-12-07 PROCEDURE — 36415 COLL VENOUS BLD VENIPUNCTURE: CPT

## 2022-12-07 PROCEDURE — 86900 BLOOD TYPING SEROLOGIC ABO: CPT

## 2022-12-07 PROCEDURE — 86850 RBC ANTIBODY SCREEN: CPT

## 2022-12-07 PROCEDURE — 82728 ASSAY OF FERRITIN: CPT

## 2022-12-07 PROCEDURE — 86923 COMPATIBILITY TEST ELECTRIC: CPT

## 2022-12-07 PROCEDURE — P9016: CPT

## 2022-12-07 PROCEDURE — 84466 ASSAY OF TRANSFERRIN: CPT

## 2022-12-07 PROCEDURE — 99239 HOSP IP/OBS DSCHRG MGMT >30: CPT

## 2022-12-07 PROCEDURE — 80053 COMPREHEN METABOLIC PANEL: CPT

## 2022-12-07 PROCEDURE — 85025 COMPLETE CBC W/AUTO DIFF WBC: CPT

## 2022-12-07 PROCEDURE — 83550 IRON BINDING TEST: CPT

## 2022-12-07 PROCEDURE — 93005 ELECTROCARDIOGRAM TRACING: CPT

## 2022-12-07 PROCEDURE — 85027 COMPLETE CBC AUTOMATED: CPT

## 2022-12-07 RX ORDER — CARVEDILOL PHOSPHATE 80 MG/1
1 CAPSULE, EXTENDED RELEASE ORAL
Qty: 0 | Refills: 0 | DISCHARGE

## 2022-12-07 RX ORDER — FERROUS SULFATE 325(65) MG
1 TABLET ORAL
Qty: 30 | Refills: 0
Start: 2022-12-07 | End: 2023-01-05

## 2022-12-07 RX ORDER — OMEPRAZOLE 10 MG/1
1 CAPSULE, DELAYED RELEASE ORAL
Qty: 0 | Refills: 0 | DISCHARGE

## 2022-12-07 RX ORDER — IRON SUCROSE 20 MG/ML
200 INJECTION, SOLUTION INTRAVENOUS ONCE
Refills: 0 | Status: COMPLETED | OUTPATIENT
Start: 2022-12-07 | End: 2022-12-07

## 2022-12-07 RX ORDER — POTASSIUM CHLORIDE 20 MEQ
40 PACKET (EA) ORAL ONCE
Refills: 0 | Status: COMPLETED | OUTPATIENT
Start: 2022-12-07 | End: 2022-12-07

## 2022-12-07 RX ORDER — CARVEDILOL PHOSPHATE 80 MG/1
1 CAPSULE, EXTENDED RELEASE ORAL
Qty: 0 | Refills: 0 | DISCHARGE
Start: 2022-12-07

## 2022-12-07 RX ADMIN — Medication 10: at 11:45

## 2022-12-07 RX ADMIN — Medication 4: at 08:11

## 2022-12-07 RX ADMIN — PANTOPRAZOLE SODIUM 40 MILLIGRAM(S): 20 TABLET, DELAYED RELEASE ORAL at 06:31

## 2022-12-07 RX ADMIN — CARVEDILOL PHOSPHATE 12.5 MILLIGRAM(S): 80 CAPSULE, EXTENDED RELEASE ORAL at 17:39

## 2022-12-07 RX ADMIN — Medication 81 MILLIGRAM(S): at 11:45

## 2022-12-07 RX ADMIN — Medication 40 MILLIEQUIVALENT(S): at 10:34

## 2022-12-07 RX ADMIN — CARVEDILOL PHOSPHATE 12.5 MILLIGRAM(S): 80 CAPSULE, EXTENDED RELEASE ORAL at 05:31

## 2022-12-07 RX ADMIN — IRON SUCROSE 110 MILLIGRAM(S): 20 INJECTION, SOLUTION INTRAVENOUS at 16:21

## 2022-12-07 RX ADMIN — CLOPIDOGREL BISULFATE 75 MILLIGRAM(S): 75 TABLET, FILM COATED ORAL at 11:45

## 2022-12-07 RX ADMIN — Medication 25 MICROGRAM(S): at 05:31

## 2022-12-07 RX ADMIN — Medication 8: at 16:32

## 2022-12-07 NOTE — CONSULT NOTE ADULT - ASSESSMENT
82yFemale. She has known CAD, s/p PHYLLIS prox/mid RCA 9/24/19 and p/m/d LAD 10/15/19. Patient with worsening b/l LE edema. Had abnormal NST with posterolateral ischemia and referred for C. On admission, she was found to have a Hgb 7.1 and cath was cancelled. She was evaluated by GI 10/2022 at [facility] and diagnosed with multifocal hepatocellular carcinoma.  She has a previous history of anemia according to her daughter. She denies chest pain, PND or syncope.     1. Abnormal outpatient nuclear stress test (11/02/2022)- posterolateral ischemia   2. Leg edema, r/o CHF  3. Anemia Hgb - 7.1   4.  multifocal hepatocellular carcinoma (10/2022) previously recommended for evaluation for ablation    CV:  Continue aspirin and atorvastatin.   Discontinue plavix for now.   Continue coreg.  Gastroenterology evaluation recommended.   Cath deferred until patient cleared by GI.

## 2022-12-07 NOTE — CONSULT NOTE ADULT - SUBJECTIVE AND OBJECTIVE BOX
Patient is a 82y old  Female who presents with a chief complaint of abnormal NST    HPI: 82F with PMHx of HTN, HLD, DM, CAD, s/p PHYLLIS prox/mid RCA 9/24/19 and p/m/d LAD 10/15/19, who presented for an elective cardiac catheterization after having abnormal NST. On admission, she was found to have a Hgb 7.1. She has a previous history of anemia according to her daughter.  Cardiac cath deemed not necessary by cardiologist. She denied any history of bleeding. Last EGD over 7 years ago normal, Reports last colonoscopy 3-4 years ago without any abnormal findings. Patient was recently seen by Hepatologist Dr. Kelley  newly diagnosed Cirrhosis and imaging concerning for multifocal HCC. Patient seen and evaluated at bedside, Daughter at  bedside, NP able to interpret. At this time, Denies nausea, vomiting, abdominal pain, fever, chills, chest pain, shortness of breath, hematemesis, hematochezia, melena.       PAST MEDICAL & SURGICAL HISTORY:  Hypertension      Hypothyroidism      GERD (gastroesophageal reflux disease)      Hypercholesterolemia      Knee pain, left  arthritis      Diabetes      Heart murmur      S/P tubal ligation      History of vaginal surgery      History of total left knee replacement          Allergies    amlodipine (Swelling)    Intolerances        MEDICATIONS  (STANDING):  aspirin enteric coated 81 milliGRAM(s) Oral daily  atorvastatin 40 milliGRAM(s) Oral at bedtime  carvedilol 12.5 milliGRAM(s) Oral every 12 hours  chlorhexidine 4% Liquid 1 Application(s) Topical Once  dextrose 5%. 1000 milliLiter(s) (50 mL/Hr) IV Continuous <Continuous>  dextrose 5%. 1000 milliLiter(s) (100 mL/Hr) IV Continuous <Continuous>  dextrose 50% Injectable 25 Gram(s) IV Push once  dextrose 50% Injectable 12.5 Gram(s) IV Push once  dextrose 50% Injectable 25 Gram(s) IV Push once  glucagon  Injectable 1 milliGRAM(s) IntraMuscular once  insulin lispro (ADMELOG) corrective regimen sliding scale   SubCutaneous three times a day before meals  iron sucrose IVPB 200 milliGRAM(s) IV Intermittent once  levothyroxine 25 MICROGram(s) Oral daily  pantoprazole    Tablet 40 milliGRAM(s) Oral before breakfast    MEDICATIONS  (PRN):  dextrose Oral Gel 15 Gram(s) Oral once PRN Blood Glucose LESS THAN 70 milliGRAM(s)/deciliter      Social History:    Marital Status:  (   )    (   ) Single    (   )    (  )   Occupation:   Lives with: (  ) alone  (  ) children   (  ) spouse   (  ) parents  (  ) other    Substance Use (street drugs): (  ) never used  (  ) other:  Tobacco Usage:  (   ) never smoked   (   ) former smoker   (   ) current smoker  (     ) pack year  (        ) last cigarette date  Alcohol Usage:  Sexual History:     Family History   IBD (  ) Yes   (  ) No  GI Malignancy (  )  Yes    (  ) No    Health Management     Last Colonoscopy -      (     ) Advanced Directives: (     ) None    (      ) DNR    (     ) DNI    (     ) Health Care Proxy:       REVIEW OF SYSTEMS:   General: Negative  HEENT: Negative  CV: Negative  Respiratory: Negative  GI: See HPI  : Negative  MSK: Negative  Hematologic: Negative  Skin: Negative      Vital Signs Last 24 Hrs  T(C): 36.7 (07 Dec 2022 11:00), Max: 36.7 (06 Dec 2022 19:40)  T(F): 98.1 (07 Dec 2022 11:00), Max: 98.1 (06 Dec 2022 19:40)  HR: 66 (07 Dec 2022 11:30) (66 - 74)  BP: 145/68 (07 Dec 2022 11:00) (144/73 - 165/67)  BP(mean): --  RR: 18 (07 Dec 2022 11:00) (18 - 18)  SpO2: 96% (07 Dec 2022 11:00) (95% - 97%)    Parameters below as of 07 Dec 2022 11:00  Patient On (Oxygen Delivery Method): room air        PHYSICAL EXAM:   GENERAL:  No acute distress  HEENT:  NC/AT, conjunctiva clear, sclera anicteric  CHEST:  No increased effort, breath sounds clear  HEART:  Regular rhythm, S1, S2,   ABDOMEN:  See HPI  EXTREMITIES: No edema  SKIN:  Warm, dry  NEURO:  Calm, cooperative  HUMPHREY: Brown stool       LABS:                        8.7    6.99  )-----------( 193      ( 07 Dec 2022 04:40 )             27.3     12-07    136  |  93<L>  |  20.8<H>  ----------------------------<  180<H>  3.4<L>   |  30.0<H>  |  0.90    Ca    9.1      07 Dec 2022 04:40  Mg     1.3     12-06    TPro  7.3  /  Alb  3.6  /  TBili  0.7  /  DBili  x   /  AST  34<H>  /  ALT  16  /  AlkPhos  140<H>  12-07        LIVER FUNCTIONS - ( 07 Dec 2022 04:40 )  Alb: 3.6 g/dL / Pro: 7.3 g/dL / ALK PHOS: 140 U/L / ALT: 16 U/L / AST: 34 U/L / GGT: x             RADIOLOGY & ADDITIONAL TESTS:    < from: MR Abdomen w/ IV Cont (10.21.22 @ 09:25) >    ACC: [id number] EXAM:  MR ABDOMEN IC                          PROCEDURE DATE:  10/21/2022          INTERPRETATION:  CLINICAL INFORMATION: Right hepatic hepatoma.    COMPARISON: None contrast-enhanced CT of the abdomen and pelvis October 16, 2022.    CONTRAST/COMPLICATIONS:  IV Contrast: 8 cc of Gadavist intravenous contrast.  Oral Contrast: None  Complications: None    PROCEDURE:  MRI of the abdomen was performed. There is significant patient   respiratory motion artifact.  MRCP was performed.    FINDINGS:  LOWER CHEST: Within normal limits.    LIVER: Liver is shrunken with prominence of the left lobe and caudate   lobe and a subtle nodular contour, compatible with cirrhosis. There is a   2.2 x 2.0 cm mildly T2 hyperintense lesion at the junction of segments 6   and 7 which demonstrates restricted diffusion. Dynamic postcontrast   imaging is markedly degraded by respiratory motion artifact, however   there is evidence of early enhancement and washout. These findings are   compatible with HCC. There is a similar 2.7 x 1.9 cm mildly T2 bright   subcapsular lesion at the junction of segments 2 and 3 posteriorly which   also demonstrates early enhancement with washout. The remainder of the   liver is mildly heterogeneous in signal and enhancement. The hepatic   veins and portal vein are patent.  BILE DUCTS: There is no intra or extra hepatic biliary duct dilatation.   The common bile duct measures 3 mm.  GALLBLADDER: Within normal limits.  SPLEEN: Within normal limits.  PANCREAS: Within normal limits. The pancreatic duct is normal in course   and caliber.  ADRENALS: Within normal limits.  KIDNEYS/URETERS: Within normal limits.    VISUALIZED PORTIONS:  BOWEL: Within normal limits.  PERITONEUM: No ascites.  VESSELS: Within normal limits.  RETROPERITONEUM/LYMPH NODES: No lymphadenopathy.  ABDOMINAL WALL: Within normal limits.  BONES: Within normal limits.    IMPRESSION: Technically suboptimal study due to significant respiratory   motion artifact on dynamic postcontrast imaging. Otherwise, evidence of   multifocal HCC within a cirrhotic liver, as described.    --- End of Report ---            RUPALI BELLO MD; Attending Radiologist  This document has been electronically signed. Oct 21 2022  1:07PM    < end of copied text >       Patient is a 82y old  Female who presents with a chief complaint of abnormal NST    HPI: 82F with PMHx of HTN, HLD, DM, CAD, s/p PHYLLIS prox/mid RCA 9/24/19 and p/m/d LAD 10/15/19, who presented for an elective cardiac catheterization after having abnormal NST. On admission, she was found to have a Hgb 7.1. She has a previous history of anemia according to her daughter.  Cardiac cath deemed not necessary by cardiologist. She denied any history of bleeding. Last EGD over 7 years ago normal, Reports last colonoscopy 3-4 years ago without any abnormal findings. Patient was recently seen by Hepatologist Dr. Kelley  newly diagnosed Cirrhosis and imaging concerning for multifocal HCC. Patient seen and evaluated at bedside, Daughter at  bedside, NP able to interpret. At this time, Denies nausea, vomiting, abdominal pain, fever, chills, chest pain, shortness of breath, hematemesis, hematochezia, melena.       PAST MEDICAL & SURGICAL HISTORY:  Hypertension      Hypothyroidism      GERD (gastroesophageal reflux disease)      Hypercholesterolemia      Knee pain, left  arthritis      Diabetes      Heart murmur      S/P tubal ligation      History of vaginal surgery      History of total left knee replacement          Allergies    amlodipine (Swelling)    Intolerances        MEDICATIONS  (STANDING):  aspirin enteric coated 81 milliGRAM(s) Oral daily  atorvastatin 40 milliGRAM(s) Oral at bedtime  carvedilol 12.5 milliGRAM(s) Oral every 12 hours  chlorhexidine 4% Liquid 1 Application(s) Topical Once  dextrose 5%. 1000 milliLiter(s) (50 mL/Hr) IV Continuous <Continuous>  dextrose 5%. 1000 milliLiter(s) (100 mL/Hr) IV Continuous <Continuous>  dextrose 50% Injectable 25 Gram(s) IV Push once  dextrose 50% Injectable 12.5 Gram(s) IV Push once  dextrose 50% Injectable 25 Gram(s) IV Push once  glucagon  Injectable 1 milliGRAM(s) IntraMuscular once  insulin lispro (ADMELOG) corrective regimen sliding scale   SubCutaneous three times a day before meals  iron sucrose IVPB 200 milliGRAM(s) IV Intermittent once  levothyroxine 25 MICROGram(s) Oral daily  pantoprazole    Tablet 40 milliGRAM(s) Oral before breakfast    MEDICATIONS  (PRN):  dextrose Oral Gel 15 Gram(s) Oral once PRN Blood Glucose LESS THAN 70 milliGRAM(s)/deciliter      Social History:    Marital Status:  (   )    (   ) Single    (   )    (  )   Occupation:   Lives with: (  ) alone  (  ) children   (  ) spouse   (  ) parents  (  ) other    Substance Use (street drugs): (  ) never used  (  ) other:  Tobacco Usage:  (   ) never smoked   (   ) former smoker   (   ) current smoker  (     ) pack year  (        ) last cigarette date  Alcohol Usage:  Sexual History:     Family History   IBD (  ) Yes   (  ) No  GI Malignancy (  )  Yes    (  ) No    Health Management     Last Colonoscopy -      (     ) Advanced Directives: (     ) None    (      ) DNR    (     ) DNI    (     ) Health Care Proxy:       REVIEW OF SYSTEMS:   General: Negative  HEENT: Negative  CV: Negative  Respiratory: Negative  GI: See HPI  : Negative  MSK: Negative  Hematologic: Negative  Skin: Negative      Vital Signs Last 24 Hrs  T(C): 36.7 (07 Dec 2022 11:00), Max: 36.7 (06 Dec 2022 19:40)  T(F): 98.1 (07 Dec 2022 11:00), Max: 98.1 (06 Dec 2022 19:40)  HR: 66 (07 Dec 2022 11:30) (66 - 74)  BP: 145/68 (07 Dec 2022 11:00) (144/73 - 165/67)  BP(mean): --  RR: 18 (07 Dec 2022 11:00) (18 - 18)  SpO2: 96% (07 Dec 2022 11:00) (95% - 97%)    Parameters below as of 07 Dec 2022 11:00  Patient On (Oxygen Delivery Method): room air        PHYSICAL EXAM:   GENERAL:  No acute distress  HEENT:  NC/AT, conjunctiva clear, sclera anicteric  CHEST:  No increased effort, breath sounds clear  HEART:  Regular rhythm, S1, S2,   ABDOMEN:  See HPI  EXTREMITIES: No edema  SKIN:  Warm, dry  NEURO:  Calm, cooperative  HUMPHREY: Brown stool - no masses       LABS:                        8.7    6.99  )-----------( 193      ( 07 Dec 2022 04:40 )             27.3     12-07    136  |  93<L>  |  20.8<H>  ----------------------------<  180<H>  3.4<L>   |  30.0<H>  |  0.90    Ca    9.1      07 Dec 2022 04:40  Mg     1.3     12-06    TPro  7.3  /  Alb  3.6  /  TBili  0.7  /  DBili  x   /  AST  34<H>  /  ALT  16  /  AlkPhos  140<H>  12-07        LIVER FUNCTIONS - ( 07 Dec 2022 04:40 )  Alb: 3.6 g/dL / Pro: 7.3 g/dL / ALK PHOS: 140 U/L / ALT: 16 U/L / AST: 34 U/L / GGT: x             RADIOLOGY & ADDITIONAL TESTS:    < from: MR Abdomen w/ IV Cont (10.21.22 @ 09:25) >    ACC: [id number] EXAM:  MR ABDOMEN IC                          PROCEDURE DATE:  10/21/2022          INTERPRETATION:  CLINICAL INFORMATION: Right hepatic hepatoma.    COMPARISON: None contrast-enhanced CT of the abdomen and pelvis October 16, 2022.    CONTRAST/COMPLICATIONS:  IV Contrast: 8 cc of Gadavist intravenous contrast.  Oral Contrast: None  Complications: None    PROCEDURE:  MRI of the abdomen was performed. There is significant patient   respiratory motion artifact.  MRCP was performed.    FINDINGS:  LOWER CHEST: Within normal limits.    LIVER: Liver is shrunken with prominence of the left lobe and caudate   lobe and a subtle nodular contour, compatible with cirrhosis. There is a   2.2 x 2.0 cm mildly T2 hyperintense lesion at the junction of segments 6   and 7 which demonstrates restricted diffusion. Dynamic postcontrast   imaging is markedly degraded by respiratory motion artifact, however   there is evidence of early enhancement and washout. These findings are   compatible with HCC. There is a similar 2.7 x 1.9 cm mildly T2 bright   subcapsular lesion at the junction of segments 2 and 3 posteriorly which   also demonstrates early enhancement with washout. The remainder of the   liver is mildly heterogeneous in signal and enhancement. The hepatic   veins and portal vein are patent.  BILE DUCTS: There is no intra or extra hepatic biliary duct dilatation.   The common bile duct measures 3 mm.  GALLBLADDER: Within normal limits.  SPLEEN: Within normal limits.  PANCREAS: Within normal limits. The pancreatic duct is normal in course   and caliber.  ADRENALS: Within normal limits.  KIDNEYS/URETERS: Within normal limits.    VISUALIZED PORTIONS:  BOWEL: Within normal limits.  PERITONEUM: No ascites.  VESSELS: Within normal limits.  RETROPERITONEUM/LYMPH NODES: No lymphadenopathy.  ABDOMINAL WALL: Within normal limits.  BONES: Within normal limits.    IMPRESSION: Technically suboptimal study due to significant respiratory   motion artifact on dynamic postcontrast imaging. Otherwise, evidence of   multifocal HCC within a cirrhotic liver, as described.    --- End of Report ---            RUPALI BELLO MD; Attending Radiologist  This document has been electronically signed. Oct 21 2022  1:07PM    < end of copied text >

## 2022-12-07 NOTE — CONSULT NOTE ADULT - NS ATTEND AMEND GEN_ALL_CORE FT
patient seen and examined with NP. ( NP was  ). Spoke to daughter at bedside   Patient with  DM, HTN, PCI 2019 on plavix.  Admitted  in 10/2022 with epigastic pain. ON CT FOund to have HCC, elevated AFP. Followed up with Dr Kelley. Considering IR local regional treatment, Not candidate for transplant.  Had abnormal outpatient stress test with Dr Rowe., then sent in for elective cath with Dr Chirinos. Found to have hemoglobin 7.1 with microcytic indices. Hg was 8.1 on 12/2. Last colon was 3-4 years ago. Negative as p er pt. No family h x of colon cancer  or polyps.  NO GI symptoms.  Had 1 unit PRBC and hemoglobin is 8.7     Dr Chirinos did not feel that pt needed cath as there were not significant findings on stress test and cardiac cath was cancelled. Plavix was D/Flavio.     A/P  Pt will see her cardiologist and get cardiac clearance 12/15  Plavix now D/Flavio.   will plan for EGD, colon at [facility] week of 12/19  get CBC, Cmp , INR around 12/15

## 2022-12-07 NOTE — DISCHARGE NOTE PROVIDER - NSDCMRMEDTOKEN_GEN_ALL_CORE_FT
albuterol 90 mcg/inh inhalation powder: 2 puff(s) inhaled every 4 hours   aspirin 81 mg oral tablet: 1 tab(s) orally once a day  atorvastatin 40 mg oral tablet: 1 tab(s) orally once a day (at bedtime)  carvedilol 12.5 mg oral tablet: 1 tab(s) orally every 12 hours  Farxiga 10 mg oral tablet: 1 tab(s) orally once a day  ferrous sulfate 324 mg (65 mg elemental iron) oral delayed release tablet: 1 tab(s) orally once a day   hydrALAZINE 50 mg oral tablet: 1 tab(s) orally 2 times a day  levothyroxine 25 mcg (0.025 mg) oral capsule: 1 cap(s) orally once a day  metFORMIN 1000 mg oral tablet: 1 tab(s) orally 2 times a day  omeprazole 20 mg oral delayed release capsule: 1 cap(s) orally 2 times a day  Vitamin C 500 mg oral tablet: 1 tab(s) orally once a day

## 2022-12-07 NOTE — CONSULT NOTE ADULT - SUBJECTIVE AND OBJECTIVE BOX
WILMER RAFIQ  [id number]      HPI: 82yFemale. She has known CAD, s/p PHYLLIS prox/mid RCA 9/24/19 and p/m/d LAD 10/15/19. Patient with worsening b/l LE edema. Had abnormal NST with posterolateral ischemia and referred for C. On admission, she was found to have a Hgb 7.1 and cath was cancelled. She has a previous history of anemia according to her daughter. She denies chest pain, PND or syncope.       REVIEW OF SYSTEMS:  NECK: No pain or stiffness  RESPIRATORY: No cough, wheezing, chills or hemoptysis; No Shortness of Breath  CARDIOVASCULAR: No chest pain, palpitations, passing out, dizziness, or leg swelling  GASTROINTESTINAL: No abdominal or epigastric pain. No nausea, vomiting, or hematemesis; No diarrhea or constipation. No melena or hematochezia.  NEUROLOGICAL: No headaches, memory loss, loss of strength, numbness, or tremors  MUSCULOSKELETAL: No joint pain or swelling; No muscle, back, or extremity pain  	      PAST MEDICAL & SURGICAL HISTORY:  Hypertension      Hypothyroidism      GERD (gastroesophageal reflux disease)      Hypercholesterolemia      Knee pain, left  arthritis      Diabetes      Heart murmur      S/P tubal ligation      History of vaginal surgery      History of total left knee replacement          Allergies    amlodipine (Swelling)    Intolerances        MEDICATIONS  (STANDING):  aspirin enteric coated 81 milliGRAM(s) Oral daily  atorvastatin 40 milliGRAM(s) Oral at bedtime  carvedilol 12.5 milliGRAM(s) Oral every 12 hours  chlorhexidine 4% Liquid 1 Application(s) Topical Once  clopidogrel Tablet 75 milliGRAM(s) Oral daily  dextrose 5%. 1000 milliLiter(s) (50 mL/Hr) IV Continuous <Continuous>  dextrose 5%. 1000 milliLiter(s) (100 mL/Hr) IV Continuous <Continuous>  dextrose 50% Injectable 25 Gram(s) IV Push once  dextrose 50% Injectable 12.5 Gram(s) IV Push once  dextrose 50% Injectable 25 Gram(s) IV Push once  glucagon  Injectable 1 milliGRAM(s) IntraMuscular once  insulin lispro (ADMELOG) corrective regimen sliding scale   SubCutaneous three times a day before meals  levothyroxine 25 MICROGram(s) Oral daily  pantoprazole    Tablet 40 milliGRAM(s) Oral before breakfast    MEDICATIONS  (PRN):  dextrose Oral Gel 15 Gram(s) Oral once PRN Blood Glucose LESS THAN 70 milliGRAM(s)/deciliter      FAMILY HISTORY:  Family history of diabetes mellitus (DM)    FH: HTN (hypertension)    FH: heart disease               EKG: NSR, RBBB           Vital Signs Last 24 Hrs  T(C): 36.7 (07 Dec 2022 11:00), Max: 36.7 (06 Dec 2022 15:40)  T(F): 98.1 (07 Dec 2022 11:00), Max: 98.1 (06 Dec 2022 19:40)  HR: 66 (07 Dec 2022 11:30) (66 - 78)  BP: 145/68 (07 Dec 2022 11:00) (144/73 - 165/67)  BP(mean): --  RR: 18 (07 Dec 2022 11:00) (18 - 18)  SpO2: 96% (07 Dec 2022 11:00) (94% - 97%)    Parameters below as of 07 Dec 2022 11:00  Patient On (Oxygen Delivery Method): room air        Daily     Daily     I&O's Detail      PHYSICAL EXAM:  Appearance: NAD  HEENT:   Normal oral mucosa, PERRL, sclera non-icteric	  Cardiovascular: Normal S1 S2, No JVD,   Respiratory: Lungs clear to auscultation	  Gastrointestinal:  Soft, Non-tender, + BS, no bruits	  Neurologic: Grossly non-focal with full strength in all four extremities  Extremities: No edema       LABS:                        8.7    6.99  )-----------( 193      ( 07 Dec 2022 04:40 )             27.3     12-07    136  |  93<L>  |  20.8<H>  ----------------------------<  180<H>  3.4<L>   |  30.0<H>  |  0.90    Ca    9.1      07 Dec 2022 04:40  Mg     1.3     12-06    TPro  7.3  /  Alb  3.6  /  TBili  0.7  /  DBili  x   /  AST  34<H>  /  ALT  16  /  AlkPhos  140<H>  12-07

## 2022-12-07 NOTE — DISCHARGE NOTE PROVIDER - CARE PROVIDER_API CALL
Lavinia Gonzalez)  Cardiovascular Disease  [address] [place] [address]  Phone: [phone]  Fax: [phone]  Follow Up Time: 1 week

## 2022-12-07 NOTE — DISCHARGE NOTE PROVIDER - NSDCCPCAREPLAN_GEN_ALL_CORE_FT
PRINCIPAL DISCHARGE DIAGNOSIS  Diagnosis: Anemia of chronic disease  Assessment and Plan of Treatment: given i unit blood   follow up with gastroeneterologist for endoscopy      SECONDARY DISCHARGE DIAGNOSES  Diagnosis: CAD (coronary artery disease)  Assessment and Plan of Treatment: continue aspirin , coreg   stop palvix , see cardiologist prior endoscopy    Diagnosis: Cancer, hepatocellular  Assessment and Plan of Treatment: see the hepatologist    Diagnosis: Diabetes mellitus  Assessment and Plan of Treatment: watch your diet

## 2022-12-07 NOTE — DISCHARGE NOTE PROVIDER - HOSPITAL COURSE
82 F with h/o CAD, HCC , cirrhosis presented for an elective cardiac catheterization on 12/06 She had abnormal NST with posterolateral ischemia and referred for LHC.   On admission, she was found to have a Hgb 7.1 and cath was cancelled. She has a previous history of anemia according to her daughter. Old records reviewed Hb is 8-9 range   She denies chest pain, PND or syncope. No melena or rectal bleed reported     She reported a prior colonoscopy four years in past. She reports compliance with her medications as home. She was noted to have cirrhosis on the prior hospitalization , liver mass , HCC abd and had seen the hepatologist who recommended further testing in the future.   Pt had 1 unit PRBC on admission   post tx hb 807, asymptomatic , d/w Dr Mann and her cardiologist , no LHC at this time pending GI work up EGD / colonoscopy   GI consulted , since pt had been on plavix and no acute bleed rec to see her in the office in 1 week for outpatient EGD / colonoscopy , plavix stopped , jr per her cardiologist     daughter and pt are informed with  and staff who speaks Japanese     total time spend coordinating care 40 min

## 2022-12-07 NOTE — CONSULT NOTE ADULT - TIME BILLING
I reviewed the outpatient records, hepatology notes   I reviewed Ct done 10/22  I spoke to NP and hospitalist regarding the plan

## 2022-12-07 NOTE — DISCHARGE NOTE NURSING/CASE MANAGEMENT/SOCIAL WORK - PATIENT PORTAL LINK FT
You can access the FollowMyHealth Patient Portal offered by [facility] by registering at the following website: [email or web address]. By joining [employer]’s FollowMyHealth portal, you will also be able to view your health information using other applications (apps) compatible with our system.

## 2022-12-07 NOTE — CONSULT NOTE ADULT - PROBLEM SELECTOR RECOMMENDATION 2
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 -MRI Abdomen w/IC 10/21/22 - LIVER: Liver is shrunken with prominence of the left lobe and caudate lobe and a subtle nodular contour, compatible with cirrhosis. There is a 2.2 x 2.0 cm mildly T2 hyperintense lesion at the junction of segments 6 and 7 which demonstrates restricted diffusion. Dynamic postcontrast imaging is markedly degraded by respiratory motion artifact, however there is evidence of early enhancement and washout. These findings are compatible with HCC. There is a similar 2.7 x 1.9 cm mildly T2 bright subcapsular lesion at the junction of segments 2 and 3 posteriorly which also demonstrates early enhancement with washout. The remainder of the liver is mildly heterogeneous in signal and enhancement. The hepatic veins and portal vein are patent.    Seen by Hepatologist Dr. Kelley.   - Needs outpatient follow up with our Hepatologist Dr. Kelley for further assessment and monitoring.

## 2022-12-07 NOTE — CONSULT NOTE ADULT - PROBLEM SELECTOR RECOMMENDATION 9
Microcytic anemia with a hgb of 7.2gm on admission, s/p 1uPRBC. Today, Hgb of 8.7gm. Denies any rectal bleeding. No evidence of overt GI bleeding.     Plan:  Will plan for outpatient EGD/colonoscopy with Dr. June for anemia workup/variceal screening.   Patient will need cardiology clearance prior to procedure. Scheduled outpatient appointment with Cardiologist next Thursday.   No further GI recommendations at this time. Microcytic anemia with a hgb of 7.2gm on admission, s/p 1uPRBC. Today, Hgb of 8.7gm. Denies any rectal bleeding. No evidence of overt GI bleeding.     Plan:  Will plan for outpatient EGD/colonoscopy with Dr. June for anemia workup/variceal screening.   Patient will need cardiology clearance prior to procedure with Dr Rowe. Scheduled outpatient appointment with Cardiologist next Thursday 12/15.   No further GI recommendations at this time.

## 2022-12-07 NOTE — DISCHARGE NOTE PROVIDER - ATTENDING DISCHARGE PHYSICAL EXAMINATION:
pt is seen earlier today with  Kaveh at the bedside   she is feeling well, denies melena , no stomach pain   Vital Signs Last 24 Hrs  T(C): 36.7 (07 Dec 2022 11:00), Max: 36.7 (06 Dec 2022 19:40)  T(F): 98.1 (07 Dec 2022 11:00), Max: 98.1 (06 Dec 2022 19:40)  HR: 66 (07 Dec 2022 11:30) (66 - 74)  BP: 145/68 (07 Dec 2022 11:00) (144/73 - 165/67)  BP(mean): --  RR: 18 (07 Dec 2022 11:00) (18 - 18)  SpO2: 96% (07 Dec 2022 11:00) (95% - 97%)    Parameters below as of 07 Dec 2022 11:00  Patient On (Oxygen Delivery Method): room air    Lungs : CTA bilateral   Heart : regular s1 /s2   Abd soft no tenderness , BS +  Ext : no pretibial edema

## 2022-12-07 NOTE — DISCHARGE NOTE PROVIDER - NSDCFUSCHEDAPPT_GEN_ALL_CORE_FT
Susan Kelley  [facility]  [address]  Scheduled Appointment: 12/21/2022

## 2022-12-15 LAB
A1AT SERPL-MCNC: 163 MG/DL
AFP-TM SERPL-MCNC: 306 NG/ML
ALBUMIN SERPL ELPH-MCNC: 4.1 G/DL
ALP BLD-CCNC: 160 U/L
ALT SERPL-CCNC: 18 U/L
ANA SER IF-ACNC: NEGATIVE
ANION GAP SERPL CALC-SCNC: 15 MMOL/L
AST SERPL-CCNC: 30 U/L
BASOPHILS # BLD AUTO: 0.02 K/UL
BASOPHILS NFR BLD AUTO: 0.3 %
BILIRUB INDIRECT SERPL-MCNC: 0.2 MG/DL
BILIRUB SERPL-MCNC: 0.4 MG/DL
BUN SERPL-MCNC: 23 MG/DL
CALCIUM SERPL-MCNC: 10.1 MG/DL
CERULOPLASMIN SERPL-MCNC: 29 MG/DL
CHLORIDE SERPL-SCNC: 93 MMOL/L
CO2 SERPL-SCNC: 27 MMOL/L
CREAT SERPL-MCNC: 0.98 MG/DL
EGFR: 57 ML/MIN/1.73M2
EOSINOPHIL # BLD AUTO: 0.05 K/UL
EOSINOPHIL NFR BLD AUTO: 0.7 %
GLUCOSE SERPL-MCNC: 278 MG/DL
HCT VFR BLD CALC: 26.5 %
HCV AB SER QL: NONREACTIVE
HCV S/CO RATIO: 0.39 S/CO
HGB BLD-MCNC: 8.1 G/DL
IGG SER QL IEP: 1818 MG/DL
IMM GRANULOCYTES NFR BLD AUTO: 0.3 %
INR PPP: 1.27 RATIO
LKM AB SER QL IF: <20.1 UNITS
LYMPHOCYTES # BLD AUTO: 1.97 K/UL
LYMPHOCYTES NFR BLD AUTO: 28.8 %
MAN DIFF?: NORMAL
MCHC RBC-ENTMCNC: 23.5 PG
MCHC RBC-ENTMCNC: 30.6 GM/DL
MCV RBC AUTO: 76.8 FL
MITOCHONDRIA AB SER IF-ACNC: NORMAL
MONOCYTES # BLD AUTO: 0.62 K/UL
MONOCYTES NFR BLD AUTO: 9.1 %
NEUTROPHILS # BLD AUTO: 4.16 K/UL
NEUTROPHILS NFR BLD AUTO: 60.8 %
NT-PROBNP SERPL-MCNC: 779 PG/ML
PLATELET # BLD AUTO: 218 K/UL
POTASSIUM SERPL-SCNC: 4.3 MMOL/L
PROT SERPL-MCNC: 7.9 G/DL
PT BLD: 15 SEC
RBC # BLD: 3.45 M/UL
RBC # FLD: 16.2 %
SMOOTH MUSCLE AB SER QL IF: ABNORMAL
SODIUM SERPL-SCNC: 135 MMOL/L
SOLUBLE LIVER IGG SER IA-ACNC: 0.9
WBC # FLD AUTO: 6.84 K/UL

## 2022-12-16 LAB
GLUCOSE BLDC GLUCOMTR-MCNC: 360 MG/DL — HIGH (ref 70–99)
GLUCOSE BLDC GLUCOMTR-MCNC: 396 MG/DL — HIGH (ref 70–99)

## 2022-12-17 LAB
ALBUMIN SERPL ELPH-MCNC: 4 G/DL
ALP BLD-CCNC: 175 U/L
ALT SERPL-CCNC: 21 U/L
ANION GAP SERPL CALC-SCNC: 14 MMOL/L
AST SERPL-CCNC: 32 U/L
BASOPHILS # BLD AUTO: 0.03 K/UL
BASOPHILS NFR BLD AUTO: 0.3 %
BILIRUB SERPL-MCNC: 0.7 MG/DL
BUN SERPL-MCNC: 27 MG/DL
CALCIUM SERPL-MCNC: 9.6 MG/DL
CHLORIDE SERPL-SCNC: 90 MMOL/L
CO2 SERPL-SCNC: 29 MMOL/L
CREAT SERPL-MCNC: 1.11 MG/DL
EGFR: 49 ML/MIN/1.73M2
EOSINOPHIL # BLD AUTO: 0.05 K/UL
EOSINOPHIL NFR BLD AUTO: 0.6 %
GLUCOSE SERPL-MCNC: 297 MG/DL
HCT VFR BLD CALC: 33.9 %
HGB BLD-MCNC: 10.1 G/DL
IMM GRANULOCYTES NFR BLD AUTO: 0.3 %
INR PPP: 1.3 RATIO
LYMPHOCYTES # BLD AUTO: 1.69 K/UL
LYMPHOCYTES NFR BLD AUTO: 19.2 %
MAN DIFF?: NORMAL
MCHC RBC-ENTMCNC: 24.3 PG
MCHC RBC-ENTMCNC: 29.8 GM/DL
MCV RBC AUTO: 81.7 FL
MONOCYTES # BLD AUTO: 0.63 K/UL
MONOCYTES NFR BLD AUTO: 7.2 %
NEUTROPHILS # BLD AUTO: 6.38 K/UL
NEUTROPHILS NFR BLD AUTO: 72.4 %
PLATELET # BLD AUTO: 177 K/UL
POTASSIUM SERPL-SCNC: 3.6 MMOL/L
PROT SERPL-MCNC: 7.9 G/DL
PT BLD: 15.4 SEC
RBC # BLD: 4.15 M/UL
RBC # FLD: 19.7 %
SODIUM SERPL-SCNC: 133 MMOL/L
WBC # FLD AUTO: 8.81 K/UL

## 2022-12-19 ENCOUNTER — RESULT REVIEW (OUTPATIENT)
Age: 82
End: 2022-12-19

## 2022-12-20 ENCOUNTER — APPOINTMENT (OUTPATIENT)
Dept: GASTROENTEROLOGY | Facility: CLINIC | Age: 82
End: 2022-12-20

## 2022-12-20 VITALS
HEART RATE: 77 BPM | WEIGHT: 166 LBS | HEIGHT: 62 IN | SYSTOLIC BLOOD PRESSURE: 148 MMHG | OXYGEN SATURATION: 98 % | TEMPERATURE: 97.9 F | DIASTOLIC BLOOD PRESSURE: 64 MMHG | BODY MASS INDEX: 30.55 KG/M2 | RESPIRATION RATE: 18 BRPM

## 2022-12-20 PROCEDURE — 99215 OFFICE O/P EST HI 40 MIN: CPT

## 2022-12-20 RX ORDER — LINAGLIPTIN 5 MG/1
5 TABLET, FILM COATED ORAL
Refills: 0 | Status: ACTIVE | COMMUNITY

## 2022-12-20 RX ORDER — FERROUS SULFATE 325(65) MG
325 TABLET ORAL
Refills: 0 | Status: ACTIVE | COMMUNITY

## 2022-12-20 NOTE — HISTORY OF PRESENT ILLNESS
[MELD Score: ___] : MELD Score is [unfilled] [de-identified] : WILMER CONROY is a 82 year old female with a PMH significant for newly diagnosed Cirrhosis, imaging concerning for multifocal HCC, HLD, HTN, DM, and CAD s/p stent. \par \par 12/1/22: She presents today for evaluation of newly diagnosed Cirrhosis and imaging concerning for multifocal HCC. Pt presented to [facility] on 10/16/22 with complaints of epigastric pain. During workup, pt was found of have cirrhotic liver and lesions suspicious for HCC on imaging. ACS ruled out. Pt went on to have additional imaging which further indicated HCC , 2 lesions, with cirrhotic liver. Additional workup done indicated HBV core antibody positive, surface antigen indeterminate, core IgM nonreactive, Be antibody nonreactive. HAV antibody positive but IgM negative indicated past resolved infection. Labs done during admission - bilirubin 0.3, AST 73, ALT 53, , INR 1.3, , , Iron serum 30, ferritin 18 and transferrin saturation 7%.\par \par CT Angio Abd Pelv w/wo IC 10/16/22 - Hypervascular lesions seen in liver, at least one of which is typical for a flash filling hemangioma. A lesion within the right hepatic lobe demonstrates washout, suspicious for hepatocellular carcinoma. Additional subcentimeter hypervascular lesion of left hepatic lobe is indeterminate. Confirmation with multiphasic hepatic protocol MRI or CT is advised for more definitive evaluation. Cirrhosis. Atherosclerotic changes of the arterial structures, with 50-70% stenosis \par of the origins of the celiac axis and SMA. No mesenteric venous occlusive disease.\par \par MRI Abdomen w/IC 10/21/22 - LIVER: Liver is shrunken with prominence of the left lobe and caudate lobe and a subtle nodular contour, compatible with cirrhosis. There is a 2.2 x 2.0 cm mildly T2 hyperintense lesion at the junction of segments 6 and 7 which demonstrates restricted diffusion. Dynamic postcontrast imaging is markedly degraded by respiratory motion artifact, however there is evidence of early enhancement and washout. These findings are compatible with HCC. There is a similar 2.7 x 1.9 cm mildly T2 bright subcapsular lesion at the junction of segments 2 and 3 posteriorly which also demonstrates early enhancement with washout. The remainder of the liver is mildly heterogeneous in signal and enhancement. The hepatic veins and portal vein are patent. BILE DUCTS: There is no intra or extra hepatic biliary duct dilatation. The common bile duct measures 3 mm.\par \par Denies fatigue, malaise, arthralgias, myalgias, pruritus, recent infection, abdominal pain or distension, jaundice, hematemesis, hematochezia, dark urine, confusion, unintentional weight loss or gain. Denies alcohol consumption. Denies OTC/herbal supplements. Pt had EGD about 3 years ago but unknown results and where it was done. Pt reports a family of history of liver disease but is unsure what kind.\par \par 12/20/22: Since last visit, pt was hospitalized at [facility] for anemia on 12/6/22. H/H on admission was 7.2/23. She was given a blood transfusion. Current H/H 10.1/33.9. Pt is scheduled for EGD and Colonoscopy with Dr. June tomorrow. Pt currently has an URI and reports a lot of coughing and mucus production. She has regular BMs daily. Pt's daughter reports she has noticed her mom is confused but no more than usual. She continues to take Lactulose daily.

## 2022-12-20 NOTE — REASON FOR VISIT
[Follow-Up: _____] : a [unfilled] follow-up visit [Family Member] : family member [[employer] ] : provided by [employer]   [Time Spent: ____ minutes] : Total time spent using  services: [unfilled] minutes. The patient's primary language is not English thus required  services. [FreeTextEntry1] : cirrhosis, HCC [Interpreters_IDNumber] : [id number] [TWNoteComboBox1] : [place]

## 2022-12-20 NOTE — ASSESSMENT
[FreeTextEntry1] : WILMER CONROY is a 82 year old female with a PMH significant for newly diagnosed Cirrhosis, HCC, HLD, HTN, DM, and CAD s/p stent. \par \par Cirrhosis\par -Etiology likely MARTIN\par -Disease progression of cirrhosis discussed, including but not limited to hepatocellular carcinoma, varices with or without bleeding, hepatic encephalopathy, ascites, liver failure and death.\par \par HCC Screening\par -I have explained the need for imaging every 6 months to assess for HCC.\par -MRI Abdomen w/IC 10/21/22 - LIVER: Liver is shrunken with prominence of the left lobe and caudate lobe and a subtle nodular contour, compatible with cirrhosis. There is a 2.2 x 2.0 cm mildly T2 hyperintense lesion at the junction of segments 6 and 7 which demonstrates restricted diffusion. Dynamic postcontrast imaging is markedly degraded by respiratory motion artifact, however there is evidence of early enhancement and washout. These findings are compatible with HCC. There is a similar 2.7 x 1.9 cm mildly T2 bright subcapsular lesion at the junction of segments 2 and 3 posteriorly which also demonstrates early enhancement with washout. The remainder of the liver is mildly heterogeneous in signal and enhancement. The hepatic veins and portal vein are patent. BILE DUCTS: There is no intra or extra hepatic biliary duct dilatation. The common bile duct measures 3 mm.\par -MRI Abdomen and Pelvis w/wo IVC ordered to reevaluate lesions, then pt will be evaluated for radioembolization with IR\par -AFP 12/2/22 - 306\par \par Variceal Screening\par -Pt is scheduled for EGD tomorrow with Dr. June however this will need to be rescheduled due to URI\par -if pt experiences hematemesis, advised to go to ER immediately\par \par Ascites\par -no ascites noted on imaging or physical exam\par -Contact provider for increased abdominal distension\par \par Encephalopathy\par -notify provider if new onset confusion\par -at least 1-2 bms/day\par -titrate lactulose to 1-2 bms/day\par \par Transplant\par -Pt is not a candidate for transplant\par -MELDNa - 16\par \par Hepatitis\par -Additional workup done indicated HBV core antibody positive, surface antigen indeterminate, core IgM nonreactive, Be antibody nonreactive. \par -HAV antibody positive but IgM negative indicated past resolved infection\par -HCV negative\par -WILMER negative, ASMA weakly + 1:20, IGG negative\par \par Diet\par -High Protein Low Fat Low Sugar Low Salt (up to 2G Na/day) Diet\par -No prolonged fasting\par -Mediterranean Diet info provided\par -No alcohol consumption\par \par Pain\par -NO NSAIDs as these can lead to diuretic resistance and precipitate renal dysfunction in patients with advanced liver disease\par -Can take up to 2G Tylenol per day\par \par Follow Up\par -Follow up in 1 month

## 2022-12-25 ENCOUNTER — EMERGENCY (EMERGENCY)
Facility: HOSPITAL | Age: 82
LOS: 1 days | Discharge: HOME | End: 2022-12-25
Attending: STUDENT IN AN ORGANIZED HEALTH CARE EDUCATION/TRAINING PROGRAM
Payer: MEDICARE

## 2022-12-25 VITALS
HEART RATE: 89 BPM | DIASTOLIC BLOOD PRESSURE: 68 MMHG | OXYGEN SATURATION: 98 % | HEIGHT: 62 IN | TEMPERATURE: 98 F | SYSTOLIC BLOOD PRESSURE: 173 MMHG | RESPIRATION RATE: 20 BRPM

## 2022-12-25 DIAGNOSIS — Z98.890 OTHER SPECIFIED POSTPROCEDURAL STATES: Chronic | ICD-10-CM

## 2022-12-25 DIAGNOSIS — Z96.652 PRESENCE OF LEFT ARTIFICIAL KNEE JOINT: Chronic | ICD-10-CM

## 2022-12-25 PROCEDURE — 99283 EMERGENCY DEPT VISIT LOW MDM: CPT

## 2022-12-25 RX ADMIN — Medication 20 MILLIGRAM(S): at 12:40

## 2022-12-25 NOTE — ED ADULT NURSE NOTE - OBJECTIVE STATEMENT
81 yo F came to the ED with her daughter because she WAS itchy earlier.  Pt denies any known alergies denies any new soap , food or animal contact pt is A&Ox4 lung sounds are clear NO SOB, no euterica , no angio edema no hives or rash noted  pt denies. any itchyness at this time denies any pain or discomfort and is in no apparent distress .

## 2022-12-25 NOTE — ED PROVIDER NOTE - PHYSICAL EXAMINATION
Constitutional: Awake, alert, in no acute distress  Eyes: no scleral icterus  HENT: normocephalic, atraumatic, moist oral mucosa, airway patent, no appreciable tongue or lip swelling, uvula midline, no hoarseness, no pooling of secretions, no drooling  Neck: supple  CV: RRR, no murmur  Pulm: non-labored respirations, CTAB  Abdomen: soft, non-tender, non-distended  Extremities: no edema, no deformity  Skin: no rash, no jaundice  Neuro: AAOx3, moving all extremities equally

## 2022-12-25 NOTE — ED PROVIDER NOTE - CARE PROVIDER_API CALL
Jj Seymour)  [facility]  [address] [place] [address]  Phone: [phone]  Fax: [phone]  Follow Up Time:

## 2022-12-25 NOTE — ED PROVIDER NOTE - PATIENT PORTAL LINK FT
You can access the FollowMyHealth Patient Portal offered by [facility] by registering at the following website: [email or web address]. By joining [employer]’s FollowMyHealth portal, you will also be able to view your health information using other applications (apps) compatible with our system.

## 2022-12-25 NOTE — ED PROVIDER NOTE - OBJECTIVE STATEMENT
82y F w/ hx HTN, HLD, DM, CAD, hypothyroid, liver cancer, cirrhosis; presents with daughter for allergic reaction. Per daughter, pt woke up with itching in her hands yesterday, and later developed swelling of her tongue. Took benadryl at home last night. Symptoms now improved. Pt complains of persistent throat/tongue discomfort but denies pain or inability to swallow. No fever, chills, cough, congestion, sneezing, vomiting, rash. Reports prior hx of similar episodes which self resolved. Denies new exposures, including new foods, meds, soaps/detergents, pets.    : Bonny

## 2022-12-25 NOTE — ED PROVIDER NOTE - NSFOLLOWUPINSTRUCTIONS_ED_ALL_ED_FT
1. Puede curtis benadryl/difenhidramina 25 mg cada 6 horas según sea necesario.  2.  Clay la prednisona según lo prescrito.  3.  Natanael un seguimiento con un especialista en alergias para cualquier problema persistente.    =====================================    Alergias en los adultos    Allergies, Adult      Marry alergia es marry afección que se caracteriza porque el sistema de defensa del cuerpo (sistema inmunitario) entra en contacto con un alérgeno y reacciona a sydnie. Un alérgeno es cualquier cosa que causa marry reacción alérgica. Los alérgenos hacen que el sistema inmunitario produzca proteínas para combatir las infecciones (anticuerpos). Estos anticuerpos hacen que las células liberen sustancias químicas llamadas histaminas que provocan los síntomas de marry reacción alérgica.    Las alergias suelen afectar las fosas nasales (rinitis alérgica), los ojos (conjuntivitis alérgica), la piel (dermatitis atópica) y el estómago. Las alergias pueden ser leves, moderadas o graves. No se pueden transmitir de marry persona a otra. Las alergias pueden aparecer a cualquier edad y se pueden superar con los años.      ¿Cuáles son las causas?    Esta afección es causada por alérgenos. Entre los alérgenos más comunes se encuentran los siguientes:  •Alérgenos de exterior, riccardo el polen, el humo de los automóviles y el moho.      •Alérgenos internos, riccardo el polvo, el humo, el moho y la caspa de las mascotas.      •Otros alérgenos, riccardo los alimentos, los medicamentos, los perfumes, las picaduras de insectos y otros factores que irritan la piel.        ¿Qué incrementa el riesgo?    Es más probable que tenga esta afección si:  •Tiene familiares con alergias.      •Tiene familiares con cualquier afección que pueda ser causada por alérgenos, riccardo el asma. [place] puede hacer que usted sea más propenso a tener otras alergias.        ¿Cuáles son los signos o síntomas?    Los síntomas de esta afección dependen de la gravedad de la alergia.    Síntomas leves o moderados     •Nariz tapada o que gotea (congestión nasal) o estornudos.      •Picazón en la boca, los oídos o la garganta.      •Sensación de mucosidad que gotea por la parte posterior de la garganta (goteo posnasal).      •Dolor de garganta.      •Ojos rojos, lagrimosos, hinchados o con picazón.      •Zonas de la piel hinchadas, enrojecidas y con picazón (ronchas) o erupción.      •Cólicos estomacales o meteorismo.      Síntomas graves     Las alergias graves a los alimentos, los medicamentos o las picaduras de insectos pueden causar anafilaxia, lo que puede poner en peligro la rubin. Algunos de los síntomas son los siguientes:  •Enrojecimiento (rubor) en el nelida.      •Tos o sibilancias.      •Labios, lengua o boca hinchados.      •Hinchazón u opresión en la garganta.      •Dolor u opresión en el pecho, o latidos cardíacos acelerados.      •Dificultad para respirar o falta de aire.      •Dolor en el abdomen, vómitos o diarrea.      •Mareos o desmayos.        ¿Cómo se diagnostica?    Esta afección se diagnostica en función de aden síntomas, antecedentes médicos y familiares y un examen físico. También pueden hacerle estudios, que incluyen los siguientes:•Pruebas cutáneas para abiodun cómo reacciona la piel a los alérgenos que pueden estar causando los síntomas. Las pruebas incluyen:  •Prueba de punción. Para esta prueba, se introduce un alérgeno en el cuerpo a través de marry pequeña abertura en la piel.      •Prueba intradérmica. Para esta prueba, se inyecta marry pequeña cantidad de alérgeno debajo de la primera capa de la piel.      •Prueba de parche. Para esta prueba, se coloca marry pequeña cantidad de alérgeno sobre la piel. La kassandra se cubre y luego se examina después de algunos días.        •Análisis de sandra.      •Prueba de provocación. Para esta prueba, debe ingerir o inhalar marry pequeña cantidad de alérgeno para abiodun si produce marry reacción alérgica.      También pueden pedirle que:  •Lleve un registro de los alimentos que come. Incluye todos los alimentos, las bebidas y los síntomas diarios.    •Pruebe marry dieta de eliminación. Para hacer esto:  •Retire ciertos alimentos de la dieta.      •Vuelva a incorporar esos alimentos lázaro por lázaro para averiguar si hay algún alimento que le cause marry reacción alérgica.          ¿Cómo se trata?                  El tratamiento para las alergias depende de los síntomas. El tratamiento puede incluir:  •Paños húmedos fríos (compresas frías) para aliviar la picazón y la hinchazón.      •Gotas oftálmicas o aerosoles nasales.      •Irrigación nasal para ayudar a eliminar la mucosidad o para mantener las fosas nasales húmedas.      •Un humidificador para agregar humedad al aire.      •Cremas para la piel para tratar las erupciones o la picazón.      •Antihistamínicos orales u otros medicamentos para detener la reacción alérgica o para tratar la inflamación.      •Cambios en la dieta para eliminar los alimentos que causan alergias.    •La exposición repetida a pequeñas cantidades de alérgenos para ayudarle a generar defensas (tolerancia) contra los alérgenos. [place] se denomina inmunoterapia. Por ejemplo:  •Inyección para la alergia. Recibe marry inyección que contiene un alérgeno.      •Inmunoterapia sublingual. Yazmin marry pequeña dosis de alérgeno debajo de la lengua.        •Inyección de emergencia para la anafilaxia. Se administra marry inyección con marry jeringa (autoinyector) contiene la cantidad de medicamento que necesita. El médico le enseñará cómo administrarse la inyección.        Siga estas instrucciones en [place] casa:      Medicamentos      •Clay o aplíquese los medicamentos de venta chino y los recetados solamente riccardo se lo haya indicado el médico.      •Siempre lleve [place] lápiz autoinyector si está en riesgo de anafilaxia. Aplíquese marry inyección riccardo se lo haya indicado el médico.      Comida y bebida     •Siga las instrucciones del médico respecto de las restricciones en las comidas o las bebidas.      •Constance suficiente líquido riccardo para mantener la orina de color amarillo pálido.      Instrucciones generales     •Use un brazalete o collar de alerta médica para informar a otras personas que ha tenido anafilaxia anteriormente.      •Evite los alérgenos conocidos, siempre que sea posible.      •Concurra a todas las visitas de seguimiento riccardo se lo haya indicado el médico. [place] es importante.        Comuníquese con un médico si:    •Los síntomas no mejoran con el tratamiento.        Solicite ayuda de inmediato si:  •Tiene síntomas de anafilaxia. [place] incluye lo siguiente:  •Boca, lengua o garganta hinchadas.      •Dolor u opresión en el pecho.      •Dificultad para respirar o falta de aire.      •Mareos o desmayos.      •Dolor abdominal intenso, vómitos o diarrea.        Estos síntomas pueden representar un problema grave que constituye marry emergencia. No espere a abiodun si los síntomas desaparecen. Solicite atención médica de inmediato. Comuníquese con el servicio de emergencias de [place] localidad (911 en los [place]). No conduzca por aden propios medios hasta el hospital.       Resumen    •Clay o aplíquese los medicamentos de venta chino y los recetados solamente riccardo se lo haya indicado el médico.      •Evite los alérgenos conocidos cuando sea posible.      •Siempre lleve [place] lápiz autoinyector si está en riesgo de anafilaxia. Aplíquese marry inyección riccardo se lo haya indicado el médico.      •Use un brazalete o collar de alerta médica para informar a otras personas que ha tenido anafilaxia anteriormente.      •La anafilaxiaes marry emergencia potencialmente mortal. Solicite ayuda de inmediato.      Esta información no tiene riccardo fin reemplazar el consejo del médico. Asegúrese de hacerle al médico cualquier pregunta que tenga.

## 2022-12-25 NOTE — ED ADULT TRIAGE NOTE - PRO INTERPRETER NEED 2
"Chief Complaint   Patient presents with     Pharyngitis         Initial /70  Pulse 68  Temp 99  F (37.2  C) (Tympanic)  Ht 5' 5\" (1.651 m)  Wt 161 lb 4 oz (73.1 kg)  BMI 26.83 kg/m2 Estimated body mass index is 26.83 kg/(m^2) as calculated from the following:    Height as of this encounter: 5' 5\" (1.651 m).    Weight as of this encounter: 161 lb 4 oz (73.1 kg).    Medication Reconciliation: complete      Norma J. Gosselin, LPN  " English

## 2022-12-25 NOTE — ED ADULT NURSE NOTE - COVID-19 RESULT DATE/TIME
"  SUBJECTIVE:   July Green is a 57 year old female who presents to clinic today for the following health issues:    HPI     Joint Pain    Onset: 2-3 weeks    Description:   Location: left knee   Character: Burning behind knee    Intensity: moderate    Progression of Symptoms: better    Accompanying Signs & Symptoms:  Other symptoms: \"i had fluid in there but not as bad as it was - still looks a little puffy\"  \"cracking when walking down the stairs\"    History:   Previous similar pain: no       Precipitating factors:   Trauma or overuse: YES- \"was cleaning someones house and heard a popping noise in the left knee\"    Alleviating factors:  Improved by: ibuprofen    Therapies Tried and outcome: ibuprofen with mild relief, \"soaked in epsom salt in the tub\"       Symptoms have been present for 2 weeks. She was cleaning and heard a crack/pop in her left knee and now it is clicking every time she goes up and down stairs and prolonged standing. She has been taking ibuprofen with minimal relief. The pain is over the back of her knee and in the front with some soft tissue swelling that has improved. She was limping much more but this seems to be improving. She states both of her knees are very stiff in the mornings. She denies any significant knee pain in the past.     Problem list and histories reviewed & adjusted, as indicated.  Additional history: none    ROS:  GENERAL: Denies fever, fatigue, weakness, weight gain, or weight loss.  CARDIOVASCULAR: Denies chest pain, shortness of breath, irregular heartbeats,  palpitations, or edema.  RESPIRATORY: Denies cough, hemoptysis, and shortness of breath.  MUSCULOSKELETAL: +Left knee pain and swelling.   NEUROLOGIC: Denies headache, fainting, dizziness, memory loss, numbness, tingling, or seizures.    OBJECTIVE:     /70 (BP Location: Right arm, Patient Position: Chair, Cuff Size: Adult Regular)  Pulse 79  Temp 98  F (36.7  C) (Temporal)  Resp 16  Ht 5' 4\" (1.626 " m)  Wt 155 lb (70.3 kg)  LMP 05/12/2011  SpO2 95%  BMI 26.61 kg/m2  Body mass index is 26.61 kg/(m^2).  GENERAL: healthy, alert and no distress  RESP: lungs clear to auscultation - no rales, rhonchi or wheezes  CV: regular rate and rhythm, normal S1 S2, no S3 or S4, no murmur, click or rub  MS: There is soft tissue swelling with minimal effusion medial to the left patella. There is medial and lateral joint line tenderness. No patellar or popliteal tenderness. Increased pain with knee flexion. Negative provocative testing including anterior/posterior drawer, varus/valgus stress and medial/lateral McMuray's.  NEURO: Normal strength and tone, mentation intact and speech normal. Cranial nerves II-XII are grossly intact. DTRs are 2+/4 throughout and symmetric. Gait is slightly antalgic/limping.      XR Pending    ASSESSMENT/PLAN:       ICD-10-CM    1. Primary osteoarthritis of left knee M17.12 XR Knee Left 3 Views     CANCELED: XR Knee Right 3 Views   2. Acute pain of left knee M25.562 XR Knee Left 3 Views     CANCELED: XR Knee Right 3 Views   3. Knee effusion, left M25.462 XR Knee Left 3 Views     CANCELED: XR Knee Right 3 Views   4. Screen for colon cancer Z12.11 Fecal colorectal cancer screen FIT - Future (S+30)         Symptoms are consistent with primary osteoarthritis of the left knee based on her symptoms and exam findings.   Will order an x-ray for further evaluation.   I recommend she continue with ibuprofen, rest, ice, and elevation.  Instructed to avoid quick movements and prolonged walking and standing until pain improves.  I recommend a soft knee brace for comfort and stability.   Depending on x-ray results, may send to orthopedics if severe arthritis is found.   If pain is worsening or not improving over the next month, she will me know and will send to orthopedics versus trying PT or ordering an MRI.     Mammogram scheduled and FIT testing ordered.       Allan Peña PA-C  [facility]  [place]   19-Dec-2022 05:48

## 2022-12-25 NOTE — ED ADULT NURSE NOTE - CCCP TRG CHIEF CMPLNT
Pre-Visit Chart Review  For Appointment Scheduled on 1/11/17.    Health Maintenance Due   Topic Date Due    TETANUS VACCINE  01/25/1955    Zoster Vaccine  01/25/1995                      allergic reaction

## 2022-12-25 NOTE — ED PROVIDER NOTE - NS ED ROS FT
Constitutional: no fever, no chills  Eyes: no vision changes  ENT: no nasal congestion, no sore throat, +tongue swelling  CV: no chest pain  Resp: no cough, no shortness of breath  GI: no abdominal pain, no vomiting, no diarrhea  : no dysuria  MSK: no joint pain  Skin: no rash, +itcing  Neuro: no headache, no focal weakness, no paresthesias

## 2022-12-27 ENCOUNTER — RESULT REVIEW (OUTPATIENT)
Age: 82
End: 2022-12-27

## 2022-12-27 ENCOUNTER — OUTPATIENT (OUTPATIENT)
Dept: OUTPATIENT SERVICES | Facility: HOSPITAL | Age: 82
LOS: 1 days | End: 2022-12-27
Payer: MEDICARE

## 2022-12-27 ENCOUNTER — NON-APPOINTMENT (OUTPATIENT)
Age: 82
End: 2022-12-27

## 2022-12-27 DIAGNOSIS — Z98.890 OTHER SPECIFIED POSTPROCEDURAL STATES: Chronic | ICD-10-CM

## 2022-12-27 DIAGNOSIS — C22.0 LIVER CELL CARCINOMA: ICD-10-CM

## 2022-12-27 DIAGNOSIS — Z96.652 PRESENCE OF LEFT ARTIFICIAL KNEE JOINT: Chronic | ICD-10-CM

## 2022-12-27 PROCEDURE — 99212 OFFICE O/P EST SF 10 MIN: CPT

## 2022-12-29 ENCOUNTER — APPOINTMENT (OUTPATIENT)
Dept: MRI IMAGING | Facility: CLINIC | Age: 82
End: 2022-12-29
Payer: MEDICARE

## 2022-12-29 ENCOUNTER — OUTPATIENT (OUTPATIENT)
Dept: OUTPATIENT SERVICES | Facility: HOSPITAL | Age: 82
LOS: 1 days | End: 2022-12-29
Payer: MEDICARE

## 2022-12-29 DIAGNOSIS — K74.60 UNSPECIFIED CIRRHOSIS OF LIVER: ICD-10-CM

## 2022-12-29 DIAGNOSIS — Z96.652 PRESENCE OF LEFT ARTIFICIAL KNEE JOINT: Chronic | ICD-10-CM

## 2022-12-29 DIAGNOSIS — Z98.890 OTHER SPECIFIED POSTPROCEDURAL STATES: Chronic | ICD-10-CM

## 2022-12-29 PROCEDURE — A9585: CPT

## 2022-12-29 PROCEDURE — 74183 MRI ABD W/O CNTR FLWD CNTR: CPT | Mod: 26,MH

## 2022-12-29 PROCEDURE — 72197 MRI PELVIS W/O & W/DYE: CPT | Mod: 26,MH

## 2022-12-29 PROCEDURE — 74183 MRI ABD W/O CNTR FLWD CNTR: CPT

## 2022-12-29 PROCEDURE — 72197 MRI PELVIS W/O & W/DYE: CPT

## 2023-01-05 ENCOUNTER — TRANSCRIPTION ENCOUNTER (OUTPATIENT)
Age: 83
End: 2023-01-05

## 2023-01-05 ENCOUNTER — RESULT REVIEW (OUTPATIENT)
Age: 83
End: 2023-01-05

## 2023-01-05 ENCOUNTER — OUTPATIENT (OUTPATIENT)
Dept: OUTPATIENT SERVICES | Facility: HOSPITAL | Age: 83
LOS: 1 days | End: 2023-01-05
Payer: MEDICARE

## 2023-01-05 ENCOUNTER — APPOINTMENT (OUTPATIENT)
Dept: GASTROENTEROLOGY | Facility: HOSPITAL | Age: 83
End: 2023-01-05

## 2023-01-05 DIAGNOSIS — D50.8 OTHER IRON DEFICIENCY ANEMIAS: ICD-10-CM

## 2023-01-05 DIAGNOSIS — Z98.890 OTHER SPECIFIED POSTPROCEDURAL STATES: Chronic | ICD-10-CM

## 2023-01-05 DIAGNOSIS — N18.6 END STAGE RENAL DISEASE: ICD-10-CM

## 2023-01-05 DIAGNOSIS — K74.60 UNSPECIFIED CIRRHOSIS OF LIVER: ICD-10-CM

## 2023-01-05 DIAGNOSIS — Z96.652 PRESENCE OF LEFT ARTIFICIAL KNEE JOINT: Chronic | ICD-10-CM

## 2023-01-05 LAB
GLUCOSE BLDC GLUCOMTR-MCNC: 212 MG/DL — HIGH (ref 70–99)
HCT VFR BLD CALC: 31 % — LOW (ref 34.5–45)
HGB BLD-MCNC: 9.7 G/DL — LOW (ref 11.5–15.5)
MCHC RBC-ENTMCNC: 24.9 PG — LOW (ref 27–34)
MCHC RBC-ENTMCNC: 31.3 GM/DL — LOW (ref 32–36)
MCV RBC AUTO: 79.7 FL — LOW (ref 80–100)
PLATELET # BLD AUTO: 158 K/UL — SIGNIFICANT CHANGE UP (ref 150–400)
RBC # BLD: 3.89 M/UL — SIGNIFICANT CHANGE UP (ref 3.8–5.2)
RBC # FLD: 20.5 % — HIGH (ref 10.3–14.5)
WBC # BLD: 6.02 K/UL — SIGNIFICANT CHANGE UP (ref 3.8–10.5)
WBC # FLD AUTO: 6.02 K/UL — SIGNIFICANT CHANGE UP (ref 3.8–10.5)

## 2023-01-05 PROCEDURE — 85027 COMPLETE CBC AUTOMATED: CPT

## 2023-01-05 PROCEDURE — 45380 COLONOSCOPY AND BIOPSY: CPT

## 2023-01-05 PROCEDURE — 88342 IMHCHEM/IMCYTCHM 1ST ANTB: CPT | Mod: 26

## 2023-01-05 PROCEDURE — 43239 EGD BIOPSY SINGLE/MULTIPLE: CPT | Mod: 59

## 2023-01-05 PROCEDURE — 88305 TISSUE EXAM BY PATHOLOGIST: CPT

## 2023-01-05 PROCEDURE — 88305 TISSUE EXAM BY PATHOLOGIST: CPT | Mod: 26

## 2023-01-05 PROCEDURE — 82962 GLUCOSE BLOOD TEST: CPT

## 2023-01-05 PROCEDURE — 36415 COLL VENOUS BLD VENIPUNCTURE: CPT

## 2023-01-05 PROCEDURE — 88342 IMHCHEM/IMCYTCHM 1ST ANTB: CPT

## 2023-01-05 PROCEDURE — 43239 EGD BIOPSY SINGLE/MULTIPLE: CPT

## 2023-01-05 NOTE — ASSESSMENT
[FreeTextEntry1] : A/P\par anemia\par  HCC\par cirrhosis\par  I discussed the risks and benefits of EGD and patient was given opportunity to ask questions. EGD to r/o Steinberg's  esophagus, PUD, mass, AVM'S. Pt is medically optimized for EGD\par \par \par  I discussed the risks and benefits of colonoscopy and patient was given opportunity to ask questions. Colonoscopy to r/o colon cancer, polyps, AVM's. Patient is medically optimized for the procedure\par

## 2023-01-10 LAB — SURGICAL PATHOLOGY STUDY: SIGNIFICANT CHANGE UP

## 2023-01-10 NOTE — PHYSICAL EXAM
[Alert] : alert [Well Developed] : well developed [No Respiratory Distress] : no respiratory distress [Normal Rate] : heart rate was normal  [Normal S1, S2] : normal S1 and S2 [Regular Rhythm] : with a regular rhythm [Normal Bowel Sounds] : normal bowel sounds [Oriented x3] : oriented to person, place, and time [Ambulatory and capable of all selfcare but unable to carry out any work activities] : Ambulatory and capable of all selfcare but unable to carry out any work activities. Up and about more than 50% of waking hours

## 2023-01-10 NOTE — ASSESSMENT
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 Patient with good performance status and liver function scores including an Yuan grade of 1 and a Child Solis score of 5. Patient likely to benefit from liver directed therapy. Given location and size of left-sided tumor, Y90 radioembolization should be performed. Right-sided tumor amenable to either microwave ablation or Y90 radioembolization. Plan is for mapping followed by liver directed therapy due either Y90 radioembolization and/or Microwave Ablation.

## 2023-01-30 ENCOUNTER — EMERGENCY (EMERGENCY)
Facility: HOSPITAL | Age: 83
LOS: 1 days | Discharge: HOME | End: 2023-01-30
Attending: STUDENT IN AN ORGANIZED HEALTH CARE EDUCATION/TRAINING PROGRAM
Payer: MEDICARE

## 2023-01-30 VITALS
DIASTOLIC BLOOD PRESSURE: 73 MMHG | TEMPERATURE: 97 F | HEART RATE: 73 BPM | OXYGEN SATURATION: 95 % | RESPIRATION RATE: 20 BRPM | SYSTOLIC BLOOD PRESSURE: 173 MMHG

## 2023-01-30 VITALS
TEMPERATURE: 98 F | HEIGHT: 62 IN | SYSTOLIC BLOOD PRESSURE: 132 MMHG | HEART RATE: 80 BPM | WEIGHT: 160.94 LBS | DIASTOLIC BLOOD PRESSURE: 72 MMHG | RESPIRATION RATE: 18 BRPM | OXYGEN SATURATION: 98 %

## 2023-01-30 DIAGNOSIS — Z96.652 PRESENCE OF LEFT ARTIFICIAL KNEE JOINT: Chronic | ICD-10-CM

## 2023-01-30 DIAGNOSIS — Z98.890 OTHER SPECIFIED POSTPROCEDURAL STATES: Chronic | ICD-10-CM

## 2023-01-30 LAB
ALBUMIN SERPL ELPH-MCNC: 3.9 G/DL — SIGNIFICANT CHANGE UP (ref 3.3–5.2)
ALP SERPL-CCNC: 182 U/L — HIGH (ref 40–120)
ALT FLD-CCNC: 26 U/L — SIGNIFICANT CHANGE UP
ANION GAP SERPL CALC-SCNC: 17 MMOL/L — SIGNIFICANT CHANGE UP (ref 5–17)
ANISOCYTOSIS BLD QL: SLIGHT — SIGNIFICANT CHANGE UP
APPEARANCE UR: CLEAR — SIGNIFICANT CHANGE UP
AST SERPL-CCNC: 55 U/L — HIGH
BASOPHILS # BLD AUTO: 0.02 K/UL — SIGNIFICANT CHANGE UP (ref 0–0.2)
BASOPHILS NFR BLD AUTO: 0.2 % — SIGNIFICANT CHANGE UP (ref 0–2)
BILIRUB SERPL-MCNC: 0.5 MG/DL — SIGNIFICANT CHANGE UP (ref 0.4–2)
BILIRUB UR-MCNC: NEGATIVE — SIGNIFICANT CHANGE UP
BUN SERPL-MCNC: 17.2 MG/DL — SIGNIFICANT CHANGE UP (ref 8–20)
CALCIUM SERPL-MCNC: 9.9 MG/DL — SIGNIFICANT CHANGE UP (ref 8.4–10.5)
CHLORIDE SERPL-SCNC: 94 MMOL/L — LOW (ref 96–108)
CO2 SERPL-SCNC: 24 MMOL/L — SIGNIFICANT CHANGE UP (ref 22–29)
COLOR SPEC: YELLOW — SIGNIFICANT CHANGE UP
CREAT SERPL-MCNC: 0.94 MG/DL — SIGNIFICANT CHANGE UP (ref 0.5–1.3)
DIFF PNL FLD: NEGATIVE — SIGNIFICANT CHANGE UP
EGFR: 59 ML/MIN/1.73M2 — LOW
EOSINOPHIL # BLD AUTO: 0.13 K/UL — SIGNIFICANT CHANGE UP (ref 0–0.5)
EOSINOPHIL NFR BLD AUTO: 1.2 % — SIGNIFICANT CHANGE UP (ref 0–6)
GLUCOSE SERPL-MCNC: 163 MG/DL — HIGH (ref 70–99)
GLUCOSE UR QL: 1000 MG/DL
HCT VFR BLD CALC: 34.8 % — SIGNIFICANT CHANGE UP (ref 34.5–45)
HGB BLD-MCNC: 11 G/DL — LOW (ref 11.5–15.5)
IMM GRANULOCYTES NFR BLD AUTO: 0.3 % — SIGNIFICANT CHANGE UP (ref 0–0.9)
KETONES UR-MCNC: NEGATIVE — SIGNIFICANT CHANGE UP
LACTATE BLDV-MCNC: 5.4 MMOL/L — CRITICAL HIGH (ref 0.5–2)
LEUKOCYTE ESTERASE UR-ACNC: NEGATIVE — SIGNIFICANT CHANGE UP
LIDOCAIN IGE QN: 63 U/L — HIGH (ref 22–51)
LYMPHOCYTES # BLD AUTO: 3.62 K/UL — HIGH (ref 1–3.3)
LYMPHOCYTES # BLD AUTO: 33.2 % — SIGNIFICANT CHANGE UP (ref 13–44)
MACROCYTES BLD QL: SLIGHT — SIGNIFICANT CHANGE UP
MANUAL SMEAR VERIFICATION: SIGNIFICANT CHANGE UP
MCHC RBC-ENTMCNC: 25.4 PG — LOW (ref 27–34)
MCHC RBC-ENTMCNC: 31.6 GM/DL — LOW (ref 32–36)
MCV RBC AUTO: 80.4 FL — SIGNIFICANT CHANGE UP (ref 80–100)
MONOCYTES # BLD AUTO: 0.87 K/UL — SIGNIFICANT CHANGE UP (ref 0–0.9)
MONOCYTES NFR BLD AUTO: 8 % — SIGNIFICANT CHANGE UP (ref 2–14)
NEUTROPHILS # BLD AUTO: 6.25 K/UL — SIGNIFICANT CHANGE UP (ref 1.8–7.4)
NEUTROPHILS NFR BLD AUTO: 57.1 % — SIGNIFICANT CHANGE UP (ref 43–77)
NITRITE UR-MCNC: NEGATIVE — SIGNIFICANT CHANGE UP
OVALOCYTES BLD QL SMEAR: SLIGHT — SIGNIFICANT CHANGE UP
PH UR: 8 — SIGNIFICANT CHANGE UP (ref 5–8)
PLAT MORPH BLD: NORMAL — SIGNIFICANT CHANGE UP
PLATELET # BLD AUTO: 241 K/UL — SIGNIFICANT CHANGE UP (ref 150–400)
POIKILOCYTOSIS BLD QL AUTO: SLIGHT — SIGNIFICANT CHANGE UP
POLYCHROMASIA BLD QL SMEAR: SLIGHT — SIGNIFICANT CHANGE UP
POTASSIUM SERPL-MCNC: 4.6 MMOL/L — SIGNIFICANT CHANGE UP (ref 3.5–5.3)
POTASSIUM SERPL-SCNC: 4.6 MMOL/L — SIGNIFICANT CHANGE UP (ref 3.5–5.3)
PROT SERPL-MCNC: 7.9 G/DL — SIGNIFICANT CHANGE UP (ref 6.6–8.7)
PROT UR-MCNC: SIGNIFICANT CHANGE UP MG/DL
RBC # BLD: 4.33 M/UL — SIGNIFICANT CHANGE UP (ref 3.8–5.2)
RBC # FLD: 20.5 % — HIGH (ref 10.3–14.5)
RBC BLD AUTO: ABNORMAL
SODIUM SERPL-SCNC: 134 MMOL/L — LOW (ref 135–145)
SP GR SPEC: 1.01 — SIGNIFICANT CHANGE UP (ref 1.01–1.02)
TARGETS BLD QL SMEAR: SLIGHT — SIGNIFICANT CHANGE UP
UROBILINOGEN FLD QL: NEGATIVE MG/DL — SIGNIFICANT CHANGE UP
WBC # BLD: 10.92 K/UL — HIGH (ref 3.8–10.5)
WBC # FLD AUTO: 10.92 K/UL — HIGH (ref 3.8–10.5)

## 2023-01-30 PROCEDURE — 74177 CT ABD & PELVIS W/CONTRAST: CPT | Mod: 26,MA

## 2023-01-30 PROCEDURE — 99285 EMERGENCY DEPT VISIT HI MDM: CPT

## 2023-01-30 PROCEDURE — 71260 CT THORAX DX C+: CPT | Mod: 26,MA

## 2023-01-30 PROCEDURE — 70491 CT SOFT TISSUE NECK W/DYE: CPT | Mod: 26,MA

## 2023-01-30 RX ORDER — SODIUM CHLORIDE 9 MG/ML
2200 INJECTION INTRAMUSCULAR; INTRAVENOUS; SUBCUTANEOUS ONCE
Refills: 0 | Status: COMPLETED | OUTPATIENT
Start: 2023-01-30 | End: 2023-01-30

## 2023-01-30 RX ORDER — SODIUM CHLORIDE 9 MG/ML
1000 INJECTION INTRAMUSCULAR; INTRAVENOUS; SUBCUTANEOUS
Refills: 0 | Status: DISCONTINUED | OUTPATIENT
Start: 2023-01-30 | End: 2023-02-07

## 2023-01-30 RX ADMIN — SODIUM CHLORIDE 50 MILLILITER(S): 9 INJECTION INTRAMUSCULAR; INTRAVENOUS; SUBCUTANEOUS at 17:44

## 2023-01-30 RX ADMIN — SODIUM CHLORIDE 1100 MILLILITER(S): 9 INJECTION INTRAMUSCULAR; INTRAVENOUS; SUBCUTANEOUS at 21:11

## 2023-01-30 RX ADMIN — Medication 125 MILLIGRAM(S): at 23:22

## 2023-01-30 NOTE — CONSULT NOTE ADULT - ASSESSMENT
83F with hypopharynx/supraglottic mass    - spoke with Dr. Boyle who reviewed images from home, no need for urgent intervention, can follow up as outpatient and schedule scope with biopsy.  - would resume oral steroids to reduce inflammation  - if gets admitted, NPO after midnight for possible scope/biopsy in am

## 2023-01-30 NOTE — ED PROVIDER NOTE - PROGRESS NOTE DETAILS
yolanda: Pt signed out to me by dr. georges.  labs and imaging reviewed.  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 Pt feeling better since being in ER and now tolerating po.  lactic acid cleared. elevated from combination dehydration/cirrhosis.  ENT consult appreciated and recommended steroids and outpatient f/up.  Discussed results with patient and family with  Kristofer and daughter understands the importance of follow-up. instructed to return immediately for inability to swallow, trouble breathing, vocal changes, or any other concerns.  Pt given a copy of all results and instructed to f/up with pcp regarding any abnormal results.

## 2023-01-30 NOTE — ED PROVIDER NOTE - OBJECTIVE STATEMENT
84 y/o female with PMHx of HTN, Hypothyroidism, Hypercholesterolemia, DM, GERD, and Liver CA presents to ED c/o allergic reaction. Patient family reports patient has been having an allergic reaction x1 month, was seen in [facility] ED on 12/25/2022 for the same thing. Endorsing rash, throat pain, tongue swelling, and intermittent difficulty swallowing.    Denies chest pain, shortness of breath, N/V/D, fever, chills, sweats

## 2023-01-30 NOTE — ED ADULT NURSE REASSESSMENT NOTE - NS ED NURSE REASSESS COMMENT FT1
received report from day RN, assumed care of pt at change of shift.  pt is AOX4, primarily Bel[place]ian speaking.  [facility].   will draw blood cultures and administer fluids.  no s/s acute distress noted.  pending ct

## 2023-01-30 NOTE — ED ADULT NURSE NOTE - OBJECTIVE STATEMENT
assumed care of pt at 17:45. pt reports scarthy throat. pt states " im having an allergic reaction. airway patent, no hives present. rr even and unlabored. pt denies allergy. pt educated on plan of care, pt able to successfully teach back plan of care to RN, RN will continue to reeducate pt during hospital stay.

## 2023-01-30 NOTE — ED PROVIDER NOTE - CARE PROVIDER_API CALL
Lemuel Boyle (MD)  Otolaryngology  [address], [address]  [place] [address]  Phone: [phone]  Fax: [phone]  Follow Up Time: 1-3 Days

## 2023-01-30 NOTE — CONSULT NOTE ADULT - NS PANP COMMENT GEN_ALL_CORE FT
Case reviewed with PA via telephone.    Images reviewed: Possible HP lesion present.  No airway obstruction.    Will perform full evaluation tomorrow, with scope.

## 2023-01-30 NOTE — ED PROVIDER NOTE - CLINICAL SUMMARY MEDICAL DECISION MAKING FREE TEXT BOX
84 y/o female with hx of "allergic reaction" with tongue swelling difficulty swallowing, seen in [facility] ED in 12/2022 for this. Returns today for difficulty swallowing, PE does not reveal any signs of an allergic reaction. Possible metastasis of Liver CA. Will check CT neck, chest, abdomen, pelvis, check labs, hydrate, and re-assess.

## 2023-01-30 NOTE — ED PROVIDER NOTE - PATIENT PORTAL LINK FT
You can access the FollowMyHealth Patient Portal offered by [facility] by registering at the following website: [email or web address]. By joining [employer]’s FollowMyHealth portal, you will also be able to view your health information using other applications (apps) compatible with our system.

## 2023-01-30 NOTE — CONSULT NOTE ADULT - SUBJECTIVE AND OBJECTIVE BOX
HPI:      ROS:    PMHx: DM, GERD, Heart murmur, HLD, HTN, Hypothyroidism, Knee pain, left arthritis.   PSHx: L TKR, Vaginal surgery, tubal ligation  FamHx: DM, Heart Dz, HTN  Allergies: Amlodipine    Medications: (as per chart)  · 	predniSONE 20 mg oral tablet: 1 tab(s) orally once a day   · 	ferrous sulfate 324 mg (65 mg elemental iron) oral delayed release tablet: 1 tab(s) orally once a day   · 	carvedilol 12.5 mg oral tablet: 1 tab(s) orally every 12 hours  · 	albuterol 90 mcg/inh inhalation powder: 2 puff(s) inhaled every 4 hours   · 	atorvastatin 40 mg oral tablet: 1 tab(s) orally once a day (at bedtime)  · 	levothyroxine 25 mcg (0.025 mg) oral capsule: 1 cap(s) orally once a day  · 	metFORMIN 1000 mg oral tablet: 1 tab(s) orally 2 times a day  · 	aspirin 81 mg oral tablet: 1 tab(s) orally once a day  · 	hydrALAZINE 50 mg oral tablet: 1 tab(s) orally 2 times a day  · 	Farxiga 10 mg oral tablet: 1 tab(s) orally once a day  · 	omeprazole 20 mg oral delayed release capsule: 1 cap(s) orally 2 times a day  · 	Vitamin C 500 mg oral tablet: 1 tab(s) orally once a day    MEDICATIONS  (STANDING):  sodium chloride 0.9%. 1000 milliLiter(s) (50 mL/Hr) IV Continuous <Continuous>    MEDICATIONS  (PRN):      Vital Signs Last 24 Hrs  T(C): 36.6 (30 Jan 2023 14:56), Max: 36.6 (30 Jan 2023 14:56)  T(F): 97.9 (30 Jan 2023 14:56), Max: 97.9 (30 Jan 2023 14:56)  HR: 80 (30 Jan 2023 14:56) (80 - 80)  BP: 132/72 (30 Jan 2023 14:56) (132/72 - 132/72)  BP(mean): --  RR: 18 (30 Jan 2023 14:56) (18 - 18)  SpO2: 98% (30 Jan 2023 14:56) (98% - 98%)    Parameters below as of 30 Jan 2023 14:56  Patient On (Oxygen Delivery Method): room air        PHYSICAL EXAM:      Constitutional:    Eyes:    ENMT:    Neck:    Breasts:    Back:    Respiratory:    Cardiovascular:    Gastrointestinal:    Genitourinary:    Rectal:    Extremities:    Vascular:    Neurological:    Skin:    Lymph Nodes:    Musculoskeletal:    Psychiatric:        I&O's Detail      LABS:                        11.0   10.92 )-----------( 241      ( 30 Jan 2023 17:40 )             34.8     01-30    134<L>  |  94<L>  |  17.2  ----------------------------<  163<H>  4.6   |  24.0  |  0.94    Ca    9.9      30 Jan 2023 17:40    TPro  7.9  /  Alb  3.9  /  TBili  0.5  /  DBili  x   /  AST  55<H>  /  ALT  26  /  AlkPhos  182<H>  01-30          RADIOLOGY & ADDITIONAL STUDIES:  < from: CT Neck Soft Tissue w/ IV Cont (01.30.23 @ 20:02) >    ******PRELIMINARY REPORT******      ******PRELIMINARY REPORT******         ACC: [id number] EXAM:  CT NECK SOFT TISSUE IC   ORDERED BY: DOROTA AMARAL     PROCEDURE DATE:  01/30/2023    ******PRELIMINARY REPORT******      ******PRELIMINARY REPORT******           INTERPRETATION:  [facility] RADIOLOGIST PRELIMINARY REPORT    PROCEDURE INFORMATION:  Exam: CT Neck With Contrast  Exam date and time: 1/30/2023 7:50 PM  Age: 83 years old  Clinical indication: Difficulty swallowing    TECHNIQUE:  Imaging protocol: Computed tomography of the neck with contrast.  Contrast material: IV: OMNIPAQUE 350; Contrast volume: 94 ml; Contrast   route:  IV;    COMPARISON:  DX XR CHEST IMMEDIATE 10/15/2022 5:54 PM    FINDINGS:    Pharynx: Asymmetric severe soft tissue swelling/thickening of the right  posterolateral hypopharynx/supraglottic soft tissues, extending to the   level of  the bilateral aryepiglottic folds.  Larynx: Unremarkable. Normal epiglottis.  Prevertebral and retropharyngeal spaces: Unremarkable.  Salivary glands: Normal. Glands are normal in size.  Thyroid: Bilateral heterogeneous thyroid gland nodules.  Lymph nodes: No cervical lymphadenopathy.  Trachea: Unremarkable.  Lungs: Unremarkable as visualized.  Bones/joints: No acute osseus lesions orfractures.  Soft tissues:  See above.    IMPRESSION:  1. Asymmetric severe soft tissue swelling/thickening of the right  posterolateral hypopharynx/supraglottic soft tissues, extending to the   level of  the bilateral aryepiglottic folds. 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  Recommend ENT consultation.  2. Bilateral heterogeneous thyroid gland nodules. Recommend further   evaluation  with nonemergent thyroid ultrasound.        ******PRELIMINARY REPORT******      ******PRELIMINARY REPORT******         CORNELIA ALEJO M.D.;[facility] RADIOLOGIST  This document is a PRELIMINARY interpretation and is pending final   attending approval. Jan 30 2023  9:19PM    < end of copied text >   HPI: 83F PMHx HTN, CAD s/p PCI x 3 (2019) and recent abnormal NST, HLD, DM, GERD, Liver Ca, hypothyroidism who presents to the ED with an "allergic reaction" with associated itching/swelling to her lips/tongue with difficulty swallowing.  Pt. and daughter report that this has been going on for approximately 1 month now, on and off.  Pt. seen here for same on 12/25, given prednisone at that time.  Took last dose 2 days ago and difficulty swallowing restarted yesterday and got worse today so she returned to the ED.  Called by ED attending based for CT results showing neck mass.  Denies difficulty breathing, wheezing, coughing, hoarseness, throat/neck pain, fevers, chills, recent illness.         ROS: negative except for HPI    PMHx: HTN, CAD s/p PCI x 3 (2019) and recent abnormal NST, HLD, DM, GERD, Liver Ca, hypothyroidism, Knee pain, left arthritis.   PSHx: L TKR, Vaginal surgery, tubal ligation  FamHx: DM, Heart Dz, HTN  Allergies: Amlodipine    Medications: (as per chart)  · 	predniSONE 20 mg oral tablet: 1 tab(s) orally once a day   · 	ferrous sulfate 324 mg (65 mg elemental iron) oral delayed release tablet: 1 tab(s) orally once a day   · 	carvedilol 12.5 mg oral tablet: 1 tab(s) orally every 12 hours  · 	albuterol 90 mcg/inh inhalation powder: 2 puff(s) inhaled every 4 hours   · 	atorvastatin 40 mg oral tablet: 1 tab(s) orally once a day (at bedtime)  · 	levothyroxine 25 mcg (0.025 mg) oral capsule: 1 cap(s) orally once a day  · 	metFORMIN 1000 mg oral tablet: 1 tab(s) orally 2 times a day  · 	aspirin 81 mg oral tablet: 1 tab(s) orally once a day  · 	hydrALAZINE 50 mg oral tablet: 1 tab(s) orally 2 times a day  · 	Farxiga 10 mg oral tablet: 1 tab(s) orally once a day  · 	omeprazole 20 mg oral delayed release capsule: 1 cap(s) orally 2 times a day  · 	Vitamin C 500 mg oral tablet: 1 tab(s) orally once a day    MEDICATIONS  (STANDING):  sodium chloride 0.9%. 1000 milliLiter(s) (50 mL/Hr) IV Continuous <Continuous>    MEDICATIONS  (PRN):      Vital Signs Last 24 Hrs  T(C): 36.6 (30 Jan 2023 14:56), Max: 36.6 (30 Jan 2023 14:56)  T(F): 97.9 (30 Jan 2023 14:56), Max: 97.9 (30 Jan 2023 14:56)  HR: 80 (30 Jan 2023 14:56) (80 - 80)  BP: 132/72 (30 Jan 2023 14:56) (132/72 - 132/72)  BP(mean): --  RR: 18 (30 Jan 2023 14:56) (18 - 18)  SpO2: 98% (30 Jan 2023 14:56) (98% - 98%)    Parameters below as of 30 Jan 2023 14:56  Patient On (Oxygen Delivery Method): room air        PHYSICAL EXAM:      Constitutional: NAD    ENMT: tongue midline, symetric, no swelling noted to lips and tongue    Neck:  neck/throat without palpable asses     Respiratory: no accessory muscle use or conversational dyspnea    Cardiovascular: RRR    Gastrointestinal: soft, NT/ND    Extremities: JACKSON          I&O's Detail      LABS:                        11.0   10.92 )-----------( 241      ( 30 Jan 2023 17:40 )             34.8     01-30    134<L>  |  94<L>  |  17.2  ----------------------------<  163<H>  4.6   |  24.0  |  0.94    Ca    9.9      30 Jan 2023 17:40    TPro  7.9  /  Alb  3.9  /  TBili  0.5  /  DBili  x   /  AST  55<H>  /  ALT  26  /  AlkPhos  182<H>  01-30          RADIOLOGY & ADDITIONAL STUDIES:  < from: CT Neck Soft Tissue w/ IV Cont (01.30.23 @ 20:02) >    ******PRELIMINARY REPORT******      ******PRELIMINARY REPORT******         ACC: [id number] EXAM:  CT NECK SOFT TISSUE IC   ORDERED BY: DOROTA AMARAL     PROCEDURE DATE:  01/30/2023    ******PRELIMINARY REPORT******      ******PRELIMINARY REPORT******           INTERPRETATION:  [facility] RADIOLOGIST PRELIMINARY REPORT    PROCEDURE INFORMATION:  Exam: CT Neck With Contrast  Exam date and time: 1/30/2023 7:50 PM  Age: 83 years old  Clinical indication: Difficulty swallowing    TECHNIQUE:  Imaging protocol: Computed tomography of the neck with contrast.  Contrast material: IV: OMNIPAQUE 350; Contrast volume: 94 ml; Contrast   route:  IV;    COMPARISON:  DX XR CHEST IMMEDIATE 10/15/2022 5:54 PM    FINDINGS:    Pharynx: Asymmetric severe soft tissue swelling/thickening of the right  posterolateral hypopharynx/supraglottic soft tissues, extending to the   level of  the bilateral aryepiglottic folds.  Larynx: Unremarkable. Normal epiglottis.  Prevertebral and retropharyngeal spaces: Unremarkable.  Salivary glands: Normal. Glands are normal in size.  Thyroid: Bilateral heterogeneous thyroid gland nodules.  Lymph nodes: No cervical lymphadenopathy.  Trachea: Unremarkable.  Lungs: Unremarkable as visualized.  Bones/joints: No acute osseus lesions orfractures.  Soft tissues:  See above.    IMPRESSION:  1. Asymmetric severe soft tissue swelling/thickening of the right  posterolateral hypopharynx/supraglottic soft tissues, extending to the   level of  the bilateral aryepiglottic folds. 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  Recommend ENT consultation.  2. Bilateral heterogeneous thyroid gland nodules. Recommend further   evaluation  with nonemergent thyroid ultrasound.        ******PRELIMINARY REPORT******      ******PRELIMINARY REPORT******         CORNELIA ALEJO M.D.;[facility] RADIOLOGIST  This document is a PRELIMINARY interpretation and is pending final   attending approval. Jan 30 2023  9:19PM    < end of copied text >

## 2023-01-31 ENCOUNTER — APPOINTMENT (OUTPATIENT)
Dept: GASTROENTEROLOGY | Facility: CLINIC | Age: 83
End: 2023-01-31
Payer: MEDICARE

## 2023-01-31 VITALS
HEIGHT: 62 IN | OXYGEN SATURATION: 98 % | HEART RATE: 70 BPM | RESPIRATION RATE: 16 BRPM | WEIGHT: 162 LBS | DIASTOLIC BLOOD PRESSURE: 80 MMHG | SYSTOLIC BLOOD PRESSURE: 130 MMHG | BODY MASS INDEX: 29.81 KG/M2

## 2023-01-31 LAB — LACTATE BLDV-MCNC: 1.7 MMOL/L — SIGNIFICANT CHANGE UP (ref 0.5–2)

## 2023-01-31 PROCEDURE — 83690 ASSAY OF LIPASE: CPT

## 2023-01-31 PROCEDURE — 96361 HYDRATE IV INFUSION ADD-ON: CPT

## 2023-01-31 PROCEDURE — 74177 CT ABD & PELVIS W/CONTRAST: CPT | Mod: MA

## 2023-01-31 PROCEDURE — 96374 THER/PROPH/DIAG INJ IV PUSH: CPT

## 2023-01-31 PROCEDURE — 87086 URINE CULTURE/COLONY COUNT: CPT

## 2023-01-31 PROCEDURE — 36415 COLL VENOUS BLD VENIPUNCTURE: CPT

## 2023-01-31 PROCEDURE — 85025 COMPLETE CBC W/AUTO DIFF WBC: CPT

## 2023-01-31 PROCEDURE — 80053 COMPREHEN METABOLIC PANEL: CPT

## 2023-01-31 PROCEDURE — 87040 BLOOD CULTURE FOR BACTERIA: CPT

## 2023-01-31 PROCEDURE — 99214 OFFICE O/P EST MOD 30 MIN: CPT

## 2023-01-31 PROCEDURE — 99215 OFFICE O/P EST HI 40 MIN: CPT

## 2023-01-31 PROCEDURE — 71260 CT THORAX DX C+: CPT | Mod: MA

## 2023-01-31 PROCEDURE — 83605 ASSAY OF LACTIC ACID: CPT

## 2023-01-31 PROCEDURE — 70491 CT SOFT TISSUE NECK W/DYE: CPT | Mod: MA

## 2023-01-31 PROCEDURE — 99284 EMERGENCY DEPT VISIT MOD MDM: CPT | Mod: 25

## 2023-02-01 LAB
CULTURE RESULTS: SIGNIFICANT CHANGE UP
SPECIMEN SOURCE: SIGNIFICANT CHANGE UP

## 2023-02-05 LAB
CULTURE RESULTS: SIGNIFICANT CHANGE UP
CULTURE RESULTS: SIGNIFICANT CHANGE UP
SPECIMEN SOURCE: SIGNIFICANT CHANGE UP
SPECIMEN SOURCE: SIGNIFICANT CHANGE UP

## 2023-02-07 ENCOUNTER — OUTPATIENT (OUTPATIENT)
Dept: OUTPATIENT SERVICES | Facility: HOSPITAL | Age: 83
LOS: 1 days | End: 2023-02-07
Payer: MEDICARE

## 2023-02-07 DIAGNOSIS — Z98.890 OTHER SPECIFIED POSTPROCEDURAL STATES: Chronic | ICD-10-CM

## 2023-02-07 DIAGNOSIS — Z96.652 PRESENCE OF LEFT ARTIFICIAL KNEE JOINT: Chronic | ICD-10-CM

## 2023-02-07 RX ORDER — ASCORBIC ACID 60 MG
1 TABLET,CHEWABLE ORAL
Qty: 0 | Refills: 0 | DISCHARGE

## 2023-02-07 RX ORDER — OMEPRAZOLE 10 MG/1
1 CAPSULE, DELAYED RELEASE ORAL
Qty: 0 | Refills: 0 | DISCHARGE

## 2023-02-08 ENCOUNTER — TRANSCRIPTION ENCOUNTER (OUTPATIENT)
Age: 83
End: 2023-02-08

## 2023-02-08 ENCOUNTER — EMERGENCY (EMERGENCY)
Facility: HOSPITAL | Age: 83
LOS: 1 days | Discharge: HOME | End: 2023-02-08
Attending: EMERGENCY MEDICINE
Payer: MEDICARE

## 2023-02-08 VITALS
WEIGHT: 169.98 LBS | HEART RATE: 81 BPM | RESPIRATION RATE: 18 BRPM | OXYGEN SATURATION: 98 % | HEIGHT: 62 IN | TEMPERATURE: 98 F | SYSTOLIC BLOOD PRESSURE: 127 MMHG | DIASTOLIC BLOOD PRESSURE: 76 MMHG

## 2023-02-08 DIAGNOSIS — Z96.652 PRESENCE OF LEFT ARTIFICIAL KNEE JOINT: Chronic | ICD-10-CM

## 2023-02-08 DIAGNOSIS — Z98.890 OTHER SPECIFIED POSTPROCEDURAL STATES: Chronic | ICD-10-CM

## 2023-02-08 LAB
ALBUMIN SERPL ELPH-MCNC: 4.2 G/DL — SIGNIFICANT CHANGE UP (ref 3.3–5.2)
ALP SERPL-CCNC: 195 U/L — HIGH (ref 40–120)
ALT FLD-CCNC: 38 U/L — HIGH
ANION GAP SERPL CALC-SCNC: 16 MMOL/L — SIGNIFICANT CHANGE UP (ref 5–17)
APTT BLD: 27.1 SEC — LOW (ref 27.5–35.5)
AST SERPL-CCNC: 61 U/L — HIGH
BASOPHILS # BLD AUTO: 0 K/UL — SIGNIFICANT CHANGE UP (ref 0–0.2)
BASOPHILS NFR BLD AUTO: 0 % — SIGNIFICANT CHANGE UP (ref 0–2)
BILIRUB SERPL-MCNC: 0.9 MG/DL — SIGNIFICANT CHANGE UP (ref 0.4–2)
BUN SERPL-MCNC: 24.7 MG/DL — HIGH (ref 8–20)
CALCIUM SERPL-MCNC: 9.5 MG/DL — SIGNIFICANT CHANGE UP (ref 8.4–10.5)
CHLORIDE SERPL-SCNC: 89 MMOL/L — LOW (ref 96–108)
CO2 SERPL-SCNC: 25 MMOL/L — SIGNIFICANT CHANGE UP (ref 22–29)
CREAT SERPL-MCNC: 1.23 MG/DL — SIGNIFICANT CHANGE UP (ref 0.5–1.3)
EGFR: 43 ML/MIN/1.73M2 — LOW
EOSINOPHIL # BLD AUTO: 0 K/UL — SIGNIFICANT CHANGE UP (ref 0–0.5)
EOSINOPHIL NFR BLD AUTO: 0 % — SIGNIFICANT CHANGE UP (ref 0–6)
FLUAV AG NPH QL: SIGNIFICANT CHANGE UP
FLUBV AG NPH QL: SIGNIFICANT CHANGE UP
GLUCOSE SERPL-MCNC: 502 MG/DL — CRITICAL HIGH (ref 70–99)
HCT VFR BLD CALC: 32 % — LOW (ref 34.5–45)
HGB BLD-MCNC: 10.1 G/DL — LOW (ref 11.5–15.5)
IMM GRANULOCYTES NFR BLD AUTO: 0.9 % — SIGNIFICANT CHANGE UP (ref 0–0.9)
INR BLD: 1.27 RATIO — HIGH (ref 0.88–1.16)
LYMPHOCYTES # BLD AUTO: 1.2 K/UL — SIGNIFICANT CHANGE UP (ref 1–3.3)
LYMPHOCYTES # BLD AUTO: 16.2 % — SIGNIFICANT CHANGE UP (ref 13–44)
MCHC RBC-ENTMCNC: 25.1 PG — LOW (ref 27–34)
MCHC RBC-ENTMCNC: 31.6 GM/DL — LOW (ref 32–36)
MCV RBC AUTO: 79.4 FL — LOW (ref 80–100)
MONOCYTES # BLD AUTO: 0.63 K/UL — SIGNIFICANT CHANGE UP (ref 0–0.9)
MONOCYTES NFR BLD AUTO: 8.5 % — SIGNIFICANT CHANGE UP (ref 2–14)
NEUTROPHILS # BLD AUTO: 5.51 K/UL — SIGNIFICANT CHANGE UP (ref 1.8–7.4)
NEUTROPHILS NFR BLD AUTO: 74.4 % — SIGNIFICANT CHANGE UP (ref 43–77)
PLATELET # BLD AUTO: 186 K/UL — SIGNIFICANT CHANGE UP (ref 150–400)
POTASSIUM SERPL-MCNC: 5 MMOL/L — SIGNIFICANT CHANGE UP (ref 3.5–5.3)
POTASSIUM SERPL-SCNC: 5 MMOL/L — SIGNIFICANT CHANGE UP (ref 3.5–5.3)
PROT SERPL-MCNC: 8.1 G/DL — SIGNIFICANT CHANGE UP (ref 6.6–8.7)
PROTHROM AB SERPL-ACNC: 14.8 SEC — HIGH (ref 10.5–13.4)
RBC # BLD: 4.03 M/UL — SIGNIFICANT CHANGE UP (ref 3.8–5.2)
RBC # FLD: 19.6 % — HIGH (ref 10.3–14.5)
RSV RNA NPH QL NAA+NON-PROBE: SIGNIFICANT CHANGE UP
SARS-COV-2 RNA SPEC QL NAA+PROBE: SIGNIFICANT CHANGE UP
SODIUM SERPL-SCNC: 130 MMOL/L — LOW (ref 135–145)
WBC # BLD: 7.41 K/UL — SIGNIFICANT CHANGE UP (ref 3.8–10.5)
WBC # FLD AUTO: 7.41 K/UL — SIGNIFICANT CHANGE UP (ref 3.8–10.5)

## 2023-02-08 PROCEDURE — C1887: CPT

## 2023-02-08 PROCEDURE — 99284 EMERGENCY DEPT VISIT MOD MDM: CPT

## 2023-02-08 PROCEDURE — C1874: CPT

## 2023-02-08 PROCEDURE — C1725: CPT

## 2023-02-08 PROCEDURE — 83735 ASSAY OF MAGNESIUM: CPT

## 2023-02-08 PROCEDURE — 93458 L HRT ARTERY/VENTRICLE ANGIO: CPT | Mod: XU

## 2023-02-08 PROCEDURE — 36415 COLL VENOUS BLD VENIPUNCTURE: CPT

## 2023-02-08 PROCEDURE — 82962 GLUCOSE BLOOD TEST: CPT

## 2023-02-08 PROCEDURE — 83036 HEMOGLOBIN GLYCOSYLATED A1C: CPT

## 2023-02-08 PROCEDURE — C1760: CPT

## 2023-02-08 PROCEDURE — C1769: CPT

## 2023-02-08 PROCEDURE — C9600: CPT | Mod: LC

## 2023-02-08 PROCEDURE — 93926 LOWER EXTREMITY STUDY: CPT | Mod: 26,RT

## 2023-02-08 PROCEDURE — 85027 COMPLETE CBC AUTOMATED: CPT

## 2023-02-08 PROCEDURE — 93005 ELECTROCARDIOGRAM TRACING: CPT

## 2023-02-08 PROCEDURE — C1894: CPT

## 2023-02-08 PROCEDURE — 80048 BASIC METABOLIC PNL TOTAL CA: CPT

## 2023-02-08 RX ORDER — SODIUM CHLORIDE 9 MG/ML
1000 INJECTION INTRAMUSCULAR; INTRAVENOUS; SUBCUTANEOUS ONCE
Refills: 0 | Status: COMPLETED | OUTPATIENT
Start: 2023-02-08 | End: 2023-02-08

## 2023-02-08 RX ADMIN — SODIUM CHLORIDE 1000 MILLILITER(S): 9 INJECTION INTRAMUSCULAR; INTRAVENOUS; SUBCUTANEOUS at 20:39

## 2023-02-08 NOTE — ED PROVIDER NOTE - PROGRESS NOTE DETAILS
Bonny: IMPRESSION:  1. No evidence of arterial occlusion or pseudoaneurysm right groin.  2.  Suspect right groin hemorrhagic contusion. No evidence of focal fluid  collection or hematoma.        ******PRELIMINARY REPORT******      Patient otherwise stable at this time for discharge. Can followup with Cardiology.

## 2023-02-08 NOTE — ED PROVIDER NOTE - CARE PROVIDER_API CALL
Mukund Chirinos)  Cardiovascular Disease; Interventional Cardiology  [address] [place] [address]  Phone: [phone]  Fax: [phone]  Follow Up Time: 4-6 Days

## 2023-02-08 NOTE — ED PROVIDER NOTE - IV ALTEPLASE EXCL ABS HIDDEN
Pt sister called stressing concern over pt being discharged today, stating she is at work and unable to make a decision now. I instructed the pt sister to please let us know by tomorrow morning.  Education provided to sister on the benefits of rehab on inc show

## 2023-02-08 NOTE — ED PROVIDER NOTE - OBJECTIVE STATEMENT
Translation provided by ED : Kaylan  83-year-old female history of hypertension, hyperlipidemia, CAD status post catheterization yesterday presenting with ecchymosis to right inguinal catheterization site.  Denies chest pain, shortness of breath, dizziness, fever.

## 2023-02-08 NOTE — ED ADULT TRIAGE NOTE - CHIEF COMPLAINT QUOTE
patient has hematoma at site of cardiac catheterization that was done yesterday. was told if gets worse to come back to the ED.

## 2023-02-08 NOTE — ED PROVIDER NOTE - PHYSICAL EXAMINATION
Gen: Well appearing in NAD  Head: NC/AT  Neck: trachea midline  Resp:  No distress, CTAB  CV: RRR  GI: soft, NTND  Ext:  large ecchymosis to right inguinal catheterization site, no bruit.  Normal DP pulses bilaterally.  Neuro:  A&O appears non focal  Skin:  Warm and dry as visualized  Psych:  Normal affect and mood

## 2023-02-08 NOTE — ED PROVIDER NOTE - DATE/TIME 1
Likely embolic, cardiac given his history of Afib and CAD but also possible that it was formed from in situ thrombosis at area of significant plaque/calcification.   Interval MRI with expected extension of infarct within the left PCA territory involving thalamocapsular region as well as medial temporal lobe correlating to his worsening symptoms of thalamic aphasia and visual defect and worsening strength in R UE now 0/5 in RUE and 2/5 strength in RLE. Aphasia/Dysarthria worsening.     Antithrombotics for secondary stroke prevention: Antiplatelets: Aspirin: 81 mg daily    Statins for secondary stroke prevention and hyperlipidemia, if present:   Statins: Atorvastatin- 80 mg daily    Aggressive risk factor modification: HTN, DM, HLD, A-Fib, CAD     Rehab efforts: PT/OT/SLP to evaluate and treat, PM&R consult     Diagnostics ordered/pending: suggest NPO and further evaluation with SLP     VTE prophylaxis: Heparin gtt and warfarin     BP parameters: Infarct: No intervention, SBP <220       09-Feb-2023 03:17

## 2023-02-08 NOTE — ED PROVIDER NOTE - ATTENDING CONTRIBUTION TO CARE
Tex: I performed a face to face bedside interview with patient regarding history of present illness, review of symptoms and past medical history. I completed an independent physical exam and ordered tests/medications as needed.  I have discussed patient's plan of care with the resident. The resident assisted in  executing the discussed plan. I was available for any questions or issues that may have arose during the execution of the plan of care.

## 2023-02-08 NOTE — ED PROVIDER NOTE - NSICDXPASTSURGICALHX_GEN_ALL_CORE_FT
PAST SURGICAL HISTORY:  History of cardiac cath     History of total left knee replacement     History of vaginal surgery     S/P tubal ligation

## 2023-02-08 NOTE — ED PROVIDER NOTE - NSFOLLOWUPINSTRUCTIONS_ED_ALL_ED_FT
Regrese al servicio de urgencias si aden síntomas empeoran o la hinchazón en la delgado empeora. Seguimiento con hopkins Cardiólogo.

## 2023-02-08 NOTE — ED ADULT NURSE NOTE - OBJECTIVE STATEMENT
Assumed care of pt at 1915. Pt A&Ox4 c/o a hematoma at site of cardiac catheterization that was done yesterday, the pt presents to the ED with ecchymosis to her right inguinal cardiac catheterization site that was placed yesterday, the pt denies N/V/D/CP/SOB/fever, the pt is resting comfortably showing no signs of respiratory distress or pain, the pt is calm and cooperative

## 2023-02-08 NOTE — ED PROVIDER NOTE - CLINICAL SUMMARY MEDICAL DECISION MAKING FREE TEXT BOX
patient with ecchymosis at catheterization site.  Concern for hematoma versus pseudoaneurysm.  Plan for labs, ultrasound and reassess. patient with  ecchymosis at catheterization site.  Concern for hematoma versus pseudoaneurysm.  Plan for labs, ultrasound and reassess.

## 2023-02-08 NOTE — ED PROVIDER NOTE - PATIENT PORTAL LINK FT
You can access the FollowMyHealth Patient Portal offered by [facility] by registering at the following website: [email or web address]. By joining [employer]’s FollowMyHealth portal, you will also be able to view your health information using other applications (apps) compatible with our system.

## 2023-02-09 VITALS
HEART RATE: 70 BPM | DIASTOLIC BLOOD PRESSURE: 61 MMHG | OXYGEN SATURATION: 97 % | SYSTOLIC BLOOD PRESSURE: 138 MMHG | RESPIRATION RATE: 20 BRPM

## 2023-02-09 PROCEDURE — 93005 ELECTROCARDIOGRAM TRACING: CPT | Mod: XU

## 2023-02-09 PROCEDURE — 99284 EMERGENCY DEPT VISIT MOD MDM: CPT | Mod: 25

## 2023-02-09 PROCEDURE — 96360 HYDRATION IV INFUSION INIT: CPT

## 2023-02-09 PROCEDURE — 85610 PROTHROMBIN TIME: CPT

## 2023-02-09 PROCEDURE — 96361 HYDRATE IV INFUSION ADD-ON: CPT

## 2023-02-09 PROCEDURE — 80053 COMPREHEN METABOLIC PANEL: CPT

## 2023-02-09 PROCEDURE — 80048 BASIC METABOLIC PNL TOTAL CA: CPT

## 2023-02-09 PROCEDURE — 85027 COMPLETE CBC AUTOMATED: CPT

## 2023-02-09 PROCEDURE — 82962 GLUCOSE BLOOD TEST: CPT

## 2023-02-09 PROCEDURE — 83735 ASSAY OF MAGNESIUM: CPT

## 2023-02-09 PROCEDURE — 36415 COLL VENOUS BLD VENIPUNCTURE: CPT

## 2023-02-09 PROCEDURE — 85730 THROMBOPLASTIN TIME PARTIAL: CPT

## 2023-02-09 PROCEDURE — 83036 HEMOGLOBIN GLYCOSYLATED A1C: CPT

## 2023-02-09 PROCEDURE — 93926 LOWER EXTREMITY STUDY: CPT

## 2023-02-09 PROCEDURE — 85025 COMPLETE CBC W/AUTO DIFF WBC: CPT

## 2023-02-09 PROCEDURE — 87637 SARSCOV2&INF A&B&RSV AMP PRB: CPT

## 2023-02-09 RX ORDER — INSULIN LISPRO 100/ML
10 VIAL (ML) SUBCUTANEOUS ONCE
Refills: 0 | Status: COMPLETED | OUTPATIENT
Start: 2023-02-09 | End: 2023-02-09

## 2023-02-09 RX ADMIN — SODIUM CHLORIDE 1000 MILLILITER(S): 9 INJECTION INTRAMUSCULAR; INTRAVENOUS; SUBCUTANEOUS at 00:55

## 2023-02-09 RX ADMIN — Medication 10 UNIT(S): at 01:01

## 2023-02-14 DIAGNOSIS — R94.39 ABNORMAL RESULT OF OTHER CARDIOVASCULAR FUNCTION STUDY: ICD-10-CM

## 2023-03-07 ENCOUNTER — OUTPATIENT (OUTPATIENT)
Dept: OUTPATIENT SERVICES | Facility: HOSPITAL | Age: 83
LOS: 1 days | End: 2023-03-07
Payer: MEDICARE

## 2023-03-07 VITALS
OXYGEN SATURATION: 97 % | DIASTOLIC BLOOD PRESSURE: 70 MMHG | WEIGHT: 156.53 LBS | SYSTOLIC BLOOD PRESSURE: 120 MMHG | HEIGHT: 60 IN | RESPIRATION RATE: 16 BRPM | TEMPERATURE: 97 F | HEART RATE: 72 BPM

## 2023-03-07 DIAGNOSIS — I10 ESSENTIAL (PRIMARY) HYPERTENSION: ICD-10-CM

## 2023-03-07 DIAGNOSIS — Z98.890 OTHER SPECIFIED POSTPROCEDURAL STATES: Chronic | ICD-10-CM

## 2023-03-07 DIAGNOSIS — Z29.9 ENCOUNTER FOR PROPHYLACTIC MEASURES, UNSPECIFIED: ICD-10-CM

## 2023-03-07 DIAGNOSIS — Z96.652 PRESENCE OF LEFT ARTIFICIAL KNEE JOINT: Chronic | ICD-10-CM

## 2023-03-07 DIAGNOSIS — C22.0 LIVER CELL CARCINOMA: ICD-10-CM

## 2023-03-07 DIAGNOSIS — Z01.818 ENCOUNTER FOR OTHER PREPROCEDURAL EXAMINATION: ICD-10-CM

## 2023-03-07 LAB
A1C WITH ESTIMATED AVERAGE GLUCOSE RESULT: 9.2 % — HIGH (ref 4–5.6)
ALBUMIN SERPL ELPH-MCNC: 3.8 G/DL — SIGNIFICANT CHANGE UP (ref 3.3–5.2)
ALP SERPL-CCNC: 147 U/L — HIGH (ref 40–120)
ALT FLD-CCNC: 16 U/L — SIGNIFICANT CHANGE UP
ANION GAP SERPL CALC-SCNC: 10 MMOL/L — SIGNIFICANT CHANGE UP (ref 5–17)
APTT BLD: 27.7 SEC — SIGNIFICANT CHANGE UP (ref 27.5–35.5)
AST SERPL-CCNC: 34 U/L — HIGH
BASOPHILS # BLD AUTO: 0.01 K/UL — SIGNIFICANT CHANGE UP (ref 0–0.2)
BASOPHILS NFR BLD AUTO: 0.2 % — SIGNIFICANT CHANGE UP (ref 0–2)
BILIRUB SERPL-MCNC: 0.5 MG/DL — SIGNIFICANT CHANGE UP (ref 0.4–2)
BUN SERPL-MCNC: 9 MG/DL — SIGNIFICANT CHANGE UP (ref 8–20)
CALCIUM SERPL-MCNC: 9.6 MG/DL — SIGNIFICANT CHANGE UP (ref 8.4–10.5)
CHLORIDE SERPL-SCNC: 99 MMOL/L — SIGNIFICANT CHANGE UP (ref 96–108)
CO2 SERPL-SCNC: 29 MMOL/L — SIGNIFICANT CHANGE UP (ref 22–29)
CREAT SERPL-MCNC: 0.82 MG/DL — SIGNIFICANT CHANGE UP (ref 0.5–1.3)
EGFR: 70 ML/MIN/1.73M2 — SIGNIFICANT CHANGE UP
EOSINOPHIL # BLD AUTO: 0.07 K/UL — SIGNIFICANT CHANGE UP (ref 0–0.5)
EOSINOPHIL NFR BLD AUTO: 1.2 % — SIGNIFICANT CHANGE UP (ref 0–6)
ESTIMATED AVERAGE GLUCOSE: 217 MG/DL — HIGH (ref 68–114)
GLUCOSE SERPL-MCNC: 184 MG/DL — HIGH (ref 70–99)
HCT VFR BLD CALC: 29.9 % — LOW (ref 34.5–45)
HGB BLD-MCNC: 9 G/DL — LOW (ref 11.5–15.5)
IMM GRANULOCYTES NFR BLD AUTO: 0.2 % — SIGNIFICANT CHANGE UP (ref 0–0.9)
INR BLD: 1.2 RATIO — HIGH (ref 0.88–1.16)
LYMPHOCYTES # BLD AUTO: 1.66 K/UL — SIGNIFICANT CHANGE UP (ref 1–3.3)
LYMPHOCYTES # BLD AUTO: 28.4 % — SIGNIFICANT CHANGE UP (ref 13–44)
MCHC RBC-ENTMCNC: 25.1 PG — LOW (ref 27–34)
MCHC RBC-ENTMCNC: 30.1 GM/DL — LOW (ref 32–36)
MCV RBC AUTO: 83.3 FL — SIGNIFICANT CHANGE UP (ref 80–100)
MONOCYTES # BLD AUTO: 0.56 K/UL — SIGNIFICANT CHANGE UP (ref 0–0.9)
MONOCYTES NFR BLD AUTO: 9.6 % — SIGNIFICANT CHANGE UP (ref 2–14)
NEUTROPHILS # BLD AUTO: 3.54 K/UL — SIGNIFICANT CHANGE UP (ref 1.8–7.4)
NEUTROPHILS NFR BLD AUTO: 60.4 % — SIGNIFICANT CHANGE UP (ref 43–77)
PLATELET # BLD AUTO: 160 K/UL — SIGNIFICANT CHANGE UP (ref 150–400)
POTASSIUM SERPL-MCNC: 4.7 MMOL/L — SIGNIFICANT CHANGE UP (ref 3.5–5.3)
POTASSIUM SERPL-SCNC: 4.7 MMOL/L — SIGNIFICANT CHANGE UP (ref 3.5–5.3)
PROT SERPL-MCNC: 7.4 G/DL — SIGNIFICANT CHANGE UP (ref 6.6–8.7)
PROTHROM AB SERPL-ACNC: 13.9 SEC — HIGH (ref 10.5–13.4)
RBC # BLD: 3.59 M/UL — LOW (ref 3.8–5.2)
RBC # FLD: 17.4 % — HIGH (ref 10.3–14.5)
SODIUM SERPL-SCNC: 138 MMOL/L — SIGNIFICANT CHANGE UP (ref 135–145)
WBC # BLD: 5.85 K/UL — SIGNIFICANT CHANGE UP (ref 3.8–10.5)
WBC # FLD AUTO: 5.85 K/UL — SIGNIFICANT CHANGE UP (ref 3.8–10.5)

## 2023-03-07 PROCEDURE — 85730 THROMBOPLASTIN TIME PARTIAL: CPT

## 2023-03-07 PROCEDURE — G0463: CPT

## 2023-03-07 PROCEDURE — 85610 PROTHROMBIN TIME: CPT

## 2023-03-07 PROCEDURE — 85025 COMPLETE CBC W/AUTO DIFF WBC: CPT

## 2023-03-07 PROCEDURE — 83036 HEMOGLOBIN GLYCOSYLATED A1C: CPT

## 2023-03-07 PROCEDURE — 36415 COLL VENOUS BLD VENIPUNCTURE: CPT

## 2023-03-07 PROCEDURE — 93005 ELECTROCARDIOGRAM TRACING: CPT

## 2023-03-07 PROCEDURE — 93010 ELECTROCARDIOGRAM REPORT: CPT

## 2023-03-07 PROCEDURE — 80053 COMPREHEN METABOLIC PANEL: CPT

## 2023-03-07 NOTE — H&P PST ADULT - OTHER CARE PROVIDERS
Dr. Javier Akhtar [phone] PCP, Susan Kelley Dr. Javier Akhtar [phone] PCP, cardiologist Dr. Gonzalez [phone]

## 2023-03-07 NOTE — H&P PST ADULT - NSICDXFAMILYHX_GEN_ALL_CORE_FT
FAMILY HISTORY:  Family history of diabetes mellitus (DM)  Family history of osteoarthritis  FH: heart disease  FH: HTN (hypertension)

## 2023-03-07 NOTE — H&P PST ADULT - ASSESSMENT
Procedure protocol reviewed with Pt today. Pt provided instructions for COVID PCR test requirement 3-5 days prior to this procedure    CAPRINI VTE 2.0 SCORE [CLOT updated 2019]    AGE RELATED RISK FACTORS                                                       MOBILITY RELATED FACTORS  [ ] Age 41-60 years                                            (1 Point)                    [ ] Bed rest                                                        (1 Point)  [ ] Age: 61-74 years                                           (2 Points)                  [ ] Plaster cast                                                   (2 Points)  [x ] Age= 75 years                                              (3 Points)                    [ ] Bed bound for more than 72 hours                 (2 Points)    DISEASE RELATED RISK FACTORS                                               GENDER SPECIFIC FACTORS  [ ] Edema in the lower extremities                       (1 Point)              [ ] Pregnancy                                                     (1 Point)  [ ] Varicose veins                                               (1 Point)                     [ ] Post-partum < 6 weeks                                   (1 Point)             [ x] BMI > 25 Kg/m2                                            (1 Point)                     [ ] Hormonal therapy  or oral contraception          (1 Point)                 [ ] Sepsis (in the previous month)                        (1 Point)               [ ] History of pregnancy complications                 (1 point)  [ ] Pneumonia or serious lung disease                                               [ ] Unexplained or recurrent                     (1 Point)           (in the previous month)                               (1 Point)  [ ] Abnormal pulmonary function test                     (1 Point)                 SURGERY RELATED RISK FACTORS  [ ] Acute myocardial infarction                              (1 Point)               [ ]  Section                                             (1 Point)  [ ] Congestive heart failure (in the previous month)  (1 Point)      [ ] Minor surgery                                                  (1 Point)   [ ] Inflammatory bowel disease                             (1 Point)               [ ] Arthroscopic surgery                                        (2 Points)  [ ] Central venous access                                      (2 Points)                [x ] General surgery lasting more than 45 minutes (2 points)  [x ] Malignancy- Present or previous                   (2 Points)                [ ] Elective arthroplasty                                         (5 points)    [ ] Stroke (in the previous month)                          (5 Points)                                                                                                                                                           HEMATOLOGY RELATED FACTORS                                                 TRAUMA RELATED RISK FACTORS  [ ] Prior episodes of VTE                                     (3 Points)                [ ] Fracture of the hip, pelvis, or leg                       (5 Points)  [ ] Positive family history for VTE                         (3 Points)             [ ] Acute spinal cord injury (in the previous month)  (5 Points)  [ ] Prothrombin 98614 A                                     (3 Points)               [ ] Paralysis  (less than 1 month)                             (5 Points)  [ ] Factor V Leiden                                             (3 Points)                  [ ] Multiple Trauma within 1 month                        (5 Points)  [ ] Lupus anticoagulants                                     (3 Points)                                                           [ ] Anticardiolipin antibodies                               (3 Points)                                                       [ ] High homocysteine in the blood                      (3 Points)                                             [ ] Other congenital or acquired thrombophilia      (3 Points)                                                [ ] Heparin induced thrombocytopenia                  (3 Points)                                     Total Score [      8    ]  OPIOID RISK TOOL    KIRILL EACH BOX THAT APPLIES AND ADD TOTALS AT THE END    FAMILY HISTORY OF SUBSTANCE ABUSE                 FEMALE         MALE                                                Alcohol                             [  ]1 pt          [  ]3pts                                               Illegal Durgs                     [  ]2 pts        [  ]3pts                                               Rx Drugs                           [  ]4 pts        [  ]4 pts    PERSONAL HISTORY OF SUBSTANCE ABUSE                                                                                          Alcohol                             [  ]3 pts       [  ]3 pts                                               Illegal Drugs                     [  ]4 pts        [  ]4 pts                                               Rx Drugs                           [  ]5 pts        [  ]5 pts    AGE BETWEEN 16-45 YEARS                                      [  ]1 pt         [  ]1 pt    HISTORY OF PREADOLESCENT   SEXUAL ABUSE                                                             [  ]3 pts        [  ]0pts    PSYCHOLOGICAL DISEASE                     ADD, OCD, Bipolar, Schizophrenia        [  ]2 pts         [  ]2 pts                      Depression                                               [  ]1 pt           [  ]1 pt           SCORING TOTAL   (add numbers and type here)              (*0**)                                     A score of 3 or lower indicated LOW risk for future opioid abuse  A score of 4 to 7 indicated moderate risk for future opioid abuse  A score of 8 or higher indicates a high risk for opioid abuse   Pt is a 83 years old female, Nepali speaking,  seen today pre-op for Y90 Mapping procedure with anesthesia.  Pt past medical history of cirrhosis, anemia, HLD, HTN, GERD, diabetes, CAD(Cardiac stents on Plavix),  liver cell carcinoma. Pt presented with mild abdominal pain and bloating beginning in 2022. MRI performed at that time showed 2 masses within the liver,  PT reports abdominal pain has improved since that time. Patient denies encephalopathy, ascites, report 15 lbs weight loss within the last 5 months, occasional left side flank pain which is relief by Tylenol and rest.  Pt seen today for a scheduled radiology procedure on 3/14/23 by Dr. Reinoso.   Procedure protocol reviewed with Pt and her daughter today. Pt provided instructions for COVID PCR test requirement 3-5 days prior to this procedure, Pt to follow-up with PCP and cardiologist fro clearance and recommendation for Plavix and ASA prior to this procedure. Pt instructed to hold Metformin 24 hours and Farxiga 4 days prior to this procedure    CAPRINI VTE 2.0 SCORE [CLOT updated 2019]    AGE RELATED RISK FACTORS                                                       MOBILITY RELATED FACTORS  [ ] Age 41-60 years                                            (1 Point)                    [ ] Bed rest                                                        (1 Point)  [ ] Age: 61-74 years                                           (2 Points)                  [ ] Plaster cast                                                   (2 Points)  [x ] Age= 75 years                                              (3 Points)                    [ ] Bed bound for more than 72 hours                 (2 Points)    DISEASE RELATED RISK FACTORS                                               GENDER SPECIFIC FACTORS  [ ] Edema in the lower extremities                       (1 Point)              [ ] Pregnancy                                                     (1 Point)  [ ] Varicose veins                                               (1 Point)                     [ ] Post-partum < 6 weeks                                   (1 Point)             [ x] BMI > 25 Kg/m2                                            (1 Point)                     [ ] Hormonal therapy  or oral contraception          (1 Point)                 [ ] Sepsis (in the previous month)                        (1 Point)               [ ] History of pregnancy complications                 (1 point)  [ ] Pneumonia or serious lung disease                                               [ ] Unexplained or recurrent                     (1 Point)           (in the previous month)                               (1 Point)  [ ] Abnormal pulmonary function test                     (1 Point)                 SURGERY RELATED RISK FACTORS  [ ] Acute myocardial infarction                              (1 Point)               [ ]  Section                                             (1 Point)  [ ] Congestive heart failure (in the previous month)  (1 Point)      [ ] Minor surgery                                                  (1 Point)   [ ] Inflammatory bowel disease                             (1 Point)               [ ] Arthroscopic surgery                                        (2 Points)  [ ] Central venous access                                      (2 Points)                [x ] General surgery lasting more than 45 minutes (2 points)  [x ] Malignancy- Present or previous                   (2 Points)                [ ] Elective arthroplasty                                         (5 points)    [ ] Stroke (in the previous month)                          (5 Points)                                                                                                                                                           HEMATOLOGY RELATED FACTORS                                                 TRAUMA RELATED RISK FACTORS  [ ] Prior episodes of VTE                                     (3 Points)                [ ] Fracture of the hip, pelvis, or leg                       (5 Points)  [ ] Positive family history for VTE                         (3 Points)             [ ] Acute spinal cord injury (in the previous month)  (5 Points)  [ ] Prothrombin 99084 A                                     (3 Points)               [ ] Paralysis  (less than 1 month)                             (5 Points)  [ ] Factor V Leiden                                             (3 Points)                  [ ] Multiple Trauma within 1 month                        (5 Points)  [ ] Lupus anticoagulants                                     (3 Points)                                                           [ ] Anticardiolipin antibodies                               (3 Points)                                                       [ ] High homocysteine in the blood                      (3 Points)                                             [ ] Other congenital or acquired thrombophilia      (3 Points)                                                [ ] Heparin induced thrombocytopenia                  (3 Points)                                     Total Score [      8    ]  OPIOID RISK TOOL    KIRILL EACH BOX THAT APPLIES AND ADD TOTALS AT THE END    FAMILY HISTORY OF SUBSTANCE ABUSE                 FEMALE         MALE                                                Alcohol                             [  ]1 pt          [  ]3pts                                               Illegal Durgs                     [  ]2 pts        [  ]3pts                                               Rx Drugs                           [  ]4 pts        [  ]4 pts    PERSONAL HISTORY OF SUBSTANCE ABUSE                                                                                          Alcohol                             [  ]3 pts       [  ]3 pts                                               Illegal Drugs                     [  ]4 pts        [  ]4 pts                                               Rx Drugs                           [  ]5 pts        [  ]5 pts    AGE BETWEEN 16-45 YEARS                                      [  ]1 pt         [  ]1 pt    HISTORY OF PREADOLESCENT   SEXUAL ABUSE                                                             [  ]3 pts        [  ]0pts    PSYCHOLOGICAL DISEASE                     ADD, OCD, Bipolar, Schizophrenia        [  ]2 pts         [  ]2 pts                      Depression                                               [  ]1 pt           [  ]1 pt           SCORING TOTAL   (add numbers and type here)              (*0**)                                     A score of 3 or lower indicated LOW risk for future opioid abuse  A score of 4 to 7 indicated moderate risk for future opioid abuse  A score of 8 or higher indicates a high risk for opioid abuse   Pt is a 83 years old female, Danish speaking,  seen today pre-op for Y90 Mapping procedure with anesthesia.  Pt past medical history of cirrhosis, anemia, HLD, HTN, GERD, diabetes, CAD(Cardiac stents on Plavix),  liver cell carcinoma. Pt presented with mild abdominal pain and bloating beginning in 2022. MRI performed at that time showed 2 masses within the liver,  PT reports abdominal pain has improved since that time. Patient denies encephalopathy, ascites, report 15 lbs weight loss within the last 5 months, occasional left side flank pain which is relief by Tylenol and rest.  Pt seen today for a scheduled radiology procedure on 3/14/23 by Dr. Reinoso.   Procedure protocol reviewed with Pt and her daughter today. Pt provided instructions for COVID PCR test requirement 3-5 days prior to this procedure, Pt to follow-up with PCP and cardiologist fro clearance and recommendation for Plavix and ASA prior to this procedure. Pt instructed to hold Metformin 24 hours and Farxiga 4 days prior to this procedure  3/7/23:@7:30PM Pt lab reviewed, email sent Dr. Reinoso regarding abnormal lab results, Copies of lab to be faxed to PCP office in am CAPRINI VTE 2.0 SCORE [CLOT updated 2019]    AGE RELATED RISK FACTORS                                                       MOBILITY RELATED FACTORS  [ ] Age 41-60 years                                            (1 Point)                    [ ] Bed rest                                                        (1 Point)  [ ] Age: 61-74 years                                           (2 Points)                  [ ] Plaster cast                                                   (2 Points)  [x ] Age= 75 years                                              (3 Points)                    [ ] Bed bound for more than 72 hours                 (2 Points)    DISEASE RELATED RISK FACTORS                                               GENDER SPECIFIC FACTORS  [ ] Edema in the lower extremities                       (1 Point)              [ ] Pregnancy                                                     (1 Point)  [ ] Varicose veins                                               (1 Point)                     [ ] Post-partum < 6 weeks                                   (1 Point)             [ x] BMI > 25 Kg/m2                                            (1 Point)                     [ ] Hormonal therapy  or oral contraception          (1 Point)                 [ ] Sepsis (in the previous month)                        (1 Point)               [ ] History of pregnancy complications                 (1 point)  [ ] Pneumonia or serious lung disease                                               [ ] Unexplained or recurrent                     (1 Point)           (in the previous month)                               (1 Point)  [ ] Abnormal pulmonary function test                     (1 Point)                 SURGERY RELATED RISK FACTORS  [ ] Acute myocardial infarction                              (1 Point)               [ ]  Section                                             (1 Point)  [ ] Congestive heart failure (in the previous month)  (1 Point)      [ ] Minor surgery                                                  (1 Point)   [ ] Inflammatory bowel disease                             (1 Point)               [ ] Arthroscopic surgery                                        (2 Points)  [ ] Central venous access                                      (2 Points)                [x ] General surgery lasting more than 45 minutes (2 points)  [x ] Malignancy- Present or previous                   (2 Points)                [ ] Elective arthroplasty                                         (5 points)    [ ] Stroke (in the previous month)                          (5 Points)                                                                                                                                                           HEMATOLOGY RELATED FACTORS                                                 TRAUMA RELATED RISK FACTORS  [ ] Prior episodes of VTE                                     (3 Points)                [ ] Fracture of the hip, pelvis, or leg                       (5 Points)  [ ] Positive family history for VTE                         (3 Points)             [ ] Acute spinal cord injury (in the previous month)  (5 Points)  [ ] Prothrombin 36588 A                                     (3 Points)               [ ] Paralysis  (less than 1 month)                             (5 Points)  [ ] Factor V Leiden                                             (3 Points)                  [ ] Multiple Trauma within 1 month                        (5 Points)  [ ] Lupus anticoagulants                                     (3 Points)                                                           [ ] Anticardiolipin antibodies                               (3 Points)                                                       [ ] High homocysteine in the blood                      (3 Points)                                             [ ] Other congenital or acquired thrombophilia      (3 Points)                                                [ ] Heparin induced thrombocytopenia                  (3 Points)                                     Total Score [      8    ]  OPIOID RISK TOOL    KIRILL EACH BOX THAT APPLIES AND ADD TOTALS AT THE END    FAMILY HISTORY OF SUBSTANCE ABUSE                 FEMALE         MALE                                                Alcohol                             [  ]1 pt          [  ]3pts                                               Illegal Durgs                     [  ]2 pts        [  ]3pts                                               Rx Drugs                           [  ]4 pts        [  ]4 pts    PERSONAL HISTORY OF SUBSTANCE ABUSE                                                                                          Alcohol                             [  ]3 pts       [  ]3 pts                                               Illegal Drugs                     [  ]4 pts        [  ]4 pts                                               Rx Drugs                           [  ]5 pts        [  ]5 pts    AGE BETWEEN 16-45 YEARS                                      [  ]1 pt         [  ]1 pt    HISTORY OF PREADOLESCENT   SEXUAL ABUSE                                                             [  ]3 pts        [  ]0pts    PSYCHOLOGICAL DISEASE                     ADD, OCD, Bipolar, Schizophrenia        [  ]2 pts         [  ]2 pts                      Depression                                               [  ]1 pt           [  ]1 pt           SCORING TOTAL   (add numbers and type here)              (*0**)                                     A score of 3 or lower indicated LOW risk for future opioid abuse  A score of 4 to 7 indicated moderate risk for future opioid abuse  A score of 8 or higher indicates a high risk for opioid abuse

## 2023-03-07 NOTE — H&P PST ADULT - HISTORY OF PRESENT ILLNESS
Pt is a 83 years old female seen today pre-op for, Pt past medical history of cirrhosis, hypertension, diabetes, coronary artery disease presents with mild abdominal pain and bloating beginning in October 2022. MRI performed at that time showed 2 masses within the liver. Patient reports abdominal pain has improved since that time. Patient denies encephalopathy, ascites. Pt is a 83 years old female seen today pre-op for, Pt past medical history of cirrhosis, hypertension, diabetes, CAD(Cardiac stents), liver cell carcinoma  with mild abdominal pain and bloating beginning in October 2022. MRI performed at that time showed 2 masses within the liver,  Patient reports abdominal pain has improved since that time. Patient denies encephalopathy, ascites. Pt is a 83 years old female, Chinese speaking,  seen today pre-op for Y90 Mapping procedure with anesthesia.  Pt past medical history of cirrhosis, anemia, HLD, HTN, GERD, diabetes, CAD(Cardiac stents on Plavix),  liver cell carcinoma. Pt presented with mild abdominal pain and bloating beginning in October 2022. MRI performed at that time showed 2 masses within the liver,  PT reports abdominal pain has improved since that time. Patient denies encephalopathy, ascites, report 15 lbs weight loss within the last 5 months, occasional left side flank pain which is relief by Tylenol and rest.  Pt seen today for a scheduled radiology procedure on 3/14/23 by Dr. Reinoso

## 2023-03-07 NOTE — H&P PST ADULT - GENITOURINARY COMMENTS
[de-identified] : r> l copiosu erumen rmeoved with suciton . l central pinhole perf r extruded pe tube - copious wax suctioned and tube removed with alligator-  r pinhole perf as well [Midline] : trachea located in midline position [Normal] : no rashes Not assessed

## 2023-03-07 NOTE — H&P PST ADULT - PROBLEM SELECTOR PLAN 3
90 Mapping procedure with anesthesia. Pt to follow-up with PCP and cardiologist fro clearance and recommendation for Plavix and ASA prior to this procedure

## 2023-03-07 NOTE — H&P PST ADULT - N[facility] PST SURGERY DATE_DT_GEN_A_CORE
Physical Therapy Treatment    Patient Name:  Sandra Moody   MRN:  [id number]    Recommendations:     Discharge Recommendations:  home health PT(with family assist)   Discharge Equipment Recommendations: oxygen   Barriers to discharge: None    Assessment:     Sandra Moody is a 88 y.o. female admitted with a medical diagnosis of Sepsis due to Haemophilus influenzae type B.  She presents with the following impairments/functional limitations:  weakness, impaired endurance, impaired self care skills, impaired functional mobilty, gait instability, impaired balance, impaired cardiopulmonary response to activity, decreased lower extremity function, decreased coordination.  Pt with increased gait distance today.  Pt ambulated ~260ft with RW, Mod I on 3LO2 NC.  Pt able to tolerate static standing with no AD, Sup along EOB.      Rehab Prognosis: Good; patient would benefit from acute skilled PT services to address these deficits and reach maximum level of function.    Recent Surgery: * No surgery found *      Plan:     During this hospitalization, patient to be seen daily to address the identified rehab impairments via gait training, therapeutic activities, therapeutic exercises and progress toward the following goals:    · Plan of Care Expires:  11/01/19    Subjective     Chief Complaint: wanting to go home  Patient/Family Comments/goals: Pt agreeable to therapy  Pain/Comfort:  · Pain Rating 1: 0/10      Objective:     Communicated with pt's nurseYancy prior to session.  Patient found HOB elevated with oxygen, telemetry upon PT entry to room.     General Precautions: Standard, fall, respiratory, Droplet (Influenza B)  Orthopedic Precautions:N/A   Braces: N/A     Functional Mobility:  · Bed Mobility:     · Rolling Left:  modified independence  · Scooting: modified independence  · Supine to Sit: modified independence  · Sit to Supine: modified independence  · Transfers:x2 from EOB    · Sit to Stand:   supervision with rolling walker  · Gait: Pt ambulated ~260ft with RW and mask(2/2 droplet), Mod I with decreased beatriz, step length, velocity of limb  · Balance: good sitting and standing      AM-PAC 6 CLICK MOBILITY  Turning over in bed (including adjusting bedclothes, sheets and blankets)?: 4  Sitting down on and standing up from a chair with arms (e.g., wheelchair, bedside commode, etc.): 4  Moving from lying on back to sitting on the side of the bed?: 4  Moving to and from a bed to a chair (including a wheelchair)?: 4  Need to walk in hospital room?: 3  Climbing 3-5 steps with a railing?: 3  Basic Mobility Total Score: 22       Therapeutic Activities and Exercises:   Pt tolerated static standing along EOB for ~5 minutes with no AD, Sup.  Pt with increased gait distance with no complaints to follow.  Pt performed BM, transfers, and gait training.    Patient left HOB elevated with all lines intact, call button in reach, pt's nurseYancy notified and daughter present..    GOALS:   Multidisciplinary Problems     Physical Therapy Goals        Problem: Physical Therapy Goal    Goal Priority Disciplines Outcome Goal Variances Interventions   Physical Therapy Goal     PT, PT/OT Ongoing, Progressing     Description:  Goals to be met by: 19     Patient will increase functional independence with mobility by performin. Supine to sit with Modified Pope  2. Rolling to Left and Right with Modified Pope  3. Sit to stand transfer with Modified Pope using RW  4. Bed to chair transfer with Modified Pope using Rolling Walker  5. Gait >250 feet with Modified Pope using Rolling Walker   6. Lower extremity exercise program x10 reps per handout, with independence                      Time Tracking:     PT Received On: 10/20/19  PT Start Time: 1202     PT Stop Time: 1226  PT Total Time (min): 24 min     Billable Minutes: Gait Training 24    Treatment Type: Treatment  PT/PTA: PTA      PTA Visit Number: 2     Juliet Melendez, PTA  10/20/2019   17-Mar-2023

## 2023-03-07 NOTE — H&P PST ADULT - NSICDXPASTMEDICALHX_GEN_ALL_CORE_FT
PAST MEDICAL HISTORY:  Diabetes     GERD (gastroesophageal reflux disease)     HCC (hepatocellular carcinoma)     Heart murmur     History of cirrhosis of liver     Hypercholesterolemia     Hypertension     Hypothyroidism     Knee pain, left arthritis

## 2023-03-15 ENCOUNTER — RESULT REVIEW (OUTPATIENT)
Age: 83
End: 2023-03-15

## 2023-03-15 ENCOUNTER — TRANSCRIPTION ENCOUNTER (OUTPATIENT)
Age: 83
End: 2023-03-15

## 2023-03-15 ENCOUNTER — OUTPATIENT (OUTPATIENT)
Dept: OUTPATIENT SERVICES | Facility: HOSPITAL | Age: 83
LOS: 1 days | End: 2023-03-15
Payer: MEDICARE

## 2023-03-15 VITALS
RESPIRATION RATE: 18 BRPM | DIASTOLIC BLOOD PRESSURE: 69 MMHG | SYSTOLIC BLOOD PRESSURE: 152 MMHG | HEIGHT: 62 IN | WEIGHT: 151.9 LBS | TEMPERATURE: 97 F | OXYGEN SATURATION: 95 %

## 2023-03-15 DIAGNOSIS — C22.0 LIVER CELL CARCINOMA: ICD-10-CM

## 2023-03-15 DIAGNOSIS — Z96.652 PRESENCE OF LEFT ARTIFICIAL KNEE JOINT: Chronic | ICD-10-CM

## 2023-03-15 DIAGNOSIS — Z98.890 OTHER SPECIFIED POSTPROCEDURAL STATES: Chronic | ICD-10-CM

## 2023-03-15 PROCEDURE — 78201 LIVER IMAGING STATIC ONLY: CPT | Mod: 26,MH

## 2023-03-15 RX ORDER — DAPAGLIFLOZIN 10 MG/1
1 TABLET, FILM COATED ORAL
Qty: 0 | Refills: 0 | DISCHARGE

## 2023-03-15 NOTE — PROGRESS NOTE ADULT - SUBJECTIVE AND OBJECTIVE BOX
IR Post Procedure Note    Diagnosis: HCC    Procedure: Y90 Mapping    : Genaro Reinoso MD    Contrast: 80cc    Anesthesia: 1% Lidocaine Subcutaneous, GA administered by Anesthesiology    Estimated Blood Loss: Less than 10cc    Specimens: None    Complications: No Immediate Complications    Anticoagulation: Resume without Restriction/ Resume in 24 Hours/ Hold in Setting of Hemorrhage    Findings & Plan: R CFA Access, SegVI and seg III tumors mapped and identified, No GI perfusion, MAA administered in posterior RHA, Closure w angioseal      Please call Interventional Radiology with any questions, concerns, or issues.

## 2023-03-28 NOTE — ASSESSMENT
[FreeTextEntry1] : WILMER CONROY is a 82 year old female with a PMH significant for newly diagnosed Cirrhosis, HCC, HLD, HTN, DM, and CAD s/p stent. \par \par Cirrhosis\par -Etiology likely MARTIN\par -Disease progression of cirrhosis discussed, including but not limited to hepatocellular carcinoma, varices with or without bleeding, hepatic encephalopathy, ascites, liver failure and death.\par \par HCC Screening\par Embolization scheduled in March.\par MR1 12/29\par Slightly enlarged lesions.\par -MRI Abdomen w/IC 10/21/22 - LIVER: Liver is shrunken with prominence of the left lobe and caudate lobe and a subtle nodular contour, compatible with cirrhosis. There is a 2.2 x 2.0 cm mildly T2 hyperintense lesion at the junction of segments 6 and 7 which demonstrates restricted diffusion. Dynamic postcontrast imaging is markedly degraded by respiratory motion artifact, however there is evidence of early enhancement and washout. These findings are compatible with HCC. There is a similar 2.7 x 1.9 cm mildly T2 bright subcapsular lesion at the junction of segments 2 and 3 posteriorly which also demonstrates early enhancement with washout. The remainder of the liver is mildly heterogeneous in signal and enhancement. The hepatic veins and portal vein are patent. BILE DUCTS: There is no intra or extra hepatic biliary duct dilatation. The common bile duct measures 3 mm.\par -MRI Abdomen and Pelvis w/wo IVC ordered to reevaluate lesions, then pt will be evaluated for radioembolization with IR\par -AFP 12/2/22 - 306\par \par Variceal Screening\par -Pt is scheduled for EGD tomorrow with Dr. June however this will need to be rescheduled due to URI\par -if pt experiences hematemesis, advised to go to ER immediately\par \par Ascites\par -no ascites noted on imaging or physical exam\par -Contact provider for increased abdominal distension\par \par Encephalopathy\par -notify provider if new onset confusion\par -at least 1-2 bms/day\par -titrate lactulose to 1-2 bms/day\par \par Transplant\par -Pt is not a candidate for transplant\par -MELDNa - 16\par \par Hepatitis\par -Additional workup done indicated HBV core antibody positive, surface antigen indeterminate, core IgM nonreactive, Be antibody nonreactive. \par -HAV antibody positive but IgM negative indicated past resolved infection\par -HCV negative\par -WILMER negative, ASMA weakly + 1:20, IGG negative\par \par Diet\par -High Protein Low Fat Low Sugar Low Salt (up to 2G Na/day) Diet\par -No prolonged fasting\par -Mediterranean Diet info provided\par -No alcohol consumption\par \par Pain\par -NO NSAIDs as these can lead to diuretic resistance and precipitate renal dysfunction in patients with advanced liver disease\par -Can take up to 2G Tylenol per day\par \par Follow Up\par -Follow up in 1 month

## 2023-03-28 NOTE — HISTORY OF PRESENT ILLNESS
[MELD Score: ___] : MELD Score is [unfilled] [de-identified] : WILMER CONROY is a 82 year old female with a PMH significant for newly diagnosed Cirrhosis, imaging concerning for multifocal HCC, HLD, HTN, DM, and CAD s/p stent. \par \par EGD Findings: \par  \par Esophagus Lumen A medium size hiatal hernia was seen, the esophagogastric \par junction(EGJ) was noted at 32 cm, with hiatal narrowing at 35 cm from the \par incisors. Retroflexion view in the stomach confirmed the size and morphology of \par the hernia. \par  \par Additional esophagus findings Irregular Z line. Biopsies taken to R/o Steinberg's \par esphagus. \par  \par Stomach Mucosa Normal mucosa was noted in the antrum and stomach body. Biopsies \par were obtained to evaluate for H.Pylori infection.Multiple cold forceps biopsies \par were performed for histology. \par  \par Duodenum Additional duodenum findings Random biopsy to r/o Celiac sprue. \par  \par EGD Impressions: \par  \par Esophageal hiatal hernia. \par  \par Normal mucosa in the antrum and stomach body. (Biopsy). \par  \par Random biopsy to r/o Celiac sprue. \par  \par Irregular Z line. Biopsies taken to R/o Steinberg's esphagus. \par  \par EGD Limitations/Complications: \par  \par There were no apparent limitations or complications \par  \par Colonoscopy \par  \par Colonoscopy Procedure: \par  \par This is a  average risk patient  The quality of preparation was 6-9 on the BBP \par scale/adequate. Patient was placed in left lateral decubitus position. The \par colonoscope was introduced through rectum and advanced under direct \par visualization until cecum reached. The appendiceal orifice and the ileocecal \par valve were identified. The terminal ileum was identified. Retroflexion in the \par rectum was performed successfully and there were no remarkable findings grade II \par hemorrhoids Patient tolerance to the procedure was excellent. The procedure was \par not difficult. Digital exam was normal. \par  \par Lepanto Bowel Preparation Score: \par  \par Using the Lepanto Bowel Preparation Score, each segment of the colon was graded \par as follows: \par  \par Left Colon: Excellent, 3 Transverse Colon: Excellent, 3  Right Colon: \par Excellent, 3 \par  \par Colonoscopy Findings: \par  \par Excavated lesions Several non-bleeding diverticula with medium openings were \par seen in the sigmoid colon. Diverticulosis appeared to be of moderate severity. \par  \par  \par Protruding lesions Medium grade/stage II internal hemorrhoids were noted. \par  \par A single sessile 5 mm polyp of benign appearance was found in the mid-transverse \par colon.A single-piece polypectomy was performed using a cold forceps. The polyp \par was completely removed. Biopsies were performed. \par  \par Colonoscopy Impressions: \par  \par Moderate diverticulosis of the sigmoid colon. \par  \par Grade/Stage II internal hemorrhoids. \par  \par Polyp (5 mm) in the mid-transverse colon. (Polypectomy, Biopsy). \par  \par  \par  \par Colonoscopy Limitations/Complications: \par  \par There were no apparent limitations or complications \par EGD Findings: \par  \par Esophagus Lumen A medium size hiatal hernia was seen, the esophagogastric \par junction(EGJ) was noted at 32 cm, with hiatal narrowing at 35 cm from the \par incisors. Retroflexion view in the stomach confirmed the size and morphology of \par the hernia. \par  \par Additional esophagus findings Irregular Z line. Biopsies taken to R/o Steinbegr's \par esphagus. \par  \par Stomach Mucosa Normal mucosa was noted in the antrum and stomach body. Biopsies \par were obtained to evaluate for H.Pylori infection.Multiple cold forceps biopsies \par were performed for histology. \par  \par Duodenum Additional duodenum findings Random biopsy to r/o Celiac sprue. \par  \par EGD Impressions: \par  \par Esophageal hiatal hernia. \par  \par Normal mucosa in the antrum and stomach body. (Biopsy). \par  \par Random biopsy to r/o Celiac sprue. \par  \par Irregular Z line. Biopsies taken to R/o Steinberg's esphagus. \par  \par EGD Limitations/Complications: \par  \par There were no apparent limitations or complications \par  \par Colonoscopy \par  \par Colonoscopy Procedure: \par  \par This is a  average risk patient  The quality of preparation was 6-9 on the BBP \par scale/adequate. Patient was placed in left lateral decubitus position. The \par colonoscope was introduced through rectum and advanced under direct \par visualization until cecum reached. The appendiceal orifice and the ileocecal \par valve were identified. The terminal ileum was identified. Retroflexion in the \par rectum was performed successfully and there were no remarkable findings grade II \par hemorrhoids Patient tolerance to the procedure was excellent. The procedure was \par not difficult. Digital exam was normal. \par  \par Lepanto Bowel Preparation Score: \par  \par Using the Lepanto Bowel Preparation Score, each segment of the colon was graded \par as follows: \par  \par Left Colon: Excellent, 3 Transverse Colon: Excellent, 3  Right Colon: \par Excellent, 3 \par  \par Colonoscopy Findings: \par  \par Excavated lesions Several non-bleeding diverticula with medium openings were \par seen in the sigmoid colon. Diverticulosis appeared to be of moderate severity. \par  \par  \par Protruding lesions Medium grade/stage II internal hemorrhoids were noted. \par  \par A single sessile 5 mm polyp of benign appearance was found in the mid-transverse \par colon.A single-piece polypectomy was performed using a cold forceps. The polyp \par was completely removed. Biopsies were performed. \par  \par Colonoscopy Impressions: \par  \par Moderate diverticulosis of the sigmoid colon. \par  \par Grade/Stage II internal hemorrhoids. \par  \par Polyp (5 mm) in the mid-transverse colon. (Polypectomy, Biopsy). \par  \par  \par  \par Colonoscopy Limitations/Complications: \par  \par There were no apparent limitations or complications \par  \par EGD Findings: \par  \par Esophagus Lumen A medium size hiatal hernia was seen, the esophagogastric \par junction(EGJ) was noted at 32 cm, with hiatal narrowing at 35 cm from the \par incisors. Retroflexion view in the stomach confirmed the size and morphology of \par the hernia. \par  \par Additional esophagus findings Irregular Z line. Biopsies taken to R/o Steinberg's \par esphagus. \par  \par Stomach Mucosa Normal mucosa was noted in the antrum and stomach body. Biopsies \par were obtained to evaluate for H.Pylori infection.Multiple cold forceps biopsies \par were performed for histology. \par  \par Duodenum Additional duodenum findings Random biopsy to r/o Celiac sprue. \par  \par EGD Impressions: \par  \par Esophageal hiatal hernia. \par  \par Normal mucosa in the antrum and stomach body. (Biopsy). \par  \par Random biopsy to r/o Celiac sprue. \par  \par Irregular Z line. Biopsies taken to R/o Steinberg's esphagus. \par  \par EGD Limitations/Complications: \par  \par There were no apparent limitations or complications \par  \par Colonoscopy \par  \par Colonoscopy Procedure: \par  \par This is a  average risk patient  The quality of preparation was 6-9 on the BBP \par scale/adequate. Patient was placed in left lateral decubitus position. The \par colonoscope was introduced through rectum and advanced under direct \par visualization until cecum reached. The appendiceal orifice and the ileocecal \par valve were identified. The terminal ileum was identified. Retroflexion in the \par rectum was performed successfully and there were no remarkable findings grade II \par hemorrhoids Patient tolerance to the procedure was excellent. The procedure was \par not difficult. Digital exam was normal. \par  \par Lepanto Bowel Preparation Score: \par  \par Using the Lepanto Bowel Preparation Score, each segment of the colon was graded \par as follows: \par  \par Left Colon: Excellent, 3 Transverse Colon: Excellent, 3  Right Colon: \par Excellent, 3 \par  \par Colonoscopy Findings: \par  \par Excavated lesions Several non-bleeding diverticula with medium openings were \par seen in the sigmoid colon. Diverticulosis appeared to be of moderate severity. \par  \par  \par Protruding lesions Medium grade/stage II internal hemorrhoids were noted. \par  \par A single sessile 5 mm polyp of benign appearance was found in the mid-transverse \par colon.A single-piece polypectomy was performed using a cold forceps. The polyp \par was completely removed. Biopsies were performed. \par  \par Colonoscopy Impressions: \par  \par Moderate diverticulosis of the sigmoid colon. \par  \par Grade/Stage II internal hemorrhoids. \par  \par Polyp (5 mm) in the mid-transverse colon. (Polypectomy, Biopsy). \par  \par  \par  \par Colonoscopy Limitations/Complications: \par  \par There were no apparent limitations or complications \par  \par EGD Findings: \par  \par Esophagus Lumen A medium size hiatal hernia was seen, the esophagogastric \par junction(EGJ) was noted at 32 cm, with hiatal narrowing at 35 cm from the \par incisors. Retroflexion view in the stomach confirmed the size and morphology of \par the hernia. \par  \par Additional esophagus findings Irregular Z line. Biopsies taken to R/o Steinberg's \par esphagus. \par  \par Stomach Mucosa Normal mucosa was noted in the antrum and stomach body. Biopsies \par were obtained to evaluate for H.Pylori infection.Multiple cold forceps biopsies \par were performed for histology. \par  \par Duodenum Additional duodenum findings Random biopsy to r/o Celiac sprue. \par  \par EGD Impressions: \par  \par Esophageal hiatal hernia. \par  \par Normal mucosa in the antrum and stomach body. (Biopsy). \par  \par Random biopsy to r/o Celiac sprue. \par  \par Irregular Z line. Biopsies taken to R/o Steinberg's esphagus. \par  \par EGD Limitations/Complications: \par  \par There were no apparent limitations or complications \par  \par Colonoscopy \par  \par Colonoscopy Procedure: \par  \par This is a  average risk patient  The quality of preparation was 6-9 on the BBP \par scale/adequate. Patient was placed in left lateral decubitus position. The \par colonoscope was introduced through rectum and advanced under direct \par visualization until cecum reached. The appendiceal orifice and the ileocecal \par valve were identified. The terminal ileum was identified. Retroflexion in the \par rectum was performed successfully and there were no remarkable findings grade II \par hemorrhoids Patient tolerance to the procedure was excellent. The procedure was \par not difficult. Digital exam was normal. \par  \par Lepanto Bowel Preparation Score: \par  \par Using the Lepanto Bowel Preparation Score, each segment of the colon was graded \par as follows: \par  \par Left Colon: Excellent, 3 Transverse Colon: Excellent, 3  Right Colon: \par Excellent, 3 \par  \par Colonoscopy Findings: \par  \par Excavated lesions Several non-bleeding diverticula with medium openings were \par seen in the sigmoid colon. Diverticulosis appeared to be of moderate severity. \par  \par  \par Protruding lesions Medium grade/stage II internal hemorrhoids were noted. \par  \par A single sessile 5 mm polyp of benign appearance was found in the mid-transverse \par colon.A single-piece polypectomy was performed using a cold forceps. The polyp \par was completely removed. Biopsies were performed. \par  \par Colonoscopy Impressions: \par  \par Moderate diverticulosis of the sigmoid colon. \par  \par Grade/Stage II internal hemorrhoids. \par  \par Polyp (5 mm) in the mid-transverse colon. (Polypectomy, Biopsy). \par  \par  \par  \par Colonoscopy Limitations/Complications: \par  \par There were no apparent limitations or complications \par  \par \par  \par \par \par ct neck\par IMPRESSION:\par 1. Asymmetric severe soft tissue swelling/thickening of the right \par posterolateral hypopharynx/supraglottic soft tissues, extending to the \par level of the bilateral aryepiglottic folds. 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 Recommend ENT consultation.\par 2. Bilateral heterogeneous thyroid gland nodules. Recommend further \par evaluation\par with nonemergent thyroid ultrasound. Preliminary report provided by [facility] \par CORNELIA ALEJO M.D.;[facility] RADIOLOGIST.\par \par EGD Findings: \par  \par Esophagus Lumen A medium size hiatal hernia was seen, the esophagogastric \par junction(EGJ) was noted at 32 cm, with hiatal narrowing at 35 cm from the \par incisors. Retroflexion view in the stomach confirmed the size and morphology of \par the hernia. \par  \par Additional esophagus findings Irregular Z line. Biopsies taken to R/o Steinberg's \par esphagus. \par  \par Stomach Mucosa Normal mucosa was noted in the antrum and stomach body. Biopsies \par were obtained to evaluate for H.Pylori infection.Multiple cold forceps biopsies \par were performed for histology. \par  \par Duodenum Additional duodenum findings Random biopsy to r/o Celiac sprue. \par  \par EGD Impressions: \par  \par Esophageal hiatal hernia. \par  \par Normal mucosa in the antrum and stomach body. (Biopsy). \par  \par Random biopsy to r/o Celiac sprue. \par  \par Irregular Z line. Biopsies taken to R/o Steinberg's esphagus. \par  \par EGD Limitations/Complications: \par  \par There were no apparent limitations or complications \par  \par Colonoscopy \par  \par Colonoscopy Procedure: \par  \par This is a  average risk patient  The quality of preparation was 6-9 on the BBP \par scale/adequate. Patient was placed in left lateral decubitus position. The \par colonoscope was introduced through rectum and advanced under direct \par visualization until cecum reached. The appendiceal orifice and the ileocecal \par valve were identified. The terminal ileum was identified. Retroflexion in the \par rectum was performed successfully and there were no remarkable findings grade II \par hemorrhoids Patient tolerance to the procedure was excellent. The procedure was \par not difficult. Digital exam was normal. \par  \par Lepanto Bowel Preparation Score: \par  \par Using the Lepanto Bowel Preparation Score, each segment of the colon was graded \par as follows: \par  \par Left Colon: Excellent, 3 Transverse Colon: Excellent, 3  Right Colon: \par Excellent, 3 \par  \par Colonoscopy Findings: \par  \par Excavated lesions Several non-bleeding diverticula with medium openings were \par seen in the sigmoid colon. Diverticulosis appeared to be of moderate severity. \par  \par  \par Protruding lesions Medium grade/stage II internal hemorrhoids were noted. \par  \par A single sessile 5 mm polyp of benign appearance was found in the mid-transverse \par colon.A single-piece polypectomy was performed using a cold forceps. The polyp \par was completely removed. Biopsies were performed. \par  \par Colonoscopy Impressions: \par  \par Moderate diverticulosis of the sigmoid colon. \par  \par Grade/Stage II internal hemorrhoids. \par  \par Polyp (5 mm) in the mid-transverse colon. (Polypectomy, Biopsy). \par  \par  \par  \par Colonoscopy Limitations/Complications: \par  \par There were no apparent limitations or complications \par  \par \par 12/1/22: She presents today for evaluation of newly diagnosed Cirrhosis and imaging concerning for multifocal HCC. Pt presented to [facility] on 10/16/22 with complaints of epigastric pain. During workup, pt was found of have cirrhotic liver and lesions suspicious for HCC on imaging. ACS ruled out. Pt went on to have additional imaging which further indicated HCC , 2 lesions, with cirrhotic liver. Additional workup done indicated HBV core antibody positive, surface antigen indeterminate, core IgM nonreactive, Be antibody nonreactive. HAV antibody positive but IgM negative indicated past resolved infection. Labs done during admission - bilirubin 0.3, AST 73, ALT 53, , INR 1.3, , , Iron serum 30, ferritin 18 and transferrin saturation 7%.\par \par CT Angio Abd Pelv w/wo IC 10/16/22 - Hypervascular lesions seen in liver, at least one of which is typical for a flash filling hemangioma. A lesion within the right hepatic lobe demonstrates washout, suspicious for hepatocellular carcinoma. Additional subcentimeter hypervascular lesion of left hepatic lobe is indeterminate. Confirmation with multiphasic hepatic protocol MRI or CT is advised for more definitive evaluation. Cirrhosis. Atherosclerotic changes of the arterial structures, with 50-70% stenosis \par of the origins of the celiac axis and SMA. No mesenteric venous occlusive disease.\par \par MRI Abdomen w/IC 10/21/22 - LIVER: Liver is shrunken with prominence of the left lobe and caudate lobe and a subtle nodular contour, compatible with cirrhosis. There is a 2.2 x 2.0 cm mildly T2 hyperintense lesion at the junction of segments 6 and 7 which demonstrates restricted diffusion. Dynamic postcontrast imaging is markedly degraded by respiratory motion artifact, however there is evidence of early enhancement and washout. These findings are compatible with HCC. There is a similar 2.7 x 1.9 cm mildly T2 bright subcapsular lesion at the junction of segments 2 and 3 posteriorly which also demonstrates early enhancement with washout. The remainder of the liver is mildly heterogeneous in signal and enhancement. The hepatic veins and portal vein are patent. BILE DUCTS: There is no intra or extra hepatic biliary duct dilatation. The common bile duct measures 3 mm.\par \par Denies fatigue, malaise, arthralgias, myalgias, pruritus, recent infection, abdominal pain or distension, jaundice, hematemesis, hematochezia, dark urine, confusion, unintentional weight loss or gain. Denies alcohol consumption. Denies OTC/herbal supplements. Pt had EGD about 3 years ago but unknown results and where it was done. Pt reports a family of history of liver disease but is unsure what kind.\par \par 12/20/22: Since last visit, pt was hospitalized at [facility] for anemia on 12/6/22. H/H on admission was 7.2/23. She was given a blood transfusion. Current H/H 10.1/33.9. Pt is scheduled for EGD and Colonoscopy with Dr. June tomorrow. Pt currently has an URI and reports a lot of coughing and mucus production. She has regular BMs daily. Pt's daughter reports she has noticed her mom is confused but no more than usual. She continues to take Lactulose daily.

## 2023-03-28 NOTE — PHYSICAL EXAM
[Scleral Icterus] : Scleral Icterus noted [Non-Tender] : non-tender [Irregular] : irregular [General Appearance - Alert] : alert [General Appearance - In No Acute Distress] : in no acute distress [Sclera] : the sclera and conjunctiva were normal [Outer Ear] : the ears and nose were normal in appearance [Oropharynx] : the oropharynx was normal [Neck Appearance] : the appearance of the neck was normal [Jugular Venous Distention Increased] : there was no jugular-venous distention [Neck Cervical Mass (___cm)] : no neck mass was observed [Thyroid Diffuse Enlargement] : the thyroid was not enlarged [Thyroid Nodule] : there were no palpable thyroid nodules [Auscultation Breath Sounds / Voice Sounds] : lungs were clear to auscultation bilaterally [Heart Rate And Rhythm] : heart rate was normal and rhythm regular [Edema] : there was no peripheral edema [Bowel Sounds] : normal bowel sounds [Abdomen Soft] : soft [Abdomen Tenderness] : non-tender [Abdomen Mass (___ Cm)] : no abdominal mass palpated [Abnormal Walk] : normal gait [Nail Clubbing] : no clubbing  or cyanosis of the fingernails [Musculoskeletal - Swelling] : no joint swelling seen [Motor Tone] : muscle strength and tone were normal [Skin Color & Pigmentation] : normal skin color and pigmentation [Skin Turgor] : normal skin turgor [] : no rash [No Focal Deficits] : no focal deficits [Oriented To Time, Place, And Person] : oriented to person, place, and time [Impaired Insight] : insight and judgment were intact [Affect] : the affect was normal [Spider Angioma] : No spider angioma(s) were observed [Abdominal  Ascites] : no ascites [Asterixis] : no asterixis observed [Jaundice] : No jaundice [Palmar Erythema] : no Palmar Erythema [Depression] : no depression [Hallucinations] : ~T no ~M hallucinations

## 2023-03-29 ENCOUNTER — RESULT REVIEW (OUTPATIENT)
Age: 83
End: 2023-03-29

## 2023-03-29 ENCOUNTER — OUTPATIENT (OUTPATIENT)
Dept: OUTPATIENT SERVICES | Facility: HOSPITAL | Age: 83
LOS: 1 days | End: 2023-03-29
Payer: MEDICARE

## 2023-03-29 ENCOUNTER — TRANSCRIPTION ENCOUNTER (OUTPATIENT)
Age: 83
End: 2023-03-29

## 2023-03-29 VITALS
SYSTOLIC BLOOD PRESSURE: 145 MMHG | HEIGHT: 62 IN | OXYGEN SATURATION: 96 % | DIASTOLIC BLOOD PRESSURE: 63 MMHG | WEIGHT: 158.07 LBS | RESPIRATION RATE: 18 BRPM | TEMPERATURE: 98 F

## 2023-03-29 DIAGNOSIS — Z98.890 OTHER SPECIFIED POSTPROCEDURAL STATES: Chronic | ICD-10-CM

## 2023-03-29 DIAGNOSIS — C22.0 LIVER CELL CARCINOMA: ICD-10-CM

## 2023-03-29 DIAGNOSIS — Z96.652 PRESENCE OF LEFT ARTIFICIAL KNEE JOINT: Chronic | ICD-10-CM

## 2023-03-29 PROBLEM — Z87.19 PERSONAL HISTORY OF OTHER DISEASES OF THE DIGESTIVE SYSTEM: Chronic | Status: ACTIVE | Noted: 2023-03-07

## 2023-03-29 PROCEDURE — 76937 US GUIDE VASCULAR ACCESS: CPT | Mod: 26

## 2023-03-29 PROCEDURE — C2616: CPT | Mod: XU

## 2023-03-29 PROCEDURE — 37243 VASC EMBOLIZE/OCCLUDE ORGAN: CPT

## 2023-03-29 PROCEDURE — 36248 INS CATH ABD/L-EXT ART ADDL: CPT

## 2023-03-29 PROCEDURE — 36247 INS CATH ABD/L-EXT ART 3RD: CPT

## 2023-03-29 PROCEDURE — 75894 X-RAYS TRANSCATH THERAPY: CPT

## 2023-03-29 PROCEDURE — T1013: CPT

## 2023-03-29 PROCEDURE — 76380 CAT SCAN FOLLOW-UP STUDY: CPT | Mod: 26,XS,MH

## 2023-03-29 PROCEDURE — 75774 ARTERY X-RAY EACH VESSEL: CPT

## 2023-03-29 PROCEDURE — 78803 RP LOCLZJ TUM SPECT 1 AREA: CPT | Mod: 26,MH

## 2023-03-29 PROCEDURE — A9541: CPT

## 2023-03-29 PROCEDURE — 78803 RP LOCLZJ TUM SPECT 1 AREA: CPT

## 2023-03-29 PROCEDURE — 76942 ECHO GUIDE FOR BIOPSY: CPT

## 2023-03-29 PROCEDURE — 75726 ARTERY X-RAYS ABDOMEN: CPT

## 2023-03-29 RX ORDER — ACETAMINOPHEN 500 MG
1000 TABLET ORAL ONCE
Refills: 0 | Status: COMPLETED | OUTPATIENT
Start: 2023-03-29 | End: 2023-03-29

## 2023-03-29 RX ORDER — ONDANSETRON 8 MG/1
1 TABLET, FILM COATED ORAL
Qty: 12 | Refills: 0
Start: 2023-03-29 | End: 2023-03-31

## 2023-03-29 RX ADMIN — Medication 400 MILLIGRAM(S): at 14:10

## 2023-03-29 NOTE — ASU DISCHARGE PLAN (ADULT/PEDIATRIC) - ASU DC SPECIAL INSTRUCTIONSFT
Post Y90 Selective Internal Radiation Therapy Instructions  Initial Discharge Instructions  - You underwent a Y90 radioembolization to treat your liver tumor by Dr. Reinoso  - You may have mild abdominal pain, nausea, loss of appetite, fatigue, and/or low grade fever.  These are normal after your procedure and they should resolve within 3-5 days.  Please call Interventional Radiology if you have a fever > 101.0 F.  If you have persistent nausea, contact Interventional Radiology and we can prescribe anti-nausea medication.  - Monitor right groin site for symptoms of bleeding, hardness underneath the incision site, bruising, numbness, intense pain, or inability to move.  If you have any of these symptoms, contact Interventional Radiology and seek immediate medical attention  - Please report chills, temperature > 101.0F, persistent nausea or vomiting, severe abdominal or leg pain, confusion, yellowing of the skin, or abdominal swelling.  - Please refer to the "Radiation Safety Instructions" that were provided.  Please adhere to them for the 72 hours after your procedure.   - Continue your pantoprazole (Protonix) for 30 days post procedure.  - You may shower in 24 hours. You may resume normal activity in 24 hours.  - Do not perform any heavy lifting or put tension on the area for the next 48 hours.  - You may resume your normal diet.  - You may resume your normal medications, as well as your aspirin and plavix. You may resume Farxiga in 24 hours.   - If you experience nausea/vomiting can take zofran (ondansetron) every 6 hours as needed (max 4 tab in 24hrs).  - It is normal to experience some pain over the site for the next few days. You may take apply ice to the area (20 minutes on, 20 minutes off) and take Motrin for that pain. Do not take more frequently than every 6 hours.  - You were given conscious sedation which may make you drowsy, therefore you need someone to stay with you until the morning following the procedure.  - Do not drive, engage in heavy lifting or strenuous activity, or drink any alcoholic beverages for the next 24 hours.     Next Steps  - A follow up phone call will be performed the day after the procedure.   - Blood work is to be performed 2 weeks after your procedure (you will be given a prescription).  You can locate the closest [facility] lab by calling [phone]. If you have labwork performed at a NON-[facility] lab, please request that the results be faxed to [phone]  - Please call the Radiology Department at [phone] to schedule a follow up visit with Dr. Reinoso in 1 month.   - Follow up with your hepatologist or oncologist in 2 weeks.   - Post procedure imaging study,CT or MRI, is to be performed 3 months post procedure.  You will be given a prescription for this at your initial 1 month follow up visit. If needed, our booking office will obtain authorization from your insurance company.    Notify your primary physician and/or Interventional Radiology IMMEDIATELY if you experience any of the following       - Fever of 101.0F       - Chills or Rigors/ Shakes       - Swelling, Hardness, Redness, or Bleeding at the Groin Site       - Worsening Abdominal or Leg Pain       - Worsening Abdominal Swelling       - Skin Yellowing       - Lightheadedness and/or Dizziness Upon Standing       - Chest Pain/ Tightness       - Shortness of Breath       - Difficulty Walking    If you have a problem that you believe requires IMMEDIATE attention, please go to your NEAREST Emergency Room. If you believe your problem can safely wait until you speak to a physician, please call Interventional Radiology for any concerns.    During Normal Weekday Business Hours- You can contact the Interventional Radiology department during normal business hours via telephone, [phone].  During Evenings and Weekends- If you need to contact Interventional Radiology during off hours, do so by calling the hospital and requesting to be connected to the Interventional Radiologist on call.    Radiation Safety Discharge Information After Y90 Selective Internal Radiation Therapy Treatment    - Patients can resume normal contact with adult family members.  - For the next 72 hours, patients should follow good personal hygiene such as thorough hand washing after using the toilet, sitting on the toilet when urinating, cleaning up any spills of body fluids such as blood, urine, or stool and disposing of them in the toilet.  Body fluids should be double flushed for the first 72 hours.  - For the next 72 hours, avoid prolonged close contact with small children or pregnant individuals (Maintain distance of more than 3 feet).  - If you have to see a physician or go to the ER, inform them of your Y90 treatment to your liver. Treatment should not be delayed based on recent Y90 treatment.

## 2023-03-29 NOTE — PRE-OP CHECKLIST - NS PREOP CHK HIBICLENS NA
Refill request for 90 day supply of ranitidine and synthroid. New prescriptions sent as prescribed.
#1:

## 2023-03-29 NOTE — PROGRESS NOTE ADULT - SUBJECTIVE AND OBJECTIVE BOX
IR Post Procedure Note    Diagnosis: HCC    Procedure: Y90 Admin    : Genaro Reinoso MD    Contrast: 60cc    Anesthesia: 1% Lidocaine Subcutaneous, Sedation administered by Anesthesiology/ Moderate Sedation    Estimated Blood Loss: Less than 10cc    Complications: No Immediate Complications    Findings & Plan: R CFA Access, R tumor accessed and position confirmed w angiogram and cone beam CT. 6.0 GBq dose to R tumor. New microcatheter then moved to L tumor and position confirmed w angiogram and cone beam CT. Cloure w angioseal      Please call Interventional Radiology with any questions, concerns, or issues.

## 2023-03-29 NOTE — ASU DISCHARGE PLAN (ADULT/PEDIATRIC) - NS MD DC FALL RISK RISK
For information on Fall & Injury Prevention, visit: [email or web address] OR  [email or web address] OR  https://www.cdc.gov/steadi/patient.html

## 2023-04-11 ENCOUNTER — LABORATORY RESULT (OUTPATIENT)
Age: 83
End: 2023-04-11

## 2023-04-11 ENCOUNTER — APPOINTMENT (OUTPATIENT)
Dept: GASTROENTEROLOGY | Facility: CLINIC | Age: 83
End: 2023-04-11
Payer: MEDICARE

## 2023-04-11 ENCOUNTER — RESULT REVIEW (OUTPATIENT)
Age: 83
End: 2023-04-11

## 2023-04-11 VITALS
SYSTOLIC BLOOD PRESSURE: 120 MMHG | RESPIRATION RATE: 16 BRPM | HEART RATE: 86 BPM | BODY MASS INDEX: 27.05 KG/M2 | WEIGHT: 147 LBS | TEMPERATURE: 98.6 F | HEIGHT: 62 IN | DIASTOLIC BLOOD PRESSURE: 66 MMHG | OXYGEN SATURATION: 99 %

## 2023-04-11 PROCEDURE — 99214 OFFICE O/P EST MOD 30 MIN: CPT

## 2023-04-11 RX ORDER — HYDROCHLOROTHIAZIDE 25 MG/1
25 TABLET ORAL
Refills: 0 | Status: DISCONTINUED | COMMUNITY
End: 2023-04-11

## 2023-04-11 NOTE — ASSESSMENT
[FreeTextEntry1] : ANDERS CONROY is a 82 year old female with a PMH significant for newly diagnosed Cirrhosis, HCC, HLD, HTN, DM, and CAD s/p stent. \par \par Cirrhosis\par -Etiology likely MARTIN\par -Disease progression of cirrhosis discussed, including but not limited to hepatocellular carcinoma, varices with or without bleeding, hepatic encephalopathy, ascites, liver failure and death.\par \par HCC\par Status post embolization 2 weeks ago\par Apparent postprocedure complications.\par However had some dizziness etc. Will order complete blood profile.\par Follow-up imaging in 3 months.  Ordered\par \par Variceal Screening\par Done on 13 January 2023\par \par Ascites\par -no ascites noted on imaging or physical exam\par -Contact provider for increased abdominal distension\par \par Encephalopathy\par Had stopped lactulose.  I restarted.\par \par Diet\par -High Protein Low Fat Low Sugar Low Salt (up to 2G Na/day) Diet\par -No prolonged fasting\par -Mediterranean Diet info provided\par -No alcohol consumption\par \par Pain\par -NO NSAIDs as these can lead to diuretic resistance and precipitate renal dysfunction in patients with advanced liver disease\par -Can take up to 2G Tylenol per day\par \par Follow-up in 3 months.\par \par  \par \par \par

## 2023-04-11 NOTE — REASON FOR VISIT
[Family Member] : family member [FreeTextEntry1] : f/u post ldt Acc by Kristi Connor [Interpreters_IDNumber] : [id number] [Interpreters_FullName] : stacey

## 2023-04-11 NOTE — REVIEW OF SYSTEMS
[Feeling Poorly] : feeling poorly [Feeling Tired] : feeling tired [Recent Weight Loss (___ Lbs)] : recent [unfilled] ~Ulb weight loss [Confused] : confusion [Dizziness] : dizziness [Fever] : no fever

## 2023-04-11 NOTE — HISTORY OF PRESENT ILLNESS
[de-identified] :  WILMER CONROY is a 82 year old female with a PMH HLD, HTN, DM, and CAD s/p stent and significant for newly diagnosed Cirrhosis, HCC, \par s/p liver directed therapy Y90 for HCC on 3/31\par \par Complains of weight loss.\par Complains of anorexia\par States this morning she was dizzy upon suddenly getting up from a sitting position\par No fevers.\par Constipated : Stopped lactulose\par No hematemesis melena abdominal pain abdominal distention.\par 12/1/22: She presents today for evaluation of newly diagnosed Cirrhosis and imaging concerning for multifocal HCC. Pt presented to [facility] on 10/16/22 with complaints of epigastric pain. During workup, pt was found of have cirrhotic liver and lesions suspicious for HCC on imaging. ACS ruled out. Pt went on to have additional imaging which further indicated HCC , 2 lesions, with cirrhotic liver. Additional workup done indicated HBV core antibody positive, surface antigen indeterminate, core IgM nonreactive, Be antibody nonreactive. HAV antibody positive but IgM negative indicated past resolved infection. Labs done during admission - bilirubin 0.3, AST 73, ALT 53, , INR 1.3, , , Iron serum 30, ferritin 18 and transferrin saturation 7%.\par \par CT Angio Abd Pelv w/wo IC 10/16/22 - Hypervascular lesions seen in liver, at least one of which is typical for a flash filling hemangioma. A lesion within the right hepatic lobe demonstrates washout, suspicious for hepatocellular carcinoma. Additional subcentimeter hypervascular lesion of left hepatic lobe is indeterminate. Confirmation with multiphasic hepatic protocol MRI or CT is advised for more definitive evaluation. Cirrhosis. Atherosclerotic changes of the arterial structures, with 50-70% stenosis \par of the origins of the celiac axis and SMA. No mesenteric venous occlusive disease.\par \par MRI Abdomen w/IC 10/21/22 - LIVER: Liver is shrunken with prominence of the left lobe and caudate lobe and a subtle nodular contour, compatible with cirrhosis. There is a 2.2 x 2.0 cm mildly T2 hyperintense lesion at the junction of segments 6 and 7 which demonstrates restricted diffusion. Dynamic postcontrast imaging is markedly degraded by respiratory motion artifact, however there is evidence of early enhancement and washout. These findings are compatible with HCC. There is a similar 2.7 x 1.9 cm mildly T2 bright subcapsular lesion at the junction of segments 2 and 3 posteriorly which also demonstrates early enhancement with washout. The remainder of the liver is mildly heterogeneous in signal and enhancement. The hepatic veins and portal vein are patent. BILE DUCTS: There is no intra or extra hepatic biliary duct dilatation. The common bile duct measures 3 mm.\par \par Denies fatigue, malaise, arthralgias, myalgias, pruritus, recent infection, abdominal pain or distension, jaundice, hematemesis, hematochezia, dark urine, confusion, unintentional weight loss or gain. Denies alcohol consumption. Denies OTC/herbal supplements. Pt had EGD about 3 years ago but unknown results and where it was done. Pt reports a family of history of liver disease but is unsure what kind.\par \par 12/20/22: Since last visit, pt was hospitalized at [facility] for anemia on 12/6/22. H/H on admission was 7.2/23. She was given a blood transfusion. Current H/H 10.1/33.9. Pt is scheduled for EGD and Colonoscopy with Dr. June tomorrow. Pt currently has an URI and reports a lot of coughing and mucus production. She has regular BMs daily. Pt's daughter reports she has noticed her mom is confused but no more than usual. She continues to take Lactulose daily. \par Currently, the condition is. MELD Score is 16. \par \par

## 2023-04-12 LAB
ALBUMIN SERPL ELPH-MCNC: 4.1 G/DL
ALP BLD-CCNC: 268 U/L
ALT SERPL-CCNC: 57 U/L
ANION GAP SERPL CALC-SCNC: 17 MMOL/L
AST SERPL-CCNC: 83 U/L
BASOPHILS # BLD AUTO: 0.02 K/UL
BASOPHILS NFR BLD AUTO: 0.3 %
BILIRUB INDIRECT SERPL-MCNC: 0.4 MG/DL
BILIRUB SERPL-MCNC: 0.7 MG/DL
BUN SERPL-MCNC: 21 MG/DL
CALCIUM SERPL-MCNC: 10.3 MG/DL
CHLORIDE SERPL-SCNC: 90 MMOL/L
CO2 SERPL-SCNC: 25 MMOL/L
CREAT SERPL-MCNC: 1.21 MG/DL
EGFR: 44 ML/MIN/1.73M2
EOSINOPHIL # BLD AUTO: 0.07 K/UL
EOSINOPHIL NFR BLD AUTO: 0.9 %
GLUCOSE SERPL-MCNC: 281 MG/DL
HCT VFR BLD CALC: 33.8 %
HGB BLD-MCNC: 10.1 G/DL
IMM GRANULOCYTES NFR BLD AUTO: 0.1 %
INR PPP: 1.35 RATIO
LYMPHOCYTES # BLD AUTO: 0.65 K/UL
LYMPHOCYTES NFR BLD AUTO: 8.7 %
MAN DIFF?: NORMAL
MCHC RBC-ENTMCNC: 24.5 PG
MCHC RBC-ENTMCNC: 29.9 GM/DL
MCV RBC AUTO: 82 FL
MONOCYTES # BLD AUTO: 0.93 K/UL
MONOCYTES NFR BLD AUTO: 12.4 %
NEUTROPHILS # BLD AUTO: 5.81 K/UL
NEUTROPHILS NFR BLD AUTO: 77.6 %
PLATELET # BLD AUTO: 174 K/UL
POTASSIUM SERPL-SCNC: 4.9 MMOL/L
PROT SERPL-MCNC: 7.7 G/DL
PT BLD: 15.7 SEC
RBC # BLD: 4.12 M/UL
RBC # FLD: 17.4 %
SODIUM SERPL-SCNC: 133 MMOL/L
WBC # FLD AUTO: 7.49 K/UL

## 2023-05-24 PROCEDURE — 78201 LIVER IMAGING STATIC ONLY: CPT | Mod: MH

## 2023-05-24 PROCEDURE — C1894: CPT

## 2023-05-24 PROCEDURE — 76942 ECHO GUIDE FOR BIOPSY: CPT

## 2023-05-24 PROCEDURE — 82962 GLUCOSE BLOOD TEST: CPT

## 2023-05-24 PROCEDURE — 75774 ARTERY X-RAY EACH VESSEL: CPT

## 2023-05-24 PROCEDURE — 36247 INS CATH ABD/L-EXT ART 3RD: CPT

## 2023-05-24 PROCEDURE — C1769: CPT

## 2023-05-24 PROCEDURE — 75894 X-RAYS TRANSCATH THERAPY: CPT

## 2023-05-24 PROCEDURE — C1760: CPT

## 2023-05-24 PROCEDURE — C9399: CPT

## 2023-05-24 PROCEDURE — T1013: CPT

## 2023-05-24 PROCEDURE — A9541: CPT

## 2023-05-24 PROCEDURE — 75726 ARTERY X-RAYS ABDOMEN: CPT

## 2023-05-24 PROCEDURE — 37243 VASC EMBOLIZE/OCCLUDE ORGAN: CPT

## 2023-05-24 PROCEDURE — A9540: CPT

## 2023-05-24 PROCEDURE — 78803 RP LOCLZJ TUM SPECT 1 AREA: CPT | Mod: MH

## 2023-06-03 ENCOUNTER — EMERGENCY (EMERGENCY)
Facility: HOSPITAL | Age: 83
LOS: 1 days | Discharge: HOME | End: 2023-06-03
Attending: EMERGENCY MEDICINE
Payer: MEDICARE

## 2023-06-03 VITALS
OXYGEN SATURATION: 98 % | SYSTOLIC BLOOD PRESSURE: 145 MMHG | DIASTOLIC BLOOD PRESSURE: 63 MMHG | RESPIRATION RATE: 18 BRPM | TEMPERATURE: 98 F | WEIGHT: 149.91 LBS | HEART RATE: 69 BPM

## 2023-06-03 VITALS
DIASTOLIC BLOOD PRESSURE: 70 MMHG | RESPIRATION RATE: 18 BRPM | SYSTOLIC BLOOD PRESSURE: 132 MMHG | OXYGEN SATURATION: 98 % | HEART RATE: 70 BPM

## 2023-06-03 DIAGNOSIS — Z98.890 OTHER SPECIFIED POSTPROCEDURAL STATES: Chronic | ICD-10-CM

## 2023-06-03 DIAGNOSIS — Z96.652 PRESENCE OF LEFT ARTIFICIAL KNEE JOINT: Chronic | ICD-10-CM

## 2023-06-03 LAB
ALBUMIN SERPL ELPH-MCNC: 3.7 G/DL — SIGNIFICANT CHANGE UP (ref 3.3–5.2)
ALP SERPL-CCNC: 216 U/L — HIGH (ref 40–120)
ALT FLD-CCNC: 22 U/L — SIGNIFICANT CHANGE UP
ANION GAP SERPL CALC-SCNC: 12 MMOL/L — SIGNIFICANT CHANGE UP (ref 5–17)
ANISOCYTOSIS BLD QL: SIGNIFICANT CHANGE UP
APPEARANCE UR: ABNORMAL
APTT BLD: 19.8 SEC — LOW (ref 27.5–35.5)
AST SERPL-CCNC: 35 U/L — HIGH
BACTERIA # UR AUTO: ABNORMAL
BASE EXCESS BLDV CALC-SCNC: -1.1 MMOL/L — SIGNIFICANT CHANGE UP (ref -2–3)
BASOPHILS # BLD AUTO: 0 K/UL — SIGNIFICANT CHANGE UP (ref 0–0.2)
BASOPHILS NFR BLD AUTO: 0 % — SIGNIFICANT CHANGE UP (ref 0–2)
BILIRUB SERPL-MCNC: 0.8 MG/DL — SIGNIFICANT CHANGE UP (ref 0.4–2)
BILIRUB UR-MCNC: NEGATIVE — SIGNIFICANT CHANGE UP
BUN SERPL-MCNC: 22 MG/DL — HIGH (ref 8–20)
CA-I SERPL-SCNC: 1.21 MMOL/L — SIGNIFICANT CHANGE UP (ref 1.15–1.33)
CALCIUM SERPL-MCNC: 9.6 MG/DL — SIGNIFICANT CHANGE UP (ref 8.4–10.5)
CHLORIDE BLDV-SCNC: 104 MMOL/L — SIGNIFICANT CHANGE UP (ref 96–108)
CHLORIDE SERPL-SCNC: 99 MMOL/L — SIGNIFICANT CHANGE UP (ref 96–108)
CO2 SERPL-SCNC: 24 MMOL/L — SIGNIFICANT CHANGE UP (ref 22–29)
COLOR SPEC: YELLOW — SIGNIFICANT CHANGE UP
CREAT SERPL-MCNC: 0.88 MG/DL — SIGNIFICANT CHANGE UP (ref 0.5–1.3)
DACRYOCYTES BLD QL SMEAR: SLIGHT — SIGNIFICANT CHANGE UP
DIFF PNL FLD: NEGATIVE — SIGNIFICANT CHANGE UP
EGFR: 64 ML/MIN/1.73M2 — SIGNIFICANT CHANGE UP
ELLIPTOCYTES BLD QL SMEAR: SLIGHT — SIGNIFICANT CHANGE UP
EOSINOPHIL # BLD AUTO: 0 K/UL — SIGNIFICANT CHANGE UP (ref 0–0.5)
EOSINOPHIL NFR BLD AUTO: 0 % — SIGNIFICANT CHANGE UP (ref 0–6)
EPI CELLS # UR: SIGNIFICANT CHANGE UP
GAS PNL BLDV: 133 MMOL/L — LOW (ref 136–145)
GAS PNL BLDV: SIGNIFICANT CHANGE UP
GAS PNL BLDV: SIGNIFICANT CHANGE UP
GLUCOSE BLDV-MCNC: 113 MG/DL — HIGH (ref 70–99)
GLUCOSE SERPL-MCNC: 122 MG/DL — HIGH (ref 70–99)
GLUCOSE UR QL: 1000 MG/DL
HCO3 BLDV-SCNC: 25 MMOL/L — SIGNIFICANT CHANGE UP (ref 22–29)
HCT VFR BLD CALC: 33.1 % — LOW (ref 34.5–45)
HCT VFR BLDA CALC: 34 % — SIGNIFICANT CHANGE UP
HGB BLD CALC-MCNC: 11.2 G/DL — LOW (ref 11.7–16.1)
HGB BLD-MCNC: 10.5 G/DL — LOW (ref 11.5–15.5)
INR BLD: 1.26 RATIO — HIGH (ref 0.88–1.16)
KETONES UR-MCNC: NEGATIVE — SIGNIFICANT CHANGE UP
LACTATE BLDV-MCNC: 2 MMOL/L — SIGNIFICANT CHANGE UP (ref 0.5–2)
LEUKOCYTE ESTERASE UR-ACNC: ABNORMAL
LIDOCAIN IGE QN: 79 U/L — HIGH (ref 22–51)
LYMPHOCYTES # BLD AUTO: 0.8 K/UL — LOW (ref 1–3.3)
LYMPHOCYTES # BLD AUTO: 13 % — SIGNIFICANT CHANGE UP (ref 13–44)
MACROCYTES BLD QL: SLIGHT — SIGNIFICANT CHANGE UP
MAGNESIUM SERPL-MCNC: 1.7 MG/DL — LOW (ref 1.8–2.6)
MANUAL SMEAR VERIFICATION: SIGNIFICANT CHANGE UP
MCHC RBC-ENTMCNC: 28.8 PG — SIGNIFICANT CHANGE UP (ref 27–34)
MCHC RBC-ENTMCNC: 31.7 GM/DL — LOW (ref 32–36)
MCV RBC AUTO: 90.7 FL — SIGNIFICANT CHANGE UP (ref 80–100)
MONOCYTES # BLD AUTO: 0.43 K/UL — SIGNIFICANT CHANGE UP (ref 0–0.9)
MONOCYTES NFR BLD AUTO: 7 % — SIGNIFICANT CHANGE UP (ref 2–14)
NEUTROPHILS # BLD AUTO: 4.93 K/UL — SIGNIFICANT CHANGE UP (ref 1.8–7.4)
NEUTROPHILS NFR BLD AUTO: 80 % — HIGH (ref 43–77)
NITRITE UR-MCNC: NEGATIVE — SIGNIFICANT CHANGE UP
NT-PROBNP SERPL-SCNC: 651 PG/ML — HIGH (ref 0–300)
PCO2 BLDV: 47 MMHG — HIGH (ref 39–42)
PH BLDV: 7.33 — SIGNIFICANT CHANGE UP (ref 7.32–7.43)
PH UR: 7 — SIGNIFICANT CHANGE UP (ref 5–8)
PLAT MORPH BLD: NORMAL — SIGNIFICANT CHANGE UP
PLATELET # BLD AUTO: 139 K/UL — LOW (ref 150–400)
PO2 BLDV: 67 MMHG — HIGH (ref 25–45)
POIKILOCYTOSIS BLD QL AUTO: SLIGHT — SIGNIFICANT CHANGE UP
POLYCHROMASIA BLD QL SMEAR: SLIGHT — SIGNIFICANT CHANGE UP
POTASSIUM BLDV-SCNC: 4.5 MMOL/L — SIGNIFICANT CHANGE UP (ref 3.5–5.1)
POTASSIUM SERPL-MCNC: 4.4 MMOL/L — SIGNIFICANT CHANGE UP (ref 3.5–5.3)
POTASSIUM SERPL-SCNC: 4.4 MMOL/L — SIGNIFICANT CHANGE UP (ref 3.5–5.3)
PROT SERPL-MCNC: 7.7 G/DL — SIGNIFICANT CHANGE UP (ref 6.6–8.7)
PROT UR-MCNC: 30 MG/DL
PROTHROM AB SERPL-ACNC: 14.6 SEC — HIGH (ref 10.5–13.4)
RBC # BLD: 3.65 M/UL — LOW (ref 3.8–5.2)
RBC # FLD: SIGNIFICANT CHANGE UP (ref 10.3–14.5)
RBC BLD AUTO: ABNORMAL
RBC CASTS # UR COMP ASSIST: NEGATIVE /HPF — SIGNIFICANT CHANGE UP (ref 0–4)
SAO2 % BLDV: 91.8 % — SIGNIFICANT CHANGE UP
SODIUM SERPL-SCNC: 135 MMOL/L — SIGNIFICANT CHANGE UP (ref 135–145)
SP GR SPEC: 1.01 — SIGNIFICANT CHANGE UP (ref 1.01–1.02)
TROPONIN T SERPL-MCNC: <0.01 NG/ML — SIGNIFICANT CHANGE UP (ref 0–0.06)
UROBILINOGEN FLD QL: 1 MG/DL
WBC # BLD: 6.16 K/UL — SIGNIFICANT CHANGE UP (ref 3.8–10.5)
WBC # FLD AUTO: 6.16 K/UL — SIGNIFICANT CHANGE UP (ref 3.8–10.5)
WBC UR QL: ABNORMAL /HPF (ref 0–5)

## 2023-06-03 PROCEDURE — 74177 CT ABD & PELVIS W/CONTRAST: CPT | Mod: MA

## 2023-06-03 PROCEDURE — 83880 ASSAY OF NATRIURETIC PEPTIDE: CPT

## 2023-06-03 PROCEDURE — 85018 HEMOGLOBIN: CPT

## 2023-06-03 PROCEDURE — 87086 URINE CULTURE/COLONY COUNT: CPT

## 2023-06-03 PROCEDURE — 96374 THER/PROPH/DIAG INJ IV PUSH: CPT | Mod: XU

## 2023-06-03 PROCEDURE — 99285 EMERGENCY DEPT VISIT HI MDM: CPT | Mod: GC

## 2023-06-03 PROCEDURE — 81001 URINALYSIS AUTO W/SCOPE: CPT

## 2023-06-03 PROCEDURE — 71260 CT THORAX DX C+: CPT | Mod: 26,MA

## 2023-06-03 PROCEDURE — 83605 ASSAY OF LACTIC ACID: CPT

## 2023-06-03 PROCEDURE — 83690 ASSAY OF LIPASE: CPT

## 2023-06-03 PROCEDURE — 93005 ELECTROCARDIOGRAM TRACING: CPT

## 2023-06-03 PROCEDURE — 74177 CT ABD & PELVIS W/CONTRAST: CPT | Mod: 26,MA

## 2023-06-03 PROCEDURE — 36415 COLL VENOUS BLD VENIPUNCTURE: CPT

## 2023-06-03 PROCEDURE — T1013: CPT

## 2023-06-03 PROCEDURE — 99285 EMERGENCY DEPT VISIT HI MDM: CPT | Mod: 25

## 2023-06-03 PROCEDURE — 82435 ASSAY OF BLOOD CHLORIDE: CPT

## 2023-06-03 PROCEDURE — 85730 THROMBOPLASTIN TIME PARTIAL: CPT

## 2023-06-03 PROCEDURE — 84295 ASSAY OF SERUM SODIUM: CPT

## 2023-06-03 PROCEDURE — 84484 ASSAY OF TROPONIN QUANT: CPT

## 2023-06-03 PROCEDURE — 82947 ASSAY GLUCOSE BLOOD QUANT: CPT

## 2023-06-03 PROCEDURE — 85014 HEMATOCRIT: CPT

## 2023-06-03 PROCEDURE — 83735 ASSAY OF MAGNESIUM: CPT

## 2023-06-03 PROCEDURE — 84132 ASSAY OF SERUM POTASSIUM: CPT

## 2023-06-03 PROCEDURE — 93010 ELECTROCARDIOGRAM REPORT: CPT

## 2023-06-03 PROCEDURE — 80053 COMPREHEN METABOLIC PANEL: CPT

## 2023-06-03 PROCEDURE — 85025 COMPLETE CBC W/AUTO DIFF WBC: CPT

## 2023-06-03 PROCEDURE — 85610 PROTHROMBIN TIME: CPT

## 2023-06-03 PROCEDURE — 71260 CT THORAX DX C+: CPT | Mod: MA

## 2023-06-03 PROCEDURE — 82803 BLOOD GASES ANY COMBINATION: CPT

## 2023-06-03 PROCEDURE — 82330 ASSAY OF CALCIUM: CPT

## 2023-06-03 RX ORDER — CEPHALEXIN 500 MG
1 CAPSULE ORAL
Qty: 20 | Refills: 0
Start: 2023-06-03 | End: 2023-06-07

## 2023-06-03 RX ORDER — ACETAMINOPHEN 500 MG
1000 TABLET ORAL ONCE
Refills: 0 | Status: COMPLETED | OUTPATIENT
Start: 2023-06-03 | End: 2023-06-03

## 2023-06-03 RX ORDER — MAGNESIUM SULFATE 500 MG/ML
2 VIAL (ML) INJECTION ONCE
Refills: 0 | Status: DISCONTINUED | OUTPATIENT
Start: 2023-06-03 | End: 2023-06-10

## 2023-06-03 RX ADMIN — Medication 400 MILLIGRAM(S): at 13:11

## 2023-06-03 NOTE — ED PROVIDER NOTE - PROGRESS NOTE DETAILS
Danuta Zavala, DO PGY1: Patient reports pain improved, feels much better at this time. Labs reviewed, unremarkable, do not require emergent intervention. CT chest/abd/pelv without acute findings. Results discussed with patient and daughter. Verbalize understanding and agreement with plan to f/u with PCP. Stable for dc home.

## 2023-06-03 NOTE — ED ADULT NURSE NOTE - SUICIDE SCREENING QUESTION 3
Patient came out for snack, visible on the unit for a short period of time and went back to bed  Patient was pleasant upon approach, cooperative with care  Patient stated his depression was 4/10 and anxiety was 4/4, denies SI, HI, A/H, V/H, pain and feels safe here  Will continue to monitor patient  No

## 2023-06-03 NOTE — ED ADULT NURSE NOTE - OBJECTIVE STATEMENT
Pt presenting to Ed for eval of pain beginning a few hours ago. Patient states pain began as squeezing sensation in LUQ, slowly rad to RUQ/R side, is intermittent.( pmh liver ca last radiation 3/2023, Notes recent dry cough. Denies any chest pain, shortness of breath, fever, abdominal pain, nausea, vomiting, dysuria, hematuria.

## 2023-06-03 NOTE — ED PROVIDER NOTE - NSFOLLOWUPINSTRUCTIONS_ED_ALL_ED_FT
- Puede curtis Tylenol extra poli 2 tabletas cada 6 horas o ibuprofeno 600 mg (3 tabletas) cada 6 horas según sea necesario para jose angel y molestias.  - Por favor, shant un seguimiento con hopkins médico de atención primaria y cardiólogo

## 2023-06-03 NOTE — ED PROVIDER NOTE - PATIENT PORTAL LINK FT
You can access the FollowMyHealth Patient Portal offered by [facility] by registering at the following website: [email or web address]. By joining [employer]’s FollowMyHealth portal, you will also be able to view your health information using other applications (apps) compatible with our system.

## 2023-06-03 NOTE — ED PROVIDER NOTE - OBJECTIVE STATEMENT
83 year old female with PMHx HTN, HLD, CAD s/p ?5 stents, Liver CA (s/p radiation Mar 2023) presenting for evaluation of pain beginning a few hours ago. Patient states pain began as squeezing sensation in LUQ, slowly migrated to RUQ/R side, is intermittent. Notes recent dry cough. Denies any chest pain, shortness of breath, fever, abdominal pain, nausea, vomiting, dysuria, hematuria.

## 2023-06-03 NOTE — ED PROVIDER NOTE - ATTENDING CONTRIBUTION TO CARE
The patient seen with resident    Flank pain    I, Hermilo Bullock, performed the initial face to face bedside interview with this patient regarding history of present illness, review of symptoms and relevant past medical, social and family history.  I completed an independent physical examination.  I was the initial provider who evaluated this patient. I have signed out the follow up of any pending tests (i.e. labs, radiological studies) to the resident.  I have communicated the patient’s plan of care and disposition with the resident

## 2023-06-03 NOTE — ED ADULT NURSE NOTE - NSFALLHARMRISKINTERV_ED_ALL_ED
Assistance OOB with selected safe patient handling equipment if applicable/Assistance with ambulation/Communicate risk of Fall with Harm to all staff, patient, and family/Provide visual cue: red socks, yellow wristband, yellow gown, etc/Reinforce activity limits and safety measures with patient and family/Use of alarms - bed, stretcher, chair and/or video monitoring/Bed in lowest position, wheels locked, appropriate side rails in place/Call bell, personal items and telephone in reach/Instruct patient to call for assistance before getting out of bed/chair/stretcher/Non-slip footwear applied when patient is off stretcher/[place] to call system/Physically safe environment - no spills, clutter or unnecessary equipment/Purposeful Proactive Rounding/Room/bathroom lighting operational, light cord in reach

## 2023-06-03 NOTE — ED PROVIDER NOTE - NS ED ROS FT
Gen: No fever, no change in activity level  ENT: No congestion, no rhinorrhea  Resp: (+)cough, no trouble breathing  Cardiovascular: No chest pain, no palpitation  Gastrointestinal: No nausea, no vomiting, no diarrhea. (+) abd pain   :  No change in urine output; no dysuria, no hematuria  MS: No joint or muscle pain  Skin: No rashes  Neuro: No headache; no abnormal movements  Remainder negative, except as per the HPI

## 2023-06-03 NOTE — ED PROVIDER NOTE - CLINICAL SUMMARY MEDICAL DECISION MAKING FREE TEXT BOX
83 year old female with PMHx liver cancer presenting with a few hours of abd pain. Pain is manageable at this time. Plan - ofirmev, labs, UA, CT chest and abd/pelv, EKG.

## 2023-06-05 LAB
CULTURE RESULTS: SIGNIFICANT CHANGE UP
SPECIMEN SOURCE: SIGNIFICANT CHANGE UP

## 2023-06-09 ENCOUNTER — NON-APPOINTMENT (OUTPATIENT)
Age: 83
End: 2023-06-09

## 2023-06-23 NOTE — ED ADULT TRIAGE NOTE - GLASGOW COMA SCALE: SCORE, MLM
15 Patient is a 88y man with significant PMH of HTN, CAD, s/p stent in 2009, HPL, DM-2, CKD-stage III, CVA with muscle wasting, H/o prostate CA s/p TURP, Glaucoma, B/L muscle atrophy and others was BIBA in ED from home with coffee ground emesis x 2.  Pt. was found to be hypotensive at 83/49 and unresponsive per EMS.  Overall poor prognosis, appropriate fro hospice.  Family agreed for LTC with hospice.  Please see discussion noted above in GOC conversation

## 2023-06-30 ENCOUNTER — APPOINTMENT (OUTPATIENT)
Dept: MRI IMAGING | Facility: CLINIC | Age: 83
End: 2023-06-30
Payer: MEDICARE

## 2023-06-30 ENCOUNTER — OUTPATIENT (OUTPATIENT)
Dept: OUTPATIENT SERVICES | Facility: HOSPITAL | Age: 83
LOS: 1 days | End: 2023-06-30
Payer: MEDICARE

## 2023-06-30 DIAGNOSIS — Z96.652 PRESENCE OF LEFT ARTIFICIAL KNEE JOINT: Chronic | ICD-10-CM

## 2023-06-30 DIAGNOSIS — Z98.890 OTHER SPECIFIED POSTPROCEDURAL STATES: Chronic | ICD-10-CM

## 2023-06-30 DIAGNOSIS — C22.0 LIVER CELL CARCINOMA: ICD-10-CM

## 2023-06-30 PROCEDURE — 74183 MRI ABD W/O CNTR FLWD CNTR: CPT

## 2023-06-30 PROCEDURE — 72197 MRI PELVIS W/O & W/DYE: CPT | Mod: 26,MH

## 2023-06-30 PROCEDURE — A9585: CPT

## 2023-06-30 PROCEDURE — 72197 MRI PELVIS W/O & W/DYE: CPT

## 2023-06-30 PROCEDURE — 74183 MRI ABD W/O CNTR FLWD CNTR: CPT | Mod: 26,MH

## 2023-07-06 ENCOUNTER — NON-APPOINTMENT (OUTPATIENT)
Age: 83
End: 2023-07-06

## 2023-07-10 ENCOUNTER — APPOINTMENT (OUTPATIENT)
Dept: GASTROENTEROLOGY | Facility: CLINIC | Age: 83
End: 2023-07-10
Payer: MEDICARE

## 2023-07-10 VITALS
RESPIRATION RATE: 16 BRPM | HEIGHT: 62 IN | SYSTOLIC BLOOD PRESSURE: 168 MMHG | TEMPERATURE: 96.7 F | HEART RATE: 64 BPM | DIASTOLIC BLOOD PRESSURE: 74 MMHG | BODY MASS INDEX: 28.16 KG/M2 | OXYGEN SATURATION: 98 % | WEIGHT: 153 LBS

## 2023-07-10 PROCEDURE — 99215 OFFICE O/P EST HI 40 MIN: CPT

## 2023-07-10 RX ORDER — NIFEDIPINE 60 MG
60 TABLET, EXTENDED RELEASE ORAL
Refills: 0 | Status: DISCONTINUED | COMMUNITY
End: 2023-07-10

## 2023-07-10 RX ORDER — AMLODIPINE BESYLATE 5 MG/1
5 TABLET ORAL
Refills: 0 | Status: DISCONTINUED | COMMUNITY
End: 2023-07-10

## 2023-07-10 NOTE — ASSESSMENT
[FreeTextEntry1] : ANDERS CONROY is a 82 year old female with a PMH significant for newly diagnosed Cirrhosis, HCC, HLD, HTN, DM, and CAD s/p stent. \par \par Cirrhosis\par -Etiology likely MARTIN\par -Disease progression of cirrhosis discussed, including but not limited to hepatocellular carcinoma, varices with or without bleeding, hepatic encephalopathy, ascites, liver failure and death.\par -Labs ordered today\par \par HCC\par -s/p Y90 (3/31/23) at [facility]\par -MR ABDOMEN WAW IC (6/30/23) - Cirrhosis. Status post bilobar radioembolization. Segment 6/7 treated sites nonviable, with equivocal enhancement in the left lobe, likely due to posttreatment change. There are now  numerous small hypervascular foci in segment 8 with T2 hyperintensity and no washout or capsule (LR-4) measuring up to 1.1 cm. Additionally, several subcentimeter LR-3 bilobar hypervascular foci lack T2 hyperintensity and washout with representative central 8 mm focus adjacent to the IVC in segment 8 (20, 18) and 6 mm focus in segment 3 (20, 34).\par -new lesions, plan to discuss at tumor board\par -Repeat MRI ordered for September 2023, AFP 12/2/22 - 306, repeat ordered\par \par Variceal Screening\par -EGD (1/5/23) - no varices\par -if pt experiences hematemesis, advised to go to ER immediately\par \par Ascites\par -no ascites noted on imaging or physical exam\par -Contact provider for increased abdominal distension\par \par Encephalopathy\par -notify provider if new onset confusion\par -titrate lactulose to 1-2 bms/day\par \par Health Maintenance\par -Can take up to 2G Tylenol per day. NO NSAIDs as these can lead to diuretic resistance and precipitate renal dysfunction in patients with advanced liver disease\par --High Protein Low Fat Low Sugar Low Salt (up to 2G Na/day) Diet, no prolonged fasting, Mediterranean Diet info provided\par -Continue to abstain from alcohol and all illicit drugs\par -Avoid use of herbal and dietary supplements due to potential hepatotoxicity\par -Avoid eating any unpasteurized dairy products; avoid eating any raw or undercooked eggs, fish, poultry, or meat; and avoid eating raw/steamed oysters or other shellfish to avoid risk of infection.\par \par Follow Up\par -Follow up in 3 months

## 2023-07-10 NOTE — REASON FOR VISIT
[Follow-Up: _____] : a [unfilled] follow-up visit [[employer] ] : provided by [employer]   [Time Spent: ____ minutes] : Total time spent using  services: [unfilled] minutes. The patient's primary language is not English thus required  services. [FreeTextEntry1] : cirrhosis, hcc [Interpreters_IDNumber] : [id number] [Interpreters_FullName] : Derrick [TWNoteComboBox1] : Angolan

## 2023-07-10 NOTE — HISTORY OF PRESENT ILLNESS
[MELD Score: ___] : MELD Score is [unfilled] [de-identified] : WILMER CONROY is a 82 year old female with a PMH significant for Cirrhosis, HCC s/p Y90 (3/31/23), HLD, HTN, DM, and CAD s/p stent.\par \par 7/10/23: Imaging reviewed w/pt and her daughter. MR ABDOMEN WAW IC (6/30/23) - Cirrhosis. Status post bilobar radioembolization. Segment 6/7 treated sites nonviable, with equivocal enhancement in the left lobe, likely due to posttreatment change. There are now  numerous small hypervascular foci in segment 8 with T2 hyperintensity and no washout or capsule (LR-4) measuring up to 1.1 cm. Additionally, several subcentimeter LR-3 bilobar hypervascular foci lack T2 hyperintensity and washout with representative central 8 mm focus adjacent to the IVC in segment 8 (20, 18) and 6 mm focus in segment 3 (20, 34).\par \par 4/11/23: Complains of weight loss.\par Complains of anorexia\par States this morning she was dizzy upon suddenly getting up from a sitting position\par No fevers.\par Constipated : Stopped lactulose\par No hematemesis melena abdominal pain abdominal distention.\par \par 12/1/22: She presents today for evaluation of newly diagnosed Cirrhosis and imaging concerning for multifocal HCC. Pt presented to [facility] on 10/16/22 with complaints of epigastric pain. During workup, pt was found of have cirrhotic liver and lesions suspicious for HCC on imaging. ACS ruled out. Pt went on to have additional imaging which further indicated HCC , 2 lesions, with cirrhotic liver. Additional workup done indicated HBV core antibody positive, surface antigen indeterminate, core IgM nonreactive, Be antibody nonreactive. HAV antibody positive but IgM negative indicated past resolved infection. Labs done during admission - bilirubin 0.3, AST 73, ALT 53, , INR 1.3, , , Iron serum 30, ferritin 18 and transferrin saturation 7%.\par \par CT Angio Abd Pelv w/wo IC 10/16/22 - Hypervascular lesions seen in liver, at least one of which is typical for a flash filling hemangioma. A lesion within the right hepatic lobe demonstrates washout, suspicious for hepatocellular carcinoma. Additional subcentimeter hypervascular lesion of left hepatic lobe is indeterminate. Confirmation with multiphasic hepatic protocol MRI or CT is advised for more definitive evaluation. Cirrhosis. Atherosclerotic changes of the arterial structures, with 50-70% stenosis \par of the origins of the celiac axis and SMA. No mesenteric venous occlusive disease.\par \par MRI Abdomen w/IC 10/21/22 - LIVER: Liver is shrunken with prominence of the left lobe and caudate lobe and a subtle nodular contour, compatible with cirrhosis. There is a 2.2 x 2.0 cm mildly T2 hyperintense lesion at the junction of segments 6 and 7 which demonstrates restricted diffusion. Dynamic postcontrast imaging is markedly degraded by respiratory motion artifact, however there is evidence of early enhancement and washout. These findings are compatible with HCC. There is a similar 2.7 x 1.9 cm mildly T2 bright subcapsular lesion at the junction of segments 2 and 3 posteriorly which also demonstrates early enhancement with washout. The remainder of the liver is mildly heterogeneous in signal and enhancement. The hepatic veins and portal vein are patent. BILE DUCTS: There is no intra or extra hepatic biliary duct dilatation. The common bile duct measures 3 mm.\par \par Denies fatigue, malaise, arthralgias, myalgias, pruritus, recent infection, abdominal pain or distension, jaundice, hematemesis, hematochezia, dark urine, confusion, unintentional weight loss or gain. Denies alcohol consumption. Denies OTC/herbal supplements. Pt had EGD about 3 years ago but unknown results and where it was done. Pt reports a family of history of liver disease but is unsure what kind.\par \par 12/20/22: Since last visit, pt was hospitalized at [facility] for anemia on 12/6/22. H/H on admission was 7.2/23. She was given a blood transfusion. Current H/H 10.1/33.9. Pt is scheduled for EGD and Colonoscopy with Dr. June tomorrow. Pt currently has an URI and reports a lot of coughing and mucus production. She has regular BMs daily. Pt's daughter reports she has noticed her mom is confused but no more than usual. She continues to take Lactulose daily.

## 2023-07-18 NOTE — H&P PST ADULT - RESPIRATORY
Routing to covering provider.     OK for antibiotic for vaginal symptoms without being seen? There is a 4:30PM available today if appointment required. Pt was seen in walk-in on 7/9/2023 for toe injury and was prescribed Augmentin 875mg BID for 7 days.     If patient signs up for [facility], could attempt \"E-visit\". Could also call Walk-in where antibiotics were prescribed and see if they would be willing to prescribe without being seen.    Breath Sounds equal & clear to percussion & auscultation, no accessory muscle use

## 2023-07-20 ENCOUNTER — NON-APPOINTMENT (OUTPATIENT)
Age: 83
End: 2023-07-20

## 2023-07-21 ENCOUNTER — RESULT REVIEW (OUTPATIENT)
Age: 83
End: 2023-07-21

## 2023-07-25 ENCOUNTER — NON-APPOINTMENT (OUTPATIENT)
Age: 83
End: 2023-07-25

## 2023-07-25 DIAGNOSIS — E87.5 HYPERKALEMIA: ICD-10-CM

## 2023-07-25 LAB
AFP-TM SERPL-MCNC: 326 NG/ML
ALBUMIN SERPL ELPH-MCNC: 4.1 G/DL
ALP BLD-CCNC: 249 U/L
ALT SERPL-CCNC: 21 U/L
ANION GAP SERPL CALC-SCNC: 9 MMOL/L
AST SERPL-CCNC: 29 U/L
BILIRUB DIRECT SERPL-MCNC: 0.3 MG/DL
BILIRUB INDIRECT SERPL-MCNC: 0.2 MG/DL
BILIRUB SERPL-MCNC: 0.5 MG/DL
BUN SERPL-MCNC: 26 MG/DL
CALCIUM SERPL-MCNC: 10.5 MG/DL
CHLORIDE SERPL-SCNC: 103 MMOL/L
CO2 SERPL-SCNC: 26 MMOL/L
CREAT SERPL-MCNC: 1.05 MG/DL
EGFR: 52 ML/MIN/1.73M2
GLUCOSE SERPL-MCNC: 135 MG/DL
HBV SURFACE AG SER QL: NONREACTIVE
INR PPP: 1.3 RATIO
POTASSIUM SERPL-SCNC: 6.1 MMOL/L
PROT SERPL-MCNC: 7.8 G/DL
PT BLD: 15.2 SEC
SODIUM SERPL-SCNC: 138 MMOL/L

## 2023-07-26 LAB — POTASSIUM SERPL-SCNC: 5.6 MMOL/L

## 2023-08-04 ENCOUNTER — NON-APPOINTMENT (OUTPATIENT)
Age: 83
End: 2023-08-04

## 2023-08-05 ENCOUNTER — APPOINTMENT (OUTPATIENT)
Dept: MRI IMAGING | Facility: CLINIC | Age: 83
End: 2023-08-05
Payer: MEDICARE

## 2023-08-05 ENCOUNTER — OUTPATIENT (OUTPATIENT)
Dept: OUTPATIENT SERVICES | Facility: HOSPITAL | Age: 83
LOS: 1 days | End: 2023-08-05
Payer: MEDICARE

## 2023-08-05 DIAGNOSIS — K74.60 UNSPECIFIED CIRRHOSIS OF LIVER: ICD-10-CM

## 2023-08-05 DIAGNOSIS — Z98.890 OTHER SPECIFIED POSTPROCEDURAL STATES: Chronic | ICD-10-CM

## 2023-08-05 DIAGNOSIS — Z96.652 PRESENCE OF LEFT ARTIFICIAL KNEE JOINT: Chronic | ICD-10-CM

## 2023-08-05 PROCEDURE — 74183 MRI ABD W/O CNTR FLWD CNTR: CPT

## 2023-08-05 PROCEDURE — 72197 MRI PELVIS W/O & W/DYE: CPT

## 2023-08-05 PROCEDURE — 72197 MRI PELVIS W/O & W/DYE: CPT | Mod: 26,MH

## 2023-08-05 PROCEDURE — A9585: CPT

## 2023-08-05 PROCEDURE — 74183 MRI ABD W/O CNTR FLWD CNTR: CPT | Mod: 26,MH

## 2023-08-22 ENCOUNTER — OUTPATIENT (OUTPATIENT)
Dept: OUTPATIENT SERVICES | Facility: HOSPITAL | Age: 83
LOS: 1 days | End: 2023-08-22
Payer: MEDICARE

## 2023-08-22 ENCOUNTER — NON-APPOINTMENT (OUTPATIENT)
Age: 83
End: 2023-08-22

## 2023-08-22 ENCOUNTER — RESULT REVIEW (OUTPATIENT)
Age: 83
End: 2023-08-22

## 2023-08-22 DIAGNOSIS — Z98.890 OTHER SPECIFIED POSTPROCEDURAL STATES: Chronic | ICD-10-CM

## 2023-08-22 DIAGNOSIS — Z00.00 ENCOUNTER FOR GENERAL ADULT MEDICAL EXAMINATION WITHOUT ABNORMAL FINDINGS: ICD-10-CM

## 2023-08-22 DIAGNOSIS — Z96.652 PRESENCE OF LEFT ARTIFICIAL KNEE JOINT: Chronic | ICD-10-CM

## 2023-08-22 PROCEDURE — 99215 OFFICE O/P EST HI 40 MIN: CPT

## 2023-08-31 NOTE — HISTORY OF PRESENT ILLNESS
[FreeTextEntry1] : 82-year-old female with past medical history of cirrhosis, hypertension, diabetes, coronary artery disease presents with mild abdominal pain and bloating beginning in October 2022. MRI performed at that time showed 2 masses within the liver. Patient since then has undergone Y90 radioembolization of the 2 lesions within both sides of the liver and recent MRI shows good response in the treatment zones however three new 1.3 cm LIRADS 5 lesions were noted all within segment VIII. Patient AFP has also risen. Patient denies encephalopathy, ascites. Patient is not on hemodialysis. Patient has endorsed some increased weakness, performance status ECOG 3.

## 2023-08-31 NOTE — ASSESSMENT
[FreeTextEntry1] : 82-year-old female with previously treated HCC now with numerous recurrent lesions in the untreated portions of the liver.    -Given innumerable lesions within the liver as seen on most recent MRI, targeting with microwave ablation is likely not feasible and selective embolization also likely not feasible. Patient likely will need whole right lobe radio embolization in order to be comprehensive and inclusive entry any other potential lesions that may not be well characterized on MRI.

## 2023-08-31 NOTE — PHYSICAL EXAM
[Alert] : alert [No Acute Distress] : no acute distress [Well Nourished] : well nourished [Well Developed] : well developed [No Respiratory Distress] : no respiratory distress [Regular Rhythm] : with a regular rhythm [Capable of only limited selfcare, confined to bed or chair more than 50% of waking hours] : Capable of only limited selfcare, confined to bed or chair more than 50% of waking hours

## 2023-09-26 ENCOUNTER — OUTPATIENT (OUTPATIENT)
Dept: OUTPATIENT SERVICES | Facility: HOSPITAL | Age: 83
LOS: 1 days | End: 2023-09-26
Payer: MEDICARE

## 2023-09-26 VITALS
DIASTOLIC BLOOD PRESSURE: 70 MMHG | HEIGHT: 62 IN | TEMPERATURE: 97 F | SYSTOLIC BLOOD PRESSURE: 118 MMHG | OXYGEN SATURATION: 98 % | WEIGHT: 152.56 LBS | HEART RATE: 68 BPM | RESPIRATION RATE: 16 BRPM

## 2023-09-26 DIAGNOSIS — E03.9 HYPOTHYROIDISM, UNSPECIFIED: ICD-10-CM

## 2023-09-26 DIAGNOSIS — Z00.00 ENCOUNTER FOR GENERAL ADULT MEDICAL EXAMINATION WITHOUT ABNORMAL FINDINGS: ICD-10-CM

## 2023-09-26 DIAGNOSIS — Z98.890 OTHER SPECIFIED POSTPROCEDURAL STATES: Chronic | ICD-10-CM

## 2023-09-26 DIAGNOSIS — E11.9 TYPE 2 DIABETES MELLITUS WITHOUT COMPLICATIONS: ICD-10-CM

## 2023-09-26 DIAGNOSIS — E78.5 HYPERLIPIDEMIA, UNSPECIFIED: ICD-10-CM

## 2023-09-26 DIAGNOSIS — I10 ESSENTIAL (PRIMARY) HYPERTENSION: ICD-10-CM

## 2023-09-26 DIAGNOSIS — K21.9 GASTRO-ESOPHAGEAL REFLUX DISEASE WITHOUT ESOPHAGITIS: ICD-10-CM

## 2023-09-26 DIAGNOSIS — Z96.652 PRESENCE OF LEFT ARTIFICIAL KNEE JOINT: Chronic | ICD-10-CM

## 2023-09-26 DIAGNOSIS — Z01.818 ENCOUNTER FOR OTHER PREPROCEDURAL EXAMINATION: ICD-10-CM

## 2023-09-26 DIAGNOSIS — I25.10 ATHEROSCLEROTIC HEART DISEASE OF NATIVE CORONARY ARTERY WITHOUT ANGINA PECTORIS: ICD-10-CM

## 2023-09-26 DIAGNOSIS — Z29.9 ENCOUNTER FOR PROPHYLACTIC MEASURES, UNSPECIFIED: ICD-10-CM

## 2023-09-26 DIAGNOSIS — C22.0 LIVER CELL CARCINOMA: ICD-10-CM

## 2023-09-26 LAB
A1C WITH ESTIMATED AVERAGE GLUCOSE RESULT: 7.2 % — HIGH (ref 4–5.6)
ANION GAP SERPL CALC-SCNC: 8 MMOL/L — SIGNIFICANT CHANGE UP (ref 5–17)
APTT BLD: 29 SEC — SIGNIFICANT CHANGE UP (ref 24.5–35.6)
BASOPHILS # BLD AUTO: 0.02 K/UL — SIGNIFICANT CHANGE UP (ref 0–0.2)
BASOPHILS NFR BLD AUTO: 0.4 % — SIGNIFICANT CHANGE UP (ref 0–2)
BLD GP AB SCN SERPL QL: SIGNIFICANT CHANGE UP
BUN SERPL-MCNC: 20 MG/DL — SIGNIFICANT CHANGE UP (ref 8–20)
CALCIUM SERPL-MCNC: 10.2 MG/DL — SIGNIFICANT CHANGE UP (ref 8.4–10.5)
CHLORIDE SERPL-SCNC: 100 MMOL/L — SIGNIFICANT CHANGE UP (ref 96–108)
CO2 SERPL-SCNC: 26 MMOL/L — SIGNIFICANT CHANGE UP (ref 22–29)
CREAT SERPL-MCNC: 0.9 MG/DL — SIGNIFICANT CHANGE UP (ref 0.5–1.3)
EGFR: 63 ML/MIN/1.73M2 — SIGNIFICANT CHANGE UP
EOSINOPHIL # BLD AUTO: 0.06 K/UL — SIGNIFICANT CHANGE UP (ref 0–0.5)
EOSINOPHIL NFR BLD AUTO: 1.1 % — SIGNIFICANT CHANGE UP (ref 0–6)
ESTIMATED AVERAGE GLUCOSE: 160 MG/DL — HIGH (ref 68–114)
GLUCOSE SERPL-MCNC: 296 MG/DL — HIGH (ref 70–99)
HCT VFR BLD CALC: 32.7 % — LOW (ref 34.5–45)
HGB BLD-MCNC: 10.3 G/DL — LOW (ref 11.5–15.5)
IMM GRANULOCYTES NFR BLD AUTO: 0.2 % — SIGNIFICANT CHANGE UP (ref 0–0.9)
INR BLD: 1.24 RATIO — HIGH (ref 0.85–1.18)
LYMPHOCYTES # BLD AUTO: 0.89 K/UL — LOW (ref 1–3.3)
LYMPHOCYTES # BLD AUTO: 16.2 % — SIGNIFICANT CHANGE UP (ref 13–44)
MCHC RBC-ENTMCNC: 31.2 PG — SIGNIFICANT CHANGE UP (ref 27–34)
MCHC RBC-ENTMCNC: 31.5 GM/DL — LOW (ref 32–36)
MCV RBC AUTO: 99.1 FL — SIGNIFICANT CHANGE UP (ref 80–100)
MONOCYTES # BLD AUTO: 0.55 K/UL — SIGNIFICANT CHANGE UP (ref 0–0.9)
MONOCYTES NFR BLD AUTO: 10 % — SIGNIFICANT CHANGE UP (ref 2–14)
NEUTROPHILS # BLD AUTO: 3.95 K/UL — SIGNIFICANT CHANGE UP (ref 1.8–7.4)
NEUTROPHILS NFR BLD AUTO: 72.1 % — SIGNIFICANT CHANGE UP (ref 43–77)
PLATELET # BLD AUTO: 139 K/UL — LOW (ref 150–400)
POTASSIUM SERPL-MCNC: 5 MMOL/L — SIGNIFICANT CHANGE UP (ref 3.5–5.3)
POTASSIUM SERPL-SCNC: 5 MMOL/L — SIGNIFICANT CHANGE UP (ref 3.5–5.3)
PROTHROM AB SERPL-ACNC: 13.7 SEC — HIGH (ref 9.5–13)
RBC # BLD: 3.3 M/UL — LOW (ref 3.8–5.2)
RBC # FLD: 14.3 % — SIGNIFICANT CHANGE UP (ref 10.3–14.5)
SODIUM SERPL-SCNC: 134 MMOL/L — LOW (ref 135–145)
WBC # BLD: 5.48 K/UL — SIGNIFICANT CHANGE UP (ref 3.8–10.5)
WBC # FLD AUTO: 5.48 K/UL — SIGNIFICANT CHANGE UP (ref 3.8–10.5)

## 2023-09-26 PROCEDURE — 93010 ELECTROCARDIOGRAM REPORT: CPT

## 2023-09-26 RX ORDER — SODIUM CHLORIDE 9 MG/ML
3 INJECTION INTRAMUSCULAR; INTRAVENOUS; SUBCUTANEOUS ONCE
Refills: 0 | Status: DISCONTINUED | OUTPATIENT
Start: 2023-10-03 | End: 2023-10-17

## 2023-09-26 NOTE — H&P PST ADULT - PROBLEM SELECTOR PLAN 4
finger stick on DOP Asked the pt not to take DM meds on the DOP, Stop Farxiga 3 days prior, last dose is on 9/29/23. Asked the pt not to take DM meds on the DOP( Tradjenta, Metformin

## 2023-09-26 NOTE — H&P PST ADULT - HISTORY OF PRESENT ILLNESS
Pt is a 83 years old female, Tajik speaking,  seen today pre-op for Y90 Mapping procedure with anesthesia.  Pt past medical history of cirrhosis, anemia, HLD, HTN, GERD, diabetes, CAD(Cardiac stents on Plavix),  liver cell carcinoma. Pt presented with mild abdominal pain and bloating beginning in October 2022. MRI performed at that time showed 2 masses within the liver,  PT reports abdominal pain has improved since that time. Patient denies encephalopathy, ascites, report 15 lbs weight loss within the last 5 months, occasional left side flank pain which is relief by Tylenol and rest.  Pt seen today for a scheduled radiology procedure on 3/14/23 by Dr. Reinoso   Pt is a 83 years old female, Pashto speaking,  seen today pre-op for Y90 Mapping procedure with anesthesia.  Pt past medical history of cirrhosis, anemia, HLD, HTN, GERD, diabetes, CAD(Cardiac stents on Plavix),  liver cell carcinoma. Pt presented with mild abdominal pain and bloating beginning in October 2022. MRI performed at that time showed 2 masses within the liver,  PT reports abdominal pain has improved since that time. Patient denies encephalopathy, ascites, report 15 lbs weight loss within the last 5 months, occasional left side flank pain which is relief by Tylenol and rest.  Pt seen today in PST for Y 90 mapping with anesthesia by Dr. Reinoso    83 years old female, [place] speaking,  seen today pre-op for Y-90 Mapping procedure with anesthesia by dr. Reinoso on 10/3/23. Pt with  past medical history of cirrhosis, anemia, HLD, HTN, GERD, diabetes, CAD(Cardiac stents on Plavix),  liver cell carcinoma. Pt had  mild abdominal pain and bloating beginning in October 2022. MRI performed at that time showed 2 masses within the liver,  PT reports abdominal pain has improved since that time. She feel good today denied any complaints, Medical and cardiac clearance pending

## 2023-09-26 NOTE — H&P PST ADULT - OTHER CARE PROVIDERS
Dr. Javier Akhtar [phone] PCP, cardiologist Dr. Gonzalez [phone] will see PCP Dr. Javier Akhtar [phone] PCP for Medical Clearance pending cardiologist Dr. Gonzalez [phone] for cardiac clearance pending will see PCP Dr. Albin Akhtar [phone] PCP for Medical Clearance pending cardiologist Dr. Gonzalez [phone] for cardiac clearance pending

## 2023-09-26 NOTE — H&P PST ADULT - LYMPHATIC
Subjective:      Patient ID: Flory Cam is a 75 y.o. female.    Chief Complaint: Post-op Evaluation and Pain of the Right Hand      HPI: Flory Cam is a 75-year-old female in clinic today for postoperative follow-up.  Patient is 3 weeks status post excision of a mass to the right hand.  Patient still has retention sutures in place.  No new complaints at this time    Past Medical History:   Diagnosis Date    Acute coronary syndrome     Anxiety     Bilateral carotid artery stenosis 11/17/2015    Chronic pain     Colon polyp     Coronary artery disease     GERD (gastroesophageal reflux disease)     Hyperlipidemia     Hypertension     Hypothyroidism     Low back pain     Lupus     Osteoporosis     Poor circulation     Pre-operative clearance 7/12/2019    PVD (peripheral vascular disease)     S/P peripheral artery angioplasty 02/13/2014    SCCA (squamous cell carcinoma) of skin 10/2019    Dr. ZANDRA Rivas--oncology    Skin disease        Current Outpatient Medications:     albuterol (PROVENTIL/VENTOLIN HFA) 90 mcg/actuation inhaler, Inhale 2 puffs into the lungs every 6 (six) hours as needed for Wheezing., Disp: 18 g, Rfl: 3    amLODIPine (NORVASC) 10 MG tablet, Take 1 tablet (10 mg total) by mouth once daily., Disp: 90 tablet, Rfl: 3    atorvastatin (LIPITOR) 80 MG tablet, Take 1 tablet (80 mg total) by mouth once daily., Disp: 90 tablet, Rfl: 3    citalopram (CELEXA) 10 MG tablet, Take 10 mg by mouth once daily., Disp: , Rfl:     clopidogreL (PLAVIX) 75 mg tablet, TAKE 1 TABLET(75 MG) BY MOUTH EVERY DAY, Disp: 90 tablet, Rfl: 1    ezetimibe (ZETIA) 10 mg tablet, TAKE 1 TABLET(10 MG) BY MOUTH EVERY DAY (Patient taking differently: Take 10 mg by mouth once daily.), Disp: 90 tablet, Rfl: 3    levothyroxine (SYNTHROID) 137 MCG Tab tablet, TAKE 1 TABLET BY MOUTH EVERY DAY, Disp: 90 tablet, Rfl: 3    ondansetron (ZOFRAN) 4 MG tablet, TAKE 1 TABLET(4 MG) BY MOUTH EVERY 8 HOURS AS NEEDED  "FOR NAUSEA, Disp: 15 tablet, Rfl: 0    oxyCODONE-acetaminophen (PERCOCET)  mg per tablet, Take 1 tablet by mouth., Disp: , Rfl:     prochlorperazine (COMPAZINE) 10 MG tablet, , Disp: , Rfl:     traZODone (DESYREL) 50 MG tablet, TAKE 1 TABLET BY MOUTH EVERY DAY AT NIGHT AS NEEDED FOR INSOMNIA, Disp: 30 tablet, Rfl: 3    valsartan (DIOVAN) 320 MG tablet, TAKE 1 TABLET(320 MG) BY MOUTH EVERY DAY, Disp: 90 tablet, Rfl: 3    aspirin (ECOTRIN) 81 MG EC tablet, Take 1 tablet (81 mg total) by mouth once daily., Disp: , Rfl: 0  Review of patient's allergies indicates:  No Known Allergies    BP (!) 150/75 (BP Location: Left arm, Patient Position: Sitting, BP Method: Medium (Automatic))   Pulse 63   Temp 98.3 °F (36.8 °C)   Ht 5' 6" (1.676 m)   Wt 88.2 kg (194 lb 7.1 oz)   BMI 31.38 kg/m²     ROS      Objective:    Ortho Exam   Right hand:  Retention sutures intact, wound margins well healed, no signs of infection  Scabs are overlying many of the sutures  Skin is very dry doubt  Mild edema  Mild TTP  ROM decreased secondary to swelling/stiffness  Motor exam normal  Sensation and pulses intact    GEN: Well developed, well nourished female. AAOX3. No acute distress.   Normocephalic, atraumatic.   AYESHA  Breathing unlabored.  Mood and affect appropriate.        Assessment:     Imaging:  No new imaging ordered today        1. Lichen planus          Plan:       Removed some of the patient's sutures today, but she may be.  As she reported that it was too painful.  She would like to come back next week to be seen by Dr. Culver.  Dr. Lai was in clinic today and evaluated the patient with me.  He suggest that the patient perform Epsom salt soaks daily to soften her scabs.  Patient will return to clinic in 1 week     Follow up in about 1 week (around 3/10/2022).          " No lymphadedenopathy

## 2023-09-26 NOTE — H&P PST ADULT - PROBLEM SELECTOR PLAN 1
Y-90 Mapping procedure with anesthesia by dr. Reinoso on 10/3/23. Medical and cardiac clearance pending

## 2023-09-26 NOTE — H&P PST ADULT - NSICDXPASTMEDICALHX_GEN_ALL_CORE_FT
PAST MEDICAL HISTORY:  Diabetes     GERD (gastroesophageal reflux disease)     HCC (hepatocellular carcinoma)     Heart murmur     History of cirrhosis of liver     Hypercholesterolemia     Hypertension     Hypothyroidism     Knee pain, left arthritis     PAST MEDICAL HISTORY:  CAD (coronary artery disease)     Diabetes     GERD (gastroesophageal reflux disease)     HCC (hepatocellular carcinoma)     Heart murmur     History of cirrhosis of liver     Hypercholesterolemia     Hypertension     Hypothyroidism

## 2023-09-26 NOTE — H&P PST ADULT - ASSESSMENT
CAPRINI VTE 2.0 SCORE [CLOT updated 2019]    AGE RELATED RISK FACTORS                                                       MOBILITY RELATED FACTORS  [ ] Age 41-60 years                                            (1 Point)                    [ ] Bed rest                                                        (1 Point)  [ ] Age: 61-74 years                                           (2 Points)                  [ ] Plaster cast                                                   (2 Points)  [ ] Age= 75 years                                              (3 Points)                    [ ] Bed bound for more than 72 hours                 (2 Points)    DISEASE RELATED RISK FACTORS                                               GENDER SPECIFIC FACTORS  [ ] Edema in the lower extremities                       (1 Point)              [ ] Pregnancy                                                     (1 Point)  [ ] Varicose veins                                               (1 Point)                     [ ] Post-partum < 6 weeks                                   (1 Point)             [ ] BMI > 25 Kg/m2                                            (1 Point)                     [ ] Hormonal therapy  or oral contraception          (1 Point)                 [ ] Sepsis (in the previous month)                        (1 Point)               [ ] History of pregnancy complications                 (1 point)  [ ] Pneumonia or serious lung disease                                               [ ] Unexplained or recurrent                     (1 Point)           (in the previous month)                               (1 Point)  [ ] Abnormal pulmonary function test                     (1 Point)                 SURGERY RELATED RISK FACTORS  [ ] Acute myocardial infarction                              (1 Point)               [ ]  Section                                             (1 Point)  [ ] Congestive heart failure (in the previous month)  (1 Point)      [ ] Minor surgery                                                  (1 Point)   [ ] Inflammatory bowel disease                             (1 Point)               [ ] Arthroscopic surgery                                        (2 Points)  [ ] Central venous access                                      (2 Points)                [ ] General surgery lasting more than 45 minutes (2 points)  [ ] Malignancy- Present or previous                   (2 Points)                [ ] Elective arthroplasty                                         (5 points)    [ ] Stroke (in the previous month)                          (5 Points)                                                                                                                                                           HEMATOLOGY RELATED FACTORS                                                 TRAUMA RELATED RISK FACTORS  [ ] Prior episodes of VTE                                     (3 Points)                [ ] Fracture of the hip, pelvis, or leg                       (5 Points)  [ ] Positive family history for VTE                         (3 Points)             [ ] Acute spinal cord injury (in the previous month)  (5 Points)  [ ] Prothrombin 77080 A                                     (3 Points)               [ ] Paralysis  (less than 1 month)                             (5 Points)  [ ] Factor V Leiden                                             (3 Points)                  [ ] Multiple Trauma within 1 month                        (5 Points)  [ ] Lupus anticoagulants                                     (3 Points)                                                           [ ] Anticardiolipin antibodies                               (3 Points)                                                       [ ] High homocysteine in the blood                      (3 Points)                                             [ ] Other congenital or acquired thrombophilia      (3 Points)                                                [ ] Heparin induced thrombocytopenia                  (3 Points)                                     Total Score [          ]  OPIOID RISK TOOL    KIRILL EACH BOX THAT APPLIES AND ADD TOTALS AT THE END    FAMILY HISTORY OF SUBSTANCE ABUSE                 FEMALE         MALE                                                Alcohol                             [  ]1 pt          [  ]3pts                                               Illegal Drugs                     [  ]2 pts        [  ]3pts                                               Rx Drugs                           [  ]4 pts        [  ]4 pts    PERSONAL HISTORY OF SUBSTANCE ABUSE                                                                                          Alcohol                             [  ]3 pts       [  ]3 pts                                               Illegal Drugs                     [  ]4 pts        [  ]4 pts                                               Rx Drugs                           [  ]5 pts        [  ]5 pts    AGE BETWEEN 16-45 YEARS                                      [  ]1 pt         [  ]1 pt    HISTORY OF PREADOLESCENT   SEXUAL ABUSE                                                             [  ]3 pts        [  ]0pts    PSYCHOLOGICAL DISEASE                     ADD, OCD, Bipolar, Schizophrenia        [  ]2 pts         [  ]2 pts                      Depression                                               [  ]1 pt           [  ]1 pt           SCORING TOTAL   (add numbers and type here)              (***)                                     A score of 3 or lower indicated LOW risk for future opioid abuse  A score of 4 to 7 indicated moderate risk for future opioid abuse  A score of 8 or higher indicates a high risk for opioid abuse  83 years old female, [place]ian speaking,  seen today pre-op for Y-90 Mapping procedure with anesthesia by dr. Reinoso on 10/3/23. Pt with  past medical history of cirrhosis, anemia, HLD, HTN, GERD, diabetes, CAD(Cardiac stents on Plavix),  liver cell carcinoma. Pt had  mild abdominal pain and bloating beginning in 2022. MRI performed at that time showed 2 masses within the liver,  PT reports abdominal pain has improved since that time. She feel good today denied any complaints, Medical and cardiac clearance pending     medications reviewed, instructions given on what medications to take and what not to take. Asked the patient to take the Blood pressure medication/ heart medication or any other important meds with a sip of water in the AM of surgery( Carvedilol, spironolactone hydralazine)   Stop Farxiga 3 days prior, last dose is on 23. Asked the pt not to take DM meds on the DOP( Tradjenta, Metformin) Asked the patient to consult with PCP/cardiologist about holding ASA/Plavix  and stop/Hold it accordingly, failure to do so may result in cancellation or postponement of your surgery, pt  agreed and verbalized understanding. Asked the pt not to take any NSAID's 5-7 days before surgery and told the pt Tylenol is okay to take for pain, pt verbalized understanding.     CAPRINI VTE 2.0 SCORE [CLOT updated 2019]    AGE RELATED RISK FACTORS                                                       MOBILITY RELATED FACTORS  [ ] Age 41-60 years                                            (1 Point)                    [ ] Bed rest                                                        (1 Point)  [ ] Age: 61-74 years                                           (2 Points)                  [ ] Plaster cast                                                   (2 Points)  [x ] Age= 75 years                                              (3 Points)                    [ ] Bed bound for more than 72 hours                 (2 Points)    DISEASE RELATED RISK FACTORS                                               GENDER SPECIFIC FACTORS  [ ] Edema in the lower extremities                       (1 Point)              [ ] Pregnancy                                                     (1 Point)  [ ] Varicose veins                                               (1 Point)                     [ ] Post-partum < 6 weeks                                   (1 Point)             x[ ] BMI > 25 Kg/m2                                            (1 Point)                     [ ] Hormonal therapy  or oral contraception          (1 Point)                 [ ] Sepsis (in the previous month)                        (1 Point)               [ ] History of pregnancy complications                 (1 point)  [ ] Pneumonia or serious lung disease                                               [ ] Unexplained or recurrent                     (1 Point)           (in the previous month)                               (1 Point)  [ ] Abnormal pulmonary function test                     (1 Point)                 SURGERY RELATED RISK FACTORS  [ ] Acute myocardial infarction                              (1 Point)               [ ]  Section                                             (1 Point)  [ ] Congestive heart failure (in the previous month)  (1 Point)      [ x] Minor surgery                                                  (1 Point)   [ ] Inflammatory bowel disease                             (1 Point)               [ ] Arthroscopic surgery                                        (2 Points)  [ ] Central venous access                                      (2 Points)                [ ] General surgery lasting more than 45 minutes (2 points)  [x ] Malignancy- Present or previous                   (2 Points)                [ ] Elective arthroplasty                                         (5 points)    [ ] Stroke (in the previous month)                          (5 Points)                                                                                                                                                           HEMATOLOGY RELATED FACTORS                                                 TRAUMA RELATED RISK FACTORS  [ ] Prior episodes of VTE                                     (3 Points)                [ ] Fracture of the hip, pelvis, or leg                       (5 Points)  [ ] Positive family history for VTE                         (3 Points)             [ ] Acute spinal cord injury (in the previous month)  (5 Points)  [ ] Prothrombin 73551 A                                     (3 Points)               [ ] Paralysis  (less than 1 month)                             (5 Points)  [ ] Factor V Leiden                                             (3 Points)                  [ ] Multiple Trauma within 1 month                        (5 Points)  [ ] Lupus anticoagulants                                     (3 Points)                                                           [ ] Anticardiolipin antibodies                               (3 Points)                                                       [ ] High homocysteine in the blood                      (3 Points)                                             [ ] Other congenital or acquired thrombophilia      (3 Points)                                                [ ] Heparin induced thrombocytopenia                  (3 Points)                                     Total Score [     7     ]  OPIOID RISK TOOL    KIRILL EACH BOX THAT APPLIES AND ADD TOTALS AT THE END    FAMILY HISTORY OF SUBSTANCE ABUSE                 FEMALE         MALE                                                Alcohol                             [  ]1 pt          [  ]3pts                                               Illegal Drugs                     [  ]2 pts        [  ]3pts                                               Rx Drugs                           [  ]4 pts        [  ]4 pts    PERSONAL HISTORY OF SUBSTANCE ABUSE                                                                                          Alcohol                             [  ]3 pts       [  ]3 pts                                               Illegal Drugs                     [  ]4 pts        [  ]4 pts                                               Rx Drugs                           [  ]5 pts        [  ]5 pts    AGE BETWEEN 16-45 YEARS                                      [  ]1 pt         [  ]1 pt    HISTORY OF PREADOLESCENT   SEXUAL ABUSE                                                             [  ]3 pts        [  ]0pts    PSYCHOLOGICAL DISEASE                     ADD, OCD, Bipolar, Schizophrenia        [  ]2 pts         [  ]2 pts                      Depression                                               [  ]1 pt           [  ]1 pt           SCORING TOTAL   (add numbers and type here)              ( 0)                                     A score of 3 or lower indicated LOW risk for future opioid abuse  A score of 4 to 7 indicated moderate risk for future opioid abuse  A score of 8 or higher indicates a high risk for opioid abuse

## 2023-10-03 ENCOUNTER — TRANSCRIPTION ENCOUNTER (OUTPATIENT)
Age: 83
End: 2023-10-03

## 2023-10-03 ENCOUNTER — OUTPATIENT (OUTPATIENT)
Dept: OUTPATIENT SERVICES | Facility: HOSPITAL | Age: 83
LOS: 1 days | End: 2023-10-03
Payer: MEDICARE

## 2023-10-03 ENCOUNTER — RESULT REVIEW (OUTPATIENT)
Age: 83
End: 2023-10-03

## 2023-10-03 VITALS
TEMPERATURE: 97 F | HEART RATE: 63 BPM | SYSTOLIC BLOOD PRESSURE: 160 MMHG | RESPIRATION RATE: 17 BRPM | DIASTOLIC BLOOD PRESSURE: 70 MMHG | OXYGEN SATURATION: 100 % | WEIGHT: 152.56 LBS | HEIGHT: 63 IN

## 2023-10-03 DIAGNOSIS — Z98.890 OTHER SPECIFIED POSTPROCEDURAL STATES: Chronic | ICD-10-CM

## 2023-10-03 DIAGNOSIS — Z00.00 ENCOUNTER FOR GENERAL ADULT MEDICAL EXAMINATION WITHOUT ABNORMAL FINDINGS: ICD-10-CM

## 2023-10-03 DIAGNOSIS — Z96.652 PRESENCE OF LEFT ARTIFICIAL KNEE JOINT: Chronic | ICD-10-CM

## 2023-10-03 LAB
ALBUMIN SERPL ELPH-MCNC: 3.8 G/DL — SIGNIFICANT CHANGE UP (ref 3.3–5.2)
ALP SERPL-CCNC: 164 U/L — HIGH (ref 40–120)
ALT FLD-CCNC: 17 U/L — SIGNIFICANT CHANGE UP
ANION GAP SERPL CALC-SCNC: 9 MMOL/L — SIGNIFICANT CHANGE UP (ref 5–17)
AST SERPL-CCNC: 27 U/L — SIGNIFICANT CHANGE UP
BILIRUB SERPL-MCNC: 0.7 MG/DL — SIGNIFICANT CHANGE UP (ref 0.4–2)
BUN SERPL-MCNC: 17.2 MG/DL — SIGNIFICANT CHANGE UP (ref 8–20)
CALCIUM SERPL-MCNC: 10.1 MG/DL — SIGNIFICANT CHANGE UP (ref 8.4–10.5)
CHLORIDE SERPL-SCNC: 100 MMOL/L — SIGNIFICANT CHANGE UP (ref 96–108)
CO2 SERPL-SCNC: 25 MMOL/L — SIGNIFICANT CHANGE UP (ref 22–29)
CREAT SERPL-MCNC: 0.92 MG/DL — SIGNIFICANT CHANGE UP (ref 0.5–1.3)
EGFR: 61 ML/MIN/1.73M2 — SIGNIFICANT CHANGE UP
GLUCOSE BLDC GLUCOMTR-MCNC: 148 MG/DL — HIGH (ref 70–99)
GLUCOSE SERPL-MCNC: 161 MG/DL — HIGH (ref 70–99)
POTASSIUM SERPL-MCNC: 4.1 MMOL/L — SIGNIFICANT CHANGE UP (ref 3.5–5.3)
POTASSIUM SERPL-SCNC: 4.1 MMOL/L — SIGNIFICANT CHANGE UP (ref 3.5–5.3)
PROT SERPL-MCNC: 7.6 G/DL — SIGNIFICANT CHANGE UP (ref 6.6–8.7)
SODIUM SERPL-SCNC: 134 MMOL/L — LOW (ref 135–145)

## 2023-10-03 PROCEDURE — 82962 GLUCOSE BLOOD TEST: CPT

## 2023-10-03 PROCEDURE — A9541: CPT

## 2023-10-03 PROCEDURE — A9540: CPT

## 2023-10-03 PROCEDURE — 78201 LIVER IMAGING STATIC ONLY: CPT | Mod: 26

## 2023-10-03 PROCEDURE — G0463: CPT

## 2023-10-03 PROCEDURE — 77290 THER RAD SIMULAJ FIELD CPLX: CPT | Mod: 26

## 2023-10-03 PROCEDURE — 80053 COMPREHEN METABOLIC PANEL: CPT

## 2023-10-03 PROCEDURE — 93005 ELECTROCARDIOGRAM TRACING: CPT

## 2023-10-03 PROCEDURE — 78201 LIVER IMAGING STATIC ONLY: CPT

## 2023-10-03 PROCEDURE — 75774 ARTERY X-RAY EACH VESSEL: CPT | Mod: 26,59

## 2023-10-03 PROCEDURE — 78803 RP LOCLZJ TUM SPECT 1 AREA: CPT

## 2023-10-03 PROCEDURE — 75726 ARTERY X-RAYS ABDOMEN: CPT

## 2023-10-03 PROCEDURE — 36247 INS CATH ABD/L-EXT ART 3RD: CPT

## 2023-10-03 PROCEDURE — 75894 X-RAYS TRANSCATH THERAPY: CPT

## 2023-10-03 PROCEDURE — 36415 COLL VENOUS BLD VENIPUNCTURE: CPT

## 2023-10-03 PROCEDURE — 76942 ECHO GUIDE FOR BIOPSY: CPT

## 2023-10-03 PROCEDURE — 76380 CAT SCAN FOLLOW-UP STUDY: CPT | Mod: 26

## 2023-10-03 PROCEDURE — 76937 US GUIDE VASCULAR ACCESS: CPT | Mod: 26

## 2023-10-03 PROCEDURE — 37243 VASC EMBOLIZE/OCCLUDE ORGAN: CPT

## 2023-10-03 PROCEDURE — 77300 RADIATION THERAPY DOSE PLAN: CPT | Mod: 26

## 2023-10-03 PROCEDURE — 75726 ARTERY X-RAYS ABDOMEN: CPT | Mod: 26

## 2023-10-03 PROCEDURE — 75774 ARTERY X-RAY EACH VESSEL: CPT

## 2023-10-03 PROCEDURE — 77263 THER RADIOLOGY TX PLNG CPLX: CPT

## 2023-10-03 RX ORDER — FENTANYL CITRATE 50 UG/ML
25 INJECTION INTRAVENOUS ONCE
Refills: 0 | Status: DISCONTINUED | OUTPATIENT
Start: 2023-10-03 | End: 2023-10-03

## 2023-10-03 RX ORDER — ONDANSETRON 8 MG/1
4 TABLET, FILM COATED ORAL ONCE
Refills: 0 | Status: DISCONTINUED | OUTPATIENT
Start: 2023-10-03 | End: 2023-10-17

## 2023-10-03 RX ORDER — SODIUM CHLORIDE 9 MG/ML
1000 INJECTION, SOLUTION INTRAVENOUS
Refills: 0 | Status: DISCONTINUED | OUTPATIENT
Start: 2023-10-03 | End: 2023-10-17

## 2023-10-03 RX ORDER — PANTOPRAZOLE SODIUM 20 MG/1
1 TABLET, DELAYED RELEASE ORAL
Qty: 30 | Refills: 0
Start: 2023-10-03 | End: 2023-11-01

## 2023-10-03 NOTE — ASU DISCHARGE PLAN (ADULT/PEDIATRIC) - NS MD DC FALL RISK RISK
For information on Fall & Injury Prevention, visit: [email or web address] OR  [email or web address] OR  https://www.cdc.gov/steadi/patient.html

## 2023-10-03 NOTE — ASU DISCHARGE PLAN (ADULT/PEDIATRIC) - ASU DC SPECIAL INSTRUCTIONSFT
Post Y90 Mapping Angiogram Instructions  Initial Discharge Instructions  - You underwent a mapping angiogram today in preparation for planned radioembolization to treat your liver tumor(s).   - You may have mild abdominal pain, nausea, loss of appetite, fatigue, and/or low grade fever.  These are normal after your procedure and they should resolve within 3-5 days.  Please call Interventional Radiology if you have a fever > 101.0 F.  If you have persistent nausea, contact Interventional Radiology and we can prescribe anti-nausea medication.  - Monitor right groin site for symptoms of bleeding, hardness underneath the incision site, bruising, numbness, intense pain, or inability to move.  If you have any of these symptoms, contact Interventional Radiology and seek immediate medical attention  - Please report chills, temperature > 101.0F, persistent nausea or vomiting, severe abdominal or leg pain, confusion, yellowing of the skin, or abdominal swelling.  - You may shower in 24 hours. You may resume normal activity in 24 hours.  - Do not perform any heavy lifting or put tension on the area for the next 48 hours.  - You may resume your normal diet.  - You may resume your normal medications however you should wait 24 hours before restarting aspirin, plavix, or blood thinners.  - It is normal to experience some pain over the site for the next few days. You may take apply ice to the area (20 minutes on, 20 minutes off) and take Motrin for that pain. Do not take more frequently than every 6 hours.  - You were given conscious sedation which may make you drowsy, therefore you need someone to stay with you until the morning following the procedure.  - Do not drive, engage in heavy lifting or strenuous activity, or drink any alcoholic beverages for the next 24 hours.     Next Steps  - You should have received a prescription for medications to take prior to your radioembolization procedure.  You are to start your PPI (Nexium, Prilosec, Protonix) 5 days prior to radioembolization treatment day and continue until all medication is completed.   - The Medrol dose pack will be started the day after treatment. If your are diabetic, you will not start this medication.  If you have not received a prescription for the medication, please notify the IR team at [phone].      Notify your primary physician and/or Interventional Radiology IMMEDIATELY if you experience any of the following       - Fever of 101.0F       - Chills or Rigors/ Shakes       - Swelling, Hardness, Redness, or Bleeding at the Groin Site       - Worsening Abdominal or Leg Pain       - Worsening Abdominal Swelling       - Skin Yellowing       - Lightheadedness and/or Dizziness Upon Standing       - Chest Pain/ Tightness       - Shortness of Breath       - Difficulty Walking    If you have a problem that you believe requires IMMEDIATE attention, please go to your NEAREST Emergency Room. If you believe your problem can safely wait until you speak to a physician, please call Interventional Radiology for any concerns.    During Normal Weekday Business Hours- You can contact the Interventional Radiology department during normal business hours via telephone, [phone].  During Evenings and Weekends- If you need to contact Interventional Radiology during off hours, do so by calling the hospital and requesting to be connected to the Interventional Radiologist on call.

## 2023-10-03 NOTE — PROGRESS NOTE ADULT - SUBJECTIVE AND OBJECTIVE BOX
IR Post Procedure Note    Diagnosis: HCC    Procedure: Y90 Mapping    : Genaro Reinoso MD    Contrast: 70cc    Anesthesia: 1% Lidocaine Subcutaneous, Sedation administered by Anesthesiology    Estimated Blood Loss: Less than 10cc    Specimens: None    Complications: No Immediate Complications    Findings & Plan: RCFA Access, Sos to celiac, Microcath to RHA, angiogram and CBCT shows multiple small hervasc lesions in R lobe, MAA admin in R lobe, closure w angioseal      Please call Interventional Radiology with any questions, concerns, or issues.

## 2023-10-11 ENCOUNTER — APPOINTMENT (OUTPATIENT)
Dept: GASTROENTEROLOGY | Facility: CLINIC | Age: 83
End: 2023-10-11
Payer: MEDICARE

## 2023-10-11 VITALS
WEIGHT: 152 LBS | OXYGEN SATURATION: 98 % | SYSTOLIC BLOOD PRESSURE: 130 MMHG | HEART RATE: 71 BPM | HEIGHT: 62 IN | RESPIRATION RATE: 14 BRPM | BODY MASS INDEX: 27.97 KG/M2 | DIASTOLIC BLOOD PRESSURE: 80 MMHG

## 2023-10-11 PROBLEM — I25.10 ATHEROSCLEROTIC HEART DISEASE OF NATIVE CORONARY ARTERY WITHOUT ANGINA PECTORIS: Chronic | Status: ACTIVE | Noted: 2023-09-26

## 2023-10-11 PROCEDURE — 99215 OFFICE O/P EST HI 40 MIN: CPT

## 2023-10-11 RX ORDER — LACTULOSE 10 G/15ML
20 SOLUTION ORAL TWICE DAILY
Qty: 1 | Refills: 2 | Status: DISCONTINUED | COMMUNITY
Start: 2022-12-01 | End: 2023-10-11

## 2023-10-11 RX ORDER — SIMETHICONE 125 MG/1
125 TABLET, CHEWABLE ORAL
Qty: 120 | Refills: 10 | Status: ACTIVE | COMMUNITY
Start: 2023-10-11 | End: 1900-01-01

## 2023-10-11 RX ORDER — FUROSEMIDE 20 MG/1
20 TABLET ORAL
Refills: 0 | Status: DISCONTINUED | COMMUNITY
End: 2023-10-11

## 2023-10-24 ENCOUNTER — TRANSCRIPTION ENCOUNTER (OUTPATIENT)
Age: 83
End: 2023-10-24

## 2023-10-24 ENCOUNTER — RESULT REVIEW (OUTPATIENT)
Age: 83
End: 2023-10-24

## 2023-10-24 ENCOUNTER — OUTPATIENT (OUTPATIENT)
Dept: OUTPATIENT SERVICES | Facility: HOSPITAL | Age: 83
LOS: 1 days | End: 2023-10-24
Payer: MEDICARE

## 2023-10-24 VITALS
HEART RATE: 69 BPM | OXYGEN SATURATION: 96 % | TEMPERATURE: 98 F | SYSTOLIC BLOOD PRESSURE: 152 MMHG | WEIGHT: 151.9 LBS | DIASTOLIC BLOOD PRESSURE: 67 MMHG | RESPIRATION RATE: 16 BRPM | HEIGHT: 63 IN

## 2023-10-24 DIAGNOSIS — Z98.890 OTHER SPECIFIED POSTPROCEDURAL STATES: Chronic | ICD-10-CM

## 2023-10-24 DIAGNOSIS — Z00.00 ENCOUNTER FOR GENERAL ADULT MEDICAL EXAMINATION WITHOUT ABNORMAL FINDINGS: ICD-10-CM

## 2023-10-24 DIAGNOSIS — Z96.652 PRESENCE OF LEFT ARTIFICIAL KNEE JOINT: Chronic | ICD-10-CM

## 2023-10-24 LAB
GLUCOSE BLDC GLUCOMTR-MCNC: 150 MG/DL — HIGH (ref 70–99)
GLUCOSE BLDC GLUCOMTR-MCNC: 150 MG/DL — HIGH (ref 70–99)

## 2023-10-24 PROCEDURE — 36247 INS CATH ABD/L-EXT ART 3RD: CPT

## 2023-10-24 PROCEDURE — 75774 ARTERY X-RAY EACH VESSEL: CPT

## 2023-10-24 PROCEDURE — 75894 X-RAYS TRANSCATH THERAPY: CPT

## 2023-10-24 PROCEDURE — 82962 GLUCOSE BLOOD TEST: CPT

## 2023-10-24 PROCEDURE — 78803 RP LOCLZJ TUM SPECT 1 AREA: CPT | Mod: 26

## 2023-10-24 PROCEDURE — 78803 RP LOCLZJ TUM SPECT 1 AREA: CPT

## 2023-10-24 PROCEDURE — 37243 VASC EMBOLIZE/OCCLUDE ORGAN: CPT

## 2023-10-24 PROCEDURE — C2616: CPT

## 2023-10-24 PROCEDURE — 76380 CAT SCAN FOLLOW-UP STUDY: CPT | Mod: 26,59

## 2023-10-24 PROCEDURE — 76937 US GUIDE VASCULAR ACCESS: CPT | Mod: 26

## 2023-10-24 PROCEDURE — 75726 ARTERY X-RAYS ABDOMEN: CPT

## 2023-10-24 PROCEDURE — 76942 ECHO GUIDE FOR BIOPSY: CPT

## 2023-10-24 PROCEDURE — 79445 NUCLEAR RX INTRA-ARTERIAL: CPT | Mod: 26

## 2023-10-24 RX ORDER — SPIRONOLACTONE 25 MG/1
1 TABLET, FILM COATED ORAL
Refills: 0 | DISCHARGE

## 2023-10-24 RX ORDER — AMPICILLIN SODIUM AND SULBACTAM SODIUM 250; 125 MG/ML; MG/ML
1.5 INJECTION, POWDER, FOR SUSPENSION INTRAMUSCULAR; INTRAVENOUS ONCE
Refills: 0 | Status: DISCONTINUED | OUTPATIENT
Start: 2023-10-24 | End: 2023-11-07

## 2023-10-24 RX ORDER — LEVOFLOXACIN 5 MG/ML
1 INJECTION, SOLUTION INTRAVENOUS
Qty: 10 | Refills: 0
Start: 2023-10-24 | End: 2023-11-02

## 2023-10-24 RX ORDER — HYDRALAZINE HCL 50 MG
1 TABLET ORAL
Qty: 0 | Refills: 0 | DISCHARGE

## 2023-10-24 RX ORDER — ASPIRIN/CALCIUM CARB/MAGNESIUM 324 MG
1 TABLET ORAL
Qty: 0 | Refills: 0 | DISCHARGE

## 2023-10-24 RX ORDER — DAPAGLIFLOZIN 10 MG/1
1 TABLET, FILM COATED ORAL
Qty: 0 | Refills: 0 | DISCHARGE

## 2023-10-24 RX ORDER — PANTOPRAZOLE SODIUM 20 MG/1
1 TABLET, DELAYED RELEASE ORAL
Qty: 30 | Refills: 0
Start: 2023-10-24 | End: 2023-11-22

## 2023-10-24 RX ORDER — CLOPIDOGREL BISULFATE 75 MG/1
1 TABLET, FILM COATED ORAL
Refills: 0 | DISCHARGE

## 2023-10-24 NOTE — ASU DISCHARGE PLAN (ADULT/PEDIATRIC) - ASU DC SPECIAL INSTRUCTIONSFT
Post Y90 Selective Internal Radiation Therapy Instructions  Initial Discharge Instructions  - You underwent a Y90 radioembolization to treat your liver tumor by Dr. Reinoso  - You may have mild abdominal pain, nausea, loss of appetite, fatigue, and/or low grade fever.  These are normal after your procedure and they should resolve within 3-5 days.  Please call Interventional Radiology if you have a fever > 101.0 F.  If you have persistent nausea, contact Interventional Radiology and we can prescribe anti-nausea medication.  - Monitor right groin site for symptoms of bleeding, hardness underneath the incision site, bruising, numbness, intense pain, or inability to move.  If you have any of these symptoms, contact Interventional Radiology and seek immediate medical attention  - Please report chills, temperature > 101.0F, persistent nausea or vomiting, severe abdominal or leg pain, confusion, yellowing of the skin, or abdominal swelling.  - Please refer to the "Radiation Safety Instructions" that were provided.  Please adhere to them for the 72 hours after your procedure.   - Start Medrol dose pack the day after your Y90 radioembilization treatment (unless otherwise instructed).  - Continue your PPI (Nexium, Prilosec, Protonix) for 30 days post procedure.  - You may shower in 24 hours. You may resume normal activity in 24 hours.  - Do not perform any heavy lifting or put tension on the area for the next 48 hours.  - You may resume your normal diet.  - You may resume your normal medications however you should wait 24 hours before restarting aspirin, plavix, or blood thinners.  - It is normal to experience some pain over the site for the next few days. You may take apply ice to the area (20 minutes on, 20 minutes off) and take Motrin for that pain. Do not take more frequently than every 6 hours.  - You were given conscious sedation which may make you drowsy, therefore you need someone to stay with you until the morning following the procedure.  - Do not drive, engage in heavy lifting or strenuous activity, or drink any alcoholic beverages for the next 24 hours.     Next Steps  - A follow up phone call will be performed the day after the procedure.   - Blood work is to be performed 2 weeks after your procedure (you will be given a prescription).  You can locate the closest [facility] lab by calling [phone]. If you have labwork performed at a NON-[facility] lab, please request that the results be faxed to [phone]  - Please call the Radiology Department at [phone] to schedule a follow up visit with Dr. Reinoso in 1 month.   - Follow up with your hepatologist or oncologist in 2 weeks.   - Post procedure imaging study,CT or MRI, is to be performed 3 months post procedure.  You will be given a prescription for this at your initial 1 month follow up visit. If needed, our booking office will obtain authorization from your insurance company.    Notify your primary physician and/or Interventional Radiology IMMEDIATELY if you experience any of the following       - Fever of 101.0F       - Chills or Rigors/ Shakes       - Swelling, Hardness, Redness, or Bleeding at the Groin Site       - Worsening Abdominal or Leg Pain       - Worsening Abdominal Swelling       - Skin Yellowing       - Lightheadedness and/or Dizziness Upon Standing       - Chest Pain/ Tightness       - Shortness of Breath       - Difficulty Walking    If you have a problem that you believe requires IMMEDIATE attention, please go to your NEAREST Emergency Room. If you believe your problem can safely wait until you speak to a physician, please call Interventional Radiology for any concerns.    During Normal Weekday Business Hours- You can contact the Interventional Radiology department during normal business hours via telephone, [phone].  During Evenings and Weekends- If you need to contact Interventional Radiology during off hours, do so by calling the hospital and requesting to be connected to the Interventional Radiologist on call.    Radiation Safety Discharge Information After Y90 Selective Internal Radiation Therapy Treatment    - Patients can resume normal contact with adult family members.  - For the next 72 hours, avoid prolonged close contact with small children or pregnant individuals (Maintain distance of more than 3 feet).  - If you have to see a physician or go to the ER, inform them of your Y90 treatment to your liver. Treatment should not be delayed based on recent Y90 treatment.

## 2023-10-24 NOTE — PRE-OP CHECKLIST - PATIENT'S PERSONAL PROPERTY REMOVED
Is the patient currently in the state of [place]? YES    Visit mode:VIDEO    If the visit is dropped, the patient can be reconnected by: VIDEO VISIT: Send to e-mail at: [email or web address]    Will anyone else be joining the visit? YES: How would they like to receive their invitation? Text to cell phone: Will join through [employer]        How would you like to obtain your AVS? MyChart    Are changes needed to the allergy or medication list? NO    Reason for visit: RECHECK          
none

## 2023-10-24 NOTE — PROGRESS NOTE ADULT - SUBJECTIVE AND OBJECTIVE BOX
IR Post Procedure Note    Diagnosis: HCC    Procedure: Y90 Admin    : Genaro Reinoso MD    Contrast: 50cc    Anesthesia: 1% Lidocaine Subcutaneous, Sedation administered by Anesthesiology    Estimated Blood Loss: Less than 10cc    Complications: No Immediate Complications    Findings & Plan: RCFA Access, Sos to celiac, Y90 admin to RHA X 2 w appropriate disposal, hemostasis w compression, tolerated procedure well.      Please call Interventional Radiology with any questions, concerns, or issues.

## 2023-11-14 ENCOUNTER — LABORATORY RESULT (OUTPATIENT)
Age: 83
End: 2023-11-14

## 2023-11-17 LAB
AFP-TM SERPL-MCNC: 453 NG/ML
ALBUMIN SERPL ELPH-MCNC: 3.5 G/DL
ALP BLD-CCNC: 220 U/L
ALT SERPL-CCNC: 30 U/L
ANION GAP SERPL CALC-SCNC: 15 MMOL/L
AST SERPL-CCNC: 61 U/L
BASOPHILS # BLD AUTO: 0 K/UL
BASOPHILS NFR BLD AUTO: 0 %
BILIRUB INDIRECT SERPL-MCNC: 0.4 MG/DL
BILIRUB SERPL-MCNC: 0.9 MG/DL
BUN SERPL-MCNC: 19 MG/DL
CALCIUM SERPL-MCNC: 10.3 MG/DL
CHLORIDE SERPL-SCNC: 100 MMOL/L
CO2 SERPL-SCNC: 23 MMOL/L
CREAT SERPL-MCNC: 0.91 MG/DL
EGFR: 62 ML/MIN/1.73M2
EOSINOPHIL # BLD AUTO: 0.11 K/UL
EOSINOPHIL NFR BLD AUTO: 1.7 %
GLUCOSE SERPL-MCNC: 134 MG/DL
HCT VFR BLD CALC: 32.3 %
HGB BLD-MCNC: 10.2 G/DL
INR PPP: 1.24 RATIO
LYMPHOCYTES # BLD AUTO: 0.51 K/UL
LYMPHOCYTES NFR BLD AUTO: 7.9 %
MAN DIFF?: NORMAL
MCHC RBC-ENTMCNC: 29.8 PG
MCHC RBC-ENTMCNC: 31.6 GM/DL
MCV RBC AUTO: 94.4 FL
MONOCYTES # BLD AUTO: 0.51 K/UL
MONOCYTES NFR BLD AUTO: 7.9 %
NEUTROPHILS # BLD AUTO: 5.34 K/UL
NEUTROPHILS NFR BLD AUTO: 82.5 %
PLATELET # BLD AUTO: 138 K/UL
POTASSIUM SERPL-SCNC: 5 MMOL/L
POTASSIUM SERPL-SCNC: 5.9 MMOL/L
PROT SERPL-MCNC: 7.3 G/DL
PT BLD: 13.9 SEC
RBC # BLD: 3.42 M/UL
RBC # FLD: 14.7 %
SODIUM SERPL-SCNC: 138 MMOL/L
WBC # FLD AUTO: 6.47 K/UL

## 2023-11-30 NOTE — ED ADULT NURSE NOTE - NSIMPLEMENTINTERV_GEN_ALL_ED
Medication(s) to Refill:   Requested Prescriptions     Pending Prescriptions Disp Refills    FLUOXETINE HCL 40 MG Oral Cap [Pharmacy Med Name: FLUOXETINE 40MG CAPSULES] 90 capsule 0     Sig: TAKE 1 CAPSULE(40 MG) BY MOUTH DAILY         Reason for Medication Refill being sent to Provider / Reason Protocol Failed:  [] 90 day refill has already been granted  [] Blood Pressure out of range  [] Labs Abnormal/over due  [] Medication not previously prescribed by Provider  [x] Non-Protocol Medication  [] Controlled Substance   [x] Due for appointment- no future appointment scheduled  [] No Follow up specified    Last Time Medication was Filled: 8/3/23     Last Office Visit with PCP: 1/26/23    When Patient was Due Back to the Office:  1 month  Future Appointments:  No future appointments.     Last Blood Pressures:  BP Readings from Last 2 Encounters:   01/26/23 122/72   01/14/22 112/70     Action taken:  [] Refill approved per protocol  [x] Routing to provider for approval
Implemented All Universal Safety Interventions:  [place] to call system. Call bell, personal items and telephone within reach. Instruct patient to call for assistance. Room bathroom lighting operational. Non-slip footwear when patient is off stretcher. Physically safe environment: no spills, clutter or unnecessary equipment. Stretcher in lowest position, wheels locked, appropriate side rails in place.

## 2023-12-06 NOTE — ED ADULT NURSE NOTE - CAS TRG GEN SKIN CONDITION
Reason for call:   [x] Refill   [] Prior Auth  [] Other:     Office:   [x] PCP/Provider - Brooke   [] Specialty/Provider -     Medication: Tradjenta     Dose/Frequency: 5mg QD     Quantity: 90    Pharmacy: [facility]     Does the patient have enough for 3 days?    [] Yes   [x] No - Send as HP to [facility]

## 2024-01-04 ENCOUNTER — APPOINTMENT (OUTPATIENT)
Dept: NEUROLOGY | Facility: CLINIC | Age: 84
End: 2024-01-04
Payer: MEDICARE

## 2024-01-04 VITALS — DIASTOLIC BLOOD PRESSURE: 70 MMHG | SYSTOLIC BLOOD PRESSURE: 128 MMHG

## 2024-01-04 DIAGNOSIS — Z86.39 PERSONAL HISTORY OF OTHER ENDOCRINE, NUTRITIONAL AND METABOLIC DISEASE: ICD-10-CM

## 2024-01-04 DIAGNOSIS — Z82.61 FAMILY HISTORY OF ARTHRITIS: ICD-10-CM

## 2024-01-04 DIAGNOSIS — R25.1 TREMOR, UNSPECIFIED: ICD-10-CM

## 2024-01-04 PROCEDURE — 99204 OFFICE O/P NEW MOD 45 MIN: CPT

## 2024-01-04 NOTE — CONSULT LETTER
[Dear  ___] : Dear  [unfilled], [Courtesy Letter:] : I had the pleasure of seeing your patient, [unfilled], in my office today. [Please see my note below.] : Please see my note below. [Consult Closing:] : Thank you very much for allowing me to participate in the care of this patient.  If you have any questions, please do not hesitate to contact me. [Sincerely,] : Sincerely, [FreeTextEntry3] : Kristofer Grewal MD.

## 2024-01-04 NOTE — ASSESSMENT
[FreeTextEntry1] : This is an 83-year-old woman referred for tremors. She has a history of cirrhosis and hepatocellular carcinoma. The tremor may be secondary to her hepatic disease. I would like to obtain an MRI of the brain given the history of hallucinations. I will also obtain a dopamine spectroscopy scan to assess for any abnormalities suggestive of Parkinson's disease.  I will see her back after the scans.

## 2024-01-04 NOTE — HISTORY OF PRESENT ILLNESS
[FreeTextEntry1] : I saw this patient in the office today. She presents with her daughter.  She has a history of cirrhosis and hepatocellular carcinoma. For the past 6 months or so she has noticed tremors of predominantly the left hand. This comes and goes. In addition her daughter notes that she has occasional hallucinations.

## 2024-01-04 NOTE — PHYSICAL EXAM
[General Appearance - Alert] : alert [General Appearance - In No Acute Distress] : in no acute distress [Affect] : the affect was normal [Memory Remote] : remote memory was not impaired [Cranial Nerves Optic (II)] : visual acuity intact bilaterally,  visual fields full to confrontation, pupils equal round and reactive to light [Cranial Nerves Oculomotor (III)] : extraocular motion intact [Cranial Nerves Trigeminal (V)] : facial sensation intact symmetrically [Cranial Nerves Facial (VII)] : face symmetrical [Cranial Nerves Vestibulocochlear (VIII)] : hearing was intact bilaterally [Cranial Nerves Glossopharyngeal (IX)] : tongue and palate midline [Cranial Nerves Accessory (XI - Cranial And Spinal)] : head turning and shoulder shrug symmetric [Cranial Nerves Hypoglossal (XII)] : there was no tongue deviation with protrusion [Motor Tone] : muscle tone was normal in all four extremities [Motor Strength] : muscle strength was normal in all four extremities [Sensation Tactile Decrease] : light touch was intact [Sensation Pain / Temperature Decrease] : pain and temperature was intact [Sensation Vibration Decrease] : vibration was intact [2+] : Ankle jerk left 2+ [Optic Disc Abnormality] : the optic disc were normal in size and color [FreeTextEntry1] : She has some subjective difficulty with short-term memory. [Dysarthria] : no dysarthria [Aphasia] : no dysphasia/aphasia [Romberg's Sign] : Romberg's sign was negtive [Limited Balance] : the patient's balance was impaired [Tremor] : no tremor present [Coordination - Dysmetria Impaired Finger-to-Nose Bilateral] : not present [Plantar Reflex Right Only] : normal on the right [Plantar Reflex Left Only] : normal on the left [FreeTextEntry8] : There is no tremor at rest or with intention today.  There is no cogwheeling at rest or with reinforcement.  Ambulation required a cane.

## 2024-01-18 NOTE — ED STATDOCS - CROS ED CONS ALL NEG
MARCUS HERE FOR MD VISIT  RV 6 months with CBC, iron studies before RV  LAB ORDERS MAILED TO PT   MD VISIT 7/18/24 @ 2PM  AVS PRINTED W/ INSTRUCTIONS AND GIVEN  TO PT ON EXIT   - - -

## 2024-01-20 ENCOUNTER — OUTPATIENT (OUTPATIENT)
Dept: OUTPATIENT SERVICES | Facility: HOSPITAL | Age: 84
LOS: 1 days | End: 2024-01-20
Payer: MEDICARE

## 2024-01-20 ENCOUNTER — APPOINTMENT (OUTPATIENT)
Dept: MRI IMAGING | Facility: CLINIC | Age: 84
End: 2024-01-20

## 2024-01-20 DIAGNOSIS — R25.1 TREMOR, UNSPECIFIED: ICD-10-CM

## 2024-01-20 DIAGNOSIS — Z98.890 OTHER SPECIFIED POSTPROCEDURAL STATES: Chronic | ICD-10-CM

## 2024-01-20 DIAGNOSIS — Z96.652 PRESENCE OF LEFT ARTIFICIAL KNEE JOINT: Chronic | ICD-10-CM

## 2024-01-20 PROCEDURE — 70551 MRI BRAIN STEM W/O DYE: CPT | Mod: 26

## 2024-01-25 ENCOUNTER — APPOINTMENT (OUTPATIENT)
Dept: NUCLEAR MEDICINE | Facility: CLINIC | Age: 84
End: 2024-01-25
Payer: MEDICARE

## 2024-01-25 ENCOUNTER — OUTPATIENT (OUTPATIENT)
Dept: OUTPATIENT SERVICES | Facility: HOSPITAL | Age: 84
LOS: 1 days | End: 2024-01-25

## 2024-01-25 DIAGNOSIS — Z98.890 OTHER SPECIFIED POSTPROCEDURAL STATES: Chronic | ICD-10-CM

## 2024-01-25 DIAGNOSIS — Z96.652 PRESENCE OF LEFT ARTIFICIAL KNEE JOINT: Chronic | ICD-10-CM

## 2024-01-25 DIAGNOSIS — Z00.8 ENCOUNTER FOR OTHER GENERAL EXAMINATION: ICD-10-CM

## 2024-01-25 PROCEDURE — 78803 RP LOCLZJ TUM SPECT 1 AREA: CPT | Mod: 26

## 2024-01-29 ENCOUNTER — OUTPATIENT (OUTPATIENT)
Dept: OUTPATIENT SERVICES | Facility: HOSPITAL | Age: 84
LOS: 1 days | End: 2024-01-29

## 2024-01-29 ENCOUNTER — APPOINTMENT (OUTPATIENT)
Dept: CT IMAGING | Facility: CLINIC | Age: 84
End: 2024-01-29
Payer: MEDICARE

## 2024-01-29 DIAGNOSIS — Z98.890 OTHER SPECIFIED POSTPROCEDURAL STATES: Chronic | ICD-10-CM

## 2024-01-29 DIAGNOSIS — Z00.8 ENCOUNTER FOR OTHER GENERAL EXAMINATION: ICD-10-CM

## 2024-01-29 DIAGNOSIS — Z96.652 PRESENCE OF LEFT ARTIFICIAL KNEE JOINT: Chronic | ICD-10-CM

## 2024-01-29 PROCEDURE — 71250 CT THORAX DX C-: CPT | Mod: 26

## 2024-02-22 ENCOUNTER — APPOINTMENT (OUTPATIENT)
Dept: GASTROENTEROLOGY | Facility: CLINIC | Age: 84
End: 2024-02-22
Payer: MEDICARE

## 2024-02-22 VITALS
HEIGHT: 62 IN | BODY MASS INDEX: 27.97 KG/M2 | WEIGHT: 152 LBS | SYSTOLIC BLOOD PRESSURE: 140 MMHG | OXYGEN SATURATION: 98 % | RESPIRATION RATE: 16 BRPM | DIASTOLIC BLOOD PRESSURE: 80 MMHG | HEART RATE: 70 BPM

## 2024-02-22 PROCEDURE — 99215 OFFICE O/P EST HI 40 MIN: CPT

## 2024-02-22 NOTE — HISTORY OF PRESENT ILLNESS
[MELD Score: ___] : MELD Score is [unfilled] [FreeTextEntry1] : WILMER CONORY is an 83 year old female with a PMH significant for Cirrhosis, HCC s/p Y90 (3/31/23), HLD, HTN, DM, and CAD s/p stent.  2/22/24 Records reviewed, including notes, labs, imaging, etc. No new liver-related complaints like hematemesis melena jaundice. No hospitalizations or medication changes. She is status post Y90 embolization around the end of October since the last visit. She endorses no complications postprocedure and has been generally doing well though she increasingly notes loss of appetite. Previsit labs done on January 16, 2024 at [facility] show significantly elevated AFP of 1952. AST 39 ALT 22 total bili 0.7 alk phos 198.   10/11/23: Pt presents for f/u visit, accompanied by her daughter. MRI Abdomen w/wo IVC (8/5/23) - stable right and left hepatic lobe radioembolization cavities (LR-TR), several LI-RADS 5 lesions, largest measuring 1.3 cm along w/numerous additions LI-RADS 3 and 4 observations. . She was seen by IR Dr. Reinoso and is scheduled for whole right lobe radio embolization on 10/24/23.   7/10/23: Imaging reviewed w/pt and her daughter. MR ABDOMEN WAW IC (6/30/23) - Cirrhosis. Status post bilobar radioembolization. Segment 6/7 treated sites nonviable, with equivocal enhancement in the left lobe, likely due to posttreatment change. There are now numerous small hypervascular foci in segment 8 with T2 hyperintensity and no washout or capsule (LR-4) measuring up to 1.1 cm. Additionally, several subcentimeter LR-3 bilobar hypervascular foci lack T2 hyperintensity and washout with representative central 8 mm focus adjacent to the IVC in segment 8 (20, 18) and 6 mm focus in segment 3 (20, 34).  4/11/23: Complains of weight loss. Complains of anorexia States this morning she was dizzy upon suddenly getting up from a sitting position No fevers. Constipated : Stopped lactulose No hematemesis melena abdominal pain abdominal distention.  12/1/22: She presents today for evaluation of newly diagnosed Cirrhosis and imaging concerning for multifocal HCC. Pt presented to [facility] on 10/16/22 with complaints of epigastric pain. During workup, pt was found of have cirrhotic liver and lesions suspicious for HCC on imaging. ACS ruled out. Pt went on to have additional imaging which further indicated HCC , 2 lesions, with cirrhotic liver. Additional workup done indicated HBV core antibody positive, surface antigen indeterminate, core IgM nonreactive, Be antibody nonreactive. HAV antibody positive but IgM negative indicated past resolved infection. Labs done during admission - bilirubin 0.3, AST 73, ALT 53, , INR 1.3, , , Iron serum 30, ferritin 18 and transferrin saturation 7%.  CT Angio Abd Pelv w/wo IC 10/16/22 - Hypervascular lesions seen in liver, at least one of which is typical for a flash filling hemangioma. A lesion within the right hepatic lobe demonstrates washout, suspicious for hepatocellular carcinoma. Additional subcentimeter hypervascular lesion of left hepatic lobe is indeterminate. Confirmation with multiphasic hepatic protocol MRI or CT is advised for more definitive evaluation. Cirrhosis. Atherosclerotic changes of the arterial structures, with 50-70% stenosis  of the origins of the celiac axis and SMA. No mesenteric venous occlusive disease.  MRI Abdomen w/IC 10/21/22 - LIVER: Liver is shrunken with prominence of the left lobe and caudate lobe and a subtle nodular contour, compatible with cirrhosis. There is a 2.2 x 2.0 cm mildly T2 hyperintense lesion at the junction of segments 6 and 7 which demonstrates restricted diffusion. Dynamic postcontrast imaging is markedly degraded by respiratory motion artifact, however there is evidence of early enhancement and washout. These findings are compatible with HCC. There is a similar 2.7 x 1.9 cm mildly T2 bright subcapsular lesion at the junction of segments 2 and 3 posteriorly which also demonstrates early enhancement with washout. The remainder of the liver is mildly heterogeneous in signal and enhancement. The hepatic veins and portal vein are patent. BILE DUCTS: There is no intra or extra hepatic biliary duct dilatation. The common bile duct measures 3 mm.  Denies fatigue, malaise, arthralgias, myalgias, pruritus, recent infection, abdominal pain or distension, jaundice, hematemesis, hematochezia, dark urine, confusion, unintentional weight loss or gain. Denies alcohol consumption. Denies OTC/herbal supplements. Pt had EGD about 3 years ago but unknown results and where it was done. Pt reports a family of history of liver disease but is unsure what kind.  12/20/22: Since last visit, pt was hospitalized at [facility] for anemia on 12/6/22. H/H on admission was 7.2/23. She was given a blood transfusion. Current H/H 10.1/33.9. Pt is scheduled for EGD and Colonoscopy with Dr. June tomorrow. Pt currently has an URI and reports a lot of coughing and mucus production. She has regular BMs daily. Pt's daughter reports she has noticed her mom is confused but no more than usual. She continues to take Lactulose daily.

## 2024-02-22 NOTE — ASSESSMENT
[FreeTextEntry1] : WILMER CONROY is an 83 year old female with a PMH significant for Cirrhosis, HCC s/p Y90 (3/31/23), HLD, HTN, DM, and CAD s/p stent.  Cirrhosis -Etiology likely MARTIN -Disease progression of cirrhosis discussed, including but not limited to hepatocellular carcinoma, varices with or without bleeding, hepatic encephalopathy, ascites, liver failure and death. -Labs ordered today  HCC Status post another bout of Y90 in October 2023 after new lesions were found on imaging. aFP from January 2024 is significantly elevated at 1952. I have ordered 3-month post liver directed therapy imaging. I discussed the possibility of seeking follow-up with oncology once the CAT scan and the labs are back.  Patient daughter endorsed understanding.  They requested a local oncologist if need be.  -s/p Y90 (3/31/23) at [facility] -MR ABDOMEN WAW IC (6/30/23) - Cirrhosis. Status post bilobar radioembolization. Segment 6/7 treated sites nonviable, with equivocal enhancement in the left lobe, likely due to posttreatment change. There are now numerous small hypervascular foci in segment 8 with T2 hyperintensity and no washout or capsule (LR-4) measuring up to 1.1 cm. Additionally, several subcentimeter LR-3 bilobar hypervascular foci lack T2 hyperintensity and washout with representative central 8 mm focus adjacent to the IVC in segment 8 (20, 18) and 6 mm focus in segment 3 (20, 34). -MRI Abdomen w/wo IVC (8/5/23) - stable right and left hepatic lobe radioembolization cavities (LR-TR), several LI-RADS 5 lesions, largest measuring 1.3 cm along w/numerous additions LI-RADS 3 and 4 observations. .  -Discussed at  tumor board - plan for embolization. She was seen by IR Dr. Reinoso and is scheduled for whole right lobe radio embolization on 10/24/23.  Variceal Screening -EGD (1/5/23) - no varices -if pt experiences hematemesis, advised to go to ER immediately  Ascites -no ascites noted on imaging or physical exam -Contact provider for increased abdominal distension  Encephalopathy -notify provider if new onset confusion -titrate lactulose to 1-2 bms/day.  Added rifaximin 550 twice daily today.   Health Maintenance -Can take up to 2G Tylenol per day. NO NSAIDs as these can lead to diuretic resistance and precipitate renal dysfunction in patients with advanced liver disease. -High Protein Low Fat Low Salt (up to 2G Na/day) Diet, no prolonged fasting, Mediterranean Diet info provided -Continue to abstain from alcohol and all illicit drugs -Avoid use of herbal and dietary supplements due to potential hepatotoxicity -Avoid eating any unpasteurized dairy products; avoid eating any raw or undercooked eggs, fish, poultry, or meat; and avoid eating raw/steamed oysters or other shellfish to avoid risk of infection.   Follow Up -Follow up in 2 months Will follow-up via phone once imaging and lab work is available.

## 2024-02-22 NOTE — PHYSICAL EXAM
[Scleral Icterus] : Scleral Icterus noted [Irregular] : irregular [Non-Tender] : non-tender [General Appearance - Alert] : alert [Sclera] : the sclera and conjunctiva were normal [General Appearance - In No Acute Distress] : in no acute distress [Oropharynx] : the oropharynx was normal [Outer Ear] : the ears and nose were normal in appearance [Neck Appearance] : the appearance of the neck was normal [Neck Cervical Mass (___cm)] : no neck mass was observed [Jugular Venous Distention Increased] : there was no jugular-venous distention [Thyroid Nodule] : there were no palpable thyroid nodules [Thyroid Diffuse Enlargement] : the thyroid was not enlarged [Auscultation Breath Sounds / Voice Sounds] : lungs were clear to auscultation bilaterally [Edema] : there was no peripheral edema [Heart Rate And Rhythm] : heart rate was normal and rhythm regular [Abdomen Soft] : soft [Abdomen Tenderness] : non-tender [Bowel Sounds] : normal bowel sounds [Abdomen Mass (___ Cm)] : no abdominal mass palpated [Musculoskeletal - Swelling] : no joint swelling seen [Abnormal Walk] : normal gait [Nail Clubbing] : no clubbing  or cyanosis of the fingernails [Skin Color & Pigmentation] : normal skin color and pigmentation [Skin Turgor] : normal skin turgor [Motor Tone] : muscle strength and tone were normal [] : no rash [No Focal Deficits] : no focal deficits [Oriented To Time, Place, And Person] : oriented to person, place, and time [Impaired Insight] : insight and judgment were intact [Affect] : the affect was normal [Spider Angioma] : No spider angioma(s) were observed [Abdominal  Ascites] : no ascites [Asterixis] : no asterixis observed [Jaundice] : No jaundice [Palmar Erythema] : no Palmar Erythema [Depression] : no depression [Hallucinations] : ~T no ~M hallucinations

## 2024-03-13 ENCOUNTER — NON-APPOINTMENT (OUTPATIENT)
Age: 84
End: 2024-03-13

## 2024-03-14 ENCOUNTER — OUTPATIENT (OUTPATIENT)
Dept: OUTPATIENT SERVICES | Facility: HOSPITAL | Age: 84
LOS: 1 days | End: 2024-03-14
Payer: MEDICARE

## 2024-03-14 ENCOUNTER — APPOINTMENT (OUTPATIENT)
Dept: CT IMAGING | Facility: CLINIC | Age: 84
End: 2024-03-14
Payer: MEDICARE

## 2024-03-14 DIAGNOSIS — Z98.890 OTHER SPECIFIED POSTPROCEDURAL STATES: Chronic | ICD-10-CM

## 2024-03-14 DIAGNOSIS — Z00.8 ENCOUNTER FOR OTHER GENERAL EXAMINATION: ICD-10-CM

## 2024-03-14 DIAGNOSIS — Z96.652 PRESENCE OF LEFT ARTIFICIAL KNEE JOINT: Chronic | ICD-10-CM

## 2024-03-14 PROCEDURE — 74170 CT ABD WO CNTRST FLWD CNTRST: CPT | Mod: 26

## 2024-03-14 PROCEDURE — 74170 CT ABD WO CNTRST FLWD CNTRST: CPT

## 2024-03-31 ENCOUNTER — APPOINTMENT (OUTPATIENT)
Dept: MRI IMAGING | Facility: CLINIC | Age: 84
End: 2024-03-31
Payer: MEDICARE

## 2024-03-31 ENCOUNTER — OUTPATIENT (OUTPATIENT)
Dept: OUTPATIENT SERVICES | Facility: HOSPITAL | Age: 84
LOS: 1 days | End: 2024-03-31
Payer: MEDICARE

## 2024-03-31 DIAGNOSIS — Z96.652 PRESENCE OF LEFT ARTIFICIAL KNEE JOINT: Chronic | ICD-10-CM

## 2024-03-31 DIAGNOSIS — Z00.8 ENCOUNTER FOR OTHER GENERAL EXAMINATION: ICD-10-CM

## 2024-03-31 DIAGNOSIS — Z98.890 OTHER SPECIFIED POSTPROCEDURAL STATES: Chronic | ICD-10-CM

## 2024-03-31 PROCEDURE — A9585: CPT

## 2024-03-31 PROCEDURE — 74183 MRI ABD W/O CNTR FLWD CNTR: CPT | Mod: 26

## 2024-03-31 PROCEDURE — 74183 MRI ABD W/O CNTR FLWD CNTR: CPT

## 2024-04-04 ENCOUNTER — APPOINTMENT (OUTPATIENT)
Dept: GASTROENTEROLOGY | Facility: CLINIC | Age: 84
End: 2024-04-04
Payer: MEDICARE

## 2024-04-04 VITALS
HEART RATE: 70 BPM | BODY MASS INDEX: 27.23 KG/M2 | SYSTOLIC BLOOD PRESSURE: 120 MMHG | HEIGHT: 62 IN | OXYGEN SATURATION: 98 % | DIASTOLIC BLOOD PRESSURE: 80 MMHG | RESPIRATION RATE: 16 BRPM | WEIGHT: 148 LBS

## 2024-04-04 PROCEDURE — 99214 OFFICE O/P EST MOD 30 MIN: CPT

## 2024-04-05 NOTE — HISTORY OF PRESENT ILLNESS
[MELD Score: ___] : MELD Score is [unfilled] [de-identified] : WILMER CONROY is an 84 year old female with a PMH significant for Cirrhosis, HCC s/p Y90 (3/31/23), HLD, HTN, DM, and CAD s/p stent.  4/4/2024: f/u visit, accompanied by pt's daughter. CT Abdomen w/wo IVC (3/14/24) showed numerous new LR5 and LR4 hepatic lesions. AFP >5000. She completed an MRI a few days ago, results are pending. She has seen Dr. Dave and has f/u appt next week.   10/11/23: Pt presents for f/u visit, accompanied by her daughter. MRI Abdomen w/wo IVC (8/5/23) - stable right and left hepatic lobe radioembolization cavities (LR-TR), several LI-RADS 5 lesions, largest measuring 1.3 cm along w/numerous additions LI-RADS 3 and 4 observations. . She was seen by IR Dr. Reinoso and is scheduled for whole right lobe radio embolization on 10/24/23.   7/10/23: Imaging reviewed w/pt and her daughter. MR ABDOMEN WAW IC (6/30/23) - Cirrhosis. Status post bilobar radioembolization. Segment 6/7 treated sites nonviable, with equivocal enhancement in the left lobe, likely due to posttreatment change. There are now numerous small hypervascular foci in segment 8 with T2 hyperintensity and no washout or capsule (LR-4) measuring up to 1.1 cm. Additionally, several subcentimeter LR-3 bilobar hypervascular foci lack T2 hyperintensity and washout with representative central 8 mm focus adjacent to the IVC in segment 8 (20, 18) and 6 mm focus in segment 3 (20, 34).  4/11/23: Complains of weight loss. Complains of anorexia States this morning she was dizzy upon suddenly getting up from a sitting position No fevers. Constipated : Stopped lactulose No hematemesis melena abdominal pain abdominal distention.  12/1/22: She presents today for evaluation of newly diagnosed Cirrhosis and imaging concerning for multifocal HCC. Pt presented to [facility] on 10/16/22 with complaints of epigastric pain. During workup, pt was found of have cirrhotic liver and lesions suspicious for HCC on imaging. ACS ruled out. Pt went on to have additional imaging which further indicated HCC , 2 lesions, with cirrhotic liver. Additional workup done indicated HBV core antibody positive, surface antigen indeterminate, core IgM nonreactive, Be antibody nonreactive. HAV antibody positive but IgM negative indicated past resolved infection. Labs done during admission - bilirubin 0.3, AST 73, ALT 53, , INR 1.3, , , Iron serum 30, ferritin 18 and transferrin saturation 7%.  CT Angio Abd Pelv w/wo IC 10/16/22 - Hypervascular lesions seen in liver, at least one of which is typical for a flash filling hemangioma. A lesion within the right hepatic lobe demonstrates washout, suspicious for hepatocellular carcinoma. Additional subcentimeter hypervascular lesion of left hepatic lobe is indeterminate. Confirmation with multiphasic hepatic protocol MRI or CT is advised for more definitive evaluation. Cirrhosis. Atherosclerotic changes of the arterial structures, with 50-70% stenosis  of the origins of the celiac axis and SMA. No mesenteric venous occlusive disease.  MRI Abdomen w/IC 10/21/22 - LIVER: Liver is shrunken with prominence of the left lobe and caudate lobe and a subtle nodular contour, compatible with cirrhosis. There is a 2.2 x 2.0 cm mildly T2 hyperintense lesion at the junction of segments 6 and 7 which demonstrates restricted diffusion. Dynamic postcontrast imaging is markedly degraded by respiratory motion artifact, however there is evidence of early enhancement and washout. These findings are compatible with HCC. There is a similar 2.7 x 1.9 cm mildly T2 bright subcapsular lesion at the junction of segments 2 and 3 posteriorly which also demonstrates early enhancement with washout. The remainder of the liver is mildly heterogeneous in signal and enhancement. The hepatic veins and portal vein are patent. BILE DUCTS: There is no intra or extra hepatic biliary duct dilatation. The common bile duct measures 3 mm.  Denies fatigue, malaise, arthralgias, myalgias, pruritus, recent infection, abdominal pain or distension, jaundice, hematemesis, hematochezia, dark urine, confusion, unintentional weight loss or gain. Denies alcohol consumption. Denies OTC/herbal supplements. Pt had EGD about 3 years ago but unknown results and where it was done. Pt reports a family of history of liver disease but is unsure what kind.  12/20/22: Since last visit, pt was hospitalized at [facility] for anemia on 12/6/22. H/H on admission was 7.2/23. She was given a blood transfusion. Current H/H 10.1/33.9. Pt is scheduled for EGD and Colonoscopy with Dr. June tomorrow. Pt currently has an URI and reports a lot of coughing and mucus production. She has regular BMs daily. Pt's daughter reports she has noticed her mom is confused but no more than usual. She continues to take Lactulose daily.

## 2024-04-05 NOTE — REASON FOR VISIT
[Follow-Up: _____] : a [unfilled] follow-up visit [[employer] ] : provided by [employer]   [Time Spent: ____ minutes] : Total time spent using  services: [unfilled] minutes. The patient's primary language is not English thus required  services. [FreeTextEntry1] : cirrhosis, hcc [Interpreters_IDNumber] : [id number] [Interpreters_FullName] : Stephon [TWNoteComboBox1] : Uzbek

## 2024-04-05 NOTE — ASSESSMENT
[FreeTextEntry1] : WILMER CONROY is an 83 year old female with a PMH significant for Cirrhosis, HCC s/p Y90 (3/31/23), HLD, HTN, DM, and CAD s/p stent.  Cirrhosis -Etiology likely MARTIN -Disease progression of cirrhosis discussed, including but not limited to hepatocellular carcinoma, varices with or without bleeding, hepatic encephalopathy, ascites, liver failure and death. -Labs ordered today  HCC -s/p Y90 (3/31/23) at [facility] -MR ABDOMEN WAW IC (6/30/23) - Cirrhosis. Status post bilobar radioembolization. Segment 6/7 treated sites nonviable, with equivocal enhancement in the left lobe, likely due to posttreatment change. There are now numerous small hypervascular foci in segment 8 with T2 hyperintensity and no washout or capsule (LR-4) measuring up to 1.1 cm. Additionally, several subcentimeter LR-3 bilobar hypervascular foci lack T2 hyperintensity and washout with representative central 8 mm focus adjacent to the IVC in segment 8 (20, 18) and 6 mm focus in segment 3 (20, 34). -MRI Abdomen w/wo IVC (8/5/23) - stable right and left hepatic lobe radioembolization cavities (LR-TR), several LI-RADS 5 lesions, largest measuring 1.3 cm along w/numerous additions LI-RADS 3 and 4 observations. .  -Discussed at  tumor board - plan for embolization. She was seen by IR Dr. Reinoso and is scheduled for whole right lobe radio embolization on 10/24/23. -CT Abdomen w/wo IVC (3/14/24) showed numerous new LR5 and LR4 hepatic lesions. AFP >5000. She completed an MRI a few days ago, results are pending. HCC management now by Dr. Dave at [facility]  Variceal Screening -EGD (1/5/23) - no varices -if pt experiences hematemesis, advised to go to ER immediately  Ascites -no ascites noted on imaging or physical exam -Contact provider for increased abdominal distension  Encephalopathy -notify provider if new onset confusion -at least 1-2 BMs/day -continue Xifaxan 550 mg BID   Health Maintenance -Can take up to 2G Tylenol per day. NO NSAIDs as these can lead to diuretic resistance and precipitate renal dysfunction in patients with advanced liver disease. -High Protein Low Fat Low Salt (up to 2G Na/day) Diet, no prolonged fasting, Mediterranean Diet info provided -Continue to abstain from alcohol and all illicit drugs -Avoid use of herbal and dietary supplements due to potential hepatotoxicity -Avoid eating any unpasteurized dairy products; avoid eating any raw or undercooked eggs, fish, poultry, or meat; and avoid eating raw/steamed oysters or other shellfish to avoid risk of infection.   Follow up in 3 months w/labs

## 2024-04-11 NOTE — ED ADULT TRIAGE NOTE - LANGUAGE ASSISTANCE NEEDED
verbal cues/nonverbal cues (demo/gestures)/1 person assist Yes-Patient/Caregiver accepts free interpretation services...

## 2024-04-16 NOTE — H&P ADULT - NSICDXPASTMEDICALHX_GEN_ALL_CORE_FT
Daily Note     Today's date: 2024  Patient name: Dominique Varela  : 1950  MRN: [id number]  Referring provider: Karolyn Valdovinos PA-C  Dx:   Encounter Diagnosis     ICD-10-CM    1. Nontraumatic complete tear of right rotator cuff  M75.121       2. S/P right rotator cuff repair  Z98.890                Subjective: Patient reported her shoulder is feeling good today. The patient reported that she might be getting a new splint at OT this week to help with her wrist pain.      Objective: See treatment diary below; Patient was co-treated with JOHN Dozier, under my direct supervision.        Assessment: Tolerated treatment well. Patient  displayed more control with the eccentric lowering during shoulder flexion than in past sessions.  Pt would benefit from continued PT in order to further progress strength and AROM and in order to return to PLOF.       Plan: Continue per plan of care. Plan to increase resistance to shoulder height next visit.      Insurance:  AMA/CMS Eval/ Re-eval POC expires FOTO Auth Status Co-Insurance   CMS - [facility] 1/15/24 4/15/24   None req No     2/15/24 4/15/24           3/14/24 4/30/24            1.  1/15 2.   1 3.   1 4.    5.   1 6.   2/   7.   2/5 8.   2/8 9.   214 10.   2/15 RE 11.   2/ 12.   2   13.   2 14.   2 15.   3/5 16.   3/7 17.   3/12 18.   3/14 RE   19.   3/19 20.  3/21 21.  3/ 22.  3/28 23.  4/2 24.   4/9   25.   4/11 26.2024 27. 28. 29. 30.   31. 32.  33. 34. 35. 36.         Precautions: s/p R rotator cuff repair w/ biceps tenotomy 24       21 22 23 24 25  26   Manuals 3/26/24 3/28/24 4/2/24 4/9/24 4/11/24  2024     STM/MFR                Joint mobs               Neuromobility               Neuro Re-Ed               Cervical retraction 2x10 seated 2x10 seated 2x10 seated         Rows       2x10 RTB 2x10 GTB  2x10 GTB   Shoulder extensions       2x10 RTB 2x10 GTB  2x10 GTB   Shoulder isos 10x10s  10x10s 20x with ball  Flex, ext,  ER, IR, abd           Scaption                                               Ther Ex               PROM shoulder Flex, ER, abd   Flex, ER, abd Flex, ER, abd       Pendulums               Cane flexion               Cane ER               Flexion AAROM w/ hands clasped 20x5s standing 20x 5s standing 20x 5s standing w/ eccentric controlled lower     20x5s standing w/ ecc lowering 20x5s standing w/ ecc lowering  20x5s standing w/ ecc lowering   Cane abd 10x5s cane  2x10 AROM 10x 5s with cane     2x10 AROM 10x 5s with cane     2x10 AROM 10x5 with cane     2x10 AROM 15x5s with cane     2x10 AROM  15x5s with cane     2x10 AROM   Table slides               Finger ladder               Pulleys 20x5s flex, abd 20x 5s flex, abd 20x 5s flex, abd 20x5s flex, abd 20x5s flex, abd  20x 5s flex abd   Supine shld flex AROM 2x10              Stand flexion AROM 2x10  2x10           Sidelying ER 2x10 AROM 2x10 AROM standing   2x10 sidelying    2x10 sidelying   Tband IR       2x10 GTB 2x10 GTB  2x10 GTB    Tband ER       2x10 YTB 2x10 YTB  2x10 YTB   Behind the back stretch       5x15s  5x15s   5x 15s   Lateral raises         Ther Activity               Pt education                                               Modalities               CP R shoulder                                  Access Code: [email or web address]  URL: [email or web address]  Date: 02/01/2024  Prepared by: Irvin Abdul     Exercises  - Seated Scapular Retraction  - 2-3 x daily - 7 x weekly - 2 sets - 10 reps - 5 hold  - Seated Shoulder Shrugs  - 2-3 x daily - 7 x weekly - 2 sets - 10 reps - 5 hold  - Circular Shoulder Pendulum with Table Support  - 2-3 x daily - 7 x weekly - 1 sets - 30 reps  - Standing Elbow Flexion Extension AROM  - 2-3 x daily - 7 x weekly - 2 sets - 10 reps - 5 hold  - Supine Shoulder Flexion with Dowel  - 1 x daily - 7 x weekly - 1 sets - 10 reps - 5 hold  - Supine Shoulder External Rotation with Dowel  - 1 x daily - 7 x weekly - 1 sets - 10 reps -  5 hold         Physical Therapy Protocol: Rotator Cuff Repair Immediate Therapy    Based on Moon Shoulder Protocol    Modalities: performed at the physical therapist's discretion within the guidelines of this protocol.    Wound Care: Keep the dressing clean and dry.  Light drainage may occur the first 2 days postop.  You may remove the dressings and get the incision wet in the shower 72 hours after surgery.  DO NOT remove steri-strips or sutures.  DO NOT immerse the incision under water.  Carefully pat the incision dry.  If there is wound drainage, re-apply a fresh dry gauze dressing.    Sling: wear the sling at all times, including sleep, except for hygiene for 6 weeks following surgery. Keep the arm by the side during hygiene. The patient may remove the sling, keeping the arm by the side to perform gentle wrist and elbow range of motion. The sling may be removed for PT sessions as described below.    PHASE I (Weeks 0-4): passive range of motion       · Begin passive range of motion within 7 days following surgery. Completed 3 times per week       · Begin scapula exercises with the arm in the sling within 7 days following surgery, including scapular elevation, depression, retraction, and protraction. Completed daily Weeks 0-6       · Begin pendulum exercises at home. Completed 2 times per day    PHASE II (Weeks 4-8): active assisted range of motion       · Week 4: lying active assisted motion            o Using a stick or cane while lying flat, the nonsurgical arm moves the injured arm through different motions, including forward flexion, external rotation, and abduction. Completed daily       · Weeks 5: 45-degree active assisted motion            o Using a stick or cane while lying propped at 45 degrees, the nonsurgical arm moves the injured arm through different motions, including forward flexion, external rotation, and abduction. Completed daily       · Weeks 6-8: upright active assisted motion            o  Using a stick or cane while sitting up or standing, the nonsurgical arm moves the injured arm through different motions, including forward flexion, external rotation, and abduction. Completed daily       · Week 6: scapula exercises without the sling, including scapular elevation, depression, retraction, and protraction. Completed daily    PHASE III (Weeks 8-12): active motion       · AROM: while sitting up or standing, actively forward flex and abduct the shoulders. Completed daily            o Important not to hike the shoulder up towards the ear.       · Isometric exercises: push and pull a folded towel or pillow into a wall. Completed daily, holding push/pull for 15s with 30s rest in between for 10-15 reps            o Completed with elbow flexed at 90 degrees and with shoulder internal/external rotation    PHASE IV (Weeks 12-16): resisted exercise       · Begin rotator cuff strengthening using weights and/or elastic bands. Completed 3 times per week for 3-4 sets of 10-15 reps            o Resisted shoulder internal/external rotation            o Resisted deltoid strengthening            o Resisted scapula strengthening            o AVOID doing full can or empty can exercises       · Begin light shoulder stretching            o Hold each stretch for 15s, then rest 15s. Repeat 5 times            o After Week 16, may use aggressive stretching if needed         PAST MEDICAL HISTORY:  Diabetes     GERD (gastroesophageal reflux disease)     Heart murmur     Hypercholesterolemia     Hypertension     Hypothyroidism     Knee pain, left arthritis

## 2024-04-17 ENCOUNTER — APPOINTMENT (OUTPATIENT)
Dept: NEUROLOGY | Facility: CLINIC | Age: 84
End: 2024-04-17

## 2024-04-24 ENCOUNTER — OUTPATIENT (OUTPATIENT)
Dept: OUTPATIENT SERVICES | Facility: HOSPITAL | Age: 84
LOS: 1 days | End: 2024-04-24

## 2024-04-24 ENCOUNTER — APPOINTMENT (OUTPATIENT)
Dept: NUCLEAR MEDICINE | Facility: CLINIC | Age: 84
End: 2024-04-24
Payer: MEDICARE

## 2024-04-24 DIAGNOSIS — Z96.652 PRESENCE OF LEFT ARTIFICIAL KNEE JOINT: Chronic | ICD-10-CM

## 2024-04-24 DIAGNOSIS — Z98.890 OTHER SPECIFIED POSTPROCEDURAL STATES: Chronic | ICD-10-CM

## 2024-04-24 DIAGNOSIS — Z00.8 ENCOUNTER FOR OTHER GENERAL EXAMINATION: ICD-10-CM

## 2024-04-24 PROCEDURE — 78815 PET IMAGE W/CT SKULL-THIGH: CPT | Mod: 26,PI

## 2024-04-30 NOTE — ED PROVIDER NOTE - CARE PROVIDERS DIRECT ADDRESSES
Pt. A&O*4 and arrived reporting to be experiencing lightheadedness/dizziness, blurred vision.Also states he has intermittent chest discomfort described as tightness that began on 4/25.  Ambulatory with a steady gait and recently seen for similar symptoms. GCS 15.    ,DirectAddress_Unknown

## 2024-06-19 ENCOUNTER — APPOINTMENT (OUTPATIENT)
Dept: CT IMAGING | Facility: CLINIC | Age: 84
End: 2024-06-19
Payer: MEDICARE

## 2024-06-19 ENCOUNTER — OUTPATIENT (OUTPATIENT)
Dept: OUTPATIENT SERVICES | Facility: HOSPITAL | Age: 84
LOS: 1 days | End: 2024-06-19
Payer: MEDICARE

## 2024-06-19 DIAGNOSIS — Z98.890 OTHER SPECIFIED POSTPROCEDURAL STATES: Chronic | ICD-10-CM

## 2024-06-19 DIAGNOSIS — Z96.652 PRESENCE OF LEFT ARTIFICIAL KNEE JOINT: Chronic | ICD-10-CM

## 2024-06-19 DIAGNOSIS — Z00.8 ENCOUNTER FOR OTHER GENERAL EXAMINATION: ICD-10-CM

## 2024-06-19 PROCEDURE — 74177 CT ABD & PELVIS W/CONTRAST: CPT | Mod: 26

## 2024-06-19 PROCEDURE — 74177 CT ABD & PELVIS W/CONTRAST: CPT

## 2024-06-20 ENCOUNTER — LABORATORY RESULT (OUTPATIENT)
Age: 84
End: 2024-06-20

## 2024-06-20 ENCOUNTER — APPOINTMENT (OUTPATIENT)
Dept: GASTROENTEROLOGY | Facility: CLINIC | Age: 84
End: 2024-06-20
Payer: MEDICARE

## 2024-06-20 VITALS
HEIGHT: 62 IN | DIASTOLIC BLOOD PRESSURE: 70 MMHG | SYSTOLIC BLOOD PRESSURE: 120 MMHG | RESPIRATION RATE: 16 BRPM | OXYGEN SATURATION: 98 % | BODY MASS INDEX: 24.66 KG/M2 | HEART RATE: 70 BPM | WEIGHT: 134 LBS

## 2024-06-20 DIAGNOSIS — C22.0 LIVER CELL CARCINOMA: ICD-10-CM

## 2024-06-20 DIAGNOSIS — K74.60 UNSPECIFIED CIRRHOSIS OF LIVER: ICD-10-CM

## 2024-06-20 LAB
AFP-TM SERPL-MCNC: ABNORMAL NG/ML
ALBUMIN SERPL ELPH-MCNC: 3.2 G/DL
ALP BLD-CCNC: 508 U/L
ALT SERPL-CCNC: 97 U/L
ANION GAP SERPL CALC-SCNC: 12 MMOL/L
AST SERPL-CCNC: 167 U/L
BASOPHILS # BLD AUTO: 0.03 K/UL
BASOPHILS NFR BLD AUTO: 0.6 %
BILIRUB INDIRECT SERPL-MCNC: 0.8 MG/DL
BILIRUB SERPL-MCNC: 2.2 MG/DL
BUN SERPL-MCNC: 10 MG/DL
CALCIUM SERPL-MCNC: 9.6 MG/DL
CHLORIDE SERPL-SCNC: 100 MMOL/L
CO2 SERPL-SCNC: 20 MMOL/L
CREAT SERPL-MCNC: 0.94 MG/DL
EGFR: 59 ML/MIN/1.73M2
EOSINOPHIL # BLD AUTO: 0.06 K/UL
EOSINOPHIL NFR BLD AUTO: 1.3 %
GLUCOSE SERPL-MCNC: 97 MG/DL
HCT VFR BLD CALC: 32.6 %
HGB BLD-MCNC: 11 G/DL
IMM GRANULOCYTES NFR BLD AUTO: 0.4 %
INR PPP: 1.32 RATIO
LYMPHOCYTES # BLD AUTO: 0.59 K/UL
LYMPHOCYTES NFR BLD AUTO: 12.4 %
MAN DIFF?: NORMAL
MCHC RBC-ENTMCNC: 32.9 PG
MCHC RBC-ENTMCNC: 33.7 GM/DL
MCV RBC AUTO: 97.6 FL
MONOCYTES # BLD AUTO: 0.58 K/UL
MONOCYTES NFR BLD AUTO: 12.2 %
NEUTROPHILS # BLD AUTO: 3.47 K/UL
NEUTROPHILS NFR BLD AUTO: 73.1 %
PLATELET # BLD AUTO: 131 K/UL
POTASSIUM SERPL-SCNC: 4.2 MMOL/L
PROT SERPL-MCNC: 7.1 G/DL
PT BLD: 14.8 SEC
RBC # BLD: 3.34 M/UL
RBC # FLD: 22.5 %
SODIUM SERPL-SCNC: 133 MMOL/L
WBC # FLD AUTO: 4.75 K/UL

## 2024-06-20 PROCEDURE — 99215 OFFICE O/P EST HI 40 MIN: CPT

## 2024-06-20 NOTE — ASSESSMENT
[FreeTextEntry1] : WILMER CONROY is an 84 year old female with a PMH significant for Cirrhosis, HCC s/p Y90 (3/31/23), HLD, HTN, DM, and CAD s/p stent.  Cirrhosis -Etiology likely MARTIN -Disease progression of cirrhosis discussed, including but not limited to hepatocellular carcinoma, varices with or without bleeding, hepatic encephalopathy, ascites, liver failure and death. -Labs ordered today again  HCC -s/p Y90 (3/31/23) at [facility] -MR ABDOMEN WAW IC (6/30/23) - Cirrhosis. Status post bilobar radioembolization. Segment 6/7 treated sites nonviable, with equivocal enhancement in the left lobe, likely due to posttreatment change. There are now numerous small hypervascular foci in segment 8 with T2 hyperintensity and no washout or capsule (LR-4) measuring up to 1.1 cm. Additionally, several subcentimeter LR-3 bilobar hypervascular foci lack T2 hyperintensity and washout with representative central 8 mm focus adjacent to the IVC in segment 8 (20, 18) and 6 mm focus in segment 3 (20, 34). -MRI Abdomen w/wo IVC (8/5/23) - stable right and left hepatic lobe radioembolization cavities (LR-TR), several LI-RADS 5 lesions, largest measuring 1.3 cm along w/numerous additions LI-RADS 3 and 4 observations. .  -Discussed at  tumor board - plan for embolization. She was seen by IR Dr. Reinoso and is scheduled for whole right lobe radio embolization on 10/24/23. -CT Abdomen w/wo IVC (3/14/24) showed numerous new LR5 and LR4 hepatic lesions. AFP >5000. MRI Abdomen 4/2024 showed innumerable b/l hepatic lesion w/increase in size and number (reports not available). CT done yesterday, results pending. WIll request report for our records. HCC management now by Dr. Dave at [facility]  Variceal Screening -EGD (1/5/23) - no varices -if pt experiences hematemesis, advised to go to ER immediately  Ascites -no ascites noted on imaging or physical exam -Contact provider for increased abdominal distension  Encephalopathy -notify provider if new onset confusion -at least 1-2 BMs/day -continue Xifaxan 550 mg BID   Health Maintenance -Can take up to 2G Tylenol per day. NO NSAIDs as these can lead to diuretic resistance and precipitate renal dysfunction in patients with advanced liver disease. -High Protein Low Fat Low Salt (up to 2G Na/day) Diet, no prolonged fasting, Mediterranean Diet info provided -Continue to abstain from alcohol and all illicit drugs -Avoid use of herbal and dietary supplements due to potential hepatotoxicity -Avoid eating any unpasteurized dairy products; avoid eating any raw or undercooked eggs, fish, poultry, or meat; and avoid eating raw/steamed oysters or other shellfish to avoid risk of infection.   Follow up in 6 months

## 2024-06-20 NOTE — REASON FOR VISIT
[Follow-Up: _____] : a [unfilled] follow-up visit [[employer] ] : provided by [employer]   [Time Spent: ____ minutes] : Total time spent using  services: [unfilled] minutes. The patient's primary language is not English thus required  services. [FreeTextEntry1] : cirrhosis, hcc [Interpreters_IDNumber] : [id number] [Interpreters_FullName] : [place] [TWNoteComboBox1] : [place]

## 2024-06-20 NOTE — HISTORY OF PRESENT ILLNESS
[MELD Score: ___] : MELD Score is [unfilled] [de-identified] : WILMER CONROY is an 84 year old female with a PMH significant for Cirrhosis, HCC s/p Y90 (3/31/23), HLD, HTN, DM, and CAD s/p stent.  6/20/24: f/u visit accompanied by pt's daughter. Since last visit, pt was started on Sorafenib by Dr. Dave. She had a CT yesterday, results pending. She reports diarrhea and weakness, she has discussed her symptoms w/Dr. Dave, likely 2/2 Sorafenib. Labs 4/2024 - h/h 9.2/29.6, .  4/4/2024: f/u visit, accompanied by pt's daughter. CT Abdomen w/wo IVC (3/14/24) showed numerous new LR5 and LR4 hepatic lesions. AFP >5000. She completed an MRI a few days ago, results are pending. She has seen Dr. Dave and has f/u appt next week.   10/11/23: Pt presents for f/u visit, accompanied by her daughter. MRI Abdomen w/wo IVC (8/5/23) - stable right and left hepatic lobe radioembolization cavities (LR-TR), several LI-RADS 5 lesions, largest measuring 1.3 cm along w/numerous additions LI-RADS 3 and 4 observations. . She was seen by IR Dr. Reinoso and is scheduled for whole right lobe radio embolization on 10/24/23.   7/10/23: Imaging reviewed w/pt and her daughter. MR ABDOMEN WAW IC (6/30/23) - Cirrhosis. Status post bilobar radioembolization. Segment 6/7 treated sites nonviable, with equivocal enhancement in the left lobe, likely due to posttreatment change. There are now numerous small hypervascular foci in segment 8 with T2 hyperintensity and no washout or capsule (LR-4) measuring up to 1.1 cm. Additionally, several subcentimeter LR-3 bilobar hypervascular foci lack T2 hyperintensity and washout with representative central 8 mm focus adjacent to the IVC in segment 8 (20, 18) and 6 mm focus in segment 3 (20, 34).  4/11/23: Complains of weight loss. Complains of anorexia States this morning she was dizzy upon suddenly getting up from a sitting position No fevers. Constipated : Stopped lactulose No hematemesis melena abdominal pain abdominal distention.  12/1/22: She presents today for evaluation of newly diagnosed Cirrhosis and imaging concerning for multifocal HCC. Pt presented to [facility] on 10/16/22 with complaints of epigastric pain. During workup, pt was found of have cirrhotic liver and lesions suspicious for HCC on imaging. ACS ruled out. Pt went on to have additional imaging which further indicated HCC , 2 lesions, with cirrhotic liver. Additional workup done indicated HBV core antibody positive, surface antigen indeterminate, core IgM nonreactive, Be antibody nonreactive. HAV antibody positive but IgM negative indicated past resolved infection. Labs done during admission - bilirubin 0.3, AST 73, ALT 53, , INR 1.3, , , Iron serum 30, ferritin 18 and transferrin saturation 7%.  CT Angio Abd Pelv w/wo IC 10/16/22 - Hypervascular lesions seen in liver, at least one of which is typical for a flash filling hemangioma. A lesion within the right hepatic lobe demonstrates washout, suspicious for hepatocellular carcinoma. Additional subcentimeter hypervascular lesion of left hepatic lobe is indeterminate. Confirmation with multiphasic hepatic protocol MRI or CT is advised for more definitive evaluation. Cirrhosis. Atherosclerotic changes of the arterial structures, with 50-70% stenosis  of the origins of the celiac axis and SMA. No mesenteric venous occlusive disease.  MRI Abdomen w/IC 10/21/22 - LIVER: Liver is shrunken with prominence of the left lobe and caudate lobe and a subtle nodular contour, compatible with cirrhosis. There is a 2.2 x 2.0 cm mildly T2 hyperintense lesion at the junction of segments 6 and 7 which demonstrates restricted diffusion. Dynamic postcontrast imaging is markedly degraded by respiratory motion artifact, however there is evidence of early enhancement and washout. These findings are compatible with HCC. There is a similar 2.7 x 1.9 cm mildly T2 bright subcapsular lesion at the junction of segments 2 and 3 posteriorly which also demonstrates early enhancement with washout. The remainder of the liver is mildly heterogeneous in signal and enhancement. The hepatic veins and portal vein are patent. BILE DUCTS: There is no intra or extra hepatic biliary duct dilatation. The common bile duct measures 3 mm.  Denies fatigue, malaise, arthralgias, myalgias, pruritus, recent infection, abdominal pain or distension, jaundice, hematemesis, hematochezia, dark urine, confusion, unintentional weight loss or gain. Denies alcohol consumption. Denies OTC/herbal supplements. Pt had EGD about 3 years ago but unknown results and where it was done. Pt reports a family of history of liver disease but is unsure what kind.  12/20/22: Since last visit, pt was hospitalized at [facility] for anemia on 12/6/22. H/H on admission was 7.2/23. She was given a blood transfusion. Current H/H 10.1/33.9. Pt is scheduled for EGD and Colonoscopy with Dr. June tomorrow. Pt currently has an URI and reports a lot of coughing and mucus production. She has regular BMs daily. Pt's daughter reports she has noticed her mom is confused but no more than usual. She continues to take Lactulose daily.

## 2024-07-03 ENCOUNTER — EMERGENCY (EMERGENCY)
Facility: HOSPITAL | Age: 84
LOS: 1 days | Discharge: HOME | End: 2024-07-03
Attending: EMERGENCY MEDICINE
Payer: MEDICARE

## 2024-07-03 VITALS
RESPIRATION RATE: 18 BRPM | TEMPERATURE: 98 F | SYSTOLIC BLOOD PRESSURE: 140 MMHG | HEIGHT: 60 IN | HEART RATE: 71 BPM | WEIGHT: 134.04 LBS | DIASTOLIC BLOOD PRESSURE: 74 MMHG | OXYGEN SATURATION: 99 %

## 2024-07-03 VITALS
DIASTOLIC BLOOD PRESSURE: 77 MMHG | RESPIRATION RATE: 16 BRPM | HEART RATE: 66 BPM | OXYGEN SATURATION: 98 % | TEMPERATURE: 98 F | SYSTOLIC BLOOD PRESSURE: 146 MMHG

## 2024-07-03 DIAGNOSIS — Z96.652 PRESENCE OF LEFT ARTIFICIAL KNEE JOINT: Chronic | ICD-10-CM

## 2024-07-03 DIAGNOSIS — Z98.890 OTHER SPECIFIED POSTPROCEDURAL STATES: Chronic | ICD-10-CM

## 2024-07-03 LAB
ALBUMIN SERPL ELPH-MCNC: 3 G/DL — LOW (ref 3.3–5.2)
ALP SERPL-CCNC: 643 U/L — HIGH (ref 40–120)
ALT FLD-CCNC: 114 U/L — HIGH
AMMONIA BLD-MCNC: 61 UMOL/L — HIGH (ref 11–55)
ANION GAP SERPL CALC-SCNC: 15 MMOL/L — SIGNIFICANT CHANGE UP (ref 5–17)
ANISOCYTOSIS BLD QL: SIGNIFICANT CHANGE UP
AST SERPL-CCNC: 237 U/L — HIGH
BASOPHILS # BLD AUTO: 0.02 K/UL — SIGNIFICANT CHANGE UP (ref 0–0.2)
BASOPHILS NFR BLD AUTO: 0.4 % — SIGNIFICANT CHANGE UP (ref 0–2)
BILIRUB SERPL-MCNC: 2.7 MG/DL — HIGH (ref 0.4–2)
BUN SERPL-MCNC: 18.8 MG/DL — SIGNIFICANT CHANGE UP (ref 8–20)
CALCIUM SERPL-MCNC: 9.5 MG/DL — SIGNIFICANT CHANGE UP (ref 8.4–10.5)
CHLORIDE SERPL-SCNC: 97 MMOL/L — SIGNIFICANT CHANGE UP (ref 96–108)
CO2 SERPL-SCNC: 21 MMOL/L — LOW (ref 22–29)
CREAT SERPL-MCNC: 0.9 MG/DL — SIGNIFICANT CHANGE UP (ref 0.5–1.3)
EGFR: 62 ML/MIN/1.73M2 — SIGNIFICANT CHANGE UP
ELLIPTOCYTES BLD QL SMEAR: SLIGHT — SIGNIFICANT CHANGE UP
EOSINOPHIL # BLD AUTO: 0.02 K/UL — SIGNIFICANT CHANGE UP (ref 0–0.5)
EOSINOPHIL NFR BLD AUTO: 0.4 % — SIGNIFICANT CHANGE UP (ref 0–6)
GLUCOSE SERPL-MCNC: 111 MG/DL — HIGH (ref 70–99)
HCT VFR BLD CALC: 32.5 % — LOW (ref 34.5–45)
HGB BLD-MCNC: 11.3 G/DL — LOW (ref 11.5–15.5)
HYPOCHROMIA BLD QL: SLIGHT — SIGNIFICANT CHANGE UP
IMM GRANULOCYTES NFR BLD AUTO: 0.2 % — SIGNIFICANT CHANGE UP (ref 0–0.9)
LYMPHOCYTES # BLD AUTO: 0.54 K/UL — LOW (ref 1–3.3)
LYMPHOCYTES # BLD AUTO: 10.2 % — LOW (ref 13–44)
MACROCYTES BLD QL: SIGNIFICANT CHANGE UP
MANUAL SMEAR VERIFICATION: SIGNIFICANT CHANGE UP
MCHC RBC-ENTMCNC: 33.7 PG — SIGNIFICANT CHANGE UP (ref 27–34)
MCHC RBC-ENTMCNC: 34.8 GM/DL — SIGNIFICANT CHANGE UP (ref 32–36)
MCV RBC AUTO: 97 FL — SIGNIFICANT CHANGE UP (ref 80–100)
MONOCYTES # BLD AUTO: 0.62 K/UL — SIGNIFICANT CHANGE UP (ref 0–0.9)
MONOCYTES NFR BLD AUTO: 11.7 % — SIGNIFICANT CHANGE UP (ref 2–14)
NEUTROPHILS # BLD AUTO: 4.08 K/UL — SIGNIFICANT CHANGE UP (ref 1.8–7.4)
NEUTROPHILS NFR BLD AUTO: 77.1 % — HIGH (ref 43–77)
OVALOCYTES BLD QL SMEAR: SLIGHT — SIGNIFICANT CHANGE UP
PLAT MORPH BLD: NORMAL — SIGNIFICANT CHANGE UP
PLATELET # BLD AUTO: 117 K/UL — LOW (ref 150–400)
POIKILOCYTOSIS BLD QL AUTO: SLIGHT — SIGNIFICANT CHANGE UP
POLYCHROMASIA BLD QL SMEAR: SLIGHT — SIGNIFICANT CHANGE UP
POTASSIUM SERPL-MCNC: 4.8 MMOL/L — SIGNIFICANT CHANGE UP (ref 3.5–5.3)
POTASSIUM SERPL-SCNC: 4.8 MMOL/L — SIGNIFICANT CHANGE UP (ref 3.5–5.3)
PROT SERPL-MCNC: 7 G/DL — SIGNIFICANT CHANGE UP (ref 6.6–8.7)
RBC # BLD: 3.35 M/UL — LOW (ref 3.8–5.2)
RBC # FLD: 20.4 % — HIGH (ref 10.3–14.5)
RBC BLD AUTO: ABNORMAL
SODIUM SERPL-SCNC: 133 MMOL/L — LOW (ref 135–145)
TARGETS BLD QL SMEAR: SLIGHT — SIGNIFICANT CHANGE UP
WBC # BLD: 5.29 K/UL — SIGNIFICANT CHANGE UP (ref 3.8–10.5)
WBC # FLD AUTO: 5.29 K/UL — SIGNIFICANT CHANGE UP (ref 3.8–10.5)

## 2024-07-03 PROCEDURE — 36415 COLL VENOUS BLD VENIPUNCTURE: CPT

## 2024-07-03 PROCEDURE — 82140 ASSAY OF AMMONIA: CPT

## 2024-07-03 PROCEDURE — 93005 ELECTROCARDIOGRAM TRACING: CPT

## 2024-07-03 PROCEDURE — 93010 ELECTROCARDIOGRAM REPORT: CPT

## 2024-07-03 PROCEDURE — 80053 COMPREHEN METABOLIC PANEL: CPT

## 2024-07-03 PROCEDURE — 99284 EMERGENCY DEPT VISIT MOD MDM: CPT

## 2024-07-03 PROCEDURE — T1013: CPT

## 2024-07-03 PROCEDURE — 85025 COMPLETE CBC W/AUTO DIFF WBC: CPT

## 2024-07-03 PROCEDURE — 99283 EMERGENCY DEPT VISIT LOW MDM: CPT | Mod: 25

## 2024-07-03 RX ORDER — SODIUM CHLORIDE 0.9 % (FLUSH) 0.9 %
1000 SYRINGE (ML) INJECTION ONCE
Refills: 0 | Status: COMPLETED | OUTPATIENT
Start: 2024-07-03 | End: 2024-07-03

## 2024-07-03 RX ADMIN — Medication 1000 MILLILITER(S): at 10:17

## 2024-07-06 ENCOUNTER — INPATIENT (INPATIENT)
Facility: HOSPITAL | Age: 84
LOS: 1 days | Discharge: HOME | DRG: 641 | End: 2024-07-08
Attending: STUDENT IN AN ORGANIZED HEALTH CARE EDUCATION/TRAINING PROGRAM | Admitting: HOSPITALIST
Payer: MEDICARE

## 2024-07-06 VITALS
WEIGHT: 136.03 LBS | TEMPERATURE: 98 F | SYSTOLIC BLOOD PRESSURE: 129 MMHG | DIASTOLIC BLOOD PRESSURE: 64 MMHG | HEIGHT: 63 IN | OXYGEN SATURATION: 96 % | HEART RATE: 72 BPM | RESPIRATION RATE: 18 BRPM

## 2024-07-06 DIAGNOSIS — Z96.652 PRESENCE OF LEFT ARTIFICIAL KNEE JOINT: Chronic | ICD-10-CM

## 2024-07-06 DIAGNOSIS — Z98.890 OTHER SPECIFIED POSTPROCEDURAL STATES: Chronic | ICD-10-CM

## 2024-07-06 LAB
ALBUMIN SERPL ELPH-MCNC: 2.6 G/DL — LOW (ref 3.3–5.2)
ALP SERPL-CCNC: 608 U/L — HIGH (ref 40–120)
ALT FLD-CCNC: 116 U/L — HIGH
AMMONIA BLD-MCNC: 70 UMOL/L — HIGH (ref 11–55)
ANION GAP SERPL CALC-SCNC: 17 MMOL/L — SIGNIFICANT CHANGE UP (ref 5–17)
AST SERPL-CCNC: 268 U/L — HIGH
BASOPHILS # BLD AUTO: 0.02 K/UL — SIGNIFICANT CHANGE UP (ref 0–0.2)
BASOPHILS NFR BLD AUTO: 0.5 % — SIGNIFICANT CHANGE UP (ref 0–2)
BILIRUB SERPL-MCNC: 2.4 MG/DL — HIGH (ref 0.4–2)
BUN SERPL-MCNC: 23.1 MG/DL — HIGH (ref 8–20)
CALCIUM SERPL-MCNC: 9 MG/DL — SIGNIFICANT CHANGE UP (ref 8.4–10.5)
CHLORIDE SERPL-SCNC: 95 MMOL/L — LOW (ref 96–108)
CO2 SERPL-SCNC: 19 MMOL/L — LOW (ref 22–29)
CREAT SERPL-MCNC: 1.11 MG/DL — SIGNIFICANT CHANGE UP (ref 0.5–1.3)
EGFR: 48 ML/MIN/1.73M2 — LOW
EOSINOPHIL # BLD AUTO: 0.03 K/UL — SIGNIFICANT CHANGE UP (ref 0–0.5)
EOSINOPHIL NFR BLD AUTO: 0.7 % — SIGNIFICANT CHANGE UP (ref 0–6)
GLUCOSE SERPL-MCNC: 114 MG/DL — HIGH (ref 70–99)
HCT VFR BLD CALC: 27.7 % — LOW (ref 34.5–45)
HGB BLD-MCNC: 9.8 G/DL — LOW (ref 11.5–15.5)
IMM GRANULOCYTES NFR BLD AUTO: 0.5 % — SIGNIFICANT CHANGE UP (ref 0–0.9)
LYMPHOCYTES # BLD AUTO: 0.65 K/UL — LOW (ref 1–3.3)
LYMPHOCYTES # BLD AUTO: 14.7 % — SIGNIFICANT CHANGE UP (ref 13–44)
MAGNESIUM SERPL-MCNC: 1.6 MG/DL — SIGNIFICANT CHANGE UP (ref 1.6–2.6)
MCHC RBC-ENTMCNC: 34.3 PG — HIGH (ref 27–34)
MCHC RBC-ENTMCNC: 35.4 GM/DL — SIGNIFICANT CHANGE UP (ref 32–36)
MCV RBC AUTO: 96.9 FL — SIGNIFICANT CHANGE UP (ref 80–100)
MONOCYTES # BLD AUTO: 0.71 K/UL — SIGNIFICANT CHANGE UP (ref 0–0.9)
MONOCYTES NFR BLD AUTO: 16.1 % — HIGH (ref 2–14)
NEUTROPHILS # BLD AUTO: 2.99 K/UL — SIGNIFICANT CHANGE UP (ref 1.8–7.4)
NEUTROPHILS NFR BLD AUTO: 67.5 % — SIGNIFICANT CHANGE UP (ref 43–77)
NT-PROBNP SERPL-SCNC: 2110 PG/ML — HIGH (ref 0–300)
PLATELET # BLD AUTO: 128 K/UL — LOW (ref 150–400)
POTASSIUM SERPL-MCNC: 4.3 MMOL/L — SIGNIFICANT CHANGE UP (ref 3.5–5.3)
POTASSIUM SERPL-SCNC: 4.3 MMOL/L — SIGNIFICANT CHANGE UP (ref 3.5–5.3)
PROT SERPL-MCNC: 6.4 G/DL — LOW (ref 6.6–8.7)
RBC # BLD: 2.86 M/UL — LOW (ref 3.8–5.2)
RBC # FLD: 20.5 % — HIGH (ref 10.3–14.5)
SODIUM SERPL-SCNC: 131 MMOL/L — LOW (ref 135–145)
TROPONIN T, HIGH SENSITIVITY RESULT: 44 NG/L — SIGNIFICANT CHANGE UP (ref 0–51)
TROPONIN T, HIGH SENSITIVITY RESULT: 47 NG/L — SIGNIFICANT CHANGE UP (ref 0–51)
WBC # BLD: 4.42 K/UL — SIGNIFICANT CHANGE UP (ref 3.8–10.5)
WBC # FLD AUTO: 4.42 K/UL — SIGNIFICANT CHANGE UP (ref 3.8–10.5)

## 2024-07-06 PROCEDURE — 71275 CT ANGIOGRAPHY CHEST: CPT | Mod: 26,MC

## 2024-07-06 PROCEDURE — 71045 X-RAY EXAM CHEST 1 VIEW: CPT | Mod: 26

## 2024-07-06 PROCEDURE — 70450 CT HEAD/BRAIN W/O DYE: CPT | Mod: 26,MC

## 2024-07-06 PROCEDURE — 71045 X-RAY EXAM CHEST 1 VIEW: CPT | Mod: 26,77

## 2024-07-06 PROCEDURE — 74177 CT ABD & PELVIS W/CONTRAST: CPT | Mod: 26,MC

## 2024-07-06 PROCEDURE — 99285 EMERGENCY DEPT VISIT HI MDM: CPT

## 2024-07-06 NOTE — ED ADULT NURSE REASSESSMENT NOTE - NS ED NURSE REASSESS COMMENT FT1
called into room by pt family members stating pt appears different after return from CT. Pt noted tachypneic and hypoxic, other VSS on RA as noted. Pt reports difficulty breathing, increased abd pain, denies chest pain or other complaints. MD Zavala called to bedside for eval at this time.

## 2024-07-06 NOTE — ED ADULT TRIAGE NOTE - CHIEF COMPLAINT QUOTE
pt c/o weakness to left side more than the left, 3 days ago unable to walk  A&Ox3, resp wnl, Hx 4 stents

## 2024-07-06 NOTE — ED ADULT NURSE NOTE - OBJECTIVE STATEMENT
Pt is AxOx3, c/o weakness, pt currently has cancer, cardiac monitor and  in place, Pt is aware of plan of care.

## 2024-07-06 NOTE — ED PROVIDER NOTE - CLINICAL SUMMARY MEDICAL DECISION MAKING FREE TEXT BOX
Patient with history of hepatocellular carcinoma presents with poor p.o. intake, generalized weakness and failure to thrive.  She also endorses exertional dyspnea, we will rule out PE milligram effusion, metabolic disturbance and complication of malignancy

## 2024-07-06 NOTE — ED PROVIDER NOTE - OBJECTIVE STATEMENT
84-year-old female past medical history of hepatocellular carcinoma, diabetes, hypertension, hyperlipidemia, CAD with stents presents with generalized weakness.  Patient reports she has had several days of nausea, epigastric abdominal pain and poor p.o. intake.  She reports that she has had 4 days of progressive and constant generalized weakness and difficulty with ambulation.  Symptoms are constant, diffuse, no alleviating or exacerbating factors.  She reports associated exertional dyspnea, no chest pain, cough, hemoptysis, lower extremity edema, fever or chills.

## 2024-07-06 NOTE — ED ADULT NURSE NOTE - HOW OFTEN DO YOU HAVE A DRINK CONTAINING ALCOHOL?
REVIEW OF SYSTEMS:  Constitutional: fatigue  Eye Problem(s):negative  ENT Problem(s):negative  Cardiovascular problem(s):dyspnea on exertion, shortness of breath and abdominal bloating  Respiratory problem(s):negative  Gastro-intestinal problem(s):negative GI  Genito-urinary problem(s):negative  Musculoskeletal problem(s):negative  Integumentary problem(s):negative  Neurological problem(s):numbness or tingling of feet  Psychiatric problem(s):depression  Endocrine problem(s):diabetes  Hematologic and/or Lymphatic problem(s):negative    Patient does take aspirin.         Never

## 2024-07-06 NOTE — ED ADULT NURSE REASSESSMENT NOTE - NS ED NURSE REASSESS COMMENT FT1
pt received from Helen SAWANT RR even unlabored NAD noted VSS on RA as noted pt denies complaints at this time pending ct fall and safety precautions in place

## 2024-07-06 NOTE — ED PROVIDER NOTE - NSICDXPASTMEDICALHX_GEN_ALL_CORE_FT
PAST MEDICAL HISTORY:  CAD (coronary artery disease)     Diabetes     GERD (gastroesophageal reflux disease)     HCC (hepatocellular carcinoma)     Heart murmur     History of cirrhosis of liver     Hypercholesterolemia     Hypertension     Hypothyroidism

## 2024-07-06 NOTE — ED PROVIDER NOTE - PROGRESS NOTE DETAILS
Danuta Zavala, DO: patient received at sign out from Dr. Stewart pending results. Imaging reviewed, no actionable findings. abd soft, nontender, no RUQ tenderness, no clinical concern for acute cholecystitis. Ammonia elevated 61>70 in last 3 days, lactulose ordered. Discussed with daughters GOC, they are not ready to decide DNR/DNI, however are considering hospice. Discussed with hospitalist. Will admit.

## 2024-07-06 NOTE — ED ADULT NURSE NOTE - ED STAT RN HANDOFF DETAILS
pt stable NAD noted VSS on RA as noted HR given to Deepika SAWANT updated on POC and accepted pt transported to CDU without issues safety maintained

## 2024-07-07 DIAGNOSIS — R62.7 ADULT FAILURE TO THRIVE: ICD-10-CM

## 2024-07-07 LAB
AMORPH CRY # UR COMP ASSIST: PRESENT
APPEARANCE UR: ABNORMAL
BACTERIA # UR AUTO: NEGATIVE /HPF — SIGNIFICANT CHANGE UP
BILIRUB UR-MCNC: NEGATIVE — SIGNIFICANT CHANGE UP
CAST: 11 /LPF — HIGH (ref 0–4)
COLOR SPEC: SIGNIFICANT CHANGE UP
DIFF PNL FLD: ABNORMAL
GLUCOSE BLDC GLUCOMTR-MCNC: 120 MG/DL — HIGH (ref 70–99)
GLUCOSE BLDC GLUCOMTR-MCNC: 144 MG/DL — HIGH (ref 70–99)
GLUCOSE BLDC GLUCOMTR-MCNC: 234 MG/DL — HIGH (ref 70–99)
GLUCOSE BLDC GLUCOMTR-MCNC: 276 MG/DL — HIGH (ref 70–99)
GLUCOSE UR QL: >=1000 MG/DL
KETONES UR-MCNC: ABNORMAL MG/DL
LEUKOCYTE ESTERASE UR-ACNC: ABNORMAL
NITRITE UR-MCNC: NEGATIVE — SIGNIFICANT CHANGE UP
PH UR: 6 — SIGNIFICANT CHANGE UP (ref 5–8)
PROT UR-MCNC: 100 MG/DL
RBC CASTS # UR COMP ASSIST: 0 /HPF — SIGNIFICANT CHANGE UP (ref 0–4)
SP GR SPEC: >1.03 — HIGH (ref 1–1.03)
SQUAMOUS # UR AUTO: 6 /HPF — HIGH (ref 0–5)
UROBILINOGEN FLD QL: 1 MG/DL — SIGNIFICANT CHANGE UP (ref 0.2–1)
WBC UR QL: 37 /HPF — HIGH (ref 0–5)

## 2024-07-07 PROCEDURE — 99223 1ST HOSP IP/OBS HIGH 75: CPT

## 2024-07-07 PROCEDURE — 99497 ADVNCD CARE PLAN 30 MIN: CPT | Mod: 25

## 2024-07-07 RX ORDER — DEXTROSE MONOHYDRATE AND SODIUM CHLORIDE 5; .3 G/100ML; G/100ML
1000 INJECTION, SOLUTION INTRAVENOUS
Refills: 0 | Status: DISCONTINUED | OUTPATIENT
Start: 2024-07-07 | End: 2024-07-08

## 2024-07-07 RX ORDER — DEXTROSE 30 % IN WATER 30 %
25 VIAL (ML) INTRAVENOUS ONCE
Refills: 0 | Status: DISCONTINUED | OUTPATIENT
Start: 2024-07-07 | End: 2024-07-08

## 2024-07-07 RX ORDER — HEPARIN SODIUM 50 [USP'U]/ML
5000 INJECTION, SOLUTION INTRAVENOUS EVERY 12 HOURS
Refills: 0 | Status: DISCONTINUED | OUTPATIENT
Start: 2024-07-07 | End: 2024-07-08

## 2024-07-07 RX ORDER — HYDRALAZINE HYDROCHLORIDE 50 MG/1
50 TABLET ORAL
Refills: 0 | Status: DISCONTINUED | OUTPATIENT
Start: 2024-07-07 | End: 2024-07-08

## 2024-07-07 RX ORDER — SPIRONOLACTONE 25 MG/1
25 TABLET ORAL DAILY
Refills: 0 | Status: DISCONTINUED | OUTPATIENT
Start: 2024-07-07 | End: 2024-07-08

## 2024-07-07 RX ORDER — INSULIN LISPRO 100 [IU]/ML
INJECTION, SOLUTION SUBCUTANEOUS AT BEDTIME
Refills: 0 | Status: DISCONTINUED | OUTPATIENT
Start: 2024-07-07 | End: 2024-07-08

## 2024-07-07 RX ORDER — DEXTROSE MONOHYDRATE 100 MG/ML
125 INJECTION, SOLUTION INTRAVENOUS ONCE
Refills: 0 | Status: DISCONTINUED | OUTPATIENT
Start: 2024-07-07 | End: 2024-07-08

## 2024-07-07 RX ORDER — DEXTROSE MONOHYDRATE AND SODIUM CHLORIDE 5; .3 G/100ML; G/100ML
1000 INJECTION, SOLUTION INTRAVENOUS ONCE
Refills: 0 | Status: COMPLETED | OUTPATIENT
Start: 2024-07-07 | End: 2024-07-07

## 2024-07-07 RX ORDER — ONDANSETRON HYDROCHLORIDE 2 MG/ML
4 INJECTION INTRAMUSCULAR; INTRAVENOUS EVERY 8 HOURS
Refills: 0 | Status: DISCONTINUED | OUTPATIENT
Start: 2024-07-07 | End: 2024-07-08

## 2024-07-07 RX ORDER — INSULIN LISPRO 100 [IU]/ML
INJECTION, SOLUTION SUBCUTANEOUS
Refills: 0 | Status: DISCONTINUED | OUTPATIENT
Start: 2024-07-07 | End: 2024-07-08

## 2024-07-07 RX ORDER — DEXTROSE 30 % IN WATER 30 %
15 VIAL (ML) INTRAVENOUS ONCE
Refills: 0 | Status: DISCONTINUED | OUTPATIENT
Start: 2024-07-07 | End: 2024-07-08

## 2024-07-07 RX ORDER — GLUCAGON HYDROCHLORIDE 1 MG/ML
1 INJECTION, POWDER, FOR SOLUTION INTRAMUSCULAR; INTRAVENOUS; SUBCUTANEOUS ONCE
Refills: 0 | Status: DISCONTINUED | OUTPATIENT
Start: 2024-07-07 | End: 2024-07-08

## 2024-07-07 RX ORDER — MAGNESIUM, ALUMINUM HYDROXIDE 400-400
30 TABLET,CHEWABLE ORAL EVERY 4 HOURS
Refills: 0 | Status: DISCONTINUED | OUTPATIENT
Start: 2024-07-07 | End: 2024-07-08

## 2024-07-07 RX ORDER — CARVEDILOL PHOSPHATE 80 MG/1
12.5 CAPSULE, EXTENDED RELEASE ORAL EVERY 12 HOURS
Refills: 0 | Status: DISCONTINUED | OUTPATIENT
Start: 2024-07-07 | End: 2024-07-08

## 2024-07-07 RX ORDER — ACETAMINOPHEN 325 MG
650 TABLET ORAL EVERY 6 HOURS
Refills: 0 | Status: DISCONTINUED | OUTPATIENT
Start: 2024-07-07 | End: 2024-07-08

## 2024-07-07 RX ORDER — LACTULOSE 10 G/15ML
15 SOLUTION ORAL ONCE
Refills: 0 | Status: COMPLETED | OUTPATIENT
Start: 2024-07-07 | End: 2024-07-07

## 2024-07-07 RX ORDER — DEXTROSE 30 % IN WATER 30 %
12.5 VIAL (ML) INTRAVENOUS ONCE
Refills: 0 | Status: DISCONTINUED | OUTPATIENT
Start: 2024-07-07 | End: 2024-07-08

## 2024-07-07 RX ORDER — CLOPIDOGREL BISULFATE 75 MG/1
75 TABLET, FILM COATED ORAL DAILY
Refills: 0 | Status: DISCONTINUED | OUTPATIENT
Start: 2024-07-07 | End: 2024-07-08

## 2024-07-07 RX ORDER — ASPIRIN 325 MG/1
81 TABLET, FILM COATED ORAL DAILY
Refills: 0 | Status: DISCONTINUED | OUTPATIENT
Start: 2024-07-07 | End: 2024-07-08

## 2024-07-07 RX ORDER — MAGNESIUM SULFATE 100 %
1 POWDER (GRAM) MISCELLANEOUS ONCE
Refills: 0 | Status: COMPLETED | OUTPATIENT
Start: 2024-07-07 | End: 2024-07-07

## 2024-07-07 RX ADMIN — HYDRALAZINE HYDROCHLORIDE 50 MILLIGRAM(S): 50 TABLET ORAL at 17:47

## 2024-07-07 RX ADMIN — INSULIN LISPRO 3: 100 INJECTION, SOLUTION SUBCUTANEOUS at 17:46

## 2024-07-07 RX ADMIN — ASPIRIN 81 MILLIGRAM(S): 325 TABLET, FILM COATED ORAL at 11:30

## 2024-07-07 RX ADMIN — HEPARIN SODIUM 5000 UNIT(S): 50 INJECTION, SOLUTION INTRAVENOUS at 05:15

## 2024-07-07 RX ADMIN — HEPARIN SODIUM 5000 UNIT(S): 50 INJECTION, SOLUTION INTRAVENOUS at 17:47

## 2024-07-07 RX ADMIN — HYDRALAZINE HYDROCHLORIDE 50 MILLIGRAM(S): 50 TABLET ORAL at 05:15

## 2024-07-07 RX ADMIN — SPIRONOLACTONE 25 MILLIGRAM(S): 25 TABLET ORAL at 05:15

## 2024-07-07 RX ADMIN — LACTULOSE 15 GRAM(S): 10 SOLUTION ORAL at 03:03

## 2024-07-07 RX ADMIN — DEXTROSE MONOHYDRATE AND SODIUM CHLORIDE 500 MILLILITER(S): 5; .3 INJECTION, SOLUTION INTRAVENOUS at 03:04

## 2024-07-07 RX ADMIN — CARVEDILOL PHOSPHATE 12.5 MILLIGRAM(S): 80 CAPSULE, EXTENDED RELEASE ORAL at 05:15

## 2024-07-07 RX ADMIN — CLOPIDOGREL BISULFATE 75 MILLIGRAM(S): 75 TABLET, FILM COATED ORAL at 11:30

## 2024-07-07 RX ADMIN — Medication 100 GRAM(S): at 03:51

## 2024-07-07 RX ADMIN — CARVEDILOL PHOSPHATE 12.5 MILLIGRAM(S): 80 CAPSULE, EXTENDED RELEASE ORAL at 17:47

## 2024-07-07 NOTE — ED ADULT NURSE REASSESSMENT NOTE - NS ED NURSE REASSESS COMMENT FT1
A 2 RN skin check has been performed on admission to __CDU___ with RN witness _Deepika__.  A pressure injury __was not_____ identified.  Documentation in the assessment to support findings.

## 2024-07-07 NOTE — PHYSICAL THERAPY INITIAL EVALUATION ADULT - ADDITIONAL COMMENTS
pt reports living in private home with her 2 daughters who have a schedule/take turns being with their mom. (Daughters interested in obtaining more assistance at home if possible). Pt has 2 BARBI with handrail and no stairs inside. Prior to the past ~2weeks pt was independent, however the past 2 weeks pt has required increase assist for mobility/ADLs due to generalize weakness. Owns cane, shower chair and raised toilet seat.

## 2024-07-07 NOTE — GOALS OF CARE CONVERSATION - ADVANCED CARE PLANNING - CONVERSATION DETAILS
Advance care directive discussed with patient and daughters (1 present at bedside-Daisy and 2 on the phone). Daughter named her daughters Kristi and Daisy Amaral as the health care proxy. I discussed the known liver cancer and the fact that she is no longer on chemo as she was taken off by oncology. The patient requested CPR if needed, when asked about intubation she said she will like to discuss with her children. Patient's daughter requested to talk to palliative to discuss further the treatment plan going forward. Pending the time, patient and family requested full measure, no restriction on medical management. Patient is FULL CODE.

## 2024-07-07 NOTE — H&P ADULT - TIME BILLING
Chart, labs and imaging reviewed. Physical examination, medication reconciliation and documentation. Discussion with patient, children at bed side using  and ER nurse.

## 2024-07-07 NOTE — PHYSICAL THERAPY INITIAL EVALUATION ADULT - GENERAL OBSERVATIONS, REHAB EVAL
Pt received in bed + IV + tele/, + 2 daughters present, speaks  used #[id number], breathing on RA in NAD, 0/10 pain, agreeable to PT evaluation

## 2024-07-07 NOTE — PATIENT PROFILE ADULT - FALL HARM RISK - UNIVERSAL INTERVENTIONS
Bed in lowest position, wheels locked, appropriate side rails in place/Call bell, personal items and telephone in reach/Instruct patient to call for assistance before getting out of bed or chair/Non-slip footwear when patient is out of bed/[place] to call system/Physically safe environment - no spills, clutter or unnecessary equipment/Purposeful Proactive Rounding/Room/bathroom lighting operational, light cord in reach

## 2024-07-07 NOTE — H&P ADULT - CARDIOVASCULAR
Patient reports seeing her oncologist yesterday and was advised that she would not need chemotherapy.     She asks that I pass the message along to Dr. Jimenez so that he can get started on her treatment plan.     She also asks that someone contact her regarding reconstruction surgery. Please advise.   
regular rate and rhythm/S1 S2 present/no pedal edema

## 2024-07-07 NOTE — H&P ADULT - NSHPPHYSICALEXAM_GEN_ALL_CORE
Vital Signs Last 24 Hrs  T(C): 36.3 (06 Jul 2024 23:37), Max: 36.6 (06 Jul 2024 16:05)  T(F): 97.4 (06 Jul 2024 23:37), Max: 97.8 (06 Jul 2024 16:05)  HR: 60 (06 Jul 2024 23:37) (60 - 72)  BP: 121/76 (06 Jul 2024 23:37) (121/76 - 144/72)  BP(mean): --  RR: 18 (06 Jul 2024 23:37) (18 - 28)  SpO2: 100% (06 Jul 2024 23:37) (91% - 100%)    Parameters below as of 06 Jul 2024 23:37  Patient On (Oxygen Delivery Method): nasal cannula

## 2024-07-07 NOTE — H&P ADULT - ASSESSMENT
83 y/o female with PMH of HCC, DM-2, HTN, HLD, CAD s/p 4 stents came to the ED complaining of generalized weakness and decrease PO intake. As per patient and daughters (Daisy at bedside;  Dolly and Kristi on the phone at bedside) patient has been very weak, in the last 3 days, has decrease appetite, difficulty with ambulation. Patient follows with Dr. Dave (oncology), she was placed on oral chemo which was stopped last week because she was not getting better on the medication. She reported chronic abdominal pain otherwise no nausea, vomiting, fever, chills, chest pain, shortness of breath, palpitation, change in bowel/urinary habit, melena, hematochezia.   CT C/A/P: Small left pleural effusion with atelectasis, slightly increased from prior. Known cirrhosis with multifocal HCC and portal hypertension. Distended gallbladder containing stones.     Generalized weakness/failure to thrive   Admit to medical floor   Likely due to underlying HCC   Gentle hydration   PT eval   Fall precaution     Ammoniemia  Ammonia level: 70   Likely due to HCC   Lactulose once given in the ED  Continue Xifaxan 550mg bid     Hx of HCC   No longer on chemotherapy   Follows with oncology and hepatology   Continue Xifaxan  Spironolactone 25mg   Palliative consulted as requested by family    HTN/HLD/CAD   Coreg 12.5mg bid  with holding parameters  Plavix 75mg   Aspirin 81mg   Atorvastatin 40mg on hold due to elevated LFTs, resume as needed     DM-2   Hold PO medications:   Metformin   85 y/o female with PMH of HCC, DM-2, HTN, HLD, CAD s/p 4 stents came to the ED complaining of generalized weakness and decrease PO intake. As per patient and daughters (Daisy at bedside;  Dolly and Kristi on the phone at bedside) patient has been very weak, in the last 3 days, has decrease appetite, difficulty with ambulation. Patient follows with Dr. Dave (oncology), she was placed on oral chemo which was stopped last week because she was not getting better on the medication. She reported chronic abdominal pain otherwise no nausea, vomiting, fever, chills, chest pain, shortness of breath, palpitation, change in bowel/urinary habit, melena, hematochezia.   CT C/A/P: Small left pleural effusion with atelectasis, slightly increased from prior. Known cirrhosis with multifocal HCC and portal hypertension. Distended gallbladder containing stones.     Generalized weakness/failure to thrive   Admit to medical floor   Likely due to underlying HCC   Gentle hydration   PT eval   Fall precaution     Ammoniemia  Ammonia level: 70   Likely due to HCC   Lactulose once given in the ED  Continue Xifaxan 550mg bid     Hx of HCC   No longer on chemotherapy   Follows with oncology and hepatology   Continue Xifaxan  Spironolactone 25mg   Palliative consulted as requested by family    HTN/HLD/CAD   Coreg 12.5mg bid  with holding parameters  Plavix 75mg   Aspirin 81mg   Hydralazine 50mg bid   Atorvastatin 40mg on hold due to elevated LFTs, resume as needed     DM-2   Hold PO medications:   Metformin  1000mg bid   Farxiga 10mg   Tradjenta 5mg   Insulin sliding scale     Supportive   DVT prophylaxis: Heparin sc   Diet: soft bite size     Plan of care discussed with patient, children at bed side using  and ER nurse.     Dispo: pending PT and palliative eval

## 2024-07-07 NOTE — PATIENT PROFILE ADULT - DOES PATIENT HAVE ADVANCE DIRECTIVE
6/4/2024       RE: Gerard Manuel  [address] [place] [address]     Dear Colleague,    Thank you for referring your patient, Gerard Manuel, to the [facility] DERMATOLOGIC SURGERY CLINIC [place] [facility]. Please see a copy of my visit note below.    Dermatologic Surgery Post-Op Scar Check     CC: Derm Problem (Patient presents for scar evaluation of the right zygoma. The patient reports some improvement with the kenalog injection. )      Dermatology Problem List:  1. 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 Verrucae Vulgaris, right front parietal scalp, s/p cryotherapy 12/11/23  # retired physician    Subjective: Gerard Manuel is a 85 year old male who presents today for scar evaluation after MMS of the right zygoma on 10/30/23. Previously seen by me on 5/7/24 and presented with a web at right lateral canthus that was treated with ILK-20.  - Today, he notes some improvement s/p ILK. Denies issues with vision in that eye. Notes some firmness, swelling and numbness along the scar line.   - denies gritty or gilberto sensation of the eye. Says he recently got an eye exam and it was normal.   - no other concerns today    Objective: An exam of the right lateral canthus was performed today   - webbing and hypertrophic scar at the right lateral canthus, some improvement since last. Slight lateral ectropion present.         Procedures Performed:   Kenalog intralesional injection procedure note: After verbal consent and discussion of risks including but not limited to atrophy, pain, and bruising, time out was performed, the patient underwent positioning and the area was prepped with isopropyl alcohol, 0.2 total cc of Kenalog 40 mg/cc was injected into 2 sites on the right lateral canthus.  The patient tolerated the procedure well and left  the Dermatology clinic in good condition.      Assessment and Plan:   1.  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 Improved with prior ILK-20. Will try higher concentration today and see if we can get further improvement.   - ILK-40 given at right lateral canthus today.   - Consider repeat ILK vs surgical intervention vs CO2 if not resolved at next visit     RTC 4-6 weeks for re-check, sooner prn.     Patient was discussed with and evaluated by attending physician Dr. Hiren Serrano.    Khushbu Correa MD  Micrographic Surgery and Dermatologic Oncology (MSDO) Fellow, PGY-5    Staff Involved:   Scribe/Fellow/Staff    Scribe Disclosure:   I, CESILIA WRIGHT, am serving as a scribe; to document services personally performed by Hiren Serrano MD -based on data collection and the provider's statements to me.       Attending attestation:  I was present for key elements of the procedure and immediately available for all other portions of the procedure.  I have reviewed the note and edited it as necessary.    Hiren Serrano M.D.  Professor  Director of Dermatologic Surgery  Department of Dermatology  [facility]    Dermatology Surgery Clinic  [facility] [address] [address]         No

## 2024-07-07 NOTE — PATIENT PROFILE ADULT - LEGAL HELP
I have re-evaluated the patient's fluid status and reviewed vital signs. Clinical perfusion assessment was performed. no

## 2024-07-07 NOTE — H&P ADULT - NSHPSOCIALHISTORY_GEN_ALL_CORE
Remote cigarette use stopped about 30 years ago, no alcohol or illicit drug use. Lives with family. .

## 2024-07-07 NOTE — H&P ADULT - HISTORY OF PRESENT ILLNESS
83 y/o female with PMH of HCC, DM-2, HTN, HLD, CAD s/p 4 stents came to the ED complaining of generalized weakness and decrease PO intake. As per patient and daughters (Daisy at bedside;  Dolly and Kristi on the phone at bedside) patient has been very weak, in the last 3 days, has decrease appetite, difficulty with ambulation. Patient follows with Dr. Dave (oncology), she was placed on oral chemo which was stopped last week because she was not getting better on the medication. She reported chronic abdominal pain otherwise no nausea, vomiting, fever, chills, chest pain, shortness of breath, palpitation, change in bowel/urinary habit, melena, hematochezia.

## 2024-07-08 ENCOUNTER — TRANSCRIPTION ENCOUNTER (OUTPATIENT)
Age: 84
End: 2024-07-08

## 2024-07-08 VITALS
HEART RATE: 72 BPM | RESPIRATION RATE: 18 BRPM | DIASTOLIC BLOOD PRESSURE: 72 MMHG | OXYGEN SATURATION: 98 % | TEMPERATURE: 98 F | SYSTOLIC BLOOD PRESSURE: 136 MMHG

## 2024-07-08 LAB
ALBUMIN SERPL ELPH-MCNC: 2.3 G/DL — LOW (ref 3.3–5.2)
ALP SERPL-CCNC: 616 U/L — HIGH (ref 40–120)
ALT FLD-CCNC: 124 U/L — HIGH
ANION GAP SERPL CALC-SCNC: 12 MMOL/L — SIGNIFICANT CHANGE UP (ref 5–17)
AST SERPL-CCNC: 314 U/L — HIGH
BILIRUB SERPL-MCNC: 2.2 MG/DL — HIGH (ref 0.4–2)
BUN SERPL-MCNC: 27 MG/DL — HIGH (ref 8–20)
CALCIUM SERPL-MCNC: 9 MG/DL — SIGNIFICANT CHANGE UP (ref 8.4–10.5)
CHLORIDE SERPL-SCNC: 97 MMOL/L — SIGNIFICANT CHANGE UP (ref 96–108)
CO2 SERPL-SCNC: 22 MMOL/L — SIGNIFICANT CHANGE UP (ref 22–29)
CREAT SERPL-MCNC: 1.14 MG/DL — SIGNIFICANT CHANGE UP (ref 0.5–1.3)
EGFR: 47 ML/MIN/1.73M2 — LOW
GLUCOSE BLDC GLUCOMTR-MCNC: 142 MG/DL — HIGH (ref 70–99)
GLUCOSE BLDC GLUCOMTR-MCNC: 236 MG/DL — HIGH (ref 70–99)
GLUCOSE SERPL-MCNC: 137 MG/DL — HIGH (ref 70–99)
HCT VFR BLD CALC: 23.2 % — LOW (ref 34.5–45)
HGB BLD-MCNC: 8.4 G/DL — LOW (ref 11.5–15.5)
MCHC RBC-ENTMCNC: 34.6 PG — HIGH (ref 27–34)
MCHC RBC-ENTMCNC: 36.2 GM/DL — HIGH (ref 32–36)
MCV RBC AUTO: 95.5 FL — SIGNIFICANT CHANGE UP (ref 80–100)
PLATELET # BLD AUTO: 119 K/UL — LOW (ref 150–400)
POTASSIUM SERPL-MCNC: 3.8 MMOL/L — SIGNIFICANT CHANGE UP (ref 3.5–5.3)
POTASSIUM SERPL-SCNC: 3.8 MMOL/L — SIGNIFICANT CHANGE UP (ref 3.5–5.3)
PROT SERPL-MCNC: 5.8 G/DL — LOW (ref 6.6–8.7)
RBC # BLD: 2.43 M/UL — LOW (ref 3.8–5.2)
RBC # FLD: 20.6 % — HIGH (ref 10.3–14.5)
SODIUM SERPL-SCNC: 131 MMOL/L — LOW (ref 135–145)
WBC # BLD: 3.86 K/UL — SIGNIFICANT CHANGE UP (ref 3.8–10.5)
WBC # FLD AUTO: 3.86 K/UL — SIGNIFICANT CHANGE UP (ref 3.8–10.5)

## 2024-07-08 PROCEDURE — 84484 ASSAY OF TROPONIN QUANT: CPT

## 2024-07-08 PROCEDURE — 74177 CT ABD & PELVIS W/CONTRAST: CPT | Mod: MC

## 2024-07-08 PROCEDURE — 83735 ASSAY OF MAGNESIUM: CPT

## 2024-07-08 PROCEDURE — T1013: CPT

## 2024-07-08 PROCEDURE — 71045 X-RAY EXAM CHEST 1 VIEW: CPT

## 2024-07-08 PROCEDURE — 85027 COMPLETE CBC AUTOMATED: CPT

## 2024-07-08 PROCEDURE — 99285 EMERGENCY DEPT VISIT HI MDM: CPT

## 2024-07-08 PROCEDURE — 82962 GLUCOSE BLOOD TEST: CPT

## 2024-07-08 PROCEDURE — 93005 ELECTROCARDIOGRAM TRACING: CPT

## 2024-07-08 PROCEDURE — 99239 HOSP IP/OBS DSCHRG MGMT >30: CPT

## 2024-07-08 PROCEDURE — 85025 COMPLETE CBC W/AUTO DIFF WBC: CPT

## 2024-07-08 PROCEDURE — 70450 CT HEAD/BRAIN W/O DYE: CPT | Mod: MC

## 2024-07-08 PROCEDURE — 82140 ASSAY OF AMMONIA: CPT

## 2024-07-08 PROCEDURE — 81001 URINALYSIS AUTO W/SCOPE: CPT

## 2024-07-08 PROCEDURE — 36415 COLL VENOUS BLD VENIPUNCTURE: CPT

## 2024-07-08 PROCEDURE — 80053 COMPREHEN METABOLIC PANEL: CPT

## 2024-07-08 PROCEDURE — 83880 ASSAY OF NATRIURETIC PEPTIDE: CPT

## 2024-07-08 PROCEDURE — 71275 CT ANGIOGRAPHY CHEST: CPT | Mod: MC

## 2024-07-08 RX ADMIN — CLOPIDOGREL BISULFATE 75 MILLIGRAM(S): 75 TABLET, FILM COATED ORAL at 12:27

## 2024-07-08 RX ADMIN — SPIRONOLACTONE 25 MILLIGRAM(S): 25 TABLET ORAL at 06:17

## 2024-07-08 RX ADMIN — ASPIRIN 81 MILLIGRAM(S): 325 TABLET, FILM COATED ORAL at 12:27

## 2024-07-08 RX ADMIN — INSULIN LISPRO 2: 100 INJECTION, SOLUTION SUBCUTANEOUS at 12:27

## 2024-07-08 RX ADMIN — HEPARIN SODIUM 5000 UNIT(S): 50 INJECTION, SOLUTION INTRAVENOUS at 06:16

## 2024-07-08 RX ADMIN — CARVEDILOL PHOSPHATE 12.5 MILLIGRAM(S): 80 CAPSULE, EXTENDED RELEASE ORAL at 06:17

## 2024-07-08 RX ADMIN — HYDRALAZINE HYDROCHLORIDE 50 MILLIGRAM(S): 50 TABLET ORAL at 06:17

## 2024-07-08 NOTE — CHART NOTE - NSCHARTNOTEFT_GEN_A_CORE
Attending Chart Note    Brief HPI:  83 y/o female with PMH of HCC, DM-2, HTN, HLD, CAD s/p 4 stents came to the ED complaining of generalized weakness and decrease PO intake. As per patient and daughters (Daisy at bedside;  Dolly and Kristi on the phone at bedside) patient has been very weak, in the last 3 days, has decrease appetite, difficulty with ambulation. Patient follows with Dr. Dave (oncology), she was placed on oral chemo which was stopped last week because she was not getting better on the medication. She reported chronic abdominal pain otherwise no nausea, vomiting, fever, chills, chest pain, shortness of breath, palpitation, change in bowel/urinary habit, melena, hematochezia.   CT C/A/P: Small left pleural effusion with atelectasis, slightly increased from prior. Known cirrhosis with multifocal HCC and portal hypertension. Distended gallbladder containing stones. Seen and examined at bedside and used . Endorses persistent weakness. Pending PT eval and palliative consult.
Palliative care social work note.    SW spoke with daughter Kristi to obtain her understanding of patient dx and prognosis. Daughter very distracted during call as she reports autistic child in home. Daughter also presents with health literacy issues . It was difficult t ascertain details form daughter regarding her understanding  of dx as well for her to clarify services and caregiving in home since she kept repeating I don't understand. SW was able to ascertain that Kristi is CDPAP aide 40 hours a week. Daughter expressed wanting more help in home to care for patient. SW explored with patient dx patient Hospice eligible and could get additional support at home. Kristi reports interest in talking more but requests call tomorrow. SW advised Hospice  to contact  daughter for community Hospice follow up.

## 2024-07-08 NOTE — DISCHARGE NOTE PROVIDER - HOSPITAL COURSE
85 y/o female with PMH of HCC, DM-2, HTN, HLD, CAD s/p 4 stents came to the ED complaining of generalized weakness and decrease PO intake. She was seen by PT and recommended home with home PT. Her oral intake improved. She was seen by palliative and recommended***. she is clinically stable for discharge home. 85 y/o female with PMH of HCC, DM-2, HTN, HLD, CAD s/p 4 stents came to the ED complaining of generalized weakness and decrease PO intake. She was seen by PT and recommended home with home PT. Her oral intake improved. She was seen by palliative and recommended outpatient hospice referral. she is clinically stable for discharge home.

## 2024-07-08 NOTE — DISCHARGE NOTE NURSING/CASE MANAGEMENT/SOCIAL WORK - PATIENT PORTAL LINK FT
You can access the FollowMyHealth Patient Portal offered by [facility] by registering at the following website: [email or web address]. By joining [facility]’s FollowMyHealth portal, you will also be able to view your health information using other applications (apps) compatible with our system.

## 2024-07-08 NOTE — DISCHARGE NOTE PROVIDER - NSDCMRMEDTOKEN_GEN_ALL_CORE_FT
aspirin 81 mg oral tablet: 1 tab(s) orally once a day  atorvastatin 40 mg oral tablet: 1 tab(s) orally once a day (at bedtime)  carvedilol 12.5 mg oral tablet: 1 tab(s) orally every 12 hours  Farxiga 10 mg oral tablet: 1 tab(s) orally once a day  hydrALAZINE 50 mg oral tablet: 1 tab(s) orally 2 times a day  metFORMIN 1000 mg oral tablet: 1 tab(s) orally 2 times a da  Plavix 75 mg oral tablet: 1 tab(s) orally once a day  spironolactone 25 mg oral tablet: 1 tab(s) orally once a day  Tradjenta 5 mg oral tablet: 1 tab(s) orally once a day  vitamin c daily:   vitamin d3 daily:   Xifaxan 550 mg oral tablet: 1 tab(s) orally 2 times a day

## 2024-07-08 NOTE — DISCHARGE NOTE PROVIDER - NSDCCPCAREPLAN_GEN_ALL_CORE_FT
PRINCIPAL DISCHARGE DIAGNOSIS  Diagnosis: Failure to thrive in adult  Assessment and Plan of Treatment: Seen by PT and recommended home with home PT. Follow up pcp outpatient.      SECONDARY DISCHARGE DIAGNOSES  Diagnosis: HCC (hepatocellular carcinoma)  Assessment and Plan of Treatment: No longer on chemotherapy. Follow up with oncology and hepatology.     PRINCIPAL DISCHARGE DIAGNOSIS  Diagnosis: Failure to thrive in adult  Assessment and Plan of Treatment: Seen by PT and recommended home with home PT. Follow up pcp outpatient.      SECONDARY DISCHARGE DIAGNOSES  Diagnosis: HCC (hepatocellular carcinoma)  Assessment and Plan of Treatment: No longer on chemotherapy. Follow up with oncology and hepatology. Follow up with palliative outpatient.

## 2024-07-15 ENCOUNTER — INPATIENT (INPATIENT)
Facility: HOSPITAL | Age: 84
LOS: 3 days | Discharge: HOME | DRG: 948 | End: 2024-07-19
Attending: HOSPITALIST | Admitting: EMERGENCY MEDICINE
Payer: MEDICARE

## 2024-07-15 VITALS
HEART RATE: 65 BPM | RESPIRATION RATE: 18 BRPM | WEIGHT: 149.91 LBS | DIASTOLIC BLOOD PRESSURE: 67 MMHG | SYSTOLIC BLOOD PRESSURE: 125 MMHG | TEMPERATURE: 98 F | OXYGEN SATURATION: 99 % | HEIGHT: 63 IN

## 2024-07-15 DIAGNOSIS — Z96.652 PRESENCE OF LEFT ARTIFICIAL KNEE JOINT: Chronic | ICD-10-CM

## 2024-07-15 DIAGNOSIS — Z98.890 OTHER SPECIFIED POSTPROCEDURAL STATES: Chronic | ICD-10-CM

## 2024-07-15 DIAGNOSIS — R18.8 OTHER ASCITES: ICD-10-CM

## 2024-07-15 LAB
ALBUMIN SERPL ELPH-MCNC: 2.6 G/DL — LOW (ref 3.3–5.2)
ALP SERPL-CCNC: 569 U/L — HIGH (ref 40–120)
ALT FLD-CCNC: 116 U/L — HIGH
AMMONIA BLD-MCNC: 70 UMOL/L — HIGH (ref 11–55)
ANION GAP SERPL CALC-SCNC: 16 MMOL/L — SIGNIFICANT CHANGE UP (ref 5–17)
ANISOCYTOSIS BLD QL: SLIGHT — SIGNIFICANT CHANGE UP
APTT BLD: 29.6 SEC — SIGNIFICANT CHANGE UP (ref 24.5–35.6)
AST SERPL-CCNC: 189 U/L — HIGH
BASOPHILS # BLD AUTO: 0 K/UL — SIGNIFICANT CHANGE UP (ref 0–0.2)
BASOPHILS NFR BLD AUTO: 0 % — SIGNIFICANT CHANGE UP (ref 0–2)
BILIRUB SERPL-MCNC: 5 MG/DL — HIGH (ref 0.4–2)
BUN SERPL-MCNC: 26.8 MG/DL — HIGH (ref 8–20)
CALCIUM SERPL-MCNC: 8.9 MG/DL — SIGNIFICANT CHANGE UP (ref 8.4–10.5)
CHLORIDE SERPL-SCNC: 96 MMOL/L — SIGNIFICANT CHANGE UP (ref 96–108)
CO2 SERPL-SCNC: 20 MMOL/L — LOW (ref 22–29)
CREAT SERPL-MCNC: 1.03 MG/DL — SIGNIFICANT CHANGE UP (ref 0.5–1.3)
EGFR: 53 ML/MIN/1.73M2 — LOW
EOSINOPHIL # BLD AUTO: 0 K/UL — SIGNIFICANT CHANGE UP (ref 0–0.5)
EOSINOPHIL NFR BLD AUTO: 0 % — SIGNIFICANT CHANGE UP (ref 0–6)
GIANT PLATELETS BLD QL SMEAR: PRESENT — SIGNIFICANT CHANGE UP
GLUCOSE SERPL-MCNC: 113 MG/DL — HIGH (ref 70–99)
HCT VFR BLD CALC: 25.4 % — LOW (ref 34.5–45)
HGB BLD-MCNC: 8.9 G/DL — LOW (ref 11.5–15.5)
INR BLD: 1.87 RATIO — HIGH (ref 0.85–1.18)
LYMPHOCYTES # BLD AUTO: 0.74 K/UL — LOW (ref 1–3.3)
LYMPHOCYTES # BLD AUTO: 14.1 % — SIGNIFICANT CHANGE UP (ref 13–44)
MACROCYTES BLD QL: SLIGHT — SIGNIFICANT CHANGE UP
MAGNESIUM SERPL-MCNC: 1.8 MG/DL — SIGNIFICANT CHANGE UP (ref 1.6–2.6)
MANUAL SMEAR VERIFICATION: SIGNIFICANT CHANGE UP
MCHC RBC-ENTMCNC: 34.9 PG — HIGH (ref 27–34)
MCHC RBC-ENTMCNC: 35 GM/DL — SIGNIFICANT CHANGE UP (ref 32–36)
MCV RBC AUTO: 99.6 FL — SIGNIFICANT CHANGE UP (ref 80–100)
MONOCYTES # BLD AUTO: 0.42 K/UL — SIGNIFICANT CHANGE UP (ref 0–0.9)
MONOCYTES NFR BLD AUTO: 8 % — SIGNIFICANT CHANGE UP (ref 2–14)
NEUTROPHILS # BLD AUTO: 4.11 K/UL — SIGNIFICANT CHANGE UP (ref 1.8–7.4)
NEUTROPHILS NFR BLD AUTO: 77.9 % — HIGH (ref 43–77)
NT-PROBNP SERPL-SCNC: 2194 PG/ML — HIGH (ref 0–300)
OVALOCYTES BLD QL SMEAR: SLIGHT — SIGNIFICANT CHANGE UP
PLAT MORPH BLD: NORMAL — SIGNIFICANT CHANGE UP
PLATELET # BLD AUTO: 137 K/UL — LOW (ref 150–400)
POIKILOCYTOSIS BLD QL AUTO: SLIGHT — SIGNIFICANT CHANGE UP
POLYCHROMASIA BLD QL SMEAR: SLIGHT — SIGNIFICANT CHANGE UP
POTASSIUM SERPL-MCNC: 5.1 MMOL/L — SIGNIFICANT CHANGE UP (ref 3.5–5.3)
POTASSIUM SERPL-SCNC: 5.1 MMOL/L — SIGNIFICANT CHANGE UP (ref 3.5–5.3)
PROT SERPL-MCNC: 6.4 G/DL — LOW (ref 6.6–8.7)
PROTHROM AB SERPL-ACNC: 20.4 SEC — HIGH (ref 9.5–13)
RBC # BLD: 2.55 M/UL — LOW (ref 3.8–5.2)
RBC # FLD: 21.8 % — HIGH (ref 10.3–14.5)
RBC BLD AUTO: ABNORMAL
SODIUM SERPL-SCNC: 132 MMOL/L — LOW (ref 135–145)
TARGETS BLD QL SMEAR: SLIGHT — SIGNIFICANT CHANGE UP
TROPONIN T, HIGH SENSITIVITY RESULT: 39 NG/L — SIGNIFICANT CHANGE UP (ref 0–51)
WBC # BLD: 5.27 K/UL — SIGNIFICANT CHANGE UP (ref 3.8–10.5)
WBC # FLD AUTO: 5.27 K/UL — SIGNIFICANT CHANGE UP (ref 3.8–10.5)

## 2024-07-15 PROCEDURE — 99285 EMERGENCY DEPT VISIT HI MDM: CPT

## 2024-07-15 PROCEDURE — 71045 X-RAY EXAM CHEST 1 VIEW: CPT | Mod: 26

## 2024-07-15 PROCEDURE — 76705 ECHO EXAM OF ABDOMEN: CPT | Mod: 26

## 2024-07-15 PROCEDURE — 99223 1ST HOSP IP/OBS HIGH 75: CPT

## 2024-07-15 PROCEDURE — 93970 EXTREMITY STUDY: CPT | Mod: 26

## 2024-07-15 RX ORDER — DEXTROSE 30 % IN WATER 30 %
12.5 VIAL (ML) INTRAVENOUS ONCE
Refills: 0 | Status: DISCONTINUED | OUTPATIENT
Start: 2024-07-15 | End: 2024-07-16

## 2024-07-15 RX ORDER — INSULIN LISPRO 100 [IU]/ML
INJECTION, SOLUTION SUBCUTANEOUS
Refills: 0 | Status: DISCONTINUED | OUTPATIENT
Start: 2024-07-15 | End: 2024-07-16

## 2024-07-15 RX ORDER — HYDRALAZINE HYDROCHLORIDE 50 MG/1
50 TABLET ORAL
Refills: 0 | Status: DISCONTINUED | OUTPATIENT
Start: 2024-07-15 | End: 2024-07-19

## 2024-07-15 RX ORDER — SPIRONOLACTONE 25 MG/1
25 TABLET ORAL DAILY
Refills: 0 | Status: DISCONTINUED | OUTPATIENT
Start: 2024-07-15 | End: 2024-07-19

## 2024-07-15 RX ORDER — DEXTROSE MONOHYDRATE AND SODIUM CHLORIDE 5; .3 G/100ML; G/100ML
1000 INJECTION, SOLUTION INTRAVENOUS
Refills: 0 | Status: DISCONTINUED | OUTPATIENT
Start: 2024-07-15 | End: 2024-07-16

## 2024-07-15 RX ORDER — DEXTROSE 30 % IN WATER 30 %
25 VIAL (ML) INTRAVENOUS ONCE
Refills: 0 | Status: DISCONTINUED | OUTPATIENT
Start: 2024-07-15 | End: 2024-07-16

## 2024-07-15 RX ORDER — DEXTROSE 30 % IN WATER 30 %
15 VIAL (ML) INTRAVENOUS ONCE
Refills: 0 | Status: DISCONTINUED | OUTPATIENT
Start: 2024-07-15 | End: 2024-07-16

## 2024-07-15 RX ORDER — ATORVASTATIN CALCIUM 20 MG/1
40 TABLET, FILM COATED ORAL AT BEDTIME
Refills: 0 | Status: DISCONTINUED | OUTPATIENT
Start: 2024-07-15 | End: 2024-07-17

## 2024-07-15 RX ORDER — CARVEDILOL PHOSPHATE 80 MG/1
12.5 CAPSULE, EXTENDED RELEASE ORAL EVERY 12 HOURS
Refills: 0 | Status: DISCONTINUED | OUTPATIENT
Start: 2024-07-15 | End: 2024-07-19

## 2024-07-15 RX ORDER — DAPAGLIFLOZIN 10 MG/1
10 TABLET, FILM COATED ORAL DAILY
Refills: 0 | Status: DISCONTINUED | OUTPATIENT
Start: 2024-07-15 | End: 2024-07-15

## 2024-07-15 RX ORDER — PANTOPRAZOLE SODIUM 40 MG/10ML
40 INJECTION, POWDER, FOR SOLUTION INTRAVENOUS
Refills: 0 | Status: DISCONTINUED | OUTPATIENT
Start: 2024-07-15 | End: 2024-07-19

## 2024-07-15 RX ORDER — GLUCAGON HYDROCHLORIDE 1 MG/ML
1 INJECTION, POWDER, FOR SOLUTION INTRAMUSCULAR; INTRAVENOUS; SUBCUTANEOUS ONCE
Refills: 0 | Status: DISCONTINUED | OUTPATIENT
Start: 2024-07-15 | End: 2024-07-16

## 2024-07-15 RX ADMIN — HYDRALAZINE HYDROCHLORIDE 50 MILLIGRAM(S): 50 TABLET ORAL at 22:33

## 2024-07-15 RX ADMIN — Medication 500 MILLIGRAM(S): at 22:33

## 2024-07-15 RX ADMIN — ATORVASTATIN CALCIUM 40 MILLIGRAM(S): 20 TABLET, FILM COATED ORAL at 22:33

## 2024-07-15 RX ADMIN — CARVEDILOL PHOSPHATE 12.5 MILLIGRAM(S): 80 CAPSULE, EXTENDED RELEASE ORAL at 22:33

## 2024-07-15 RX ADMIN — SPIRONOLACTONE 25 MILLIGRAM(S): 25 TABLET ORAL at 22:33

## 2024-07-15 NOTE — ED ADULT NURSE NOTE - OBJECTIVE STATEMENT
Pt A&O x 3 brought in by adult daughter for abdominal distention and intermittent lower abdominal pain. Abdomen round and distended. No tenderness. No N/V. No SOB. Specimens sent to lab. Pt on telemonitor with . Bed locked and in lowest position. Family at bedside. Frequent checks made.

## 2024-07-15 NOTE — ED ADULT NURSE NOTE - NSFALLHARMRISKINTERV_ED_ALL_ED

## 2024-07-15 NOTE — H&P ADULT - NSHPLABSRESULTS_GEN_ALL_CORE
.                          8.9    5.27  )-----------( 137      ( 15 Jul 2024 14:00 )             25.4     Hemoglobin: 8.9 g/dL (07-15 @ 14:00)    07-15    132<L>  |  96  |  26.8<H>  ----------------------------<  113<H>  5.1   |  20.0<L>  |  1.03    Ca    8.9      15 Jul 2024 14:00    TPro  6.4<L>  /  Alb  2.6<L>  /  TBili  5.0<H>  /  DBili  x   /  AST  189<H>  /  ALT  116<H>  /  AlkPhos  569<H>  07-15    PT/INR - ( 15 Jul 2024 19:50 )   PT: 20.4 sec;   INR: 1.87 ratio         PTT - ( 15 Jul 2024 19:50 )  PTT:29.6 sec      Pro-Brain Natriuretic Peptide: 2194 pg/mL (07-15-24 @ 14:00)    Troponin T, High Sensitivity Result: 39: *     12 Lead ECG:   Ventricular Rate 65 BPM  QTC Calculation(Bazett) 522 ms    Diagnosis Line Normal sinus rhythm  Right bundle branch block  Moderate voltage criteria for LVH, may be normal variant  Abnormal ECG    Confirmed by Maurice Younger MD ([id number]) on 7/15/2024 1:44:38 PM (07-15-24 @ 12:44)    POCT glucose:   Urinalysis Basic - ( 15 Jul 2024 14:00 )    Color: x / Appearance: x / SG: x / pH: x  Gluc: 113 mg/dL / Ketone: x  / Bili: x / Urobili: x   Blood: x / Protein: x / Nitrite: x   Leuk Esterase: x / RBC: x / WBC x   Sq Epi: x / Non Sq Epi: x / Bacteria: x        RADIOLOGY & ADDITIONAL STUDIES:    < from: US Abdomen Limited (07.15.24 @ 14:59) >    Moderate ascites.  Left pleural effusion.  Cirrhotic liver with partially visualized right hepatic echogenic nodules likely correspond to known hepatocellular carcinoma.    < end of copied text >    < from: US Duplex Venous Lower Ext Complete, Bilateral (07.15.24 @ 14:59) >    No evidence of deep venous thrombosis in either lower extremity.  There is a 6.0 cm right Baker's cyst.    < end of copied text >

## 2024-07-15 NOTE — ED CDU PROVIDER INITIAL DAY NOTE - OBJECTIVE STATEMENT
85 y/o F with hx of cancer c/o shortness of breath secondary to ascites.  Denies any other complaints.  Pt currently undergoing chemotherapy.

## 2024-07-15 NOTE — ED CDU PROVIDER INITIAL DAY NOTE - ATTENDING APP SHARED VISIT CONTRIBUTION OF CARE
I, Lemuel Rosario MD, performed the initial face to face bedside interview with this patient regarding history of present illness, review of symptoms and relevant past medical, social and family history.  I completed an independent physical examination.  I was the initial provider who evaluated this patient. I have signed out the follow up of any pending tests (i.e. labs, radiological studies) to the ACP.  I have communicated the patient’s plan of care and disposition with the ACP.

## 2024-07-15 NOTE — ED PROVIDER NOTE - CLINICAL SUMMARY MEDICAL DECISION MAKING FREE TEXT BOX
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

## 2024-07-15 NOTE — H&P ADULT - ASSESSMENT
84-year-old female, history of hepatocellular carcinoma, diabetes, hypertension, hyperlipidemia, CAD with stents, presents with worsening shortness of breath and bilateral lower extremity swelling.  Patient was discharged from the hospital approximately 1 week ago related to weakness and failure to thrive    # Generalized weakness/failure to thrive   Admit to medical floor   Likely due to underlying HCC   Gentle hydration   PT eval   Fall precaution     # Ammoniemia  Ammonia level: 70   Likely due to HCC   Lactulose  Xifaxan 550mg bid     #Hx of HCC   No longer on chemotherapy   Follows with oncology and hepatology   Continue Xifaxan  Spironolactone 25mg   Palliative consulted as requested by family    # HTN/HLD/CAD   Coreg 12.5mg bid  with holding parameters  Plavix 75mg   Aspirin 81mg   Hydralazine 50mg bid   Atorvastatin 40mg on hold due to elevated LFTs, resume as needed     # DM-2   Hold PO medications:   Metformin  1000mg bid   Farxiga 10mg   Tradjenta 5mg   Insulin sliding scale       DVT:   GI:   Diet:   Activity: Increase as tolerated  Dispo: Acute for now    Plan of care discussed with patient, children at bed side using .     Dispo: pending PT and palliative eval    84-year-old female, history of hepatocellular carcinoma, diabetes, hypertension, hyperlipidemia, CAD with stents, presents with worsening shortness of breath and bilateral lower extremity swelling.  Patient was discharged from the hospital approximately 1 week ago related to weakness and failure to thrive. Family also reports dark stool vs dark blood in stool.    # SOB / LE edema  can be from 3rd spacing along with abdominal distension  c/w home dose of spironolactone  encourage po intake  comfortable on room for now.  monitor lytes and cr.    # Anemia / dark stool  # Thrombocytopenia  ? related to cirrhosis vs GI bleed  keep T+S active  Tx PRN  hold home dose of ASA and plavix. placed on SCD for DVT prophylaxis  GI consult if hgb trending down or hemodynamically unstable.  Further anemia work up if family wants to continue medical care after palliative care eval.    # Generalized weakness/failure to thrive   Admit to medical floor   Likely due to underlying HCC   c/w home dose of spironolactone  otherwise monitor off diuretics and IV fluids.  If not tolerating po intake, would prefer IV albumin over IVF    # Ammoniemia  Ammonia level: 70 noted on 07/15/24  Likely due to HCC   Lactulose PRN for 2-3 BMs  Xifaxan 550mg bid     #Hx of HCC   No longer on chemotherapy   Follows with oncology and hepatology   Continue Xifaxan  Spironolactone 25mg   Palliative consulted as requested by family    # HTN/HLD/CAD   Coreg 12.5mg bid  with holding parameters  Plavix 75mg   Aspirin 81mg   Hydralazine 50mg bid   LFTs improved from last admission. keep Atorvastatin 40mg on hold     # DM-2   Hold PO medications:   Metformin  1000mg bid   Farxiga 10mg   Tradjenta 5mg   Insulin sliding scale       DVT: SCD  GI: pantoprazole  Diet: DASH / CC + ensure high protein supplement.  Activity: Increase as tolerated  Dispo: Acute for now    Plan of care discussed with patient, children at bed side using .         Total time spent to complete patient's bedside assessment, review medical chart, discuss medical plan of care with covering medical team was more than 75 minutes  with >50% of time spent face to face with patient, discussion with patient/family and/or coordination of care.

## 2024-07-15 NOTE — H&P ADULT - HISTORY OF PRESENT ILLNESS
84-year-old female, history of hepatocellular carcinoma, diabetes, hypertension, hyperlipidemia, CAD with stents, presents with worsening shortness of breath and bilateral lower extremity swelling.  Patient was discharged from the hospital approximately 1 week ago related to weakness and failure to thrive.  Has been engaged in conversations regarding hospice and palliative care, however has not committed to a plan of action yet.  Since being at home this week, however, daughter and patient returned due to increased lethargy, poor p.o. intake, abdominal distention, shortness of breath, and leg swelling.  Also makes note of changes to stool color, concerned that it may be more dark than usual. 84-year-old female, history of hepatocellular carcinoma, diabetes, hypertension, hyperlipidemia, CAD with stents, presents with worsening shortness of breath, orthopnea and bilateral lower extremity swelling. Family at bedside also report dark blood in stool at home. Patient was discharged from the hospital approximately 1 week ago related to weakness and failure to thrive.  Has been engaged in conversations regarding hospice and palliative care, however has not committed to a plan of action yet.  Since being at home this week, however, daughter and patient returned due to increased lethargy, poor p.o. intake, abdominal distention, shortness of breath, and leg swelling. Also makes note of changes to stool color, concerned that it may be more dark than usual. Patient's chemo was stopped about 2 weeks ago because patient was unable to tolerate it. As per family at bedside, no further plan for chemo at the moment. No chest pain, palpitations, fever, urinary symptoms (except dark urine), constipation.     Hx taken from family at bedside with help of ER .

## 2024-07-15 NOTE — ED PROVIDER NOTE - ATTENDING APP SHARED VISIT CONTRIBUTION OF CARE
This was a shared visit with [facility]. I reviewed and verified the documentation and independently performed the documented history/exam/mdm.

## 2024-07-15 NOTE — ED ADULT NURSE REASSESSMENT NOTE - NS ED NURSE REASSESS COMMENT FT1
report received from SAVANA Jameson. pt. axo3 with equal and unlabored resp awaiting inr results for obs placement.

## 2024-07-15 NOTE — ED PROVIDER NOTE - OBJECTIVE STATEMENT
84-year-old female, history of hepatocellular carcinoma, diabetes, hypertension, hyperlipidemia, CAD with stents, presents with worsening shortness of breath and bilateral lower extremity swelling.  Patient was discharged from the hospital approximately 1 week ago related to weakness and failure to thrive.  Has been engaged in conversations regarding hospice and palliative care, however has not committed to a plan of action yet.  Since being at home this week, however, daughter and patient returned due to increased lethargy, poor p.o. intake, abdominal distention, shortness of breath, and leg swelling.  Also makes note of changes to stool color, concerned that it may be more dark than usual.    English interpeter: eugneia utilized throughout encounter

## 2024-07-15 NOTE — ED CDU PROVIDER INITIAL DAY NOTE - CLINICAL SUMMARY MEDICAL DECISION MAKING FREE TEXT BOX
85 y/o F with pain and shortness of breath due to ascites secondary to cancer - plan is for IR therapeutic paracentesis tomorrow and case management for hospice

## 2024-07-15 NOTE — H&P ADULT - NSHPPHYSICALEXAM_GEN_ALL_CORE
VITALS:  Vital Signs Last 24 Hrs  T(C): 36.4 (15 Jul 2024 16:01), Max: 36.7 (15 Jul 2024 12:00)  T(F): 97.5 (15 Jul 2024 16:01), Max: 98.1 (15 Jul 2024 12:00)  HR: 61 (15 Jul 2024 16:01) (61 - 65)  BP: 126/73 (15 Jul 2024 16:01) (125/67 - 126/73)  BP(mean): --  RR: 19 (15 Jul 2024 16:01) (18 - 19)  SpO2: 97% (15 Jul 2024 16:01) (97% - 99%)    Parameters below as of 15 Jul 2024 16:01  Patient On (Oxygen Delivery Method): room air      I&O's Detail      PHYSICAL EXAM:  GENERAL: NAD  CHEST/LUNG: CTAB; No wheeze  HEART: RRR; S1/S2  ABDOMEN: Soft, NT/ND; BS present  EXTREMITIES:  No cyanosis, or edema  NEUROLOGY: AAOx3  SKIN: No rashes or lesions on visible areas VITALS:  Vital Signs Last 24 Hrs  T(C): 36.4 (15 Jul 2024 16:01), Max: 36.7 (15 Jul 2024 12:00)  T(F): 97.5 (15 Jul 2024 16:01), Max: 98.1 (15 Jul 2024 12:00)  HR: 61 (15 Jul 2024 16:01) (61 - 65)  BP: 126/73 (15 Jul 2024 16:01) (125/67 - 126/73)  BP(mean): --  RR: 19 (15 Jul 2024 16:01) (18 - 19)  SpO2: 97% (15 Jul 2024 16:01) (97% - 99%)    Parameters below as of 15 Jul 2024 16:01  Patient On (Oxygen Delivery Method): room air      I&O's Detail      PHYSICAL EXAM:  GENERAL: NAD  CHEST/LUNG: CTAB; No wheeze  HEART: RRR; S1/S2  ABDOMEN: Soft, NT/ND; BS present  EXTREMITIES: 1+ edema  NEUROLOGY: AAOx3  SKIN: No rashes or lesions on visible areas

## 2024-07-16 DIAGNOSIS — R18.8 OTHER ASCITES: ICD-10-CM

## 2024-07-16 DIAGNOSIS — Z51.5 ENCOUNTER FOR PALLIATIVE CARE: ICD-10-CM

## 2024-07-16 DIAGNOSIS — C22.0 LIVER CELL CARCINOMA: ICD-10-CM

## 2024-07-16 DIAGNOSIS — R53.81 OTHER MALAISE: ICD-10-CM

## 2024-07-16 LAB
A1C WITH ESTIMATED AVERAGE GLUCOSE RESULT: 4.6 % — SIGNIFICANT CHANGE UP (ref 4–5.6)
ALBUMIN SERPL ELPH-MCNC: 2.4 G/DL — LOW (ref 3.3–5.2)
ALP SERPL-CCNC: 530 U/L — HIGH (ref 40–120)
ALT FLD-CCNC: 106 U/L — HIGH
AMMONIA BLD-MCNC: 110 UMOL/L — HIGH (ref 11–55)
ANION GAP SERPL CALC-SCNC: 15 MMOL/L — SIGNIFICANT CHANGE UP (ref 5–17)
AST SERPL-CCNC: 173 U/L — HIGH
B PERT IGG+IGM PNL SER: CLEAR — SIGNIFICANT CHANGE UP
BILIRUB SERPL-MCNC: 4.6 MG/DL — HIGH (ref 0.4–2)
BUN SERPL-MCNC: 25.4 MG/DL — HIGH (ref 8–20)
CALCIUM SERPL-MCNC: 8.7 MG/DL — SIGNIFICANT CHANGE UP (ref 8.4–10.5)
CHLORIDE SERPL-SCNC: 98 MMOL/L — SIGNIFICANT CHANGE UP (ref 96–108)
CO2 SERPL-SCNC: 20 MMOL/L — LOW (ref 22–29)
COLOR FLD: YELLOW
CREAT SERPL-MCNC: 1.04 MG/DL — SIGNIFICANT CHANGE UP (ref 0.5–1.3)
EGFR: 52 ML/MIN/1.73M2 — LOW
ESTIMATED AVERAGE GLUCOSE: 85 MG/DL — SIGNIFICANT CHANGE UP (ref 68–114)
FLUID INTAKE SUBSTANCE CLASS: SIGNIFICANT CHANGE UP
GLUCOSE SERPL-MCNC: 129 MG/DL — HIGH (ref 70–99)
HCT VFR BLD CALC: 22.2 % — LOW (ref 34.5–45)
HGB BLD-MCNC: 7.9 G/DL — LOW (ref 11.5–15.5)
LYMPHOCYTES # FLD: 75 % — SIGNIFICANT CHANGE UP
MAGNESIUM SERPL-MCNC: 1.8 MG/DL — SIGNIFICANT CHANGE UP (ref 1.6–2.6)
MCHC RBC-ENTMCNC: 35.1 PG — HIGH (ref 27–34)
MCHC RBC-ENTMCNC: 35.6 GM/DL — SIGNIFICANT CHANGE UP (ref 32–36)
MCV RBC AUTO: 98.7 FL — SIGNIFICANT CHANGE UP (ref 80–100)
MESOTHL CELL # FLD: 2 % — SIGNIFICANT CHANGE UP
MONOS+MACROS # FLD: 20 % — SIGNIFICANT CHANGE UP
NEUTROPHILS-BODY FLUID: 3 % — SIGNIFICANT CHANGE UP
PLATELET # BLD AUTO: 141 K/UL — LOW (ref 150–400)
POTASSIUM SERPL-MCNC: 4.1 MMOL/L — SIGNIFICANT CHANGE UP (ref 3.5–5.3)
POTASSIUM SERPL-SCNC: 4.1 MMOL/L — SIGNIFICANT CHANGE UP (ref 3.5–5.3)
PROT SERPL-MCNC: 5.9 G/DL — LOW (ref 6.6–8.7)
RBC # BLD: 2.25 M/UL — LOW (ref 3.8–5.2)
RBC # FLD: 21.9 % — HIGH (ref 10.3–14.5)
RCV VOL RI: <2000 /UL — HIGH (ref 0–0)
SODIUM SERPL-SCNC: 132 MMOL/L — LOW (ref 135–145)
TOTAL NUCLEATED CELL COUNT, BODY FLUID: 78 /UL — SIGNIFICANT CHANGE UP
TUBE TYPE: SIGNIFICANT CHANGE UP
WBC # BLD: 4.55 K/UL — SIGNIFICANT CHANGE UP (ref 3.8–10.5)
WBC # FLD AUTO: 4.55 K/UL — SIGNIFICANT CHANGE UP (ref 3.8–10.5)

## 2024-07-16 PROCEDURE — 99233 SBSQ HOSP IP/OBS HIGH 50: CPT

## 2024-07-16 PROCEDURE — 99223 1ST HOSP IP/OBS HIGH 75: CPT

## 2024-07-16 PROCEDURE — 49083 ABD PARACENTESIS W/IMAGING: CPT

## 2024-07-16 RX ORDER — FUROSEMIDE 10 MG/ML
40 INJECTION, SOLUTION INTRAMUSCULAR; INTRAVENOUS DAILY
Refills: 0 | Status: DISCONTINUED | OUTPATIENT
Start: 2024-07-16 | End: 2024-07-19

## 2024-07-16 RX ORDER — LACTULOSE 10 G/15ML
20 SOLUTION ORAL EVERY 6 HOURS
Refills: 0 | Status: DISCONTINUED | OUTPATIENT
Start: 2024-07-16 | End: 2024-07-17

## 2024-07-16 RX ORDER — PHYTONADIONE 5 MG/1
10 TABLET ORAL DAILY
Refills: 0 | Status: COMPLETED | OUTPATIENT
Start: 2024-07-16 | End: 2024-07-17

## 2024-07-16 RX ADMIN — ATORVASTATIN CALCIUM 40 MILLIGRAM(S): 20 TABLET, FILM COATED ORAL at 21:57

## 2024-07-16 RX ADMIN — LACTULOSE 20 GRAM(S): 10 SOLUTION ORAL at 11:59

## 2024-07-16 RX ADMIN — Medication 500 MILLIGRAM(S): at 11:59

## 2024-07-16 RX ADMIN — CARVEDILOL PHOSPHATE 12.5 MILLIGRAM(S): 80 CAPSULE, EXTENDED RELEASE ORAL at 06:25

## 2024-07-16 RX ADMIN — LACTULOSE 20 GRAM(S): 10 SOLUTION ORAL at 18:35

## 2024-07-16 RX ADMIN — PANTOPRAZOLE SODIUM 40 MILLIGRAM(S): 40 INJECTION, POWDER, FOR SOLUTION INTRAVENOUS at 06:24

## 2024-07-16 RX ADMIN — PHYTONADIONE 102 MILLIGRAM(S): 5 TABLET ORAL at 12:03

## 2024-07-16 RX ADMIN — HYDRALAZINE HYDROCHLORIDE 50 MILLIGRAM(S): 50 TABLET ORAL at 18:35

## 2024-07-16 NOTE — PROGRESS NOTE ADULT - ASSESSMENT
84-year-old female, history of hepatocellular carcinoma, diabetes, hypertension, hyperlipidemia, CAD with stents, presents with worsening shortness of breath and bilateral lower extremity swelling.  Patient was discharged from the hospital approximately 1 week ago related to weakness and failure to thrive. Family also reports dark stool vs dark blood in stool.    # Anasarca/ ascites - for ir para today   c/w home dose of spironolactone  add lasix    # Anemia / dark stool  # Thrombocytopenia  ? related to cirrhosis vs GI bleed  keep T+S active  vit K     # Generalized weakness/failure to thrive/ HCC  c/w home dose of spironolactone  pallaitive/ hospice consult    # elevated ammonia  add lactulose atc  Xifaxan 550mg bid     #Hx of HCC   No longer on chemotherapy   as above  - daughtrt no longer pursuing chemo       # DM-2   a1c 4  - dc [facility]       hospice eval for home hospice  prognosis grim

## 2024-07-16 NOTE — PATIENT PROFILE ADULT - FALL HARM RISK - UNIVERSAL INTERVENTIONS
Bed in lowest position, wheels locked, appropriate side rails in place/Call bell, personal items and telephone in reach/Instruct patient to call for assistance before getting out of bed or chair/Non-slip footwear when patient is out of bed/[place] to call system/Physically safe environment - no spills, clutter or unnecessary equipment/Purposeful Proactive Rounding/Room/bathroom lighting operational, light cord in reach

## 2024-07-16 NOTE — CONSULT NOTE ADULT - SUBJECTIVE AND OBJECTIVE BOX
HPI:  History per daughter Unable    84-year-old female, history of hepatocellular carcinoma, diabetes, hypertension, hyperlipidemia, CAD with stents, presents with worsening shortness of breath, orthopnea and bilateral lower extremity swelling. Family at bedside also report dark blood in stool at home. Patient was discharged from the hospital approximately 1 week ago related to weakness and failure to thrive.  Has been engaged in conversations regarding hospice and palliative care, however has not committed to a plan of action yet.  Since being at home this week, however, daughter and patient returned due to increased lethargy, poor p.o. intake, abdominal distention, shortness of breath, and leg swelling. Also makes note of changes to stool color, concerned that it may be more dark than usual. Patient's chemo was stopped about 2 weeks ago because patient was unable to tolerate it. As per family at bedside, no further plan for chemo at the moment. No chest pain, palpitations, fever, urinary symptoms (except dark urine), constipation.       PERTINENT PMH REVIEWED: Yes     PAST MEDICAL & SURGICAL HISTORY:  Hypertension      Hypothyroidism      GERD (gastroesophageal reflux disease)      Hypercholesterolemia      Diabetes      Heart murmur      History of cirrhosis of liver      HCC (hepatocellular carcinoma)      CAD (coronary artery disease)      S/P tubal ligation      History of vaginal surgery      History of total left knee replacement      History of cardiac cath      SOCIAL HISTORY:                      Substance history: former smoker, no Etoh or drug use                    Admitted from:  home                    Muslim/spirituality: Denominational                    Cultural concerns:    Baseline ADLs (prior to admission):   Dependent                        Surrogate/HCP/Guardian: Kristi Connor    FAMILY HISTORY:  Family history of diabetes mellitus (DM)    FH: HTN (hypertension)    FH: heart disease    Family history of osteoarthritis        Allergies    amlodipine (Swelling)    Intolerances        http://npcrc.org/files/news/palliative_performance_scale_ppsv2.pdf    Present Symptoms:   Dyspnea:  No   Nausea/Vomiting:  No    Anxiety:   No   Depression: No  Fatigue: Yes    Loss of appetite: Yes   Constipation:  Not    Pain:  No             Location            Character            Duration            Factors            Severity            Effect    Pain AD Score:  http://geriatrictoolkit.[employer].edu/cog/painad.pdf (press ctrl + left click to view)    Review of Systems: Reviewed    All other ROS negative    MEDICATIONS  (STANDING):  ascorbic acid 500 milliGRAM(s) Oral daily  atorvastatin 40 milliGRAM(s) Oral at bedtime  carvedilol 12.5 milliGRAM(s) Oral every 12 hours  furosemide    Tablet 40 milliGRAM(s) Oral daily  hydrALAZINE 50 milliGRAM(s) Oral two times a day  lactulose Syrup 20 Gram(s) Oral every 6 hours  pantoprazole    Tablet 40 milliGRAM(s) Oral before breakfast  phytonadione  IVPB 10 milliGRAM(s) IV Intermittent daily  rifAXIMin 550 milliGRAM(s) Oral two times a day  spironolactone 25 milliGRAM(s) Oral daily    MEDICATIONS  (PRN):      PHYSICAL EXAM:    Vital Signs Last 24 Hrs  T(C): 36.3 (16 Jul 2024 11:41), Max: 36.4 (15 Jul 2024 16:01)  T(F): 97.4 (16 Jul 2024 11:41), Max: 97.5 (15 Jul 2024 16:01)  HR: 59 (16 Jul 2024 11:41) (58 - 63)  BP: 111/62 (16 Jul 2024 11:41) (111/62 - 132/60)  BP(mean): 78 (16 Jul 2024 11:41) (78 - 89)  RR: 18 (16 Jul 2024 11:41) (18 - 19)  SpO2: 98% (16 Jul 2024 11:41) (97% - 99%)    Parameters below as of 16 Jul 2024 11:41  Patient On (Oxygen Delivery Method): room air    Karn[place]sky 30 %  General:  Elder woman awake NAD  HEENT: NCAT icteris  Lungs: comfortable  CV: RR  GI:    soft NT  MSK: normal  + edema  Neuro: awake no focal deficits  Skin: warm dry  Psych: calm cooperative    LABS:                        7.9    4.55  )-----------( 141      ( 16 Jul 2024 03:39 )             22.2     07-16    132<L>  |  98  |  25.4<H>  ----------------------------<  129<H>  4.1   |  20.0<L>  |  1.04    Ca    8.7      16 Jul 2024 03:39  Mg     1.8     07-16    TPro  5.9<L>  /  Alb  2.4<L>  /  TBili  4.6<H>  /  DBili  x   /  AST  173<H>  /  ALT  106<H>  /  AlkPhos  530<H>  07-16    PT/INR - ( 15 Jul 2024 19:50 )   PT: 20.4 sec;   INR: 1.87 ratio         PTT - ( 15 Jul 2024 19:50 )  PTT:29.6 sec  Urinalysis Basic - ( 16 Jul 2024 03:39 )    Color: x / Appearance: x / SG: x / pH: x  Gluc: 129 mg/dL / Ketone: x  / Bili: x / Urobili: x   Blood: x / Protein: x / Nitrite: x   Leuk Esterase: x / RBC: x / WBC x   Sq Epi: x / Non Sq Epi: x / Bacteria: x      I&O's Summary      RADIOLOGY & ADDITIONAL STUDIES:  Imaging reviewed   < from: Xray Chest 1 View-PORTABLE IMMEDIATE (Xray Chest 1 View-PORTABLE IMMEDIATE .) (07.06.24 @ 22:53) >    ACC: [id number] EXAM:  XR CHEST PORTABLE IMMED 1V   ORDERED BY: SIDDHARTHA WHITE     PROCEDURE DATE:  07/06/2024          INTERPRETATION:  Chest portable at 10:36 PM    CLINICAL HISTORY: Acute hypoxia, tachypnea    Comparison:  7/6/2024 at 5:04 PM    No change in the heart and mediastinum. Coronary artery calcification is   seen. Lungs clear. Angles sharp. Bones without acute abnormality.    IMPRESSION: No significant change allowing for technique. No acute   parenchymal disease    Please refer toCT angiography of the same day for additional information.    --- End of Report ---            ANA GOOD MD; Attending Radiologist  This document has been electronically signed. Jul 7 2024  9:41AM    < end of copied text >      < from: CT Abdomen and Pelvis w/ IV Cont (07.06.24 @ 22:05) >    ACC: [id number] EXAM:  CT ANGIO CHEST PULM ART WAWIC   ORDERED BY: ALYSHA COOMBS     ACC: [id number] EXAM:  CT ABDOMEN AND PELVIS IC   ORDERED BY: ALYSHA COOMBS     PROCEDURE DATE:  07/06/2024          INTERPRETATION:  CLINICAL INDICATION: Shortness of breath, liver cancer.    PROCEDURE: After CT angiography of the chest was performed with   intravenous contrast utilizing dedicated PE protocol, CT of the abdomen   and pelvis was performed. Coronal and sagittal reconstruction images were   obtained. Axial MIP images were obtained from a separate workstation.    CONTRAST/COMPLICATIONS:  IV Contrast: Omnipaque 350  85 cc administered   15 cc discarded  Oral Contrast: NONE  Complications: None reported at time of study completion    COMPARISON: 6/19/2024. 4/24/2024. 3/31/2024.    FINDINGS: Patient's respiratory motion degrades images.    CHEST:    LUNGS AND AIRWAYS: Patent central airways. Atelectasis. Stable 0.2 cm   nodule at the left lower lobe along the major fissure (5:203).  PLEURA: Small left pleural effusion, slightly increased since 1/29/2024.  HEART: Stable heart size. Trace pericardial effusion. Coronary artery   calcifications/stent. Aortic valve calcification.  VESSELS: Suboptimal contrast opacification of the subsegmental pulmonary   arteries. No obvious embolus to the level of the segmental pulmonary   arteries. Atherosclerosis. Normal caliber of the thoracic aorta.  MEDIASTINUM AND NAVNEET: No lymphadenopathy.  CHEST WALL AND LOWER NECK: Again noted, heterogeneous thyroid gland with   hypodensities.    ABDOMEN/PELVIS:    LIVER: Again noted, cirrhosis with innumerable lesions. Treatment   cavities at the segment 2 and 6 again noted. Question periportal edema.  BILE DUCTS/GALLBLADDER: No intrahepatic or extrahepatic biliary   dilatation. Distended gallbladder with stones again noted.  PANCREAS: Fatty atrophy.  SPLEEN: No obvious lesion.    ADRENALS: Unremarkable.  KIDNEYS/URETERS: No hydronephrosis, hydroureter or significant   perinephric stranding.  BLADDER: Partially distended.  REPRODUCTIVE ORGANS: Borderline endometrium again noted.    BOWEL: Small hiatal hernia. No bowel obstruction. Unremarkable appendix.   Under distended esophagus, colon and stomach, limiting evaluation. Wall   thickening ofthe small bowel loops and colon.  PERITONEUM: Small ascites extending to the umbilical hernia, increased   since prior study.  VESSELS: Atherosclerosis. Normal caliber of the abdominal aorta. Again   noted, recanalized umbilical vein with gastroesophageal and perisplenic   collaterals.  RETROPERITONEUM/LYMPH NODE: Subcentimeter lymph nodes.  ABDOMINAL WALL/SOFT TISSUES: Mild diffuse soft tissue edema.  BONES: Rightward curvature and degenerative changes of the spine. Stable   endplate depression of the spine. Stable grade 1 anterolisthesis of L5 on   S1 and minimal retrolisthesis of the lumbar spine.    IMPRESSION:    Suboptimal evaluation of the subsegmental pulmonary arteries. No obvious   embolus to the level of the segmental pulmonary arteries.    Small left pleural effusion with atelectasis, slightly increased since   1/29/2024.    Known cirrhosis with multifocal HCC and portal hypertension. Nonspecific   wall thickening of the small bowel loops and colon, which may related to   portal enteropathy/colopathy and/or enterocolitis.    Distended gallbladder containing stones. Recommend clinical correlation   to assess acute cholecystitis.    Additional findings as described.    --- End of Report ---            YASMIN BUTCHER MD; Attending Radiologist  This document has been electronically signed. Jul 6 2024 11:01PM    < end of copied text >        ADVANCE DIRECTIVES/TREATMENT PREFERENCES:  Currently Full code, all aggressive measures desired

## 2024-07-16 NOTE — CONSULT NOTE ADULT - ASSESSMENT
84yr woman hx of HCC, CAD with stents, HTN HLD DM admitted with increased SOB and swelling related to her liver disease    HCC  reported not tolerating latest tx    Ascites  Lasix   Spironolactone    Anemia  reported hx dark stool    Debility  Assist with care  Turn/reposition  Safety precautions    Encounter for Palliative Care  Palliative Care consulted to assist with goals of care  Met with patient and daughter Kristi Connor with language line  - [phone]  Daughter states she helps in her mother's care  40hrs/week    Daughter states mother was not tolerating cancer treatments and hence was stopped.  Explained trajectory of illness once stopping txs with cancer.   Discussed hospice services. Daughter open to hearing about service.   Hospice referral

## 2024-07-16 NOTE — PROGRESS NOTE ADULT - SUBJECTIVE AND OBJECTIVE BOX
WILMER CONROY    [id number]    84y      Female    INTERVAL HPI/OVERNIGHT EVENTS:   patient being seen for HCC, ascites and ftt. Patient seen at bedside with daughter and is in nad    patient is for ir therapeutic paracentesis.       REVIEW OF SYSTEMS:    CONSTITUTIONAL: No fever, weight loss, or fatigue  RESPIRATORY: No cough, wheezing, hemoptysis; No shortness of breath  CARDIOVASCULAR: No chest pain, palpitations  GASTROINTESTINAL: No abdominal or epigastric pain. No nausea, vomiting  NEUROLOGICAL: No headaches, memory loss, loss of strength.  MISCELLANEOUS:      Vital Signs Last 24 Hrs  T(C): 36.3 (16 Jul 2024 07:46), Max: 36.7 (15 Jul 2024 12:00)  T(F): 97.4 (16 Jul 2024 07:46), Max: 98.1 (15 Jul 2024 12:00)  HR: 58 (16 Jul 2024 07:46) (58 - 65)  BP: 127/70 (16 Jul 2024 07:46) (120/50 - 132/60)  BP(mean): 89 (16 Jul 2024 07:46) (89 - 89)  RR: 18 (16 Jul 2024 07:46) (18 - 19)  SpO2: 97% (16 Jul 2024 07:46) (97% - 99%)    Parameters below as of 16 Jul 2024 07:46  Patient On (Oxygen Delivery Method): room air        PHYSICAL EXAM:    GENERAL: NAD,  slightly jaundiced  HEENT: PERRL, +EOMI  NECK: soft, Supple, No JVD,   CHEST/LUNG: diminsihed   HEART: S1S2+, Regular rate and rhythm;   ABDOMEN: distended, ascites  EXTREMITIES:  2+ Peripheral Pulses, edema  SKIN: No rashes or lesions  NEURO: Awake    LABS:                        7.9    4.55  )-----------( 141      ( 16 Jul 2024 03:39 )             22.2     07-16    132<L>  |  98  |  25.4<H>  ----------------------------<  129<H>  4.1   |  20.0<L>  |  1.04    Ca    8.7      16 Jul 2024 03:39  Mg     1.8     07-16    TPro  5.9<L>  /  Alb  2.4<L>  /  TBili  4.6<H>  /  DBili  x   /  AST  173<H>  /  ALT  106<H>  /  AlkPhos  530<H>  07-16    PT/INR - ( 15 Jul 2024 19:50 )   PT: 20.4 sec;   INR: 1.87 ratio         PTT - ( 15 Jul 2024 19:50 )  PTT:29.6 sec  Urinalysis Basic - ( 16 Jul 2024 03:39 )    Color: x / Appearance: x / SG: x / pH: x  Gluc: 129 mg/dL / Ketone: x  / Bili: x / Urobili: x   Blood: x / Protein: x / Nitrite: x   Leuk Esterase: x / RBC: x / WBC x   Sq Epi: x / Non Sq Epi: x / Bacteria: x          MEDICATIONS  (STANDING):  ascorbic acid 500 milliGRAM(s) Oral daily  atorvastatin 40 milliGRAM(s) Oral at bedtime  carvedilol 12.5 milliGRAM(s) Oral every 12 hours  hydrALAZINE 50 milliGRAM(s) Oral two times a day  lactulose Syrup 20 Gram(s) Oral every 6 hours  pantoprazole    Tablet 40 milliGRAM(s) Oral before breakfast  phytonadione  IVPB 10 milliGRAM(s) IV Intermittent daily  rifAXIMin 550 milliGRAM(s) Oral two times a day  spironolactone 25 milliGRAM(s) Oral daily    MEDICATIONS  (PRN):      RADIOLOGY & ADDITIONAL TESTS:

## 2024-07-17 LAB
ALBUMIN FLD-MCNC: 0.7 G/DL — SIGNIFICANT CHANGE UP
ALBUMIN SERPL ELPH-MCNC: 2.5 G/DL — LOW (ref 3.3–5.2)
ALP SERPL-CCNC: 616 U/L — HIGH (ref 40–120)
ALT FLD-CCNC: 109 U/L — HIGH
AMMONIA BLD-MCNC: 53 UMOL/L — SIGNIFICANT CHANGE UP (ref 11–55)
ANION GAP SERPL CALC-SCNC: 13 MMOL/L — SIGNIFICANT CHANGE UP (ref 5–17)
AST SERPL-CCNC: 215 U/L — HIGH
BASOPHILS # BLD AUTO: 0.02 K/UL — SIGNIFICANT CHANGE UP (ref 0–0.2)
BASOPHILS NFR BLD AUTO: 0.4 % — SIGNIFICANT CHANGE UP (ref 0–2)
BILIRUB SERPL-MCNC: 4.9 MG/DL — HIGH (ref 0.4–2)
BUN SERPL-MCNC: 20.3 MG/DL — HIGH (ref 8–20)
CALCIUM SERPL-MCNC: 8.3 MG/DL — LOW (ref 8.4–10.5)
CHLORIDE SERPL-SCNC: 98 MMOL/L — SIGNIFICANT CHANGE UP (ref 96–108)
CO2 SERPL-SCNC: 21 MMOL/L — LOW (ref 22–29)
CREAT SERPL-MCNC: 0.89 MG/DL — SIGNIFICANT CHANGE UP (ref 0.5–1.3)
EGFR: 63 ML/MIN/1.73M2 — SIGNIFICANT CHANGE UP
EOSINOPHIL # BLD AUTO: 0.03 K/UL — SIGNIFICANT CHANGE UP (ref 0–0.5)
EOSINOPHIL NFR BLD AUTO: 0.6 % — SIGNIFICANT CHANGE UP (ref 0–6)
GLUCOSE FLD-MCNC: 192 MG/DL — SIGNIFICANT CHANGE UP
GLUCOSE SERPL-MCNC: 143 MG/DL — HIGH (ref 70–99)
GRAM STN FLD: SIGNIFICANT CHANGE UP
HCT VFR BLD CALC: 22.9 % — LOW (ref 34.5–45)
HGB BLD-MCNC: 8.4 G/DL — LOW (ref 11.5–15.5)
IMM GRANULOCYTES NFR BLD AUTO: 0.2 % — SIGNIFICANT CHANGE UP (ref 0–0.9)
LDH SERPL L TO P-CCNC: 75 U/L — SIGNIFICANT CHANGE UP
LYMPHOCYTES # BLD AUTO: 0.61 K/UL — LOW (ref 1–3.3)
LYMPHOCYTES # BLD AUTO: 12.8 % — LOW (ref 13–44)
MAGNESIUM SERPL-MCNC: 1.9 MG/DL — SIGNIFICANT CHANGE UP (ref 1.6–2.6)
MCHC RBC-ENTMCNC: 36.2 PG — HIGH (ref 27–34)
MCHC RBC-ENTMCNC: 36.7 GM/DL — HIGH (ref 32–36)
MCV RBC AUTO: 98.7 FL — SIGNIFICANT CHANGE UP (ref 80–100)
MONOCYTES # BLD AUTO: 0.79 K/UL — SIGNIFICANT CHANGE UP (ref 0–0.9)
MONOCYTES NFR BLD AUTO: 16.6 % — HIGH (ref 2–14)
NEUTROPHILS # BLD AUTO: 3.3 K/UL — SIGNIFICANT CHANGE UP (ref 1.8–7.4)
NEUTROPHILS NFR BLD AUTO: 69.4 % — SIGNIFICANT CHANGE UP (ref 43–77)
PLATELET # BLD AUTO: 129 K/UL — LOW (ref 150–400)
POTASSIUM SERPL-MCNC: 3.7 MMOL/L — SIGNIFICANT CHANGE UP (ref 3.5–5.3)
POTASSIUM SERPL-SCNC: 3.7 MMOL/L — SIGNIFICANT CHANGE UP (ref 3.5–5.3)
PROT FLD-MCNC: 1.1 G/DL — SIGNIFICANT CHANGE UP
PROT SERPL-MCNC: 6 G/DL — LOW (ref 6.6–8.7)
RAPID RVP RESULT: SIGNIFICANT CHANGE UP
RBC # BLD: 2.32 M/UL — LOW (ref 3.8–5.2)
RBC # FLD: 22.4 % — HIGH (ref 10.3–14.5)
SARS-COV-2 RNA SPEC QL NAA+PROBE: SIGNIFICANT CHANGE UP
SODIUM SERPL-SCNC: 132 MMOL/L — LOW (ref 135–145)
SPECIMEN SOURCE: SIGNIFICANT CHANGE UP
WBC # BLD: 4.76 K/UL — SIGNIFICANT CHANGE UP (ref 3.8–10.5)
WBC # FLD AUTO: 4.76 K/UL — SIGNIFICANT CHANGE UP (ref 3.8–10.5)

## 2024-07-17 PROCEDURE — G0316 PROLONG INPT EVAL ADD15 M: CPT

## 2024-07-17 PROCEDURE — 99232 SBSQ HOSP IP/OBS MODERATE 35: CPT

## 2024-07-17 PROCEDURE — 99233 SBSQ HOSP IP/OBS HIGH 50: CPT

## 2024-07-17 RX ORDER — BENZONATATE 100 MG/1
100 TABLET ORAL EVERY 8 HOURS
Refills: 0 | Status: DISCONTINUED | OUTPATIENT
Start: 2024-07-17 | End: 2024-07-19

## 2024-07-17 RX ORDER — LACTULOSE 10 G/15ML
10 SOLUTION ORAL EVERY 8 HOURS
Refills: 0 | Status: DISCONTINUED | OUTPATIENT
Start: 2024-07-17 | End: 2024-07-19

## 2024-07-17 RX ADMIN — SPIRONOLACTONE 25 MILLIGRAM(S): 25 TABLET ORAL at 06:17

## 2024-07-17 RX ADMIN — PANTOPRAZOLE SODIUM 40 MILLIGRAM(S): 40 INJECTION, POWDER, FOR SOLUTION INTRAVENOUS at 06:17

## 2024-07-17 RX ADMIN — Medication 500 MILLIGRAM(S): at 11:59

## 2024-07-17 RX ADMIN — PHYTONADIONE 102 MILLIGRAM(S): 5 TABLET ORAL at 11:59

## 2024-07-17 RX ADMIN — LACTULOSE 10 GRAM(S): 10 SOLUTION ORAL at 22:08

## 2024-07-17 RX ADMIN — HYDRALAZINE HYDROCHLORIDE 50 MILLIGRAM(S): 50 TABLET ORAL at 17:44

## 2024-07-17 RX ADMIN — LACTULOSE 20 GRAM(S): 10 SOLUTION ORAL at 00:46

## 2024-07-17 RX ADMIN — LACTULOSE 20 GRAM(S): 10 SOLUTION ORAL at 11:59

## 2024-07-17 RX ADMIN — CARVEDILOL PHOSPHATE 12.5 MILLIGRAM(S): 80 CAPSULE, EXTENDED RELEASE ORAL at 17:43

## 2024-07-17 RX ADMIN — CARVEDILOL PHOSPHATE 12.5 MILLIGRAM(S): 80 CAPSULE, EXTENDED RELEASE ORAL at 06:18

## 2024-07-17 RX ADMIN — BENZONATATE 100 MILLIGRAM(S): 100 TABLET ORAL at 13:41

## 2024-07-17 RX ADMIN — FUROSEMIDE 40 MILLIGRAM(S): 10 INJECTION, SOLUTION INTRAMUSCULAR; INTRAVENOUS at 06:17

## 2024-07-17 RX ADMIN — LACTULOSE 20 GRAM(S): 10 SOLUTION ORAL at 06:17

## 2024-07-17 NOTE — PROGRESS NOTE ADULT - TIME BILLING
Time spent with review of chart documents, labs, imaging. Direct patient assessment,  formulation of care plan. Discussion with  Interdisciplinary  team  STACY Montague

## 2024-07-17 NOTE — PROGRESS NOTE ADULT - SUBJECTIVE AND OBJECTIVE BOX
HPI:  84-year-old female, history of hepatocellular carcinoma, diabetes, hypertension, hyperlipidemia, CAD with stents, presents with worsening shortness of breath, orthopnea and bilateral lower extremity swelling. Family at bedside also report dark blood in stool at home. Patient was discharged from the hospital approximately 1 week ago related to weakness and failure to thrive.  Has been engaged in conversations regarding hospice and palliative care, however has not committed to a plan of action yet.  Since being at home this week, however, daughter and patient returned due to increased lethargy, poor p.o. intake, abdominal distention, shortness of breath, and leg swelling. Also makes note of changes to stool color, concerned that it may be more dark than usual. Patient's chemo was stopped about 2 weeks ago because patient was unable to tolerate it. As per family at bedside, no further plan for chemo at the moment. No chest pain, palpitations, fever, urinary symptoms (except dark urine), constipation.     Hx taken from family at bedside with help of ER . (15 Jul 2024 20:25)      PERTINENT PMH REVIEWED: Yes     PAST MEDICAL & SURGICAL HISTORY:  Hypertension      Hypothyroidism      GERD (gastroesophageal reflux disease)      Hypercholesterolemia      Diabetes      Heart murmur      History of cirrhosis of liver      HCC (hepatocellular carcinoma)      CAD (coronary artery disease)      S/P tubal ligation      History of vaginal surgery      History of total left knee replacement      History of cardiac cath          SOCIAL HISTORY:                      Substance history: no hx substance hx                    Admitted from:  home                      Taoism/spirituality:  Pentecostal                    Cultural concerns:    Baseline ADLs (prior to admission):  Dependent                        Surrogate/HCP/Guardian:     FAMILY HISTORY:  Family history of diabetes mellitus (DM)    FH: HTN (hypertension)    FH: heart disease    Family history of osteoarthritis        Allergies    amlodipine (Swelling)    Intolerances        http://npcrc.org/files/news/palliative_performance_scale_ppsv2.pdf    Present Symptoms:   Dyspnea:  No   Nausea/Vomiting:  No    Anxiety:   No  Depression: No  Fatigue: Ye  Loss of appetite: Yes   Constipation:  Not    Pain:  No              Location            Character            Duration            Factors            Severity            Effect    Pain AD Score:  http://geriatrictoolkit.[employer].edu/cog/painad.pdf (press ctrl + left click to view)    Review of Systems: Reviewed    All other ROS negative      MEDICATIONS  (STANDING):  ascorbic acid 500 milliGRAM(s) Oral daily  atorvastatin 40 milliGRAM(s) Oral at bedtime  carvedilol 12.5 milliGRAM(s) Oral every 12 hours  furosemide    Tablet 40 milliGRAM(s) Oral daily  hydrALAZINE 50 milliGRAM(s) Oral two times a day  lactulose Syrup 20 Gram(s) Oral every 6 hours  pantoprazole    Tablet 40 milliGRAM(s) Oral before breakfast  rifAXIMin 550 milliGRAM(s) Oral two times a day  spironolactone 25 milliGRAM(s) Oral daily    MEDICATIONS  (PRN):      PHYSICAL EXAM:    Vital Signs Last 24 Hrs  T(C): 36.6 (17 Jul 2024 11:30), Max: 36.6 (16 Jul 2024 23:15)  T(F): 97.8 (17 Jul 2024 11:30), Max: 97.9 (17 Jul 2024 04:28)  HR: 62 (17 Jul 2024 11:30) (57 - 69)  BP: 126/62 (17 Jul 2024 11:30) (104/57 - 130/62)  BP(mean): 83 (17 Jul 2024 11:30) (83 - 84)  RR: 18 (17 Jul 2024 11:30) (17 - 18)  SpO2: 96% (17 Jul 2024 11:30) (96% - 97%)    Parameters below as of 17 Jul 2024 11:30  Patient On (Oxygen Delivery Method): room air        Karnofsky   30  %  General:  Elderly woman NAD  HEENT: NCAT    Lungs: comfortable  CV: RR  GI:  soft NT  MSK:   weak  chair/bedbound  Neuro: no focal deficits  Skin: warm dry  Psych:  calm    LABS:                        8.4    4.76  )-----------( 129      ( 17 Jul 2024 07:00 )             22.9     07-17    132<L>  |  98  |  20.3<H>  ----------------------------<  143<H>  3.7   |  21.0<L>  |  0.89    Ca    8.3<L>      17 Jul 2024 07:00  Mg     1.9     07-17    TPro  6.0<L>  /  Alb  2.5<L>  /  TBili  4.9<H>  /  DBili  x   /  AST  215<H>  /  ALT  109<H>  /  AlkPhos  616<H>  07-17    PT/INR - ( 15 Jul 2024 19:50 )   PT: 20.4 sec;   INR: 1.87 ratio         PTT - ( 15 Jul 2024 19:50 )  PTT:29.6 sec  Urinalysis Basic - ( 17 Jul 2024 07:00 )    Color: x / Appearance: x / SG: x / pH: x  Gluc: 143 mg/dL / Ketone: x  / Bili: x / Urobili: x   Blood: x / Protein: x / Nitrite: x   Leuk Esterase: x / RBC: x / WBC x   Sq Epi: x / Non Sq Epi: x / Bacteria: x      I&O's Summary      RADIOLOGY & ADDITIONAL STUDIES:  Imaging reviewed   < from: US Duplex Venous Lower Ext Complete, Bilateral (07.15.24 @ 14:59) >    ACC: [id number] EXAM:  US DPLX LWR EXT VEINS COMPL BI   ORDERED BY: LORE LOZADA     PROCEDURE DATE:  07/15/2024          INTERPRETATION:  CLINICAL INFORMATION: Lower extremity swelling    COMPARISON: 10/30/2017.    TECHNIQUE: Duplex sonography of the BILATERAL LOWER extremity veins with   color and spectral Doppler, with and without compression.    FINDINGS:    RIGHT:  Normal compressibility of the RIGHT common femoral, femoral and popliteal   veins.  Doppler examination shows normal spontaneous and phasic flow.  No RIGHT calf vein thrombosis is detected.  Right popliteal fossa cyst measures 6.0 x 1.1 x 3.3 cm.    LEFT:  Normal compressibility of the LEFT common femoral, femoral and popliteal   veins.  Doppler examination shows normal spontaneous and phasic flow.  No LEFT calf vein thrombosis is detected.    Bilateral subcutaneous edema noted.    IMPRESSION:  No evidence of deep venous thrombosis in either lower extremity.    There is a 6.0 cm right Baker's cyst.        --- End of Report ---            PETER TREVIZO MD; Attending Radiologist  This document has been electronically signed. Jul 15 2024  3:06PM    < end of copied text >          ADVANCE DIRECTIVES/TREATMENT PREFERENCES:  Currently Full code, all aggressive measures desired

## 2024-07-17 NOTE — PROGRESS NOTE ADULT - ASSESSMENT
84yr woman hx of HCC, CAD with stents, HTN HLD DM admitted with increased SOB and swelling related to her liver disease    HCC  reported not tolerating latest tx    Ascites  s/p paracentesis  Lasix   Spironolactone    Anemia  reported hx dark stool    Debility  Assist with care  Turn/reposition  Safety precautions    Encounter for Palliative Care  Palliative Care consulted to assist with goals of care  See GOC note above for details.  In summary  Daughter Daisy informed of disease progress after stopping tx  and the functional decline that will likely ensue.  Hospice services discussed  She was recommended to discuss together as a family  Psychosocial support     84yr woman hx of HCC, CAD with stents, HTN HLD DM admitted with increased SOB and swelling related to her liver disease    HCC  reported not tolerating latest tx    Ascites  s/p paracentesis  monitor for pain and reaccumulation  Lasix   Spironolactone    Anemia  reported hx dark stool  monitor hg    Debility  Assist with care  Turn/reposition  Safety precautions    Encounter for Palliative Care  Palliative Care consulted to assist with goals of care  See GOC note above for details.  In summary  Daughter Daisy informed of disease progress after stopping tx  and the functional decline that will likely ensue.  Hospice services discussed  She was recommended to discuss together as a family  Psychosocial support

## 2024-07-17 NOTE — PROGRESS NOTE ADULT - SUBJECTIVE AND OBJECTIVE BOX
WILMER CONROY    [id number]    84y      Female    INTERVAL HPI/OVERNIGHT EVENTS:  patient being seen for worsening liver cancer and ascites. patient is s/p ir para on 7/16. Patient seen at bedside with pallaitive, daughter and .       REVIEW OF SYSTEMS:    CONSTITUTIONAL: No fever, weight loss, or fatigue  RESPIRATORY: No cough, wheezing, hemoptysis; No shortness of breath  CARDIOVASCULAR: No chest pain, palpitations  GASTROINTESTINAL: No abdominal or epigastric pain. No nausea, vomiting  NEUROLOGICAL: No headaches, memory loss, loss of strength.  MISCELLANEOUS:      Vital Signs Last 24 Hrs  T(C): 36.6 (17 Jul 2024 11:30), Max: 36.6 (16 Jul 2024 23:15)  T(F): 97.8 (17 Jul 2024 11:30), Max: 97.9 (17 Jul 2024 04:28)  HR: 62 (17 Jul 2024 11:30) (57 - 69)  BP: 126/62 (17 Jul 2024 11:30) (104/57 - 130/62)  BP(mean): 83 (17 Jul 2024 11:30) (83 - 84)  RR: 18 (17 Jul 2024 11:30) (17 - 18)  SpO2: 96% (17 Jul 2024 11:30) (96% - 97%)    Parameters below as of 17 Jul 2024 11:30  Patient On (Oxygen Delivery Method): room air        PHYSICAL EXAM:    GENERAL: NAD,  slightly jaundiced  HEENT: PERRL, +EOMI  NECK: soft, Supple, No JVD,   CHEST/LUNG: diminsihed   HEART: S1S2+, Regular rate and rhythm;   ABDOMEN: less distended, ascites  EXTREMITIES:  2+ Peripheral Pulses, edema  SKIN: No rashes or lesions  NEURO: Awake      LABS:                        8.4    4.76  )-----------( 129      ( 17 Jul 2024 07:00 )             22.9     07-17    132<L>  |  98  |  20.3<H>  ----------------------------<  143<H>  3.7   |  21.0<L>  |  0.89    Ca    8.3<L>      17 Jul 2024 07:00  Mg     1.9     07-17    TPro  6.0<L>  /  Alb  2.5<L>  /  TBili  4.9<H>  /  DBili  x   /  AST  215<H>  /  ALT  109<H>  /  AlkPhos  616<H>  07-17    PT/INR - ( 15 Jul 2024 19:50 )   PT: 20.4 sec;   INR: 1.87 ratio         PTT - ( 15 Jul 2024 19:50 )  PTT:29.6 sec  Urinalysis Basic - ( 17 Jul 2024 07:00 )    Color: x / Appearance: x / SG: x / pH: x  Gluc: 143 mg/dL / Ketone: x  / Bili: x / Urobili: x   Blood: x / Protein: x / Nitrite: x   Leuk Esterase: x / RBC: x / WBC x   Sq Epi: x / Non Sq Epi: x / Bacteria: x      MEDICATIONS  (STANDING):  ascorbic acid 500 milliGRAM(s) Oral daily  carvedilol 12.5 milliGRAM(s) Oral every 12 hours  furosemide    Tablet 40 milliGRAM(s) Oral daily  hydrALAZINE 50 milliGRAM(s) Oral two times a day  lactulose Syrup 20 Gram(s) Oral every 6 hours  pantoprazole    Tablet 40 milliGRAM(s) Oral before breakfast  rifAXIMin 550 milliGRAM(s) Oral two times a day  spironolactone 25 milliGRAM(s) Oral daily    MEDICATIONS  (PRN):      RADIOLOGY & ADDITIONAL TESTS:

## 2024-07-18 LAB
ALBUMIN SERPL ELPH-MCNC: 2.5 G/DL — LOW (ref 3.3–5.2)
ALP SERPL-CCNC: 564 U/L — HIGH (ref 40–120)
ALT FLD-CCNC: 99 U/L — HIGH
ANION GAP SERPL CALC-SCNC: 14 MMOL/L — SIGNIFICANT CHANGE UP (ref 5–17)
AST SERPL-CCNC: 190 U/L — HIGH
BASOPHILS # BLD AUTO: 0.02 K/UL — SIGNIFICANT CHANGE UP (ref 0–0.2)
BASOPHILS NFR BLD AUTO: 0.4 % — SIGNIFICANT CHANGE UP (ref 0–2)
BILIRUB SERPL-MCNC: 4.4 MG/DL — HIGH (ref 0.4–2)
BLD GP AB SCN SERPL QL: SIGNIFICANT CHANGE UP
BUN SERPL-MCNC: 17.2 MG/DL — SIGNIFICANT CHANGE UP (ref 8–20)
CALCIUM SERPL-MCNC: 8.3 MG/DL — LOW (ref 8.4–10.5)
CHLORIDE SERPL-SCNC: 94 MMOL/L — LOW (ref 96–108)
CO2 SERPL-SCNC: 21 MMOL/L — LOW (ref 22–29)
CREAT SERPL-MCNC: 1.16 MG/DL — SIGNIFICANT CHANGE UP (ref 0.5–1.3)
EGFR: 46 ML/MIN/1.73M2 — LOW
EOSINOPHIL # BLD AUTO: 0.05 K/UL — SIGNIFICANT CHANGE UP (ref 0–0.5)
EOSINOPHIL NFR BLD AUTO: 1.1 % — SIGNIFICANT CHANGE UP (ref 0–6)
GLUCOSE SERPL-MCNC: 129 MG/DL — HIGH (ref 70–99)
HCT VFR BLD CALC: 21.7 % — LOW (ref 34.5–45)
HGB BLD-MCNC: 7.9 G/DL — LOW (ref 11.5–15.5)
IMM GRANULOCYTES NFR BLD AUTO: 0.4 % — SIGNIFICANT CHANGE UP (ref 0–0.9)
INR BLD: 1.41 RATIO — HIGH (ref 0.85–1.18)
LYMPHOCYTES # BLD AUTO: 0.68 K/UL — LOW (ref 1–3.3)
LYMPHOCYTES # BLD AUTO: 14.6 % — SIGNIFICANT CHANGE UP (ref 13–44)
MAGNESIUM SERPL-MCNC: 1.8 MG/DL — SIGNIFICANT CHANGE UP (ref 1.6–2.6)
MCHC RBC-ENTMCNC: 35.1 PG — HIGH (ref 27–34)
MCHC RBC-ENTMCNC: 36.4 GM/DL — HIGH (ref 32–36)
MCV RBC AUTO: 96.4 FL — SIGNIFICANT CHANGE UP (ref 80–100)
MONOCYTES # BLD AUTO: 0.85 K/UL — SIGNIFICANT CHANGE UP (ref 0–0.9)
MONOCYTES NFR BLD AUTO: 18.2 % — HIGH (ref 2–14)
NEUTROPHILS # BLD AUTO: 3.04 K/UL — SIGNIFICANT CHANGE UP (ref 1.8–7.4)
NEUTROPHILS NFR BLD AUTO: 65.3 % — SIGNIFICANT CHANGE UP (ref 43–77)
PLATELET # BLD AUTO: 135 K/UL — LOW (ref 150–400)
POTASSIUM SERPL-MCNC: 3.5 MMOL/L — SIGNIFICANT CHANGE UP (ref 3.5–5.3)
POTASSIUM SERPL-SCNC: 3.5 MMOL/L — SIGNIFICANT CHANGE UP (ref 3.5–5.3)
PROT SERPL-MCNC: 5.8 G/DL — LOW (ref 6.6–8.7)
PROTHROM AB SERPL-ACNC: 15.5 SEC — HIGH (ref 9.5–13)
RBC # BLD: 2.25 M/UL — LOW (ref 3.8–5.2)
RBC # FLD: 22.1 % — HIGH (ref 10.3–14.5)
SODIUM SERPL-SCNC: 129 MMOL/L — LOW (ref 135–145)
WBC # BLD: 4.66 K/UL — SIGNIFICANT CHANGE UP (ref 3.8–10.5)
WBC # FLD AUTO: 4.66 K/UL — SIGNIFICANT CHANGE UP (ref 3.8–10.5)

## 2024-07-18 PROCEDURE — 99232 SBSQ HOSP IP/OBS MODERATE 35: CPT

## 2024-07-18 RX ADMIN — HYDRALAZINE HYDROCHLORIDE 50 MILLIGRAM(S): 50 TABLET ORAL at 18:44

## 2024-07-18 RX ADMIN — HYDRALAZINE HYDROCHLORIDE 50 MILLIGRAM(S): 50 TABLET ORAL at 05:31

## 2024-07-18 RX ADMIN — SPIRONOLACTONE 25 MILLIGRAM(S): 25 TABLET ORAL at 05:31

## 2024-07-18 RX ADMIN — CARVEDILOL PHOSPHATE 12.5 MILLIGRAM(S): 80 CAPSULE, EXTENDED RELEASE ORAL at 05:31

## 2024-07-18 RX ADMIN — LACTULOSE 10 GRAM(S): 10 SOLUTION ORAL at 21:18

## 2024-07-18 RX ADMIN — LACTULOSE 10 GRAM(S): 10 SOLUTION ORAL at 05:32

## 2024-07-18 RX ADMIN — CARVEDILOL PHOSPHATE 12.5 MILLIGRAM(S): 80 CAPSULE, EXTENDED RELEASE ORAL at 18:44

## 2024-07-18 RX ADMIN — LACTULOSE 10 GRAM(S): 10 SOLUTION ORAL at 13:12

## 2024-07-18 RX ADMIN — PANTOPRAZOLE SODIUM 40 MILLIGRAM(S): 40 INJECTION, POWDER, FOR SOLUTION INTRAVENOUS at 06:11

## 2024-07-18 RX ADMIN — FUROSEMIDE 40 MILLIGRAM(S): 10 INJECTION, SOLUTION INTRAMUSCULAR; INTRAVENOUS at 05:32

## 2024-07-18 RX ADMIN — Medication 500 MILLIGRAM(S): at 13:12

## 2024-07-18 NOTE — PROGRESS NOTE ADULT - SUBJECTIVE AND OBJECTIVE BOX
WILMER RAFIQ    [id number]    84y      Female    CC: malignancy; ascites     INTERVAL HPI/OVERNIGHT EVENTS: Pt seen and examined with Kosovan  (Jaime #[id number]). daughter at bedside.     REVIEW OF SYSTEMS:    CONSTITUTIONAL: No weight loss  RESPIRATORY: No cough, wheezing, hemoptysis  CARDIOVASCULAR: No chest pain, palpitations  GASTROINTESTINAL: No abdominal or epigastric pain. No nausea, vomiting    Vital Signs Last 24 Hrs  T(C): 36.7 (18 Jul 2024 07:40), Max: 36.7 (18 Jul 2024 07:40)  T(F): 98 (18 Jul 2024 07:40), Max: 98 (18 Jul 2024 07:40)  HR: 63 (18 Jul 2024 07:40) (63 - 95)  BP: 123/62 (18 Jul 2024 07:40) (116/67 - 127/67)  BP(mean): 83 (17 Jul 2024 19:26) (83 - 84)  RR: 18 (18 Jul 2024 07:40) (18 - 18)  SpO2: 97% (18 Jul 2024 07:40) (90% - 99%)    Parameters below as of 18 Jul 2024 07:40  Patient On (Oxygen Delivery Method): room air        PHYSICAL EXAM:    GENERAL: NAD  CHEST/LUNG: decreased; respirations unlabored on RA   HEART: S1S2+, Regular rate and rhythm  ABDOMEN: Soft, Nontender, +distended   SKIN: warm, dry   NEURO: Awake, alert   PSYCH: calm, cooperative     LABS:                        7.9    4.66  )-----------( 135      ( 18 Jul 2024 05:27 )             21.7     07-18    129<L>  |  94<L>  |  17.2  ----------------------------<  129<H>  3.5   |  21.0<L>  |  1.16    Ca    8.3<L>      18 Jul 2024 05:27  Mg     1.8     07-18    TPro  5.8<L>  /  Alb  2.5<L>  /  TBili  4.4<H>  /  DBili  x   /  AST  190<H>  /  ALT  99<H>  /  AlkPhos  564<H>  07-18    PT/INR - ( 18 Jul 2024 05:27 )   PT: 15.5 sec;   INR: 1.41 ratio           Urinalysis Basic - ( 18 Jul 2024 05:27 )    Color: x / Appearance: x / SG: x / pH: x  Gluc: 129 mg/dL / Ketone: x  / Bili: x / Urobili: x   Blood: x / Protein: x / Nitrite: x   Leuk Esterase: x / RBC: x / WBC x   Sq Epi: x / Non Sq Epi: x / Bacteria: x          MEDICATIONS  (STANDING):  ascorbic acid 500 milliGRAM(s) Oral daily  carvedilol 12.5 milliGRAM(s) Oral every 12 hours  furosemide    Tablet 40 milliGRAM(s) Oral daily  hydrALAZINE 50 milliGRAM(s) Oral two times a day  lactulose Syrup 10 Gram(s) Oral every 8 hours  pantoprazole    Tablet 40 milliGRAM(s) Oral before breakfast  rifAXIMin 550 milliGRAM(s) Oral two times a day  spironolactone 25 milliGRAM(s) Oral daily    MEDICATIONS  (PRN):  benzonatate 100 milliGRAM(s) Oral every 8 hours PRN Cough      RADIOLOGY & ADDITIONAL TESTS:

## 2024-07-18 NOTE — PHYSICAL THERAPY INITIAL EVALUATION ADULT - GROSSLY INTACT, SENSORY
S: Patient doing well. Has been ambulating in halls. Cook catheter in place.      O: /81   Temp 97.8  F (36.6  C) (Oral)   Resp 18    Category 1 fetal tracing, baseline 145, moderate variability, accelerations. No decelerations noted.   Not ruptured.      A:  IUP @ 41+6, IOL for late term and borderline oligo  Fetal Heart Rate Tracing category one  GBS-  - neg on      TOLAC- consent signed    Cook catheter placed  @ 1520- 80/80cc  Intact BOW    Patient Active Problem List   Diagnosis     High-risk pregnancy in third trimester - Pt is [facility] at [facility].  [facility] CNM in labor     History of  delivery     Desires  (vaginal birth after ) trial     Labor and delivery, indication for care     Labor and delivery indication for care or intervention      P:   Continuous efm and toco. Deflate vaginal balloon and evaluate at 12 hours or prn if indicated earlier.   Report given to oncoming midwife, NATHAN Escoto.  On call Dr. Noel CEE, available prn.   Care relinquished to NATHAN Escoto.      Genesis Wilhelm, ISAIAH, NATHAN          Grossly Intact

## 2024-07-18 NOTE — PROGRESS NOTE ADULT - ASSESSMENT
84y/oF PMH hepatocellular carcinoma, diabetes, hypertension, hyperlipidemia, CAD with stents, presenting with worsening shortness of breath and bilateral lower extremity swelling.  Patient was discharged from the hospital approximately 1 week prior to admission related to weakness and failure to thrive. Family also reports dark stool vs dark blood in stool.    Anasarca/ ascites  Transaminitis  In setting of recurrent hepatocellular carcinoma   -s/p IR para 7/16  -cont home dose of spironolactone  -cont PO lasix  -statin d/c'ed  -No longer on chemotherapy   -family no longer pursuing chemo     Anemia  Thrombocytopenia  likely due to cirrhosis, malignancy above   -keep T+S active  -s/p vit K     Generalized weakness/failure to thrive  due to above   -palliative care recs appreciated   -PT eval rec home PT, assist; stairs TBA     Hyperammonemia   -cont lactulose; goal 2-3bms/day  -cont Xifaxan 550mg bid     DM2   -hba1c 4.6  -ISS d/c'ed    hospice consulted     full code     dispo -home with home care in 1-2 days vs home w/hospice

## 2024-07-18 NOTE — PROGRESS NOTE ADULT - SUBJECTIVE AND OBJECTIVE BOX
84-year-old female who follows Dr Dave for a history of recurrent hepatocellular carcinoma; she did a trial of Sorafenib,however did not improve or tolerate the medication. She was stopped on all chemo therapy medications due to poor response and  unable to tolerate. We discussed second line treatment, but family is deferring at this time.   presents with worsening shortness of breath, orthopnea and bilateral lower extremity swelling. Family at bedside also report dark blood in stool at home. Patient was discharged from the hospital approximately 1 week ago related to weakness and failure to thrive.  Has been engaged in conversations regarding hospice and palliative care, however has not committed to a plan of action yet.  Since being at home this week, however, daughter and patient returned due to increased lethargy, poor p.o. intake, abdominal distention, shortness of breath, and leg swelling. Also makes note of changes to stool color, concerned that it may be more dark than usual. Patient's chemo was stopped about 2 weeks ago because patient was unable to tolerate it. As per family at bedside, no further plan for chemo at the moment. No chest pain, palpitations, fever, urinary symptoms (except dark urine), constipation.     PAST MEDICAL & SURGICAL HISTORY:  Hypertension  Hypothyroidism  GERD (gastroesophageal reflux disease)  Hypercholesterolemia  Diabetes  Heart murmur  History of cirrhosis of liver  HCC (hepatocellular carcinoma)  CAD (coronary artery disease)  S/P tubal ligation  History of vaginal surgery  History of total left knee replacement  History of cardiac cath    Vital Signs Last 24 Hrs  T(C): 36.7 (18 Jul 2024 07:40), Max: 36.7 (18 Jul 2024 07:40)  T(F): 98 (18 Jul 2024 07:40), Max: 98 (18 Jul 2024 07:40)  HR: 63 (18 Jul 2024 07:40) (62 - 95)  BP: 123/62 (18 Jul 2024 07:40) (116/67 - 127/67)  BP(mean): 83 (17 Jul 2024 19:26) (83 - 84)  RR: 18 (18 Jul 2024 07:40) (18 - 18)  SpO2: 97% (18 Jul 2024 07:40) (90% - 99%)    Parameters below as of 18 Jul 2024 07:40  Patient On (Oxygen Delivery Method): room air        PHYSICAL EXAM:  GENERAL: NAD,  slightly jaundiced  HEENT: PERRL, +EOMI  NECK: soft, Supple, No JVD,   CHEST/LUNG: diminsihed   HEART: S1S2+, Regular rate and rhythm;   ABDOMEN: less distended, ascites  EXTREMITIES:  2+ Peripheral Pulses, edema  SKIN: No rashes or lesions  NEURO: Awake      LABS:        CBC                            7.9    4.66  )-----------( 135      ( 18 Jul 2024 05:27 )             21.7                8.4    4.76  )-----------( 129      ( 17 Jul 2024 07:00 )             22.9     CHEM  07-18    129<L>  |  94<L>  |  17.2  ----------------------------<  129<H>  3.5   |  21.0<L>  |  1.16    Ca    8.3<L>      18 Jul 2024 05:27  Mg     1.8     07-18    TPro  5.8<L>  /  Alb  2.5<L>  /  TBili  4.4<H>  /  DBili  x   /  AST  190<H>  /  ALT  99<H>  /  AlkPhos  564<H>  07-18    07-17    132<L>  |  98  |  20.3<H>  ----------------------------<  143<H>  3.7   |  21.0<L>  |  0.89    Ca    8.3<L>      17 Jul 2024 07:00  Mg     1.9     07-17    TPro  6.0<L>  /  Alb  2.5<L>  /  TBili  4.9<H>  /  DBili  x   /  AST  215<H>  /  ALT  109<H>  /  AlkPhos  616<H>  07-17    PT/INR - ( 15 Jul 2024 19:50 )   PT: 20.4 sec;   INR: 1.87 ratio         PTT - ( 15 Jul 2024 19:50 )  PTT:29.6 sec  Urinalysis Basic - ( 17 Jul 2024 07:00 )    Color: x / Appearance: x / SG: x / pH: x  Gluc: 143 mg/dL / Ketone: x  / Bili: x / Urobili: x   Blood: x / Protein: x / Nitrite: x   Leuk Esterase: x / RBC: x / WBC x   Sq Epi: x / Non Sq Epi: x / Bacteria: x      MEDICATIONS  (STANDING):  ascorbic acid 500 milliGRAM(s) Oral daily  carvedilol 12.5 milliGRAM(s) Oral every 12 hours  furosemide    Tablet 40 milliGRAM(s) Oral daily  hydrALAZINE 50 milliGRAM(s) Oral two times a day  lactulose Syrup 20 Gram(s) Oral every 6 hours  pantoprazole    Tablet 40 milliGRAM(s) Oral before breakfast  rifAXIMin 550 milliGRAM(s) Oral two times a day  spironolactone 25 milliGRAM(s) Oral daily    MEDICATIONS  (PRN):      RADIOLOGY & ADDITIONAL TESTS:   84-year-old female who follows Dr Dave for a history of recurrent hepatocellular carcinoma; she did a trial of Sorafenib,however did not improve or tolerate the medication. She was stopped on all chemo therapy medications due to poor response and  unable to tolerate. We discussed second line treatment, but family is deferring at this time.   presents with worsening shortness of breath, orthopnea and bilateral lower extremity swelling. Family at bedside also report dark blood in stool at home. Patient was discharged from the hospital approximately 1 week ago related to weakness and failure to thrive.  Has been engaged in conversations regarding hospice and palliative care, however has not committed to a plan of action yet.  Since being at home this week, however, daughter and patient returned due to increased lethargy, poor p.o. intake, abdominal distention, shortness of breath, and leg swelling. Also makes note of changes to stool color, concerned that it may be more dark than usual. Patient's chemo was stopped about 2 weeks ago because patient was unable to tolerate it. As per family at bedside, no further plan for chemo at the moment. No chest pain, palpitations, fever, urinary symptoms (except dark urine), constipation.     PAST MEDICAL & SURGICAL HISTORY:  Hypertension  Hypothyroidism  GERD (gastroesophageal reflux disease)  Hypercholesterolemia  Diabetes  Heart murmur  History of cirrhosis of liver  HCC (hepatocellular carcinoma)  CAD (coronary artery disease)  S/P tubal ligation  History of vaginal surgery  History of total left knee replacement  History of cardiac cath    Vital Signs Last 24 Hrs  T(C): 36.7 (18 Jul 2024 07:40), Max: 36.7 (18 Jul 2024 07:40)  T(F): 98 (18 Jul 2024 07:40), Max: 98 (18 Jul 2024 07:40)  HR: 63 (18 Jul 2024 07:40) (62 - 95)  BP: 123/62 (18 Jul 2024 07:40) (116/67 - 127/67)  BP(mean): 83 (17 Jul 2024 19:26) (83 - 84)  RR: 18 (18 Jul 2024 07:40) (18 - 18)  SpO2: 97% (18 Jul 2024 07:40) (90% - 99%)    Parameters below as of 18 Jul 2024 07:40  Patient On (Oxygen Delivery Method): room air        PHYSICAL EXAM:  GENERAL: NAD,  slightly jaundiced  HEENT: PERRL, +EOMI  NECK: soft, Supple, No JVD,   CHEST/LUNG: diminsihed   HEART: S1S2+, Regular rate and rhythm;   ABDOMEN: less distended, ascites  EXTREMITIES: + edema  SKIN: No rashes or lesions  NEURO: Awake      LABS:        CBC                            7.9    4.66  )-----------( 135      ( 18 Jul 2024 05:27 )             21.7                8.4    4.76  )-----------( 129      ( 17 Jul 2024 07:00 )             22.9     CHEM  07-18    129<L>  |  94<L>  |  17.2  ----------------------------<  129<H>  3.5   |  21.0<L>  |  1.16    Ca    8.3<L>      18 Jul 2024 05:27  Mg     1.8     07-18    TPro  5.8<L>  /  Alb  2.5<L>  /  TBili  4.4<H>  /  DBili  x   /  AST  190<H>  /  ALT  99<H>  /  AlkPhos  564<H>  07-18    07-17    132<L>  |  98  |  20.3<H>  ----------------------------<  143<H>  3.7   |  21.0<L>  |  0.89    Ca    8.3<L>      17 Jul 2024 07:00  Mg     1.9     07-17    TPro  6.0<L>  /  Alb  2.5<L>  /  TBili  4.9<H>  /  DBili  x   /  AST  215<H>  /  ALT  109<H>  /  AlkPhos  616<H>  07-17    PT/INR - ( 15 Jul 2024 19:50 )   PT: 20.4 sec;   INR: 1.87 ratio         PTT - ( 15 Jul 2024 19:50 )  PTT:29.6 sec  Urinalysis Basic - ( 17 Jul 2024 07:00 )    Color: x / Appearance: x / SG: x / pH: x  Gluc: 143 mg/dL / Ketone: x  / Bili: x / Urobili: x   Blood: x / Protein: x / Nitrite: x   Leuk Esterase: x / RBC: x / WBC x   Sq Epi: x / Non Sq Epi: x / Bacteria: x      MEDICATIONS  (STANDING):  ascorbic acid 500 milliGRAM(s) Oral daily  carvedilol 12.5 milliGRAM(s) Oral every 12 hours  furosemide    Tablet 40 milliGRAM(s) Oral daily  hydrALAZINE 50 milliGRAM(s) Oral two times a day  lactulose Syrup 20 Gram(s) Oral every 6 hours  pantoprazole    Tablet 40 milliGRAM(s) Oral before breakfast  rifAXIMin 550 milliGRAM(s) Oral two times a day  spironolactone 25 milliGRAM(s) Oral daily    MEDICATIONS  (PRN):      RADIOLOGY & ADDITIONAL TESTS:

## 2024-07-18 NOTE — PHYSICAL THERAPY INITIAL EVALUATION ADULT - GAIT PATTERN USED, PT EVAL
contact guard for safety, verbal cues for sequencing, decreased chelsea step length, contact guard for safety due to fatigue

## 2024-07-18 NOTE — PHYSICAL THERAPY INITIAL EVALUATION ADULT - ADDITIONAL COMMENTS
owns and uses a RW, daughter assists throughout day, additional family members are involved and assist as needed as well, ambulates short distances, a total of 10steps 1 rail to enter home, no stairs within home-states rarely does stairs

## 2024-07-18 NOTE — PROGRESS NOTE ADULT - ASSESSMENT
84-year-old female who follows Dr Dave for a history of recurrent hepatocellular carcinoma; she did a trial of Sorafenib,however did not improve or tolerate the medication. She was stopped on all chemo therapy medications due to poor response and  unable to tolerate. We discussed second line treatment, but family is deferring at this time   presents with worsening shortness of breath and bilateral lower extremity swelling.  Patient was discharged from the hospital approximately 1 week ago related to weakness and failure to thrive. Family also reports dark stool vs dark blood in stool.    history of recurrent hepatocellular carcinoma;   - trial of Sorafenib,however did not improve or tolerate the medication.   - stopped  all chemo therapy medications due to poor response and unable to tolerate.   - Discussed second line treatment, but family is deferring at this time  - S/P paracentesis for 900cc    Pancytopenia due to malignancy/cirrhosis  - WBC 2.25 not neutropenic  - Hgb 7.9  - PLT 135k    Generalized weakness/failure to thrive/ HCC  pallaitive care following   hospice eval for home hospice    dispo - likely home with home care in 1-2 days     84-year-old female who follows Dr Dave for a history of recurrent hepatocellular carcinoma; she did a trial of Sorafenib,however did not improve or tolerate the medication. She was stopped on all chemo therapy medications due to poor response and  unable to tolerate. We discussed second line treatment, but family is deferring at this time   presents with worsening shortness of breath and bilateral lower extremity swelling.  Patient was discharged from the hospital approximately 1 week ago related to weakness and failure to thrive. Family also reports dark stool vs dark blood in stool.    history of recurrent hepatocellular carcinoma;   - trial of Sorafenib,however did not tolerate the medication.   - stopped  all chemo therapy medications due to poor response and unable to tolerate.   - Discussed second line treatment, but family is deferring at this time  - S/P paracentesis for 900cc    Pancytopenia due to malignancy/cirrhosis  - WBC 2.25 not neutropenic  - Hgb 7.9  - PLT 135k    Generalized weakness/failure to thrive/ HCC  pallaitive care following   hospice eval for home hospice per daughters request     German telephone  used     dispo - likely home with home care in 1-2 days

## 2024-07-19 ENCOUNTER — TRANSCRIPTION ENCOUNTER (OUTPATIENT)
Age: 84
End: 2024-07-19

## 2024-07-19 VITALS
OXYGEN SATURATION: 99 % | SYSTOLIC BLOOD PRESSURE: 125 MMHG | HEART RATE: 60 BPM | TEMPERATURE: 98 F | DIASTOLIC BLOOD PRESSURE: 63 MMHG | RESPIRATION RATE: 18 BRPM

## 2024-07-19 PROCEDURE — 99497 ADVNCD CARE PLAN 30 MIN: CPT | Mod: 25

## 2024-07-19 PROCEDURE — 83615 LACTATE (LD) (LDH) ENZYME: CPT

## 2024-07-19 PROCEDURE — 83880 ASSAY OF NATRIURETIC PEPTIDE: CPT

## 2024-07-19 PROCEDURE — 97163 PT EVAL HIGH COMPLEX 45 MIN: CPT

## 2024-07-19 PROCEDURE — 99223 1ST HOSP IP/OBS HIGH 75: CPT

## 2024-07-19 PROCEDURE — 0225U NFCT DS DNA&RNA 21 SARSCOV2: CPT

## 2024-07-19 PROCEDURE — 89051 BODY FLUID CELL COUNT: CPT

## 2024-07-19 PROCEDURE — 85027 COMPLETE CBC AUTOMATED: CPT

## 2024-07-19 PROCEDURE — 85025 COMPLETE CBC W/AUTO DIFF WBC: CPT

## 2024-07-19 PROCEDURE — 99233 SBSQ HOSP IP/OBS HIGH 50: CPT

## 2024-07-19 PROCEDURE — 82042 OTHER SOURCE ALBUMIN QUAN EA: CPT

## 2024-07-19 PROCEDURE — C1729: CPT

## 2024-07-19 PROCEDURE — 93005 ELECTROCARDIOGRAM TRACING: CPT

## 2024-07-19 PROCEDURE — 87070 CULTURE OTHR SPECIMN AEROBIC: CPT

## 2024-07-19 PROCEDURE — 86850 RBC ANTIBODY SCREEN: CPT

## 2024-07-19 PROCEDURE — 93970 EXTREMITY STUDY: CPT

## 2024-07-19 PROCEDURE — 84484 ASSAY OF TROPONIN QUANT: CPT

## 2024-07-19 PROCEDURE — 85610 PROTHROMBIN TIME: CPT

## 2024-07-19 PROCEDURE — 80053 COMPREHEN METABOLIC PANEL: CPT

## 2024-07-19 PROCEDURE — 76705 ECHO EXAM OF ABDOMEN: CPT

## 2024-07-19 PROCEDURE — 83735 ASSAY OF MAGNESIUM: CPT

## 2024-07-19 PROCEDURE — 83036 HEMOGLOBIN GLYCOSYLATED A1C: CPT

## 2024-07-19 PROCEDURE — T1013: CPT

## 2024-07-19 PROCEDURE — 85730 THROMBOPLASTIN TIME PARTIAL: CPT

## 2024-07-19 PROCEDURE — 82945 GLUCOSE OTHER FLUID: CPT

## 2024-07-19 PROCEDURE — 71045 X-RAY EXAM CHEST 1 VIEW: CPT

## 2024-07-19 PROCEDURE — 82140 ASSAY OF AMMONIA: CPT

## 2024-07-19 PROCEDURE — 86901 BLOOD TYPING SEROLOGIC RH(D): CPT

## 2024-07-19 PROCEDURE — 99239 HOSP IP/OBS DSCHRG MGMT >30: CPT

## 2024-07-19 PROCEDURE — 36415 COLL VENOUS BLD VENIPUNCTURE: CPT

## 2024-07-19 PROCEDURE — 87205 SMEAR GRAM STAIN: CPT

## 2024-07-19 PROCEDURE — 99285 EMERGENCY DEPT VISIT HI MDM: CPT

## 2024-07-19 PROCEDURE — 84157 ASSAY OF PROTEIN OTHER: CPT

## 2024-07-19 PROCEDURE — 87075 CULTR BACTERIA EXCEPT BLOOD: CPT

## 2024-07-19 PROCEDURE — 86900 BLOOD TYPING SEROLOGIC ABO: CPT

## 2024-07-19 RX ORDER — PANTOPRAZOLE SODIUM 40 MG/10ML
1 INJECTION, POWDER, FOR SOLUTION INTRAVENOUS
Qty: 30 | Refills: 0
Start: 2024-07-19 | End: 2024-08-17

## 2024-07-19 RX ORDER — CARVEDILOL PHOSPHATE 80 MG/1
1 CAPSULE, EXTENDED RELEASE ORAL
Qty: 0 | Refills: 0 | DISCHARGE
Start: 2024-07-19

## 2024-07-19 RX ORDER — FUROSEMIDE 10 MG/ML
1 INJECTION, SOLUTION INTRAMUSCULAR; INTRAVENOUS
Qty: 30 | Refills: 0
Start: 2024-07-19 | End: 2024-08-17

## 2024-07-19 RX ORDER — LACTULOSE 10 G/15ML
15 SOLUTION ORAL
Qty: 1350 | Refills: 0
Start: 2024-07-19 | End: 2024-08-17

## 2024-07-19 RX ADMIN — HYDRALAZINE HYDROCHLORIDE 50 MILLIGRAM(S): 50 TABLET ORAL at 06:48

## 2024-07-19 RX ADMIN — CARVEDILOL PHOSPHATE 12.5 MILLIGRAM(S): 80 CAPSULE, EXTENDED RELEASE ORAL at 06:48

## 2024-07-19 RX ADMIN — FUROSEMIDE 40 MILLIGRAM(S): 10 INJECTION, SOLUTION INTRAMUSCULAR; INTRAVENOUS at 06:48

## 2024-07-19 RX ADMIN — Medication 500 MILLIGRAM(S): at 11:50

## 2024-07-19 RX ADMIN — PANTOPRAZOLE SODIUM 40 MILLIGRAM(S): 40 INJECTION, POWDER, FOR SOLUTION INTRAVENOUS at 06:48

## 2024-07-19 RX ADMIN — LACTULOSE 10 GRAM(S): 10 SOLUTION ORAL at 06:48

## 2024-07-19 RX ADMIN — SPIRONOLACTONE 25 MILLIGRAM(S): 25 TABLET ORAL at 06:48

## 2024-07-19 NOTE — PROGRESS NOTE ADULT - SUBJECTIVE AND OBJECTIVE BOX
WILMER CONROY    [id number]    84y      Female    CC: ascites     INTERVAL HPI/OVERNIGHT EVENTS: Pt seen and examined. no new complaints     REVIEW OF SYSTEMS:    CONSTITUTIONAL: No fever, weight loss  RESPIRATORY: No cough, wheezing, hemoptysis; No shortness of breath  CARDIOVASCULAR: No chest pain, palpitations  GASTROINTESTINAL: No abdominal or epigastric pain. No nausea, vomiting  NEUROLOGICAL: No headaches    Vital Signs Last 24 Hrs  T(C): 36.9 (19 Jul 2024 07:39), Max: 36.9 (18 Jul 2024 16:25)  T(F): 98.4 (19 Jul 2024 07:39), Max: 98.5 (18 Jul 2024 16:25)  HR: 65 (19 Jul 2024 07:39) (65 - 98)  BP: 114/52 (19 Jul 2024 07:39) (110/65 - 122/60)  BP(mean): --  RR: 18 (19 Jul 2024 07:39) (17 - 18)  SpO2: 96% (19 Jul 2024 07:39) (96% - 97%)    Parameters below as of 19 Jul 2024 07:39  Patient On (Oxygen Delivery Method): room air        PHYSICAL EXAM:    GENERAL: NAD  CHEST/LUNG: decreased; respirations unlabored on RA   HEART: S1S2+, Regular rate and rhythm  ABDOMEN: Soft, Nontender, +distended   SKIN: warm, dry   NEURO: Awake, alert   PSYCH: calm, cooperative     LABS:                        7.9    4.66  )-----------( 135      ( 18 Jul 2024 05:27 )             21.7     07-18    129<L>  |  94<L>  |  17.2  ----------------------------<  129<H>  3.5   |  21.0<L>  |  1.16    Ca    8.3<L>      18 Jul 2024 05:27  Mg     1.8     07-18    TPro  5.8<L>  /  Alb  2.5<L>  /  TBili  4.4<H>  /  DBili  x   /  AST  190<H>  /  ALT  99<H>  /  AlkPhos  564<H>  07-18    PT/INR - ( 18 Jul 2024 05:27 )   PT: 15.5 sec;   INR: 1.41 ratio           Urinalysis Basic - ( 18 Jul 2024 05:27 )    Color: x / Appearance: x / SG: x / pH: x  Gluc: 129 mg/dL / Ketone: x  / Bili: x / Urobili: x   Blood: x / Protein: x / Nitrite: x   Leuk Esterase: x / RBC: x / WBC x   Sq Epi: x / Non Sq Epi: x / Bacteria: x          MEDICATIONS  (STANDING):  ascorbic acid 500 milliGRAM(s) Oral daily  carvedilol 12.5 milliGRAM(s) Oral every 12 hours  furosemide    Tablet 40 milliGRAM(s) Oral daily  hydrALAZINE 50 milliGRAM(s) Oral two times a day  lactulose Syrup 10 Gram(s) Oral every 8 hours  pantoprazole    Tablet 40 milliGRAM(s) Oral before breakfast  rifAXIMin 550 milliGRAM(s) Oral two times a day  spironolactone 25 milliGRAM(s) Oral daily    MEDICATIONS  (PRN):  benzonatate 100 milliGRAM(s) Oral every 8 hours PRN Cough      RADIOLOGY & ADDITIONAL TESTS:   WILMER CONROY    [id number]    84y      Female    CC: ascites     INTERVAL HPI/OVERNIGHT EVENTS: Pt seen and examined with Nauruan  (Ahmet #[id number]). no new complaints     REVIEW OF SYSTEMS:    CONSTITUTIONAL: No fever, weight loss  RESPIRATORY: No cough, wheezing, hemoptysis; No shortness of breath  CARDIOVASCULAR: No chest pain, palpitations  GASTROINTESTINAL: No abdominal or epigastric pain. No nausea, vomiting  NEUROLOGICAL: No headaches    Vital Signs Last 24 Hrs  T(C): 36.9 (19 Jul 2024 07:39), Max: 36.9 (18 Jul 2024 16:25)  T(F): 98.4 (19 Jul 2024 07:39), Max: 98.5 (18 Jul 2024 16:25)  HR: 65 (19 Jul 2024 07:39) (65 - 98)  BP: 114/52 (19 Jul 2024 07:39) (110/65 - 122/60)  BP(mean): --  RR: 18 (19 Jul 2024 07:39) (17 - 18)  SpO2: 96% (19 Jul 2024 07:39) (96% - 97%)    Parameters below as of 19 Jul 2024 07:39  Patient On (Oxygen Delivery Method): room air        PHYSICAL EXAM:    GENERAL: NAD  CHEST/LUNG: decreased; respirations unlabored on RA   HEART: S1S2+, Regular rate and rhythm  ABDOMEN: Soft, Nontender, +distended   SKIN: warm, dry   NEURO: Awake, alert   PSYCH: calm, cooperative     LABS:                        7.9    4.66  )-----------( 135      ( 18 Jul 2024 05:27 )             21.7     07-18    129<L>  |  94<L>  |  17.2  ----------------------------<  129<H>  3.5   |  21.0<L>  |  1.16    Ca    8.3<L>      18 Jul 2024 05:27  Mg     1.8     07-18    TPro  5.8<L>  /  Alb  2.5<L>  /  TBili  4.4<H>  /  DBili  x   /  AST  190<H>  /  ALT  99<H>  /  AlkPhos  564<H>  07-18    PT/INR - ( 18 Jul 2024 05:27 )   PT: 15.5 sec;   INR: 1.41 ratio           Urinalysis Basic - ( 18 Jul 2024 05:27 )    Color: x / Appearance: x / SG: x / pH: x  Gluc: 129 mg/dL / Ketone: x  / Bili: x / Urobili: x   Blood: x / Protein: x / Nitrite: x   Leuk Esterase: x / RBC: x / WBC x   Sq Epi: x / Non Sq Epi: x / Bacteria: x          MEDICATIONS  (STANDING):  ascorbic acid 500 milliGRAM(s) Oral daily  carvedilol 12.5 milliGRAM(s) Oral every 12 hours  furosemide    Tablet 40 milliGRAM(s) Oral daily  hydrALAZINE 50 milliGRAM(s) Oral two times a day  lactulose Syrup 10 Gram(s) Oral every 8 hours  pantoprazole    Tablet 40 milliGRAM(s) Oral before breakfast  rifAXIMin 550 milliGRAM(s) Oral two times a day  spironolactone 25 milliGRAM(s) Oral daily    MEDICATIONS  (PRN):  benzonatate 100 milliGRAM(s) Oral every 8 hours PRN Cough      RADIOLOGY & ADDITIONAL TESTS:   WILMER CONROY    [id number]    84y      Female    CC: ascites     INTERVAL HPI/OVERNIGHT EVENTS: Pt seen and examined with Filipino  (Ahmet #[id number]). no new complaints. pt's family at bedside     REVIEW OF SYSTEMS:    CONSTITUTIONAL: No fever, weight loss  RESPIRATORY: No cough, wheezing, hemoptysis; No shortness of breath  CARDIOVASCULAR: No chest pain, palpitations  GASTROINTESTINAL: No abdominal or epigastric pain. No nausea, vomiting  NEUROLOGICAL: No headaches    Vital Signs Last 24 Hrs  T(C): 36.9 (19 Jul 2024 07:39), Max: 36.9 (18 Jul 2024 16:25)  T(F): 98.4 (19 Jul 2024 07:39), Max: 98.5 (18 Jul 2024 16:25)  HR: 65 (19 Jul 2024 07:39) (65 - 98)  BP: 114/52 (19 Jul 2024 07:39) (110/65 - 122/60)  BP(mean): --  RR: 18 (19 Jul 2024 07:39) (17 - 18)  SpO2: 96% (19 Jul 2024 07:39) (96% - 97%)    Parameters below as of 19 Jul 2024 07:39  Patient On (Oxygen Delivery Method): room air        PHYSICAL EXAM:    GENERAL: NAD  CHEST/LUNG: decreased; respirations unlabored on RA   HEART: S1S2+, Regular rate and rhythm  ABDOMEN: Soft, Nontender, +distended   SKIN: warm, dry   NEURO: Awake, alert   PSYCH: calm, cooperative     LABS:                        7.9    4.66  )-----------( 135      ( 18 Jul 2024 05:27 )             21.7     07-18    129<L>  |  94<L>  |  17.2  ----------------------------<  129<H>  3.5   |  21.0<L>  |  1.16    Ca    8.3<L>      18 Jul 2024 05:27  Mg     1.8     07-18    TPro  5.8<L>  /  Alb  2.5<L>  /  TBili  4.4<H>  /  DBili  x   /  AST  190<H>  /  ALT  99<H>  /  AlkPhos  564<H>  07-18    PT/INR - ( 18 Jul 2024 05:27 )   PT: 15.5 sec;   INR: 1.41 ratio           Urinalysis Basic - ( 18 Jul 2024 05:27 )    Color: x / Appearance: x / SG: x / pH: x  Gluc: 129 mg/dL / Ketone: x  / Bili: x / Urobili: x   Blood: x / Protein: x / Nitrite: x   Leuk Esterase: x / RBC: x / WBC x   Sq Epi: x / Non Sq Epi: x / Bacteria: x          MEDICATIONS  (STANDING):  ascorbic acid 500 milliGRAM(s) Oral daily  carvedilol 12.5 milliGRAM(s) Oral every 12 hours  furosemide    Tablet 40 milliGRAM(s) Oral daily  hydrALAZINE 50 milliGRAM(s) Oral two times a day  lactulose Syrup 10 Gram(s) Oral every 8 hours  pantoprazole    Tablet 40 milliGRAM(s) Oral before breakfast  rifAXIMin 550 milliGRAM(s) Oral two times a day  spironolactone 25 milliGRAM(s) Oral daily    MEDICATIONS  (PRN):  benzonatate 100 milliGRAM(s) Oral every 8 hours PRN Cough      RADIOLOGY & ADDITIONAL TESTS:

## 2024-07-19 NOTE — PROGRESS NOTE ADULT - NSPROGADDITIONALINFOA_GEN_ALL_CORE
Time spent with review of chart documents, labs, imaging. Direct patient assessment,  formulation of care plan. Discussion with  Interdisciplinary  team    with an additional  ACP  of  16    minutes  This time is exclusive of the encounter

## 2024-07-19 NOTE — PROGRESS NOTE ADULT - SUBJECTIVE AND OBJECTIVE BOX
CC:  Follow up GOC , Symptoms    OVERNIGHT EVENTS:  none  reported    Present Symptoms:   Dyspnea:  No    Nausea/Vomiting:  No  Anxiety:  No  Depression:  No   Fatigue:  Yes   Loss of appetite: fair     Constipation: Not Reported       Pain: No               Location            Duration            Character            Severity            Factors            Effect    Pain AD Score:  [email or web address] (press ctrl + left click to view)    Review of Systems: Reviewed as above  All others negative    MEDICATIONS  (STANDING):  ascorbic acid 500 milliGRAM(s) Oral daily  carvedilol 12.5 milliGRAM(s) Oral every 12 hours  furosemide    Tablet 40 milliGRAM(s) Oral daily  hydrALAZINE 50 milliGRAM(s) Oral two times a day  lactulose Syrup 10 Gram(s) Oral every 8 hours  pantoprazole    Tablet 40 milliGRAM(s) Oral before breakfast  rifAXIMin 550 milliGRAM(s) Oral two times a day  spironolactone 25 milliGRAM(s) Oral daily    MEDICATIONS  (PRN):  benzonatate 100 milliGRAM(s) Oral every 8 hours PRN Cough      PHYSICAL EXAM:    Vital Signs Last 24 Hrs  T(C): 36.9 (19 Jul 2024 07:39), Max: 36.9 (18 Jul 2024 16:25)  T(F): 98.4 (19 Jul 2024 07:39), Max: 98.5 (18 Jul 2024 16:25)  HR: 65 (19 Jul 2024 07:39) (65 - 98)  BP: 114/52 (19 Jul 2024 07:39) (110/65 - 122/60)  BP(mean): --  RR: 18 (19 Jul 2024 07:39) (17 - 18)  SpO2: 96% (19 Jul 2024 07:39) (96% - 97%)    Parameters below as of 19 Jul 2024 07:39  Patient On (Oxygen Delivery Method): room air    Karnofsky:  40%  General:   F awake alert NAD      HEENT:  NCAT     Lungs: comfortable  non labored  CV:  RR  GI soft NT  MSK: weaknedss  Skin:  warm/dry  Neuro  no focal deficits  Psych mood stable, affect appropriate    LABS:                          7.9    4.66  )-----------( 135      ( 18 Jul 2024 05:27 )             21.7     07-18    129<L>  |  94<L>  |  17.2  ----------------------------<  129<H>  3.5   |  21.0<L>  |  1.16    Ca    8.3<L>      18 Jul 2024 05:27  Mg     1.8     07-18    TPro  5.8<L>  /  Alb  2.5<L>  /  TBili  4.4<H>  /  DBili  x   /  AST  190<H>  /  ALT  99<H>  /  AlkPhos  564<H>  07-18    PT/INR - ( 18 Jul 2024 05:27 )   PT: 15.5 sec;   INR: 1.41 ratio           Urinalysis Basic - ( 18 Jul 2024 05:27 )    Color: x / Appearance: x / SG: x / pH: x  Gluc: 129 mg/dL / Ketone: x  / Bili: x / Urobili: x   Blood: x / Protein: x / Nitrite: x   Leuk Esterase: x / RBC: x / WBC x   Sq Epi: x / Non Sq Epi: x / Bacteria: x      I&O's Summary    18 Jul 2024 07:01  -  19 Jul 2024 07:00  --------------------------------------------------------  IN: 0 mL / OUT: 300 mL / NET: -300 mL

## 2024-07-19 NOTE — PROGRESS NOTE ADULT - SUBJECTIVE AND OBJECTIVE BOX
84-year-old female who follows Dr Dave for a history of recurrent hepatocellular carcinoma; she did a trial of Sorafenib,however did not improve or tolerate the medication. She was stopped on all chemo therapy medications due to poor response and  unable to tolerate. We discussed second line treatment, but family is deferring at this time.   presents with worsening shortness of breath, orthopnea and bilateral lower extremity swelling. Family at bedside also report dark blood in stool at home. Patient was discharged from the hospital approximately 1 week ago related to weakness and failure to thrive.  Has been engaged in conversations regarding hospice and palliative care, however has not committed to a plan of action yet.  Since being at home this week, however, daughter and patient returned due to increased lethargy, poor p.o. intake, abdominal distention, shortness of breath, and leg swelling. Also makes note of changes to stool color, concerned that it may be more dark than usual. Patient's chemo was stopped about 2 weeks ago because patient was unable to tolerate it. As per family at bedside, no further plan for chemo at the moment. No chest pain, palpitations, fever, urinary symptoms (except dark urine), constipation.     PAST MEDICAL & SURGICAL HISTORY:  Hypertension  Hypothyroidism  GERD (gastroesophageal reflux disease)  Hypercholesterolemia  Diabetes  Heart murmur  History of cirrhosis of liver  HCC (hepatocellular carcinoma)  CAD (coronary artery disease)  S/P tubal ligation  History of vaginal surgery  History of total left knee replacement  History of cardiac cath    Vital Signs Last 24 Hrs  T(C): 36.9 (19 Jul 2024 07:39), Max: 36.9 (18 Jul 2024 16:25)  T(F): 98.4 (19 Jul 2024 07:39), Max: 98.5 (18 Jul 2024 16:25)  HR: 65 (19 Jul 2024 07:39) (65 - 98)  BP: 114/52 (19 Jul 2024 07:39) (110/65 - 122/60)  BP(mean): --  RR: 18 (19 Jul 2024 07:39) (17 - 18)  SpO2: 96% (19 Jul 2024 07:39) (96% - 97%)    Parameters below as of 19 Jul 2024 07:39  Patient On (Oxygen Delivery Method): room air      PHYSICAL EXAM:  GENERAL: NAD,  slightly jaundiced  HEENT: PERRL, +EOMI  NECK: soft, Supple, No JVD,   CHEST/LUNG: diminsihed   HEART: S1S2+, Regular rate and rhythm;   ABDOMEN: less distended, ascites  EXTREMITIES: + edema  SKIN: No rashes or lesions  NEURO: Awake      LABS:            CBC                                     7.9    4.66  )-----------( 135      ( 18 Jul 2024 05:27 )             21.7                8.4    4.76  )-----------( 129      ( 17 Jul 2024 07:00 )             22.9     CHEM  07-18    129<L>  |  94<L>  |  17.2  ----------------------------<  129<H>  3.5   |  21.0<L>  |  1.16    Ca    8.3<L>      18 Jul 2024 05:27  Mg     1.8     07-18    TPro  5.8<L>  /  Alb  2.5<L>  /  TBili  4.4<H>  /  DBili  x   /  AST  190<H>  /  ALT  99<H>  /  AlkPhos  564<H>  07-18 07-17    132<L>  |  98  |  20.3<H>  ----------------------------<  143<H>  3.7   |  21.0<L>  |  0.89    Ca    8.3<L>      17 Jul 2024 07:00  Mg     1.9     07-17    TPro  6.0<L>  /  Alb  2.5<L>  /  TBili  4.9<H>  /  DBili  x   /  AST  215<H>  /  ALT  109<H>  /  AlkPhos  616<H>  07-17    PT/INR - ( 15 Jul 2024 19:50 )   PT: 20.4 sec;   INR: 1.87 ratio         PTT - ( 15 Jul 2024 19:50 )  PTT:29.6 sec  Urinalysis Basic - ( 17 Jul 2024 07:00 )    Color: x / Appearance: x / SG: x / pH: x  Gluc: 143 mg/dL / Ketone: x  / Bili: x / Urobili: x   Blood: x / Protein: x / Nitrite: x   Leuk Esterase: x / RBC: x / WBC x   Sq Epi: x / Non Sq Epi: x / Bacteria: x      MEDICATIONS  (STANDING):  ascorbic acid 500 milliGRAM(s) Oral daily  carvedilol 12.5 milliGRAM(s) Oral every 12 hours  furosemide    Tablet 40 milliGRAM(s) Oral daily  hydrALAZINE 50 milliGRAM(s) Oral two times a day  lactulose Syrup 20 Gram(s) Oral every 6 hours  pantoprazole    Tablet 40 milliGRAM(s) Oral before breakfast  rifAXIMin 550 milliGRAM(s) Oral two times a day  spironolactone 25 milliGRAM(s) Oral daily    MEDICATIONS  (PRN):      RADIOLOGY & ADDITIONAL TESTS:

## 2024-07-19 NOTE — PROGRESS NOTE ADULT - ASSESSMENT
84yr woman hx of HCC, CAD with stents, HTN HLD DM admitted with increased SOB and swelling related to her liver disease    HCC  reported not tolerating latest tx  hospice services offered - family declines    Ascites  s/p paracentesis  monitor for pain and reaccumulation  Lasix   Spironolactone    Anemia  reported hx dark stool  monitor hg    Debility  Assist with care  Turn/reposition  Safety precautions    Encounter for Palliative Care  Palliative Care consulted to assist with goals of care  Family aware patient's tx have stopped 2/2 disease progression.   See [facility] note above for details.  In summary  1.  Family refuses hospices services.  They were offered services last admission a week ago  2.  Wishes Full Code  3, Requesting hospital bed    Patient at risk for rehospitalization.  Recommend [facility] on discharge    Patient comfortable, no symptoms.  Palliative Care to sig off.

## 2024-07-19 NOTE — DISCHARGE NOTE PROVIDER - HOSPITAL COURSE
84y/oF PMH hepatocellular carcinoma, diabetes, hypertension, hyperlipidemia, CAD with stents, presenting with worsening shortness of breath and bilateral lower extremity swelling.  Patient was discharged from the hospital approximately 1 week prior to admission related to weakness and failure to thrive. 84y/oF PMH hepatocellular carcinoma, diabetes, hypertension, hyperlipidemia, CAD with stents, presenting with worsening shortness of breath and bilateral lower extremity swelling.  Patient was discharged from the hospital approximately 1 week prior to admission related to weakness and failure to thrive. pt s/p IR paracentesis 7/16. labs monitored. seen by palliative care. hospice consult appreciated, pt's family deferring home hospice at this time. 84y/oF PMH hepatocellular carcinoma, diabetes, hypertension, hyperlipidemia, CAD with stents, presenting with worsening shortness of breath and bilateral lower extremity swelling.  Patient was discharged from the hospital approximately 1 week prior to admission related to weakness and failure to thrive. pt s/p IR paracentesis 7/16. labs monitored. seen by palliative care. hospice consult appreciated, pt's family deferring home hospice at this time. pt to dc home w/home care

## 2024-07-19 NOTE — DISCHARGE NOTE NURSING/CASE MANAGEMENT/SOCIAL WORK - PATIENT PORTAL LINK FT
You can access the FollowMyHealth Patient Portal offered by [facility] by registering at the following website: [email or web address]. By joining [facility]’s FollowMyHealth portal, you will also be able to view your health information using other applications (apps) compatible with our system.

## 2024-07-19 NOTE — PROGRESS NOTE ADULT - REASON FOR ADMISSION
worsening liver cancer, ascites
worsening liver cancer, ascites
hcc, ascites and ftt
worsening liver cancer, ascites

## 2024-07-19 NOTE — DISCHARGE NOTE PROVIDER - ATTENDING DISCHARGE PHYSICAL EXAMINATION:
Vital Signs Last 24 Hrs  T(C): 36.9 (19 Jul 2024 07:39), Max: 36.9 (18 Jul 2024 16:25)  T(F): 98.4 (19 Jul 2024 07:39), Max: 98.5 (18 Jul 2024 16:25)  HR: 65 (19 Jul 2024 07:39) (65 - 98)  BP: 114/52 (19 Jul 2024 07:39) (110/65 - 122/60)  BP(mean): --  RR: 18 (19 Jul 2024 07:39) (17 - 18)  SpO2: 96% (19 Jul 2024 07:39) (96% - 97%)    Parameters below as of 19 Jul 2024 07:39  Patient On (Oxygen Delivery Method): room air    GENERAL: NAD  CHEST/LUNG: decreased; respirations unlabored on RA   HEART: S1S2+, Regular rate and rhythm  ABDOMEN: Soft, Nontender, +distended   SKIN: warm, dry   NEURO: Awake, alert   PSYCH: calm, cooperative

## 2024-07-19 NOTE — PROGRESS NOTE ADULT - CONVERSATION DETAILS
Met with daughter- confirms mother stopped treatments because it was not working.  Discussed diease course and functional decline that can be expected.  Tried to prepare daughter mother's ascites likely to return, risk of infection and rehospitalization.    Discussed consideration to focus on comfort to maximize her QOL for her limited time - likely less 6months.    Daughter became emotional - much support given. Encouraged to speak together as a family
Informed by hospice SW family needed time to discuss hospice services.    Met daughter Kristi at bedside - shes declines services.  She would like a hospital bed for mother.    Discussed CPR/ intubation mech ventilation.  Encouraged discussions to honor patient's wishes.  She states mother wishes CPR.

## 2024-07-19 NOTE — PROGRESS NOTE ADULT - ASSESSMENT
84y/oF PMH hepatocellular carcinoma, diabetes, hypertension, hyperlipidemia, CAD with stents, presenting with worsening shortness of breath and bilateral lower extremity swelling.  Patient was discharged from the hospital approximately 1 week prior to admission related to weakness and failure to thrive. Family also reports dark stool vs dark blood in stool.    Anasarca/ ascites  Transaminitis  In setting of recurrent hepatocellular carcinoma   -s/p IR para 7/16  -cont home dose of spironolactone  -cont PO lasix  -statin d/c'ed  -No longer on chemotherapy   -family no longer pursuing chemo     Anemia  Thrombocytopenia  likely due to cirrhosis, malignancy above   -keep T+S active  -s/p vit K     Generalized weakness/failure to thrive  due to above   -palliative care recs appreciated   -PT eval rec home PT     Hyperammonemia   -cont lactulose; goal 2-3bms/day  -cont Xifaxan 550mg bid     DM2   -hba1c 4.6  -ISS d/c'ed    hospice consulted ; family deferring home hospice at this time     full code     dispo -home with home care    Pt requires a hospital bed due to limited mobility due to advanced hepatocellular carcinoma with recurrent ascites, anemia, thrombocytopenia and transaminitis. Pt requires frequent and immediate position changes not feasible in a regular bed with pillows. Pt needs HOB elevated to more than 30 degrees most of the time due to aspiration risk, pleural effusion. Pt requires a gel overlay to due limited mobility and cannot make changes significant enough to alleviate pressure and impaired nutritional status     84y/oF PMH hepatocellular carcinoma, diabetes, hypertension, hyperlipidemia, CAD with stents, presenting with worsening shortness of breath and bilateral lower extremity swelling.  Patient was discharged from the hospital approximately 1 week prior to admission related to weakness and failure to thrive.    Anasarca/ ascites  Transaminitis  In setting of recurrent hepatocellular carcinoma   -s/p IR para 7/16  -cont home dose of spironolactone  -cont PO lasix  -statin d/c'ed  -No longer on chemotherapy   -family no longer pursuing chemo     Anemia  Thrombocytopenia  likely due to cirrhosis, malignancy above   -keep T+S active  -s/p vit K     Generalized weakness/failure to thrive  due to above   -palliative care recs appreciated   -PT eval rec home PT     Hyperammonemia   -cont lactulose; goal 2-3bms/day  -cont Xifaxan 550mg bid     DM2   -hba1c 4.6  -ISS d/c'ed    hospice consulted ; family deferring home hospice at this time     full code     dispo -home with home care    Pt requires a hospital bed due to limited mobility due to advanced hepatocellular carcinoma with recurrent ascites, anemia, thrombocytopenia and transaminitis. Pt requires frequent and immediate position changes not feasible in a regular bed with pillows. Pt needs HOB elevated to more than 30 degrees most of the time due to aspiration risk, pleural effusion. Pt requires a gel overlay to due limited mobility and cannot make changes significant enough to alleviate pressure and impaired nutritional status     84y/oF PMH hepatocellular carcinoma, diabetes, hypertension, hyperlipidemia, CAD with stents, presenting with worsening shortness of breath and bilateral lower extremity swelling.  Patient was discharged from the hospital approximately 1 week prior to admission related to weakness and failure to thrive.    Anasarca/ ascites  Transaminitis  In setting of recurrent hepatocellular carcinoma   -s/p IR para 7/16  -cont home dose of spironolactone  -cont PO lasix  -statin d/c'ed  -No longer on chemotherapy   -family no longer pursuing chemo     Anemia  Thrombocytopenia  likely due to cirrhosis, malignancy above   -keep T+S active  -s/p vit K     Generalized weakness/failure to thrive  due to above   -palliative care recs appreciated   -PT eval rec home PT     Hyperammonemia   -cont lactulose; goal 2-3bms/day  -cont Xifaxan 550mg bid     DM2   -hba1c 4.6  -ISS d/c'ed    hospice consulted ; family deferring home hospice at this time     full code     dispo -home with home care          Pt requires a hospital bed due to limited mobility due to advanced hepatocellular carcinoma with recurrent ascites, anemia, thrombocytopenia and transaminitis. Pt requires frequent and immediate position changes not feasible in a regular bed with pillows. Pt needs HOB elevated to more than 30 degrees most of the time due to aspiration risk, pleural effusion. Pt requires a gel overlay to due limited mobility and cannot make changes significant enough to alleviate pressure and impaired nutritional status

## 2024-07-19 NOTE — PROGRESS NOTE ADULT - ASSESSMENT
84-year-old female who follows Dr Dave for a history of recurrent hepatocellular carcinoma; she did a trial of Sorafenib,however did not improve or tolerate the medication. She was stopped on all chemo therapy medications due to poor response and  unable to tolerate. We discussed second line treatment, but family is deferring at this time   presents with worsening shortness of breath and bilateral lower extremity swelling.  Patient was discharged from the hospital approximately 1 week ago related to weakness and failure to thrive. Family also reports dark stool vs dark blood in stool.    history of recurrent hepatocellular carcinoma;   - trial of Sorafenib,however did not tolerate the medication.   - stopped  all chemo therapy medications due to poor response and unable to tolerate.   - Discussed second line treatment, but family is deferring at this time  - S/P paracentesis for 900cc    Pancytopenia due to malignancy/cirrhosis  CBC on 7/18 shows  - WBC 2.25 not neutropenic  - Hgb 7.9  - PLT 135k    Generalized weakness/failure to thrive/ HCC  pallaitive care following   Awaiting hospice eval for home hospice per daughters request     [place] telephone  used     dispo -home with home care in 1-2 days vs home w/hospice

## 2024-07-19 NOTE — PROGRESS NOTE ADULT - PROVIDER SPECIALTY LIST ADULT
Hospitalist
Palliative Care
Internal Medicine
Internal Medicine
Heme/Onc
Hospitalist
Heme/Onc
Palliative Care

## 2024-07-19 NOTE — DISCHARGE NOTE PROVIDER - NSDCMRMEDTOKEN_GEN_ALL_CORE_FT
aspirin 81 mg oral tablet: 1 tab(s) orally once a day  atorvastatin 40 mg oral tablet: 1 tab(s) orally once a day (at bedtime)  carvedilol 12.5 mg oral tablet: 1 tab(s) orally every 12 hours  Farxiga 10 mg oral tablet: 1 tab(s) orally once a day  hydrALAZINE 50 mg oral tablet: 1 tab(s) orally 2 times a day  metFORMIN 1000 mg oral tablet: 1 tab(s) orally 2 times a da  Plavix 75 mg oral tablet: 1 tab(s) orally once a day  spironolactone 25 mg oral tablet: 1 tab(s) orally once a day  Tradjenta 5 mg oral tablet: 1 tab(s) orally once a day  vitamin c daily:   vitamin d3 daily:   Xifaxan 550 mg oral tablet: 1 tab(s) orally 2 times a day   carvedilol 12.5 mg oral tablet: 1 tab(s) orally every 12 hours  furosemide 40 mg oral tablet: 1 tab(s) orally once a day  hydrALAZINE 50 mg oral tablet: 1 tab(s) orally 2 times a day  lactulose 10 g/15 mL oral syrup: 15 milliliter(s) orally every 8 hours titrate to 2-3 soft stools/day  pantoprazole 40 mg oral delayed release tablet: 1 tab(s) orally once a day (before a meal)  spironolactone 25 mg oral tablet: 1 tab(s) orally once a day  vitamin c daily:   vitamin d3 daily:   Xifaxan 550 mg oral tablet: 1 tab(s) orally 2 times a day

## 2024-07-19 NOTE — DISCHARGE NOTE NURSING/CASE MANAGEMENT/SOCIAL WORK - NSDCPEFALRISK_GEN_ALL_CORE
For information on Fall & Injury Prevention, visit: [email or web address] OR  [email or web address] OR  https://www.cdc.gov/steadi/patient.html

## 2024-07-19 NOTE — DISCHARGE NOTE PROVIDER - NSDCCPCAREPLAN_GEN_ALL_CORE_FT
PRINCIPAL DISCHARGE DIAGNOSIS  Diagnosis: Ascites  Assessment and Plan of Treatment: s/p paracentesis . continue current medications      SECONDARY DISCHARGE DIAGNOSES  Diagnosis: Cancer, hepatocellular  Assessment and Plan of Treatment:

## 2024-07-21 LAB
CULTURE RESULTS: SIGNIFICANT CHANGE UP
SPECIMEN SOURCE: SIGNIFICANT CHANGE UP

## 2024-07-29 ENCOUNTER — INPATIENT (INPATIENT)
Facility: HOSPITAL | Age: 84
LOS: 4 days | Discharge: HOME | DRG: 437 | End: 2024-08-03
Attending: STUDENT IN AN ORGANIZED HEALTH CARE EDUCATION/TRAINING PROGRAM | Admitting: STUDENT IN AN ORGANIZED HEALTH CARE EDUCATION/TRAINING PROGRAM
Payer: MEDICARE

## 2024-07-29 VITALS
TEMPERATURE: 98 F | WEIGHT: 145.06 LBS | SYSTOLIC BLOOD PRESSURE: 125 MMHG | DIASTOLIC BLOOD PRESSURE: 57 MMHG | HEIGHT: 63 IN | RESPIRATION RATE: 16 BRPM | HEART RATE: 59 BPM | OXYGEN SATURATION: 99 %

## 2024-07-29 DIAGNOSIS — Z96.652 PRESENCE OF LEFT ARTIFICIAL KNEE JOINT: Chronic | ICD-10-CM

## 2024-07-29 DIAGNOSIS — Z98.890 OTHER SPECIFIED POSTPROCEDURAL STATES: Chronic | ICD-10-CM

## 2024-07-29 DIAGNOSIS — C22.9 MALIGNANT NEOPLASM OF LIVER, NOT SPECIFIED AS PRIMARY OR SECONDARY: ICD-10-CM

## 2024-07-29 LAB
ALBUMIN SERPL ELPH-MCNC: 2 G/DL — LOW (ref 3.3–5.2)
ALP SERPL-CCNC: 693 U/L — HIGH (ref 40–120)
ALT FLD-CCNC: 89 U/L — HIGH
ANION GAP SERPL CALC-SCNC: 13 MMOL/L — SIGNIFICANT CHANGE UP (ref 5–17)
AST SERPL-CCNC: 230 U/L — HIGH
BASOPHILS # BLD AUTO: 0.02 K/UL — SIGNIFICANT CHANGE UP (ref 0–0.2)
BASOPHILS NFR BLD AUTO: 0.3 % — SIGNIFICANT CHANGE UP (ref 0–2)
BILIRUB SERPL-MCNC: 9.2 MG/DL — HIGH (ref 0.4–2)
BLD GP AB SCN SERPL QL: SIGNIFICANT CHANGE UP
BUN SERPL-MCNC: 33.8 MG/DL — HIGH (ref 8–20)
CALCIUM SERPL-MCNC: 10.6 MG/DL — HIGH (ref 8.4–10.5)
CHLORIDE SERPL-SCNC: 93 MMOL/L — LOW (ref 96–108)
CO2 SERPL-SCNC: 22 MMOL/L — SIGNIFICANT CHANGE UP (ref 22–29)
CREAT SERPL-MCNC: 1.03 MG/DL — SIGNIFICANT CHANGE UP (ref 0.5–1.3)
EGFR: 53 ML/MIN/1.73M2 — LOW
EOSINOPHIL # BLD AUTO: 0.03 K/UL — SIGNIFICANT CHANGE UP (ref 0–0.5)
EOSINOPHIL NFR BLD AUTO: 0.5 % — SIGNIFICANT CHANGE UP (ref 0–6)
GLUCOSE SERPL-MCNC: 150 MG/DL — HIGH (ref 70–99)
HCT VFR BLD CALC: 23.7 % — LOW (ref 34.5–45)
HGB BLD-MCNC: 8.5 G/DL — LOW (ref 11.5–15.5)
IMM GRANULOCYTES NFR BLD AUTO: 0.5 % — SIGNIFICANT CHANGE UP (ref 0–0.9)
INR BLD: 1.45 RATIO — HIGH (ref 0.85–1.18)
LIDOCAIN IGE QN: 72 U/L — HIGH (ref 22–51)
LYMPHOCYTES # BLD AUTO: 0.6 K/UL — LOW (ref 1–3.3)
LYMPHOCYTES # BLD AUTO: 9.3 % — LOW (ref 13–44)
MCHC RBC-ENTMCNC: 35 PG — HIGH (ref 27–34)
MCHC RBC-ENTMCNC: 35.9 GM/DL — SIGNIFICANT CHANGE UP (ref 32–36)
MCV RBC AUTO: 97.5 FL — SIGNIFICANT CHANGE UP (ref 80–100)
MONOCYTES # BLD AUTO: 0.67 K/UL — SIGNIFICANT CHANGE UP (ref 0–0.9)
MONOCYTES NFR BLD AUTO: 10.4 % — SIGNIFICANT CHANGE UP (ref 2–14)
NEUTROPHILS # BLD AUTO: 5.07 K/UL — SIGNIFICANT CHANGE UP (ref 1.8–7.4)
NEUTROPHILS NFR BLD AUTO: 79 % — HIGH (ref 43–77)
PLATELET # BLD AUTO: 127 K/UL — LOW (ref 150–400)
POTASSIUM SERPL-MCNC: 4.9 MMOL/L — SIGNIFICANT CHANGE UP (ref 3.5–5.3)
POTASSIUM SERPL-SCNC: 4.9 MMOL/L — SIGNIFICANT CHANGE UP (ref 3.5–5.3)
PROT SERPL-MCNC: 5.7 G/DL — LOW (ref 6.6–8.7)
PROTHROM AB SERPL-ACNC: 15.9 SEC — HIGH (ref 9.5–13)
RBC # BLD: 2.43 M/UL — LOW (ref 3.8–5.2)
RBC # FLD: 22.9 % — HIGH (ref 10.3–14.5)
SODIUM SERPL-SCNC: 128 MMOL/L — LOW (ref 135–145)
WBC # BLD: 6.42 K/UL — SIGNIFICANT CHANGE UP (ref 3.8–10.5)
WBC # FLD AUTO: 6.42 K/UL — SIGNIFICANT CHANGE UP (ref 3.8–10.5)

## 2024-07-29 PROCEDURE — 99223 1ST HOSP IP/OBS HIGH 75: CPT | Mod: GC

## 2024-07-29 PROCEDURE — 74177 CT ABD & PELVIS W/CONTRAST: CPT | Mod: 26,MC

## 2024-07-29 PROCEDURE — 99497 ADVNCD CARE PLAN 30 MIN: CPT | Mod: GC,25

## 2024-07-29 PROCEDURE — 99291 CRITICAL CARE FIRST HOUR: CPT

## 2024-07-29 RX ORDER — BACTERIOSTATIC SODIUM CHLORIDE 0.9 %
1000 VIAL (ML) INJECTION ONCE
Refills: 0 | Status: COMPLETED | OUTPATIENT
Start: 2024-07-29 | End: 2024-07-29

## 2024-07-29 RX ORDER — HYDRALAZINE HYDROCHLORIDE 100 MG/1
50 TABLET ORAL
Refills: 0 | Status: DISCONTINUED | OUTPATIENT
Start: 2024-07-29 | End: 2024-08-02

## 2024-07-29 RX ORDER — PANTOPRAZOLE SODIUM 20 MG/1
40 TABLET, DELAYED RELEASE ORAL
Refills: 0 | Status: DISCONTINUED | OUTPATIENT
Start: 2024-07-29 | End: 2024-08-03

## 2024-07-29 RX ORDER — CARVEDILOL PHOSPHATE 80 MG
12.5 CAPSULE,EXTENDED RELEASE MULTIPHASE 24HR ORAL EVERY 12 HOURS
Refills: 0 | Status: DISCONTINUED | OUTPATIENT
Start: 2024-07-29 | End: 2024-08-03

## 2024-07-29 RX ORDER — SPIRONOLACTONE 50 MG/1
25 TABLET, FILM COATED ORAL DAILY
Refills: 0 | Status: DISCONTINUED | OUTPATIENT
Start: 2024-07-29 | End: 2024-08-01

## 2024-07-29 RX ORDER — ONDANSETRON HCL/PF 4 MG/2 ML
4 VIAL (ML) INJECTION ONCE
Refills: 0 | Status: DISCONTINUED | OUTPATIENT
Start: 2024-07-29 | End: 2024-08-03

## 2024-07-29 RX ORDER — LACTULOSE 10 G/15 ML
10 SOLUTION, ORAL ORAL EVERY 8 HOURS
Refills: 0 | Status: DISCONTINUED | OUTPATIENT
Start: 2024-07-29 | End: 2024-08-03

## 2024-07-29 RX ORDER — FUROSEMIDE 10 MG/ML
40 INJECTION, SOLUTION INTRAVENOUS DAILY
Refills: 0 | Status: DISCONTINUED | OUTPATIENT
Start: 2024-07-29 | End: 2024-07-30

## 2024-07-29 RX ORDER — DEXTROSE MONOHYDRATE, SODIUM CHLORIDE, SODIUM LACTATE, CALCIUM CHLORIDE, MAGNESIUM CHLORIDE 1.5; 538; 448; 18.4; 5.08 G/100ML; MG/100ML; MG/100ML; MG/100ML; MG/100ML
1000 SOLUTION INTRAPERITONEAL ONCE
Refills: 0 | Status: COMPLETED | OUTPATIENT
Start: 2024-07-29 | End: 2024-07-29

## 2024-07-29 RX ORDER — ACETAMINOPHEN 500 MG
650 TABLET ORAL EVERY 6 HOURS
Refills: 0 | Status: DISCONTINUED | OUTPATIENT
Start: 2024-07-29 | End: 2024-08-03

## 2024-07-29 RX ORDER — MAGNESIUM, ALUMINUM HYDROXIDE 200-225/5
30 SUSPENSION, ORAL (FINAL DOSE FORM) ORAL EVERY 4 HOURS
Refills: 0 | Status: DISCONTINUED | OUTPATIENT
Start: 2024-07-29 | End: 2024-08-03

## 2024-07-29 RX ORDER — MELATONIN 3 MG
3 TABLET ORAL AT BEDTIME
Refills: 0 | Status: DISCONTINUED | OUTPATIENT
Start: 2024-07-29 | End: 2024-08-03

## 2024-07-29 RX ADMIN — DEXTROSE MONOHYDRATE, SODIUM CHLORIDE, SODIUM LACTATE, CALCIUM CHLORIDE, MAGNESIUM CHLORIDE 1000 MILLILITER(S): 1.5; 538; 448; 18.4; 5.08 SOLUTION INTRAPERITONEAL at 17:29

## 2024-07-29 NOTE — PATIENT PROFILE ADULT - MEDICATIONS/VISITS
Addended by: ELIE ARCE on: 5/7/2024 04:14 PM     Modules accepted: Orders    
Addended by: ELIE ARCE on: 5/7/2024 04:22 PM     Modules accepted: Orders    
no

## 2024-07-29 NOTE — H&P ADULT - TIME BILLING
Reviewing prior medical record, EMS events, ED course, independently reviewing labs/imaging studies, discussing case with ED attending, examining patient, furnishing H&P, orders, doing medication reconciliation, discussing plan of care with Patient/family using  and answering all their questions.

## 2024-07-29 NOTE — H&P ADULT - ATTENDING COMMENTS
Patient seen, chart reviewed, Patient seen, chart reviewed, prior admissions noted. Patient with multiple admission due to complications of HCC and not candidate for treatment. Family aware disease will cont. to progress due to lack of treatment options, advanced age, and multiple comorbidities. Now with PVT due to malignancy, recurrent ascites/pleural effusion likely from severe/protein malnutrition and liver failure from infiltrate disease/cancer and low oncotic pressure. Also now with hypercalcemia from malignancy, and worsening hyperbilirubinemia. All expected due to progression of disease. Family agreeable to Palliative to reconsult but not interested in Hospice. Will cont. with conservative measures as noted above. IR for therapeutic paracentesis for comfort, no evidence of SBP clinically. May need thoracentesis pending clinical course. MICU eval noted and episode or resp. distress transient and related to lying supine for CT scan causing decreased diaphragmatic excursion. No episodes since and normal sat on RA while sitting up. Over all prognosis very poor.

## 2024-07-29 NOTE — CONSULT NOTE ADULT - ATTENDING COMMENTS
84F PMH HCC, CAD s/p PCI, HTN, HLD who presented 7/29/24 with SOB, abdominal discomfort. She has frequent admissions for sequelae of her advanced liver disease. In the ED, while being laid flat for CT scan, she became acute SOB, hypoxic requiring BVM, but improved after raising head of bed and supplemental O2. Most likely this was due to her abdominal ascites pushing into her thorax. Keep HOB elevated. She was a DNR/DNI however this was acutely rescinded in light of the aforementioned events. Please involve palliative care in these discussions; her admissions are frequent and her prognosis is poor. At this time she would not benefit from ICU level of care.         Brayden Daniels MD, [facility]  , Pulmonary & Critical Care Medicine  [facility]  Pulmonary and Sleep Medicine at [place]  [address]  [place], [place] [address]  T: [phone]  F: [phone]

## 2024-07-29 NOTE — CONSULT NOTE ADULT - ASSESSMENT
83 y/o F pt w/ PMHx of Hepatocellular carcinoma presenting w/ abd discomfort and SOB. Episode going for CT that pt became tachypneic lying flat, was concern she may need to be intubated. Currently saturating well on 2L NC. NO respiratory distress. CT showing portal vein thrombus and ascites.    #Hepatocellular carcinoma  -Respiratory status would likely benefit from therapeutic paracentesis. Would benefit from albumin pablo-procedure to avoid hypotension   -Portal vein thrombus- anticoagulation per primary team  -No active chemo    #Hypotension  -BP improved after 200cc of fluids. Continue IVF for intravascular volume repletion, monitor cardiac and respiratory status closely.  -Not requiring vasoactive medications at this time.    #Tachypnea  -Resolved  -Breathing comfortably on 2L NC    Pt not requiring ICU level of care at this time. Recommend admission to medicine. Discussed w/ MICU attending Dr. Daniels.  85 y/o F pt w/ PMHx of Hepatocellular carcinoma presenting w/ abd discomfort and SOB. Episode going for CT that pt became tachypneic lying flat, was concern she may need to be intubated. Currently saturating well on 2L NC. NO respiratory distress. CT showing portal vein thrombus and ascites.    #Hepatocellular carcinoma  -Respiratory status would likely benefit from therapeutic paracentesis w/ IR.   -Would benefit from albumin pablo-procedure to avoid hypotension   -Portal vein thrombus- anticoagulation per primary team  -No active chemo  -Recommend palliative care consult    #Hypotension  -BP improved after 200cc of fluids. Continue IVF for intravascular volume repletion, monitor cardiac and respiratory status closely.  -Not requiring vasoactive medications at this time.    #Tachypnea  -Resolved  -Breathing comfortably on 2L NC    Pt not requiring ICU level of care at this time. Recommend admission to medicine. Discussed w/ MICU attending Dr. Daniels.  85 y/o F pt w/ PMHx of Hepatocellular carcinoma presenting w/ abd discomfort and SOB. Episode going for CT that pt became tachypneic lying flat, was concern she may need to be intubated. Currently saturating well on 2L NC. NO respiratory distress. CT showing portal vein thrombus and ascites.    #Hepatocellular carcinoma  -Respiratory status would likely benefit from therapeutic paracentesis w/ IR.   -Would benefit from albumin pablo-procedure to avoid hypotension   -Portal vein thrombus- anticoagulation per primary team  -No active chemo  -Recommend palliative care consult    #Hypotension  -BP improved after 200cc of fluids. Continue IVF for intravascular volume repletion, monitor cardiac and respiratory status closely.  -Not requiring vasoactive medications at this time.    #Tachypnea  -Resolved  -Breathing comfortably on 2L NC    Pt not requiring ICU level of care at this time. Recommend admission to medicine step down. Discussed w/ MICU attending Dr. Daniels.

## 2024-07-29 NOTE — ED PROVIDER NOTE - OBJECTIVE STATEMENT
The patient is a 84 year old female with history of advanced liver cancer and failed all therapy and currently not on any therapy presents with worsening of abdominal pain with distension with vomiting.  +Worsening of SOB and bilateral leg swelling  Not DNR/DNI currently

## 2024-07-29 NOTE — ED ADULT NURSE REASSESSMENT NOTE - NS ED NURSE REASSESS COMMENT FT1
received pt to CC agonal breathing, MD at bedside place don monitor, ambu bagging pt, pt became stable, no intubation needed, pt is stable, safety maintained

## 2024-07-29 NOTE — ED ADULT TRIAGE NOTE - TEMPERATURE IN CELSIUS (DEGREES C)
decreased ability to use arms for pushing/pulling/decreased ability to use legs for bridging/pushing
36.4

## 2024-07-29 NOTE — H&P ADULT - ASSESSMENT
83 y/o female with history of hepatocellular carcinoma, diabetes, HTN, HLD, CAD with stents presents due to abdominal pain and vomiting. Patient became hypoxic prior to CT scan.  Patient discharged from hospital related to SoB and lower leg edema.     #abdominal pain, vomiting  - New left portal venous thrombosis  -holding anticoagulation due to anemia  -CT scan showed diverticulitis of ascending colon likely 2/2 portal hypertension.     #acute hypoxic respiratory failure, resolved  -patient saturating well on room air    #Anemia  #thrombocytopenia  -Home dose of ASA and plavix help  -no heparin currenlty  -SCD for DVT prophylaxis    #HX of HCC  -no longer on chemotherapy  -continue xifaxan  -continue spironoloactone 25mg      Hx of type II diabetes  -patient was on metformin previously  -HgA1c last admission was 4.6, metformin discontinued  -repeat HgA1c ordered.     HTN  -continue carvedilol 12.5 q12  -continue hydralazine 50mg BID  -continue furosemide 40mg qDay P.O  -continue spironolactone 25mg oral daily.  85 y/o female with history of hepatocellular carcinoma, diabetes, HTN, HLD, CAD with stents presents due to abdominal pain and vomiting. Patient became hypoxic prior to CT scan.  Patient discharged from hospital related to SoB and lower leg edema. Admit to medicine for management     #abdominal pain, vomiting  - New left portal venous thrombosis  -holding anticoagulation due to anemia  -CT scan showed diverticulitis of ascending colon likely 2/2 portal hypertension.     #acute hypoxic respiratory failure, resolved  -patient saturating well on room air    #Anemia  #thrombocytopenia  -H.5  -platelets 127  -Home dose of ASA and plavix help  -no heparin currenlty  -SCD for DVT prophylaxis    #Cirrhosis  #HX of HCC  -INR 1.45  -no longer on chemotherapy  -continue xifaxan  -continue spironoloactone 25mg    Pre-Renal KAYLIN  -BUN 33.8, cr 1.03  -Patient received 2 L bolus in ED  -continue to monitor.     Hx of type II diabetes  -patient was on metformin previously  -HgA1c last admission was 4.6, metformin discontinued  -repeat HgA1c ordered.     HTN  -continue carvedilol 12.5 q12  -continue hydralazine 50mg BID  -continue furosemide 40mg qDay P.O  -continue spironolactone 25mg oral daily.  85 y/o female with history of hepatocellular carcinoma, diabetes, HTN, HLD, CAD with stents presents due to abdominal pain and vomiting. Patient became hypoxic prior to CT scan.  Patient discharged from hospital related to SoB and lower leg edema. Admit to medicine for management     #abdominal pain, vomiting  - New left portal venous thrombosis  -holding anticoagulation due to anemia  -CT scan showed diverticulitis of ascending colon likely 2/2 portal hypertension.     #acute hypoxic respiratory failure, resolved  -patient saturating well on room air    #Anemia  #thrombocytopenia  -H.5  -platelets 127  -Home dose of ASA and plavix help  -no heparin currenlty  -SCD for DVT prophylaxis    #Cirrhosis  #HX of HCC  -INR 1.45  -no longer on chemotherapy  -continue xifaxan  -continue spironoloactone 25mg    Pre-Renal KAYLIN  -BUN 33.8, cr 1.03  -Patient received 2 L bolus in ED  -continue to monitor.     Hx of type II diabetes  -patient was on metformin previously  -HgA1c last admission was 4.6, metformin discontinued  -repeat HgA1c ordered.     HTN  -continue carvedilol 12.5 q12  -continue hydralazine 50mg BID  -continue furosemide 40mg qDay P.O  -continue spironolactone 25mg oral daily.     Disu 83 y/o female with history of hepatocellular carcinoma, diabetes, HTN, HLD, CAD with stents presents due to abdominal pain and vomiting. Patient became hypoxic prior to CT scan.  Patient discharged from hospital related to SoB and lower leg edema. Admit to medicine for management     #abdominal pain, vomiting  - New left portal venous thrombosis  -holding anticoagulation due to anemia  -CT scan showed diverticulosis of ascending colon likely 2/2 portal hypertension.     #Anasarca likely 2/2 hypoalbuminemia  #hypercalcemia  -albumin 2.0  -corrected calcium 10.6  -NS IV started 75 mL/hr   -albumin 25% IVPB 50mL q6    #HCC  #Cirrhosis   -diagnosed with HCC in   -Hep C negative  -discontinued chemotherapy 2/2 side effects  -INR 1.45  -continue furosemide 40mg qDay P.O  -continue spironolactone 25mg oral daily.       Hyponatremia  -likely 2/2 hypervolemia 2/2 cirrhosis/anasarca.   -continue to monitor      #Anemia  #thrombocytopenia  -H.5  -platelets 127  -Home dose of ASA and plavix help  -no heparin currenlty  -SCD for DVT prophylaxis    Hx of type II diabetes  -patient was on metformin previously  -HgA1c last admission was 4.6, metformin discontinued  -repeat HgA1c ordered.     HTN  -continue carvedilol 12.5 q12  -continue hydralazine 50mg BID    Disu 85 y/o female with history of hepatocellular carcinoma/portal htn w/ ascites, Pleural effusion, DM-2, HTN, HLD, CAD with stents presents due to abdominal pain and vomiting.     #abdominal pain, vomiting  -Recurrent ascites, no hx of SBP  -New left portal venous thrombosis causing pain? provoked from malignancy   -holding  full dose anticoagulation due to anemia/risk of bleeding and plan for IR eval for therapeutic paracentesis     #Anasarca likely 2/2 hypoalbuminemia/portal htn  #hypercalcemia due to underlying malignancy   -albumin 2.0  -corrected calcium 12.2  -NS IV started 75 mL/hr after 1 liter NS  -Hold Lasix   -Repeat calcium post hydration, may need to initiate bisphosphonate/Calcitonin    -albumin 25% IVPB 50mL q6 x 3 doses to assist with oncotic pressure    #HCC  #Cirrhosis   -diagnosed with HCC in 2022  -Hep C negative  -discontinued chemotherapy 2/2 side effects  -INR 1.45  -continue furosemide 40mg Daily PO  -continue spironolactone 25mg oral daily  -Prior Heme/onc records noted, not candidate for chemo, declined trying other therapies  -Hospice appropriate, Daughter not interested in Hospice, agreeable to Palliative f/u     Hyponatremia  -likely 2/2 hypervolemia 2/2 cirrhosis/anasarca, diuretic use   -Hold Lasix for now pending Na trend  -continue to monitor  -Repeat BMP in AM after IVF trial    Pleural effusion:  -Due to low oncotic pressure?  -Malignant effusion  -Not hypoxic, no increase WOB at this time  -Monitor sats  -May need therapeutic thoracentesis    #Anemia  #thrombocytopenia  -Hbg stable compared to prior admission, likely multi-factorial   -No reports of bleeding  -DAPT on hold from last admission due to risk of bleeding   -FA/MVI    Hx of type II diabetes  -patient was on metformin previously  -HgA1c last admission was 4.6, metformin discontinued  -ADA diet, Accu checks AC/HS    HTN  -continue carvedilol 12.5 q12  -continue hydralazine 50 mg BID  -DASH diet     Discussed with ED attending, Family at bedside with   83 y/o female with history of hepatocellular carcinoma/portal htn w/ ascites, Pleural effusion, DM-2, HTN, HLD, CAD with stents presents due to abdominal pain and vomiting.     #abdominal pain, vomiting  -Recurrent ascites, no hx of SBP  -New left portal venous thrombosis causing pain? provoked from malignancy   -holding  full dose anticoagulation due to anemia/risk of bleeding and plan for IR eval for therapeutic paracentesis     #Anasarca likely 2/2 hypoalbuminemia/portal htn  #hypercalcemia due to underlying malignancy   -albumin 2.0  -corrected calcium 12.2  -NS IV started 75 mL/hr after 1 liter NS  -Hold Lasix   -Repeat calcium post hydration, may need to initiate bisphosphonate/Calcitonin    -albumin 25% IVPB 50mL q6 x 3 doses to assist with oncotic pressure    #HCC  #Cirrhosis   -diagnosed with HCC in 2022  -Hep C negative  -discontinued chemotherapy 2/2 side effects  -INR 1.45  -continue furosemide 40mg Daily PO  -continue spironolactone 25mg oral daily  -Prior Heme/onc records noted, not candidate for chemo, declined trying other therapies  -Hospice appropriate, Daughter not interested in Hospice, agreeable to Palliative f/u     Hyponatremia  -likely 2/2 hypervolemia 2/2 cirrhosis/anasarca, diuretic use   -Hold Lasix for now pending Na trend  -continue to monitor  -Repeat BMP in AM after IVF trial    Pleural effusion:  -Due to low oncotic pressure?  -Malignant effusion  -Not hypoxic, no increase WOB at this time  -Monitor sats  -May need therapeutic thoracentesis    #Anemia  #thrombocytopenia  -Hbg stable compared to prior admission, likely multi-factorial   -No reports of bleeding  -DAPT on hold from last admission due to risk of bleeding   -FA/MVI    Hx of type II diabetes  -patient was on metformin previously  -HgA1c last admission was 4.6, metformin discontinued  -ADA diet, Accu checks AC/HS    HTN  -continue carvedilol 12.5 q12  -continue hydralazine 50 mg BID  -DASH diet     Discussed with ED attending, Family at bedside with      Dispo: Remains acute due to above, Hospice appropriate, await Palliative care eval. Anticipated LOS 3-4 days

## 2024-07-29 NOTE — H&P ADULT - CONVERSATION DETAILS
Discussed with patient that she is at high risk of experiencing cardiopulmonary arrest. Discussed possible sequela of undergoing CPR for patient including breaking ribs and needing a ventilator in order to breathe in the future. Discussed possibility of intubation and details of the intervention. Daughter at bedside says she still wants mother to be full code. Patient has no desire to be DNR or DNI at this time.

## 2024-07-29 NOTE — ED ADULT TRIAGE NOTE - ESI TRIAGE ACUITY LEVEL, MLM
Final Anesthesia Post-op Assessment    Patient: Shayna Gomez  Procedure(s) Performed: ESOPHAGOGASTRODUODENOSCOPY  Anesthesia type: MAC    Vitals Value Taken Time   Temp 36.4 °C (97.6 °F) 05/16/22 1335   Pulse 90 05/16/22 1335   Resp 16 05/16/22 1335   SpO2 98 % 05/16/22 1344   /72 05/16/22 1335   Vitals shown include unvalidated device data.      Patient Location: Phase II  Post-op Vital Signs:stable  Level of Consciousness: participates in exam  Respiratory Status: spontaneous ventilation  Cardiovascular blood pressure returned to baseline  Hydration: euvolemic  Pain Management: adequately controlled  Handoff: Handoff to receiving clinician was performed and questions were answered  Vomiting: none  Nausea: None  Airway Patency:patent  Post-op Assessment: awake, alert, appropriately conversant, or baseline, no complications, patient tolerated procedure well with no complications and no evidence of recall      No complications documented.   
3

## 2024-07-29 NOTE — H&P ADULT - NSHPPHYSICALEXAM_GEN_ALL_CORE
T(C): 36.9 (07-29-24 @ 23:40), Max: 36.9 (07-29-24 @ 23:40)  HR: 60 (07-29-24 @ 23:40) (59 - 62)  BP: 112/61 (07-29-24 @ 23:40) (112/61 - 125/57)  RR: 18 (07-29-24 @ 23:40) (16 - 18)  SpO2: 98% (07-29-24 @ 23:40) (96% - 99%)    CONSTITUTIONAL: Well groomed, no apparent distress  EYES: PERRLA and symmetric, EOMI. scleral icterus  ENMT: Oral mucosa with moist membranes. Normal dentition; no pharyngeal injection or exudates  RESP: No respiratory distress, no use of accessory muscles; CTA b/l, no WRR  CV: RRR, +S1S2, no MRG; no JVD; no peripheral edema  GI: Soft,  distended, Tender in upper right and lower left quadrant.  no rebound, no guarding; no palpable masses; no hepatosplenomegaly; no hernia palpated. normal BS  LYMPH: No cervical LAD or tenderness; no axillary LAD or tenderness; no inguinal LAD or tenderness  MSK: +1 pitting edema bilaterally Normal ROM without pain, no spinal tenderness, normal muscle strength/tone  SKIN: No rashes or ulcers noted; no subcutaneous nodules or induration palpable  NEURO: sensation intact in upper and lower extremities b/l to light touch   PSYCH: Appropriate insight/judgment; A+O x 3, mood and affect appropriate, recent/remote memory intact T(C): 36.9 (07-29-24 @ 23:40), Max: 36.9 (07-29-24 @ 23:40)  HR: 60 (07-29-24 @ 23:40) (59 - 62)  BP: 112/61 (07-29-24 @ 23:40) (112/61 - 125/57)  RR: 18 (07-29-24 @ 23:40) (16 - 18)  SpO2: 98% (07-29-24 @ 23:40) (96% - 99%)    CONSTITUTIONAL: Well groomed, no apparent distress  EYES: PERRLA and symmetric, EOMI. scleral icterus  ENMT: Oral mucosa with moist membranes. Normal dentition; no pharyngeal injection or exudates  RESP: No respiratory distress, no use of accessory muscles; CTA b/l, no WRR  CV: RRR, +S1S2,systolic murmur presnt no JVD; no peripheral edema  GI: Soft,  distended, Tender in upper right and lower left quadrant.  no rebound, no guarding; no palpable masses; no hepatosplenomegaly; no hernia palpated. normal BS  LYMPH: No cervical LAD or tenderness; no axillary LAD or tenderness; no inguinal LAD or tenderness  MSK: +1 pitting edema bilaterally Normal ROM without pain, no spinal tenderness, normal muscle strength/tone  SKIN: No rashes or ulcers noted; no subcutaneous nodules or induration palpable. obvious jaundice noted.   NEURO: sensation intact in upper and lower extremities b/l to light touch   PSYCH: Appropriate insight/judgment; A+O x 3, mood and affect appropriate, recent/remote memory intact

## 2024-07-29 NOTE — H&P ADULT - HISTORY OF PRESENT ILLNESS
85 y/o female history of hepatocellular carcinoma, daibetes, HTN, HLD, CAD with stents presents due abdominal pain and vomiting. Patient was recently admitted to hospital for worsening shortness of breath and bilateral lower extremity swelling. Patient received therapeutic paracentesis and was discharged with home care.  According to patient daughter, patient has been lethargic for the previous two days. Last night patient began complaining of abdominal pain that she rates as 10/10. this morning patient began vomiting on and off for 30-60 minutes. Patient was taken to ED by family at this time. Patient was sent for CT scan and became hypoxic and tachypneic. MICU was consulted for patient was placed on NC supplemental oxygen and patient improved. Patient denies chest pain, SoB, 83 y/o female history of hepatocellular carcinoma, daibetes, HTN, HLD, CAD with stents presents due abdominal pain and vomiting. Patient was recently admitted to hospital for worsening shortness of breath and bilateral lower extremity swelling. Patient received therapeutic paracentesis and was discharged with home care.  According to patient daughter, patient has been lethargic for the previous two days. Last night patient began complaining of abdominal pain that she rates as 10/10. this morning patient began vomiting on and off for 30-60 minutes. Patient was taken to ED by family at this time. Patient was sent for CT scan and became hypoxic and tachypneic. MICU was consulted for patient was placed on NC supplemental oxygen and patient improved. Patient also complained of itching for over a week. States itching comes sporadically and is not associated with any notable triggers.  Patient denies chest pain, SoB, dysuria, syncope, HA.  85 y/o female history of hepatocellular carcinoma, diabetes HTN, HLD, CAD with stents presents due abdominal pain and vomiting. Patient was recently admitted to hospital for worsening shortness of breath and bilateral lower extremity swelling. Patient received therapeutic paracentesis and was discharged with home care.  According to patient daughter, patient has been lethargic for the previous two days. Last night patient began complaining of abdominal pain that she rates as 10/10. this morning patient began vomiting on and off for 30-60 minutes. Patient was taken to ED by family at this time. Patient was sent for CT scan and became hypoxic and tachypneic. MICU was consulted for patient was placed on NC supplemental oxygen and patient improved. Patient also complained of itching for over a week. States itching comes sporadically and is not associated with any notable triggers.  Patient denies chest pain, SoB, dysuria, syncope, HA.

## 2024-07-29 NOTE — ED PROVIDER NOTE - PROGRESS NOTE DETAILS
The patient's health care proxy after discussion wants DNR/DNI today The patient upon returning from the CT scan the patient has respiratory problem and agonal breathing.  The patient applied supplemental oxygen  Case d/w the health care proxy and she wants full code. Rescind the DNR and DNI  The patient is doing better in VS and at this time the patient does not need to be intubated  Will call MICU consult

## 2024-07-29 NOTE — CONSULT NOTE ADULT - SUBJECTIVE AND OBJECTIVE BOX
Patient is a 84y old  Female who presents with a chief complaint of     84y/oF PMH hepatocellular carcinoma, diabetes, hypertension, hyperlipidemia, CAD with stents, presenting with worsening shortness of breath and abdominal discomfort. MICU was consulted because patient went for CT and became very tachypneic requiring BVM, there was concern she may need to be intubated but this was held and pt improved. Pt is full code.        PAST MEDICAL & SURGICAL HISTORY:  Hypertension      Hypothyroidism      GERD (gastroesophageal reflux disease)      Hypercholesterolemia      Diabetes      Heart murmur      History of cirrhosis of liver      HCC (hepatocellular carcinoma)      CAD (coronary artery disease)      S/P tubal ligation      History of vaginal surgery      History of total left knee replacement      History of cardiac cath        Allergies    amlodipine (Swelling)    Intolerances      FAMILY HISTORY:  Family history of diabetes mellitus (DM)    FH: HTN (hypertension)    FH: heart disease    Family history of osteoarthritis          Social History: ***      Medications:        ondansetron Injectable 4 milliGRAM(s) IV Push once                            ICU Vital Signs Last 24 Hrs  T(C): 36.4 (29 Jul 2024 11:24), Max: 36.4 (29 Jul 2024 11:24)  T(F): 97.5 (29 Jul 2024 11:24), Max: 97.5 (29 Jul 2024 11:24)  HR: 62 (29 Jul 2024 14:12) (59 - 62)  BP: 117/61 (29 Jul 2024 14:12) (117/61 - 125/57)  BP(mean): --  ABP: --  ABP(mean): --  RR: 16 (29 Jul 2024 14:12) (16 - 16)  SpO2: 96% (29 Jul 2024 14:12) (96% - 99%)    O2 Parameters below as of 29 Jul 2024 14:12  Patient On (Oxygen Delivery Method): room air          Vital Signs Last 24 Hrs  T(C): 36.4 (29 Jul 2024 11:24), Max: 36.4 (29 Jul 2024 11:24)  T(F): 97.5 (29 Jul 2024 11:24), Max: 97.5 (29 Jul 2024 11:24)  HR: 62 (29 Jul 2024 14:12) (59 - 62)  BP: 117/61 (29 Jul 2024 14:12) (117/61 - 125/57)  BP(mean): --  RR: 16 (29 Jul 2024 14:12) (16 - 16)  SpO2: 96% (29 Jul 2024 14:12) (96% - 99%)    Parameters below as of 29 Jul 2024 14:12  Patient On (Oxygen Delivery Method): room air            I&O's Detail        LABS:                        8.5    6.42  )-----------( 127      ( 29 Jul 2024 12:51 )             23.7     07-29    128<L>  |  93<L>  |  33.8<H>  ----------------------------<  150<H>  4.9   |  22.0  |  1.03    Ca    10.6<H>      29 Jul 2024 12:51    TPro  5.7<L>  /  Alb  2.0<L>  /  TBili  9.2<H>  /  DBili  x   /  AST  230<H>  /  ALT  89<H>  /  AlkPhos  693<H>  07-29          CAPILLARY BLOOD GLUCOSE        PT/INR - ( 29 Jul 2024 13:57 )   PT: 15.9 sec;   INR: 1.45 ratio           Urinalysis Basic - ( 29 Jul 2024 12:51 )    Color: x / Appearance: x / SG: x / pH: x  Gluc: 150 mg/dL / Ketone: x  / Bili: x / Urobili: x   Blood: x / Protein: x / Nitrite: x   Leuk Esterase: x / RBC: x / WBC x   Sq Epi: x / Non Sq Epi: x / Bacteria: x      CULTURES:        Physical Examination:    Gen:  NAD  Head: NC/AT  Neck: trachea midline  Card: regular rate and rhythm  Resp:  CTAB  Abd: soft, +distension  Ext: no deformities above reported baseline  Neuro:  A&O to person and place, no motor or sensory deficits above reported baseline  Skin:  Warm and dry as visualized  Psych:  Normal affect and mood

## 2024-07-29 NOTE — PATIENT PROFILE ADULT - FALL HARM RISK - HARM RISK INTERVENTIONS

## 2024-07-29 NOTE — ED PROVIDER NOTE - CRITICAL CARE ATTENDING CONTRIBUTION TO CARE
The patient presents with advanced liver cancer and has new left portal vein thrombosis and needs admission     Required mulitple visits to stabilize the patient

## 2024-07-29 NOTE — ED PROVIDER NOTE - CLINICAL SUMMARY MEDICAL DECISION MAKING FREE TEXT BOX
The patient presents with abdominal pain increased with vomiting with advanced liver cancer now with new portal vein thrombosis and will admit for further evaluaiton

## 2024-07-29 NOTE — H&P ADULT - NSHPREVIEWOFSYSTEMS_GEN_ALL_CORE
REVIEW OF SYSTEMS:    CONSTITUTIONAL: No weakness, fevers or chills  EYES/ENT: No visual changes;  No vertigo or throat pain. Scleritic icterus present in eyes b/l  NECK: No pain or stiffness  RESPIRATORY: No cough, wheezing, hemoptysis; Denies current SoB on NC.  CARDIOVASCULAR: No chest pain or palpitations  GASTROINTESTINAL: States abdominal pain is present but improved compared to previous.   GENITOURINARY: No dysuria, frequency or hematuria  NEUROLOGICAL: No numbness or weakness  All other review of systems is negative unless indicated above. Patient refused

## 2024-07-29 NOTE — H&P ADULT - NSHPLABSRESULTS_GEN_ALL_CORE
LABS:                          8.5    6.42  )-----------( 127      ( 29 Jul 2024 12:51 )             23.7     07-29    128<L>  |  93<L>  |  33.8<H>  ----------------------------<  150<H>  4.9   |  22.0  |  1.03    Ca    10.6<H>      29 Jul 2024 12:51    TPro  5.7<L>  /  Alb  2.0<L>  /  TBili  9.2<H>  /  DBili  x   /  AST  230<H>  /  ALT  89<H>  /  AlkPhos  693<H>  07-29    PT/INR - ( 29 Jul 2024 13:57 )   PT: 15.9 sec;   INR: 1.45 ratio            ACC: [id number] EXAM:  CT ABDOMEN AND PELVIS IC   ORDERED BY: MAGAN BUTCHER     *** ADDENDUM # 1 ***    Question endometrial thickening. Recommend pelvic sonogram if clinically   warranted.    --- End of Report ---    *** END OF ADDENDUM # 1 ***      PROCEDURE DATE:  07/29/2024          INTERPRETATION:  CLINICAL INFORMATION: Worsening abdominal pain. History   of liver cancer.    COMPARISON: CT chest abdomen pelvis 7/6/2024.    CONTRAST/COMPLICATIONS:  IV Contrast: Omnipaque 350  90 cc administered   10 cc discarded  Oral Contrast: NONE  Complications: None reported at time of study completion    PROCEDURE:  CT of the Abdomen and Pelvis was performed.  Sagittal and coronal reformats were performed.    FINDINGS:  LOWER CHEST: Interval increase of the small left pleural effusion and new   small right pleural effusion. Coronary artery stents.    LIVER: Cirrhosis. Innumerable hypoattenuating lesions. Stable segment 2   and 6 posttreatment cavities.  BILE DUCTS: Normal caliber.  GALLBLADDER: Cholelithiasis. Nondistended gallbladder with mural   thickening, likely associated with hepatic disease.  SPLEEN: Within normal limits.  PANCREAS: Within normal limits.  ADRENALS: Within normal limits.  KIDNEYS/URETERS: No hydronephrosis. Nonobstructive right renal stone   measuring 0.2 cm. Symmetric renal enhancement.      BLADDER: Within normal limits.  REPRODUCTIVE ORGANS: Question endometrial thickening. No adnexal lesion.    BOWEL: Colonic diverticulosis. Mild mural thickening of the ascending   colon likely associated portal hypertension. Small hiatal hernia. No   bowel obstruction.  PERITONEUM/RETROPERITONEUM: Interval increase in mild to moderate ascites.  VESSELS: New left portal venous thrombosis (3/42). Paraesophageal   varices. Atherosclerotic changes.  LYMPH NODES: No lymphadenopathy.  ABDOMINAL WALL: Small anterior periumbilical hernia containing free   fluid. Mild anasarca.  BONES: Degenerative changes.    IMPRESSION:  1.  New left portal venous thrombosis.  2.  Interval increase in pleural effusion, ascites, and new anasarca,   consistent with fluid overload state.  3.  Cirrhosis with multiple hypoattenuating hepatic lesions. Stable   treatment cavities. Portal hypertension.

## 2024-07-29 NOTE — ED ADULT TRIAGE NOTE - CHIEF COMPLAINT QUOTE
Pt BIBA from home for increased abdominal pain. Pt has liver cancer, treatment was stopped. +jaundice

## 2024-07-30 DIAGNOSIS — R18.8 OTHER ASCITES: ICD-10-CM

## 2024-07-30 DIAGNOSIS — I81 PORTAL VEIN THROMBOSIS: ICD-10-CM

## 2024-07-30 DIAGNOSIS — Z51.5 ENCOUNTER FOR PALLIATIVE CARE: ICD-10-CM

## 2024-07-30 DIAGNOSIS — R53.81 OTHER MALAISE: ICD-10-CM

## 2024-07-30 DIAGNOSIS — C22.0 LIVER CELL CARCINOMA: ICD-10-CM

## 2024-07-30 LAB
A1C WITH ESTIMATED AVERAGE GLUCOSE RESULT: 4.8 % — SIGNIFICANT CHANGE UP (ref 4–5.6)
ALBUMIN SERPL ELPH-MCNC: 2.2 G/DL — LOW (ref 3.3–5.2)
ALP SERPL-CCNC: 615 U/L — HIGH (ref 40–120)
ALT FLD-CCNC: 80 U/L — HIGH
ANION GAP SERPL CALC-SCNC: 16 MMOL/L — SIGNIFICANT CHANGE UP (ref 5–17)
APPEARANCE UR: ABNORMAL
AST SERPL-CCNC: 209 U/L — HIGH
BACTERIA # UR AUTO: ABNORMAL /HPF
BILIRUB SERPL-MCNC: 11.2 MG/DL — HIGH (ref 0.4–2)
BILIRUB UR-MCNC: ABNORMAL
BUN SERPL-MCNC: 37.4 MG/DL — HIGH (ref 8–20)
CALCIUM SERPL-MCNC: 10.2 MG/DL — SIGNIFICANT CHANGE UP (ref 8.4–10.5)
CHLORIDE SERPL-SCNC: 93 MMOL/L — LOW (ref 96–108)
CO2 SERPL-SCNC: 20 MMOL/L — LOW (ref 22–29)
COLOR SPEC: ABNORMAL
CREAT SERPL-MCNC: 1.52 MG/DL — HIGH (ref 0.5–1.3)
DIFF PNL FLD: NEGATIVE — SIGNIFICANT CHANGE UP
EGFR: 33 ML/MIN/1.73M2 — LOW
ESTIMATED AVERAGE GLUCOSE: 91 MG/DL — SIGNIFICANT CHANGE UP (ref 68–114)
GLUCOSE BLDC GLUCOMTR-MCNC: 145 MG/DL — HIGH (ref 70–99)
GLUCOSE BLDC GLUCOMTR-MCNC: 148 MG/DL — HIGH (ref 70–99)
GLUCOSE BLDC GLUCOMTR-MCNC: 179 MG/DL — HIGH (ref 70–99)
GLUCOSE BLDC GLUCOMTR-MCNC: 205 MG/DL — HIGH (ref 70–99)
GLUCOSE SERPL-MCNC: 94 MG/DL — SIGNIFICANT CHANGE UP (ref 70–99)
GLUCOSE UR QL: NEGATIVE MG/DL — SIGNIFICANT CHANGE UP
HCT VFR BLD CALC: 23.7 % — LOW (ref 34.5–45)
HGB BLD-MCNC: 8.4 G/DL — LOW (ref 11.5–15.5)
KETONES UR-MCNC: NEGATIVE MG/DL — SIGNIFICANT CHANGE UP
LEUKOCYTE ESTERASE UR-ACNC: ABNORMAL
MAGNESIUM SERPL-MCNC: 1.5 MG/DL — LOW (ref 1.8–2.6)
MCHC RBC-ENTMCNC: 35 PG — HIGH (ref 27–34)
MCHC RBC-ENTMCNC: 35.4 GM/DL — SIGNIFICANT CHANGE UP (ref 32–36)
MCV RBC AUTO: 98.8 FL — SIGNIFICANT CHANGE UP (ref 80–100)
NITRITE UR-MCNC: POSITIVE
PH UR: 5 — SIGNIFICANT CHANGE UP (ref 5–8)
PHOSPHATE SERPL-MCNC: 4.4 MG/DL — SIGNIFICANT CHANGE UP (ref 2.4–4.7)
PLATELET # BLD AUTO: 157 K/UL — SIGNIFICANT CHANGE UP (ref 150–400)
POTASSIUM SERPL-MCNC: 5.3 MMOL/L — SIGNIFICANT CHANGE UP (ref 3.5–5.3)
POTASSIUM SERPL-SCNC: 5.3 MMOL/L — SIGNIFICANT CHANGE UP (ref 3.5–5.3)
PROT SERPL-MCNC: 5.4 G/DL — LOW (ref 6.6–8.7)
PROT UR-MCNC: SIGNIFICANT CHANGE UP MG/DL
RBC # BLD: 2.4 M/UL — LOW (ref 3.8–5.2)
RBC # FLD: 23.4 % — HIGH (ref 10.3–14.5)
RBC CASTS # UR COMP ASSIST: 22 /HPF — HIGH (ref 0–4)
SODIUM SERPL-SCNC: 129 MMOL/L — LOW (ref 135–145)
SP GR SPEC: >1.03 — HIGH (ref 1–1.03)
SQUAMOUS # UR AUTO: 25 /HPF — HIGH (ref 0–5)
UROBILINOGEN FLD QL: 1 MG/DL — SIGNIFICANT CHANGE UP (ref 0.2–1)
WBC # BLD: 10.06 K/UL — SIGNIFICANT CHANGE UP (ref 3.8–10.5)
WBC # FLD AUTO: 10.06 K/UL — SIGNIFICANT CHANGE UP (ref 3.8–10.5)
WBC UR QL: 35 /HPF — HIGH (ref 0–5)

## 2024-07-30 PROCEDURE — 99223 1ST HOSP IP/OBS HIGH 75: CPT

## 2024-07-30 PROCEDURE — 99233 SBSQ HOSP IP/OBS HIGH 50: CPT

## 2024-07-30 RX ORDER — DEXTROSE MONOHYDRATE, SODIUM CHLORIDE, SODIUM LACTATE, CALCIUM CHLORIDE, MAGNESIUM CHLORIDE 1.5; 538; 448; 18.4; 5.08 G/100ML; MG/100ML; MG/100ML; MG/100ML; MG/100ML
1000 SOLUTION INTRAPERITONEAL
Refills: 0 | Status: DISCONTINUED | OUTPATIENT
Start: 2024-07-30 | End: 2024-08-02

## 2024-07-30 RX ORDER — ALBUMIN HUMAN 25 %
50 INTRAVENOUS SOLUTION INTRAVENOUS EVERY 6 HOURS
Refills: 0 | Status: DISCONTINUED | OUTPATIENT
Start: 2024-07-30 | End: 2024-07-30

## 2024-07-30 RX ORDER — ALBUMIN HUMAN 25 %
50 INTRAVENOUS SOLUTION INTRAVENOUS EVERY 6 HOURS
Refills: 0 | Status: COMPLETED | OUTPATIENT
Start: 2024-07-30 | End: 2024-07-30

## 2024-07-30 RX ORDER — BACTERIOSTATIC SODIUM CHLORIDE 0.9 %
1000 VIAL (ML) INJECTION
Refills: 0 | Status: DISCONTINUED | OUTPATIENT
Start: 2024-07-30 | End: 2024-07-30

## 2024-07-30 RX ORDER — HEPARIN SODIUM 1000 [USP'U]/ML
5000 INJECTION, SOLUTION INTRAVENOUS; SUBCUTANEOUS EVERY 12 HOURS
Refills: 0 | Status: DISCONTINUED | OUTPATIENT
Start: 2024-07-30 | End: 2024-08-02

## 2024-07-30 RX ORDER — INSULIN LISPRO 100/ML
VIAL (ML) SUBCUTANEOUS
Refills: 0 | Status: DISCONTINUED | OUTPATIENT
Start: 2024-07-30 | End: 2024-08-02

## 2024-07-30 RX ORDER — DEXTROSE 4 G
15 TABLET,CHEWABLE ORAL ONCE
Refills: 0 | Status: DISCONTINUED | OUTPATIENT
Start: 2024-07-30 | End: 2024-08-02

## 2024-07-30 RX ORDER — DEXTROSE 4 G
12.5 TABLET,CHEWABLE ORAL ONCE
Refills: 0 | Status: DISCONTINUED | OUTPATIENT
Start: 2024-07-30 | End: 2024-08-02

## 2024-07-30 RX ORDER — DEXTROSE 4 G
25 TABLET,CHEWABLE ORAL ONCE
Refills: 0 | Status: DISCONTINUED | OUTPATIENT
Start: 2024-07-30 | End: 2024-08-02

## 2024-07-30 RX ORDER — RIFAXIMIN 550 MG/1
1 TABLET ORAL
Refills: 0 | DISCHARGE

## 2024-07-30 RX ORDER — GLUCAGON INJECTION, SOLUTION 0.5 MG/.1ML
1 INJECTION, SOLUTION SUBCUTANEOUS ONCE
Refills: 0 | Status: DISCONTINUED | OUTPATIENT
Start: 2024-07-30 | End: 2024-08-02

## 2024-07-30 RX ADMIN — Medication 50 MILLILITER(S): at 11:57

## 2024-07-30 RX ADMIN — Medication 50 MILLILITER(S): at 07:42

## 2024-07-30 RX ADMIN — Medication 50 MILLILITER(S): at 17:38

## 2024-07-30 RX ADMIN — SPIRONOLACTONE 25 MILLIGRAM(S): 50 TABLET, FILM COATED ORAL at 06:32

## 2024-07-30 RX ADMIN — HYDRALAZINE HYDROCHLORIDE 50 MILLIGRAM(S): 100 TABLET ORAL at 06:32

## 2024-07-30 RX ADMIN — Medication 10 GRAM(S): at 13:43

## 2024-07-30 RX ADMIN — Medication 10 GRAM(S): at 06:33

## 2024-07-30 RX ADMIN — HEPARIN SODIUM 5000 UNIT(S): 1000 INJECTION, SOLUTION INTRAVENOUS; SUBCUTANEOUS at 17:37

## 2024-07-30 RX ADMIN — HEPARIN SODIUM 5000 UNIT(S): 1000 INJECTION, SOLUTION INTRAVENOUS; SUBCUTANEOUS at 06:32

## 2024-07-30 RX ADMIN — Medication 75 MILLILITER(S): at 09:07

## 2024-07-30 RX ADMIN — Medication 12.5 MILLIGRAM(S): at 17:37

## 2024-07-30 RX ADMIN — PANTOPRAZOLE SODIUM 40 MILLIGRAM(S): 20 TABLET, DELAYED RELEASE ORAL at 06:32

## 2024-07-30 RX ADMIN — HYDRALAZINE HYDROCHLORIDE 50 MILLIGRAM(S): 100 TABLET ORAL at 17:37

## 2024-07-30 RX ADMIN — Medication 12.5 MILLIGRAM(S): at 06:32

## 2024-07-30 RX ADMIN — Medication 2: at 21:44

## 2024-07-30 RX ADMIN — Medication 4: at 17:37

## 2024-07-30 RX ADMIN — Medication 10 GRAM(S): at 21:21

## 2024-07-30 NOTE — CONSULT NOTE ADULT - TIME BILLING
Time spent with review of chart documents, labs, imaging. Direct patient assessment,  formulation of care plan. Discussion with  Interdisciplinary  team  Dr. Jenkins
reviewing labs, notes, orders, radiographic studies, as well as counseling and coordinating care with the relevant multidisciplinary team, including with the primary and consulting providers.

## 2024-07-30 NOTE — PROGRESS NOTE ADULT - SUBJECTIVE AND OBJECTIVE BOX
Hospitalist Daily Progress Note    Chief Complaint:  Patient is a 84y old  Female who presents with a chief complaint of Decompensated cirrhosis   Hypercalcemia (29 Jul 2024 22:20)      SUBJECTIVE / OVERNIGHT EVENTS:  Patient was seen and examined at bedside. Latvian translated by RN at bedside. Complains of abdominal pain and weakness.  Patient denies chest pain, SOB, N/V, fever, chills, dysuria or any other complaints. All remainder ROS negative.     MEDICATIONS  (STANDING):  albumin human 25% IVPB 50 milliLiter(s) IV Intermittent every 6 hours  carvedilol 12.5 milliGRAM(s) Oral every 12 hours  dextrose 5%. 1000 milliLiter(s) (50 mL/Hr) IV Continuous <Continuous>  dextrose 5%. 1000 milliLiter(s) (100 mL/Hr) IV Continuous <Continuous>  dextrose 50% Injectable 12.5 Gram(s) IV Push once  dextrose 50% Injectable 25 Gram(s) IV Push once  dextrose 50% Injectable 25 Gram(s) IV Push once  glucagon  Injectable 1 milliGRAM(s) IntraMuscular once  heparin   Injectable 5000 Unit(s) SubCutaneous every 12 hours  hydrALAZINE 50 milliGRAM(s) Oral two times a day  insulin lispro (ADMELOG) corrective regimen sliding scale   SubCutaneous Before meals and at bedtime  lactulose Syrup 10 Gram(s) Oral every 8 hours  ondansetron Injectable 4 milliGRAM(s) IV Push once  pantoprazole    Tablet 40 milliGRAM(s) Oral before breakfast  rifAXIMin 550 milliGRAM(s) Oral two times a day  sodium chloride 0.9%. 1000 milliLiter(s) (75 mL/Hr) IV Continuous <Continuous>  spironolactone 25 milliGRAM(s) Oral daily    MEDICATIONS  (PRN):  acetaminophen     Tablet .. 650 milliGRAM(s) Oral every 6 hours PRN Temp greater or equal to 38C (100.4F), Mild Pain (1 - 3)  aluminum hydroxide/magnesium hydroxide/simethicone Suspension 30 milliLiter(s) Oral every 4 hours PRN Dyspepsia  dextrose Oral Gel 15 Gram(s) Oral once PRN Blood Glucose LESS THAN 70 milliGRAM(s)/deciliter  melatonin 3 milliGRAM(s) Oral at bedtime PRN Insomnia        I&O's Summary      PHYSICAL EXAM:  Vital Signs Last 24 Hrs  T(C): 36.4 (30 Jul 2024 12:07), Max: 36.9 (29 Jul 2024 23:40)  T(F): 97.5 (30 Jul 2024 12:07), Max: 98.5 (29 Jul 2024 23:40)  HR: 66 (30 Jul 2024 12:07) (58 - 67)  BP: 105/49 (30 Jul 2024 12:07) (102/53 - 121/68)  BP(mean): --  RR: 18 (30 Jul 2024 12:07) (16 - 18)  SpO2: 98% (30 Jul 2024 12:07) (96% - 98%)    Parameters below as of 30 Jul 2024 12:07  Patient On (Oxygen Delivery Method): room air    Constitutional: NAD, Resting, Chronically ill appearing female, icterus, Jaundice   ENT: Supple, No JVD  Lungs: CTA B/L, Non-labored breathing  Cardio: RRR, S1/S2, No murmur  Abdomen: Soft, Nontender, Nondistended; Bowel sounds present  Extremities: No calf tenderness, B/L Pitting edema  Musculoskeletal:   No joint swelling  Psych: Calm, cooperative affect appropriate  Neuro: Awake and alert, oriented to name,  moves extremities  Skin: No rashes; no palpable lesions    LABS:                        8.4    10.06 )-----------( 157      ( 30 Jul 2024 04:33 )             23.7     07-30    129<L>  |  93<L>  |  37.4<H>  ----------------------------<  94  5.3   |  20.0<L>  |  1.52<H>    Ca    10.2      30 Jul 2024 04:33  Phos  4.4     07-30  Mg     1.5     07-30    TPro  5.4<L>  /  Alb  2.2<L>  /  TBili  11.2<H>  /  DBili  x   /  AST  209<H>  /  ALT  80<H>  /  AlkPhos  615<H>  07-30    PT/INR - ( 29 Jul 2024 13:57 )   PT: 15.9 sec;   INR: 1.45 ratio               Urinalysis Basic - ( 30 Jul 2024 09:45 )    Color: Orange / Appearance: Turbid / SG: >1.030 / pH: x  Gluc: x / Ketone: Negative mg/dL  / Bili: Large / Urobili: 1.0 mg/dL   Blood: x / Protein: Trace mg/dL / Nitrite: Positive   Leuk Esterase: Moderate / RBC: 22 /HPF / WBC 35 /HPF   Sq Epi: x / Non Sq Epi: 25 /HPF / Bacteria: Many /HPF        CAPILLARY BLOOD GLUCOSE      POCT Blood Glucose.: 148 mg/dL (30 Jul 2024 12:03)  POCT Blood Glucose.: 145 mg/dL (30 Jul 2024 08:52)        RADIOLOGY REVIEWED

## 2024-07-30 NOTE — CONSULT NOTE ADULT - NS ATTEND AMEND GEN_ALL_CORE FT
Ms. Selvin Canas is an 84 year old woman with significant past medical history of decompensated cirrhosis further complicated by HCC (Oncology following, no further treatment/intervention offered at this time), images interpreted and updated labs reviewed in detail, who presented with worsening abdominal pain, ascites, nausea and non-bloody emesis for the past 24 hours,  CT with evidence of new onset portal venous thrombosis (suspect likely in the setting of HCC / hypercoagulability), diagnostic paracentesis negative for SBP, in addition to above, patient prognosis guarded, not a transplant candidate, would continue ongoing discussions with Palliative care, recommendations appreciated, can follow as an outpatient with Dr. Kelley, No acute intervention planned at this time. We will continue to follow along peripherally.

## 2024-07-30 NOTE — CONSULT NOTE ADULT - ASSESSMENT
84yr woman  with history of hepatocellular carcinoma/portal htn w/ ascites, Pleural effusion, DM-2, HTN, HLD, CAD presented with abdominal pain and vomiting found to have new PVT, recurrent ascites    Abdominal Pain   Portal Vein Thrombosis  GI rec Hematology eval  Rec Dilaudid 0.2mg IVP q4hr PRN    HCC   Ascites  paracentesis on last admission  CT with increase in ascites - IR consulted  could benefit from pigtail if agreeable to comfort plan  currently does not endorse pain    Pleural Effusion  monitor O2    KAYLIN  Cr 1.52   monitor avoid nephrotoxic agents  Rec to use Dilaudid over Morphine    Debility  Assist with care  Turn/reposition  Safety precaution    Encounter for Palliative Care  Palliative Care consulted to assist with goals of care  Patient known to our service from last admission - 7/15/24.  She has 2 daughters who are involved in her care  Family informed me then her oncologist was not offering further txs 2/2 to disease progression  At the time, family was informed since no further oncologic treatments, disease would naturally progress.  They were offered hospice services for which they declined.  Family was also discussed hospice services on previous admission on July 7, 2024 admission.   Advance directives was also discussed for which daughter wished Full Code.      Patient seen with . Met with daughter Kristi  who remembered me from last admission.    Recommend continued IDT communication to help family understand overall poor prognosis and support them during this time.

## 2024-07-30 NOTE — CONSULT NOTE ADULT - SUBJECTIVE AND OBJECTIVE BOX
HPI:  85 y/o female history of hepatocellular carcinoma, diabetes HTN, HLD, CAD with stents presents due abdominal pain and vomiting. Patient was recently admitted to hospital for worsening shortness of breath and bilateral lower extremity swelling. Patient received therapeutic paracentesis and was discharged with home care.  According to patient daughter, patient has been lethargic for the previous two days. Last night patient began complaining of abdominal pain that she rates as 10/10. this morning patient began vomiting on and off for 30-60 minutes. Patient was taken to ED by family at this time. Patient was sent for CT scan and became hypoxic and tachypneic. MICU was consulted for patient was placed on NC supplemental oxygen and patient improved. Patient also complained of itching for over a week. States itching comes sporadically and is not associated with any notable triggers.  Patient denies chest pain, SoB, dysuria, syncope, HA.  (29 Jul 2024 22:20)      PERTINENT PMH REVIEWED: Yes    PAST MEDICAL & SURGICAL HISTORY:  Hypertension      Hypothyroidism      GERD (gastroesophageal reflux disease)      Hypercholesterolemia      Diabetes      Heart murmur      History of cirrhosis of liver      HCC (hepatocellular carcinoma)      CAD (coronary artery disease)      S/P tubal ligation      History of vaginal surgery      History of total left knee replacement      History of cardiac cath          SOCIAL HISTORY:                      Substance history:                    Admitted from:  home  SNF  [facility]                     Jehovah's witness/spirituality:                    Cultural concerns:    Baseline ADLs (prior to admission): Dependent                        Surrogate/HCP/Guardian: daughters Daisy and Kristi Connor    FAMILY HISTORY:  Family history of diabetes mellitus (DM)    FH: HTN (hypertension)    FH: heart disease    Family history of osteoarthritis        Allergies    amlodipine (Swelling)    Intolerances        http://npcrc.org/files/news/palliative_performance_scale_[email or web address].pdf    Present Symptoms:   Dyspnea:  No   Nausea/Vomiting:   Yes  Anxiety:   No   Fatigue: Yes   Loss of appetite: Yes No   Fair  N/A  NPO  Constipation:  Not reported   Yes    Pain:  No - currently does not endorse            Location            Character            Duration            Factors            Severity            Effect    Pain AD Score:  [email or web address] (press ctrl + left click to view)    Review of Systems: Reviewed  above   Limited  to obtain due to poor  historian      MEDICATIONS  (STANDING):  albumin human 25% IVPB 50 milliLiter(s) IV Intermittent every 6 hours  carvedilol 12.5 milliGRAM(s) Oral every 12 hours  dextrose 5%. 1000 milliLiter(s) (50 mL/Hr) IV Continuous <Continuous>  dextrose 5%. 1000 milliLiter(s) (100 mL/Hr) IV Continuous <Continuous>  dextrose 50% Injectable 12.5 Gram(s) IV Push once  dextrose 50% Injectable 25 Gram(s) IV Push once  dextrose 50% Injectable 25 Gram(s) IV Push once  glucagon  Injectable 1 milliGRAM(s) IntraMuscular once  heparin   Injectable 5000 Unit(s) SubCutaneous every 12 hours  hydrALAZINE 50 milliGRAM(s) Oral two times a day  insulin lispro (ADMELOG) corrective regimen sliding scale   SubCutaneous Before meals and at bedtime  lactulose Syrup 10 Gram(s) Oral every 8 hours  ondansetron Injectable 4 milliGRAM(s) IV Push once  pantoprazole    Tablet 40 milliGRAM(s) Oral before breakfast  rifAXIMin 550 milliGRAM(s) Oral two times a day  sodium chloride 0.9%. 1000 milliLiter(s) (75 mL/Hr) IV Continuous <Continuous>  spironolactone 25 milliGRAM(s) Oral daily    MEDICATIONS  (PRN):  acetaminophen     Tablet .. 650 milliGRAM(s) Oral every 6 hours PRN Temp greater or equal to 38C (100.4F), Mild Pain (1 - 3)  aluminum hydroxide/magnesium hydroxide/simethicone Suspension 30 milliLiter(s) Oral every 4 hours PRN Dyspepsia  dextrose Oral Gel 15 Gram(s) Oral once PRN Blood Glucose LESS THAN 70 milliGRAM(s)/deciliter  melatonin 3 milliGRAM(s) Oral at bedtime PRN Insomnia      PHYSICAL EXAM:    Vital Signs Last 24 Hrs  T(C): 36.4 (30 Jul 2024 07:36), Max: 36.9 (29 Jul 2024 23:40)  T(F): 97.6 (30 Jul 2024 07:36), Max: 98.5 (29 Jul 2024 23:40)  HR: 67 (30 Jul 2024 07:36) (58 - 67)  BP: 102/53 (30 Jul 2024 07:36) (102/53 - 125/57)  BP(mean): --  RR: 18 (30 Jul 2024 07:36) (16 - 18)  SpO2: 98% (30 Jul 2024 07:36) (96% - 99%)    Parameters below as of 29 Jul 2024 14:12  Patient On (Oxygen Delivery Method): room air    Karnofsky 30   %  General:  Elderly frail woman awake NAD  HEENT: NCAT      Lungs: comfortable  CV: RR  GI: softly distended nt  MSK:    weak  chair/bedbound  Neuro:  awake no focal deficits  Skin: warm dry  Psych:  calm cooperative    LABS:                        8.4    10.06 )-----------( 157      ( 30 Jul 2024 04:33 )             23.7     07-30    129<L>  |  93<L>  |  37.4<H>  ----------------------------<  94  5.3   |  20.0<L>  |  1.52<H>    Ca    10.2      30 Jul 2024 04:33  Phos  4.4     07-30  Mg     1.5     07-30    TPro  5.4<L>  /  Alb  2.2<L>  /  TBili  11.2<H>  /  DBili  x   /  AST  209<H>  /  ALT  80<H>  /  AlkPhos  615<H>  07-30    PT/INR - ( 29 Jul 2024 13:57 )   PT: 15.9 sec;   INR: 1.45 ratio           Urinalysis Basic - ( 30 Jul 2024 04:33 )    Color: x / Appearance: x / SG: x / pH: x  Gluc: 94 mg/dL / Ketone: x  / Bili: x / Urobili: x   Blood: x / Protein: x / Nitrite: x   Leuk Esterase: x / RBC: x / WBC x   Sq Epi: x / Non Sq Epi: x / Bacteria: x      I&O's Summary      RADIOLOGY & ADDITIONAL STUDIES:      Imaging reviewed   < from: CT Abdomen and Pelvis w/ IV Cont (07.29.24 @ 15:45) >    ACC: [id number] EXAM:  CT ABDOMEN AND PELVIS IC   ORDERED BY: MAGAN BUTCHER     *** ADDENDUM # 1 ***    Question endometrial thickening. Recommend pelvic sonogram if clinically   warranted.    --- End of Report ---    *** END OF ADDENDUM # 1 ***      PROCEDURE DATE:  07/29/2024          INTERPRETATION:  CLINICAL INFORMATION: Worsening abdominal pain. History   of liver cancer.    COMPARISON: CT chest abdomen pelvis 7/6/2024.    CONTRAST/COMPLICATIONS:  IV Contrast: Omnipaque 350  90 cc administered   10 cc discarded  Oral Contrast: NONE  Complications: None reported at time of study completion    PROCEDURE:  CT of the Abdomen and Pelvis was performed.  Sagittal and coronal reformats were performed.    FINDINGS:  LOWER CHEST: Interval increase of the small left pleural effusion and new   small right pleural effusion. Coronary artery stents.    LIVER: Cirrhosis. Innumerable hypoattenuating lesions. Stable segment 2   and 6 posttreatment cavities.  BILE DUCTS: Normal caliber.  GALLBLADDER: Cholelithiasis. Nondistended gallbladder with mural   thickening, likely associated with hepatic disease.  SPLEEN: Within normal limits.  PANCREAS: Within normal limits.  ADRENALS: Within normal limits.  KIDNEYS/URETERS: No hydronephrosis. Nonobstructive right renal stone   measuring 0.2 cm. Symmetric renal enhancement.      BLADDER: Within normal limits.  REPRODUCTIVE ORGANS: Question endometrial thickening. No adnexal lesion.    BOWEL: Colonic diverticulosis. Mild mural thickening of the ascending   colon likely associated portal hypertension. Small hiatal hernia. No   bowel obstruction.  PERITONEUM/RETROPERITONEUM: Interval increase in mild to moderate ascites.  VESSELS: New left portal venous thrombosis (3/42). Paraesophageal   varices. Atherosclerotic changes.  LYMPH NODES: No lymphadenopathy.  ABDOMINAL WALL: Small anterior periumbilical hernia containing free   fluid. Mild anasarca.  BONES: Degenerative changes.    IMPRESSION:  1.  New left portal venous thrombosis.  2.  Interval increase in pleural effusion, ascites, and new anasarca,   consistent with fluid overload state.  3.  Cirrhosis with multiple hypoattenuating hepatic lesions. Stable   treatment cavities. Portal hypertension.    Findings were discussed with Dr. Juárez 7/29/2024 4:30 PM by Dr. Butcher   with read back confirmation.    --- End of Report ---    ***Please see the addendum at the top of this report. It may contain   additional important information or changes.****        JUSTIN BUTCHER MD; Attending Radiologist  This document has been electronically signed. Jul 29 2024  4:30PM  1st Addendum: JUSTIN BUTCHER MD; Attending Radiologist  The first addendum was electronically signed on: Jul 29 2024  4:57PM.    < end of copied text >      ADVANCE DIRECTIVES/TREATMENT PREFERENCES:  Currently Full code, all aggressive measures desired

## 2024-07-30 NOTE — SWALLOW BEDSIDE ASSESSMENT ADULT - SWALLOW EVAL: DIAGNOSIS
Oral stage grossly functional for assessed trials. Pharyngeal stage of swallow clinically unremarkable for PO trials with NO overt s/s aspiration noted post intake

## 2024-07-30 NOTE — PROGRESS NOTE ADULT - ASSESSMENT
83 y/o female with history of hepatocellular carcinoma/portal htn w/ ascites, Pleural effusion, DM-2, HTN, HLD, CAD with stents presents due to abdominal pain and vomiting.     #Abdominal pain  #Ascites  #Portal vein thrombosis  CT abdomen with increasing ascites, Portal vein thrombosis  GI consult  AC per GI  IR for paracentesis    #Anasarca likely 2/2 hypoalbuminemia/portal htn  #hypercalcemia due to underlying malignancy   IV fluids  Hold Lasix   Repeat labs    #HCC  #Cirrhosis   Diagnosed with HCC in 2022  Lasix and Aldactone was held  Prior Heme/onc records noted, not candidate for chemo, declined trying other therapies  Hospice appropriate, Daughter not interested in Hospice, agreeable to Palliative f/u     #Hyponatremia  likely 2/2 hypervolemia 2/2 cirrhosis/anasarca, diuretic use   Hold Lasix for now pending Na trend  continue to monitor    #Pleural effusion:  Likely Malignant effusion  Not hypoxic, no increase WOB at this time  Monitor sats  May need therapeutic thoracentesis if worsens    #Anemia  #thrombocytopenia  Hbg stable compared to prior admission, likely multi-factorial   No reports of bleeding  DAPT on hold from last admission due to risk of bleeding   FA/MVI    #Hx of type II diabetes  patient was on metformin previously  HgA1c last admission was 4.6, metformin discontinued  ADA diet, Accu checks AC/HS    #HTN  continue carvedilol 12.5 q12  continue hydralazine 50 mg BID  DASH diet     Dispo: Pending improvement, GI eval, paracentesis  Palliative care consult     Spoke with daughter at bedside 85 y/o female with history of hepatocellular carcinoma/portal htn w/ ascites, Pleural effusion, DM-2, HTN, HLD, CAD with stents presents due to abdominal pain and vomiting.     #Abdominal pain  #Ascites  #Portal vein thrombosis  CT abdomen with increasing ascites, Portal vein thrombosis  GI consult  AC per GI  IR for paracentesis    #Anasarca likely 2/2 hypoalbuminemia/portal htn  #hypercalcemia due to underlying malignancy   IV fluids stopped  Restart lasix in AM   Repeat labs    #HCC  #Cirrhosis   Diagnosed with HCC in 2022  Lasix was held  Aldactone  Prior Heme/onc records noted, not candidate for chemo, declined trying other therapies  Hospice appropriate, Daughter not interested in Hospice, agreeable to Palliative f/u     #Hyponatremia  likely 2/2 hypervolemia 2/2 cirrhosis/anasarca, diuretic use   Hold Lasix for now pending Na trend  continue to monitor    #Pleural effusion:  Likely Malignant effusion  Not hypoxic, no increase WOB at this time  Monitor sats  May need therapeutic thoracentesis if worsens    #Anemia  #thrombocytopenia  Hbg stable compared to prior admission, likely multi-factorial   No reports of bleeding  DAPT on hold from last admission due to risk of bleeding   FA/MVI    #Hx of type II diabetes  patient was on metformin previously  HgA1c last admission was 4.6, metformin discontinued  ADA diet, Accu checks AC/HS    #HTN  continue carvedilol 12.5 q12  continue hydralazine 50 mg BID  DASH diet     Dispo: Pending improvement, GI eval, paracentesis  Palliative care consult     Spoke with daughter at bedside

## 2024-07-30 NOTE — SWALLOW BEDSIDE ASSESSMENT ADULT - SLP GENERAL OBSERVATIONS
Pt received & seen seated upright in bed, awake/alert, 02 NC, family (son, daughter) present, 02 NC, language line ID: [id number] used for Syriac, 0/10 pain pre/post

## 2024-07-30 NOTE — CONSULT NOTE ADULT - SUBJECTIVE AND OBJECTIVE BOX
Chief Complaint:  Patient is a 84y old  Female who presents with a chief complaint of Decompensated cirrhosis   Hypercalcemia (30 Jul 2024 13:45)    HPI:  This is a 84 year old female with a PMH significant for Cirrhosis, HCC s/p Y90 (3/31/23), HLD, HTN, DM, and CAD s/p stent who presents to [facility] for abdominal pain, nausea and vomiting that started yesterday. History obtained from daughter at bedside. Daughter states yesterday morning patient has 2 episodes of NBNB emesis with associated bilateral upper abdominal pain.       y/o female history of hepatocellular carcinoma, diabetes HTN, HLD, CAD with stents presents due abdominal pain and vomiting.                   Patient was recently admitted to hospital for worsening shortness of breath and bilateral lower extremity swelling. Patient received therapeutic paracentesis and was discharged with home care.  According to patient daughter, patient has been lethargic for the previous two days. Last night patient began complaining of abdominal pain that she rates as 10/10. this morning patient began vomiting on and off for 30-60 minutes. Patient was taken to ED by family at this time. Patient was sent for CT scan and became hypoxic and tachypneic. MICU was consulted for patient was placed on NC supplemental oxygen and patient improved. Patient also complained of itching for over a week. States itching comes sporadically and is not associated with any notable triggers.  Patient denies chest pain, SoB, dysuria, syncope, HA.  (29 Jul 2024 22:20)      PAST MEDICAL & SURGICAL HISTORY:  Hypertension      Hypothyroidism      GERD (gastroesophageal reflux disease)      Hypercholesterolemia      Diabetes      Heart murmur      History of cirrhosis of liver      HCC (hepatocellular carcinoma)      CAD (coronary artery disease)      S/P tubal ligation      History of vaginal surgery      History of total left knee replacement      History of cardiac cath          REVIEW OF SYSTEMS:   General: Negative  HEENT: Negative  CV: Negative  Respiratory: Negative  GI: See HPI  : Negative  MSK: Negative  Hematologic: Negative  Skin: Negative    MEDICATIONS:   MEDICATIONS  (STANDING):  albumin human 25% IVPB 50 milliLiter(s) IV Intermittent every 6 hours  carvedilol 12.5 milliGRAM(s) Oral every 12 hours  dextrose 5%. 1000 milliLiter(s) (100 mL/Hr) IV Continuous <Continuous>  dextrose 5%. 1000 milliLiter(s) (50 mL/Hr) IV Continuous <Continuous>  dextrose 50% Injectable 25 Gram(s) IV Push once  dextrose 50% Injectable 12.5 Gram(s) IV Push once  dextrose 50% Injectable 25 Gram(s) IV Push once  glucagon  Injectable 1 milliGRAM(s) IntraMuscular once  heparin   Injectable 5000 Unit(s) SubCutaneous every 12 hours  hydrALAZINE 50 milliGRAM(s) Oral two times a day  insulin lispro (ADMELOG) corrective regimen sliding scale   SubCutaneous Before meals and at bedtime  lactulose Syrup 10 Gram(s) Oral every 8 hours  ondansetron Injectable 4 milliGRAM(s) IV Push once  pantoprazole    Tablet 40 milliGRAM(s) Oral before breakfast  rifAXIMin 550 milliGRAM(s) Oral two times a day  sodium chloride 0.9%. 1000 milliLiter(s) (75 mL/Hr) IV Continuous <Continuous>  spironolactone 25 milliGRAM(s) Oral daily    MEDICATIONS  (PRN):  acetaminophen     Tablet .. 650 milliGRAM(s) Oral every 6 hours PRN Temp greater or equal to 38C (100.4F), Mild Pain (1 - 3)  aluminum hydroxide/magnesium hydroxide/simethicone Suspension 30 milliLiter(s) Oral every 4 hours PRN Dyspepsia  dextrose Oral Gel 15 Gram(s) Oral once PRN Blood Glucose LESS THAN 70 milliGRAM(s)/deciliter  melatonin 3 milliGRAM(s) Oral at bedtime PRN Insomnia          DIET:  Diet, Easy to Chew:   Consistent Carbohydrate Evening Snack (CSTCHOSN)  DASH/TLC Sodium & Cholesterol Restricted (DASH) (07-30-24 @ 08:50) [Active]          ALLERGIES:   Allergies    amlodipine (Swelling)    Intolerances        Substance Use:   (  ) never used  (  ) other:  Tobacco Usage:  (   ) never smoked   (   ) former smoker   (   ) current smoker  (     ) pack year  (        ) last cigarette date  Alcohol Usage:    Family History   IBD (  ) Yes   (  ) No  GI Malignancy (  )  Yes    (  ) No    Health Management  Last Colonoscopy:  Last Endoscopy:     VITAL SIGNS:   Vital Signs Last 24 Hrs  T(C): 36.4 (30 Jul 2024 12:07), Max: 36.9 (29 Jul 2024 23:40)  T(F): 97.5 (30 Jul 2024 12:07), Max: 98.5 (29 Jul 2024 23:40)  HR: 66 (30 Jul 2024 12:07) (58 - 67)  BP: 105/49 (30 Jul 2024 12:07) (102/53 - 121/68)  BP(mean): --  RR: 18 (30 Jul 2024 12:07) (18 - 18)  SpO2: 98% (30 Jul 2024 12:07) (98% - 98%)    Parameters below as of 30 Jul 2024 12:07  Patient On (Oxygen Delivery Method): room air      I&O's Summary      PHYSICAL EXAM:   GENERAL:  No acute distress  HEENT:  NC/AT, conjunctiva clear, sclera anicteric  CHEST:  No increased effort  HEART:  Regular rate  ABDOMEN:  Soft, non-tender, non-distended, normoactive bowel sounds, no rebound or guarding  EXTREMITIES: No edema  SKIN:  Warm, dry  NEURO:  Calm, cooperative    LABS:                        8.4    10.06 )-----------( 157      ( 30 Jul 2024 04:33 )             23.7     Hemoglobin: 8.4 g/dL (07-30-24 @ 04:33)  Hemoglobin: 8.5 g/dL (07-29-24 @ 12:51)    07-30    129<L>  |  93<L>  |  37.4<H>  ----------------------------<  94  5.3   |  20.0<L>  |  1.52<H>    Ca    10.2      30 Jul 2024 04:33  Phos  4.4     07-30  Mg     1.5     07-30    TPro  5.4<L>  /  Alb  2.2<L>  /  TBili  11.2<H>  /  DBili  x   /  AST  209<H>  /  ALT  80<H>  /  AlkPhos  615<H>  07-30    LIVER FUNCTIONS - ( 30 Jul 2024 04:33 )  Alb: 2.2 g/dL / Pro: 5.4 g/dL / ALK PHOS: 615 U/L / ALT: 80 U/L / AST: 209 U/L / GGT: x             PT/INR - ( 29 Jul 2024 13:57 )   PT: 15.9 sec;   INR: 1.45 ratio                                               RADIOLOGY & ADDITIONAL STUDIES:      ACC: [id number] EXAM:  CT ABDOMEN AND PELVIS IC   ORDERED BY: MAGAN BUTCHER     *** ADDENDUM # 1 ***    Question endometrial thickening. Recommend pelvic sonogram if clinically   warranted.    --- End of Report ---    *** END OF ADDENDUM # 1 ***      PROCEDURE DATE:  07/29/2024          INTERPRETATION:  CLINICAL INFORMATION: Worsening abdominal pain. History   of liver cancer.    COMPARISON: CT chest abdomen pelvis 7/6/2024.    CONTRAST/COMPLICATIONS:  IV Contrast: Omnipaque 350  90 cc administered   10 cc discarded  Oral Contrast: NONE  Complications: None reported at time of study completion    PROCEDURE:  CT of the Abdomen and Pelvis was performed.  Sagittal and coronal reformats were performed.    FINDINGS:  LOWER CHEST: Interval increase of the small left pleural effusion and new   small right pleural effusion. Coronary artery stents.    LIVER: Cirrhosis. Innumerable hypoattenuating lesions. Stable segment 2   and 6 posttreatment cavities.  BILE DUCTS: Normal caliber.  GALLBLADDER: Cholelithiasis. Nondistended gallbladder with mural   thickening, likely associated with hepatic disease.  SPLEEN: Within normal limits.  PANCREAS: Within normal limits.  ADRENALS: Within normal limits.  KIDNEYS/URETERS: No hydronephrosis. Nonobstructive right renal stone   measuring 0.2 cm. Symmetric renal enhancement.      BLADDER: Within normal limits.  REPRODUCTIVE ORGANS: Question endometrial thickening. No adnexal lesion.    BOWEL: Colonic diverticulosis. Mild mural thickening of the ascending   colon likely associated portal hypertension. Small hiatal hernia. No   bowel obstruction.  PERITONEUM/RETROPERITONEUM: Interval increase in mild to moderate ascites.  VESSELS: New left portal venous thrombosis (3/42). Paraesophageal   varices. Atherosclerotic changes.  LYMPH NODES: No lymphadenopathy.  ABDOMINAL WALL: Small anterior periumbilical hernia containing free   fluid. Mild anasarca.  BONES: Degenerative changes.    IMPRESSION:  1.  New left portal venous thrombosis.  2.  Interval increase in pleural effusion, ascites, and new anasarca,   consistent with fluid overload state.  3.  Cirrhosis with multiple hypoattenuating hepatic lesions. Stable   treatment cavities. Portal hypertension.    Findings were discussed with Dr. Juárez 7/29/2024 4:30 PM by Dr. Butcher   with read back confirmation.    --- End of Report ---    ***Please see the addendum at the top of this report. It may contain   additional important information or changes.****        JUSTIN BUTCHER MD; Attending Radiologist  This document has been electronically signed. Jul 29 2024  4:30PM  1st Addendum: JUSTIN BUTCHER MD; Attending Radiologist  The first addendum was electronically signed on: Jul 29 2024  4:57PM.  07-29-24 @ 15:45       Chief Complaint:  Patient is a 84y old  Female who presents with a chief complaint of Decompensated cirrhosis   Hypercalcemia (30 Jul 2024 13:45).     HPI:  This is a 84 year old female with a PMH significant for Cirrhosis, HCC s/p Y90 (3/31/23), HLD, HTN, DM, and CAD s/p stent who presents to [facility] for abdominal pain, nausea and vomiting that started yesterday. Patient was recently admitted to [facility] 7/7 for generalized weakness, s/p paracentesis at that time and discharged with home care. History obtained from daughter at bedside, patient slightly lethargic upon assessment. Daughter states yesterday morning patient has 2 episodes of NBNB emesis with associated bilateral upper abdominal pain. She also reports patient had some blood in her stool yesterday, after being constipated for several days, similar episode 10 days ago. Daughter reports she noticed patient progressively started turning "yellow" about 2 weeks ago. CT of A/P with new left portal venous thrombosis, ascites, and portal hypertension. Last paracentesis July 17 with removal of 900cc fluid. Denies history of smoking, drinking, and illicit drug use. Last EGD/colonoscopy Jan 2023 esophageal hiatal hernia and 5mm polyp in the mid-transverse colon.      PAST MEDICAL & SURGICAL HISTORY:  Hypertension      Hypothyroidism      GERD (gastroesophageal reflux disease)      Hypercholesterolemia      Diabetes      Heart murmur      History of cirrhosis of liver      HCC (hepatocellular carcinoma)      CAD (coronary artery disease)      S/P tubal ligation      History of vaginal surgery      History of total left knee replacement      History of cardiac cath          REVIEW OF SYSTEMS:   General: Negative  HEENT: Negative  CV: Negative  Respiratory: Negative  GI: See HPI  : Negative  MSK: Negative  Hematologic: Negative  Skin: Negative    MEDICATIONS:   MEDICATIONS  (STANDING):  albumin human 25% IVPB 50 milliLiter(s) IV Intermittent every 6 hours  carvedilol 12.5 milliGRAM(s) Oral every 12 hours  dextrose 5%. 1000 milliLiter(s) (100 mL/Hr) IV Continuous <Continuous>  dextrose 5%. 1000 milliLiter(s) (50 mL/Hr) IV Continuous <Continuous>  dextrose 50% Injectable 25 Gram(s) IV Push once  dextrose 50% Injectable 12.5 Gram(s) IV Push once  dextrose 50% Injectable 25 Gram(s) IV Push once  glucagon  Injectable 1 milliGRAM(s) IntraMuscular once  heparin   Injectable 5000 Unit(s) SubCutaneous every 12 hours  hydrALAZINE 50 milliGRAM(s) Oral two times a day  insulin lispro (ADMELOG) corrective regimen sliding scale   SubCutaneous Before meals and at bedtime  lactulose Syrup 10 Gram(s) Oral every 8 hours  ondansetron Injectable 4 milliGRAM(s) IV Push once  pantoprazole    Tablet 40 milliGRAM(s) Oral before breakfast  rifAXIMin 550 milliGRAM(s) Oral two times a day  sodium chloride 0.9%. 1000 milliLiter(s) (75 mL/Hr) IV Continuous <Continuous>  spironolactone 25 milliGRAM(s) Oral daily    MEDICATIONS  (PRN):  acetaminophen     Tablet .. 650 milliGRAM(s) Oral every 6 hours PRN Temp greater or equal to 38C (100.4F), Mild Pain (1 - 3)  aluminum hydroxide/magnesium hydroxide/simethicone Suspension 30 milliLiter(s) Oral every 4 hours PRN Dyspepsia  dextrose Oral Gel 15 Gram(s) Oral once PRN Blood Glucose LESS THAN 70 milliGRAM(s)/deciliter  melatonin 3 milliGRAM(s) Oral at bedtime PRN Insomnia          DIET:  Diet, Easy to Chew:   Consistent Carbohydrate Evening Snack (CSTCHOSN)  DASH/TLC Sodium & Cholesterol Restricted (DASH) (07-30-24 @ 08:50) [Active]          ALLERGIES:   Allergies    amlodipine (Swelling)    Intolerances        Substance Use:   (  ) never used  (  ) other:  Tobacco Usage:  (   ) never smoked   (   ) former smoker   (   ) current smoker  (     ) pack year  (        ) last cigarette date  Alcohol Usage:    Family History   IBD (  ) Yes   (  ) No  GI Malignancy (  )  Yes    (  ) No    Health Management  Last Colonoscopy:  Last Endoscopy:     VITAL SIGNS:   Vital Signs Last 24 Hrs  T(C): 36.4 (30 Jul 2024 12:07), Max: 36.9 (29 Jul 2024 23:40)  T(F): 97.5 (30 Jul 2024 12:07), Max: 98.5 (29 Jul 2024 23:40)  HR: 66 (30 Jul 2024 12:07) (58 - 67)  BP: 105/49 (30 Jul 2024 12:07) (102/53 - 121/68)  BP(mean): --  RR: 18 (30 Jul 2024 12:07) (18 - 18)  SpO2: 98% (30 Jul 2024 12:07) (98% - 98%)    Parameters below as of 30 Jul 2024 12:07  Patient On (Oxygen Delivery Method): room air      I&O's Summary      PHYSICAL EXAM:   GENERAL:  No acute distress  HEENT:  NC/AT, conjunctiva clear, + sclera icteric  CHEST:  No increased effort  HEART:  Regular rate  ABDOMEN:  Soft, non-tender, + distended, normoactive bowel sounds, no rebound or guarding  EXTREMITIES: + 2 pedal edema  SKIN:  Warm, dry, jaundiced  NEURO:  Calm, cooperative    LABS:                        8.4    10.06 )-----------( 157      ( 30 Jul 2024 04:33 )             23.7     Hemoglobin: 8.4 g/dL (07-30-24 @ 04:33)  Hemoglobin: 8.5 g/dL (07-29-24 @ 12:51)    07-30    129<L>  |  93<L>  |  37.4<H>  ----------------------------<  94  5.3   |  20.0<L>  |  1.52<H>    Ca    10.2      30 Jul 2024 04:33  Phos  4.4     07-30  Mg     1.5     07-30    TPro  5.4<L>  /  Alb  2.2<L>  /  TBili  11.2<H>  /  DBili  x   /  AST  209<H>  /  ALT  80<H>  /  AlkPhos  615<H>  07-30    LIVER FUNCTIONS - ( 30 Jul 2024 04:33 )  Alb: 2.2 g/dL / Pro: 5.4 g/dL / ALK PHOS: 615 U/L / ALT: 80 U/L / AST: 209 U/L / GGT: x             PT/INR - ( 29 Jul 2024 13:57 )   PT: 15.9 sec;   INR: 1.45 ratio          RADIOLOGY & ADDITIONAL STUDIES:      ACC: [id number] EXAM:  CT ABDOMEN AND PELVIS IC   ORDERED BY: MAGAN BUTCHER     *** ADDENDUM # 1 ***    Question endometrial thickening. Recommend pelvic sonogram if clinically   warranted.    --- End of Report ---    *** END OF ADDENDUM # 1 ***      PROCEDURE DATE:  07/29/2024          INTERPRETATION:  CLINICAL INFORMATION: Worsening abdominal pain. History   of liver cancer.    COMPARISON: CT chest abdomen pelvis 7/6/2024.    CONTRAST/COMPLICATIONS:  IV Contrast: Omnipaque 350  90 cc administered   10 cc discarded  Oral Contrast: NONE  Complications: None reported at time of study completion    PROCEDURE:  CT of the Abdomen and Pelvis was performed.  Sagittal and coronal reformats were performed.    FINDINGS:  LOWER CHEST: Interval increase of the small left pleural effusion and new   small right pleural effusion. Coronary artery stents.    LIVER: Cirrhosis. Innumerable hypoattenuating lesions. Stable segment 2   and 6 posttreatment cavities.  BILE DUCTS: Normal caliber.  GALLBLADDER: Cholelithiasis. Nondistended gallbladder with mural   thickening, likely associated with hepatic disease.  SPLEEN: Within normal limits.  PANCREAS: Within normal limits.  ADRENALS: Within normal limits.  KIDNEYS/URETERS: No hydronephrosis. Nonobstructive right renal stone   measuring 0.2 cm. Symmetric renal enhancement.      BLADDER: Within normal limits.  REPRODUCTIVE ORGANS: Question endometrial thickening. No adnexal lesion.    BOWEL: Colonic diverticulosis. Mild mural thickening of the ascending   colon likely associated portal hypertension. Small hiatal hernia. No   bowel obstruction.  PERITONEUM/RETROPERITONEUM: Interval increase in mild to moderate ascites.  VESSELS: New left portal venous thrombosis (3/42). Paraesophageal   varices. Atherosclerotic changes.  LYMPH NODES: No lymphadenopathy.  ABDOMINAL WALL: Small anterior periumbilical hernia containing free   fluid. Mild anasarca.  BONES: Degenerative changes.    IMPRESSION:  1.  New left portal venous thrombosis.  2.  Interval increase in pleural effusion, ascites, and new anasarca,   consistent with fluid overload state.  3.  Cirrhosis with multiple hypoattenuating hepatic lesions. Stable   treatment cavities. Portal hypertension.    Findings were discussed with Dr. Juárez 7/29/2024 4:30 PM by Dr. Butcher   with read back confirmation.    --- End of Report ---    ***Please see the addendum at the top of this report. It may contain   additional important information or changes.****        JUSTIN BUTCHER MD; Attending Radiologist  This document has been electronically signed. Jul 29 2024  4:30PM  1st Addendum: JUSTIN BUTCHER MD; Attending Radiologist  The first addendum was electronically signed on: Jul 29 2024  4:57PM.  07-29-24 @ 15:45       Chief Complaint:  Patient is a 84y old  Female who presents with a chief complaint of Decompensated cirrhosis   Hypercalcemia (30 Jul 2024 13:45).     HPI:  This is a 84 year old female with a PMH significant for Cirrhosis, HCC s/p Y90 (3/31/23), HLD, HTN, DM, and CAD s/p stent who presents to [facility] for abdominal pain, nausea and vomiting that started yesterday. Patient was recently admitted to [facility] 7/7 for generalized weakness, s/p paracentesis at that time and discharged with home care. History obtained from daughter at bedside, patient slightly lethargic upon assessment. Daughter states yesterday morning patient has 2 episodes of NBNB emesis with associated bilateral upper abdominal pain. She also reports patient had some blood in her stool yesterday, after being constipated for several days, similar episode 10 days ago. Daughter reports she noticed patient progressively started turning "yellow" about 2 weeks ago. CT of A/P with new left portal venous thrombosis, ascites, and portal hypertension. Last paracentesis July 17 with removal of 900cc fluid. Denies history of smoking, drinking, and illicit drug use. Last EGD/colonoscopy Jan 2023 esophageal hiatal hernia and 5mm polyp in the mid-transverse colon.     [id number]     PAST MEDICAL & SURGICAL HISTORY:  Hypertension      Hypothyroidism      GERD (gastroesophageal reflux disease)      Hypercholesterolemia      Diabetes      Heart murmur      History of cirrhosis of liver      HCC (hepatocellular carcinoma)      CAD (coronary artery disease)      S/P tubal ligation      History of vaginal surgery      History of total left knee replacement      History of cardiac cath          REVIEW OF SYSTEMS:   General: Negative  HEENT: Negative  CV: Negative  Respiratory: Negative  GI: See HPI  : Negative  MSK: Negative  Hematologic: Negative  Skin: Negative    MEDICATIONS:   MEDICATIONS  (STANDING):  albumin human 25% IVPB 50 milliLiter(s) IV Intermittent every 6 hours  carvedilol 12.5 milliGRAM(s) Oral every 12 hours  dextrose 5%. 1000 milliLiter(s) (100 mL/Hr) IV Continuous <Continuous>  dextrose 5%. 1000 milliLiter(s) (50 mL/Hr) IV Continuous <Continuous>  dextrose 50% Injectable 25 Gram(s) IV Push once  dextrose 50% Injectable 12.5 Gram(s) IV Push once  dextrose 50% Injectable 25 Gram(s) IV Push once  glucagon  Injectable 1 milliGRAM(s) IntraMuscular once  heparin   Injectable 5000 Unit(s) SubCutaneous every 12 hours  hydrALAZINE 50 milliGRAM(s) Oral two times a day  insulin lispro (ADMELOG) corrective regimen sliding scale   SubCutaneous Before meals and at bedtime  lactulose Syrup 10 Gram(s) Oral every 8 hours  ondansetron Injectable 4 milliGRAM(s) IV Push once  pantoprazole    Tablet 40 milliGRAM(s) Oral before breakfast  rifAXIMin 550 milliGRAM(s) Oral two times a day  sodium chloride 0.9%. 1000 milliLiter(s) (75 mL/Hr) IV Continuous <Continuous>  spironolactone 25 milliGRAM(s) Oral daily    MEDICATIONS  (PRN):  acetaminophen     Tablet .. 650 milliGRAM(s) Oral every 6 hours PRN Temp greater or equal to 38C (100.4F), Mild Pain (1 - 3)  aluminum hydroxide/magnesium hydroxide/simethicone Suspension 30 milliLiter(s) Oral every 4 hours PRN Dyspepsia  dextrose Oral Gel 15 Gram(s) Oral once PRN Blood Glucose LESS THAN 70 milliGRAM(s)/deciliter  melatonin 3 milliGRAM(s) Oral at bedtime PRN Insomnia          DIET:  Diet, Easy to Chew:   Consistent Carbohydrate Evening Snack (CSTCHOSN)  DASH/TLC Sodium & Cholesterol Restricted (DASH) (07-30-24 @ 08:50) [Active]          ALLERGIES:   Allergies    amlodipine (Swelling)    Intolerances        Substance Use:   (  ) never used  (  ) other:  Tobacco Usage:  (   ) never smoked   (   ) former smoker   (   ) current smoker  (     ) pack year  (        ) last cigarette date  Alcohol Usage:    Family History   IBD (  ) Yes   (  ) No  GI Malignancy (  )  Yes    (  ) No    Health Management  Last Colonoscopy:  Last Endoscopy:     VITAL SIGNS:   Vital Signs Last 24 Hrs  T(C): 36.4 (30 Jul 2024 12:07), Max: 36.9 (29 Jul 2024 23:40)  T(F): 97.5 (30 Jul 2024 12:07), Max: 98.5 (29 Jul 2024 23:40)  HR: 66 (30 Jul 2024 12:07) (58 - 67)  BP: 105/49 (30 Jul 2024 12:07) (102/53 - 121/68)  BP(mean): --  RR: 18 (30 Jul 2024 12:07) (18 - 18)  SpO2: 98% (30 Jul 2024 12:07) (98% - 98%)    Parameters below as of 30 Jul 2024 12:07  Patient On (Oxygen Delivery Method): room air      I&O's Summary      PHYSICAL EXAM:   GENERAL:  No acute distress  HEENT:  NC/AT, conjunctiva clear, + sclera icteric  CHEST:  No increased effort  HEART:  Regular rate  ABDOMEN:  Soft, non-tender, + distended, normoactive bowel sounds, no rebound or guarding  EXTREMITIES: + 2 pedal edema  SKIN:  Warm, dry, jaundiced  NEURO:  Calm, cooperative    LABS:                        8.4    10.06 )-----------( 157      ( 30 Jul 2024 04:33 )             23.7     Hemoglobin: 8.4 g/dL (07-30-24 @ 04:33)  Hemoglobin: 8.5 g/dL (07-29-24 @ 12:51)    07-30    129<L>  |  93<L>  |  37.4<H>  ----------------------------<  94  5.3   |  20.0<L>  |  1.52<H>    Ca    10.2      30 Jul 2024 04:33  Phos  4.4     07-30  Mg     1.5     07-30    TPro  5.4<L>  /  Alb  2.2<L>  /  TBili  11.2<H>  /  DBili  x   /  AST  209<H>  /  ALT  80<H>  /  AlkPhos  615<H>  07-30    LIVER FUNCTIONS - ( 30 Jul 2024 04:33 )  Alb: 2.2 g/dL / Pro: 5.4 g/dL / ALK PHOS: 615 U/L / ALT: 80 U/L / AST: 209 U/L / GGT: x             PT/INR - ( 29 Jul 2024 13:57 )   PT: 15.9 sec;   INR: 1.45 ratio          RADIOLOGY & ADDITIONAL STUDIES:      ACC: [id number] EXAM:  CT ABDOMEN AND PELVIS IC   ORDERED BY: MAGAN BUTCHER     *** ADDENDUM # 1 ***    Question endometrial thickening. Recommend pelvic sonogram if clinically   warranted.    --- End of Report ---    *** END OF ADDENDUM # 1 ***      PROCEDURE DATE:  07/29/2024          INTERPRETATION:  CLINICAL INFORMATION: Worsening abdominal pain. History   of liver cancer.    COMPARISON: CT chest abdomen pelvis 7/6/2024.    CONTRAST/COMPLICATIONS:  IV Contrast: Omnipaque 350  90 cc administered   10 cc discarded  Oral Contrast: NONE  Complications: None reported at time of study completion    PROCEDURE:  CT of the Abdomen and Pelvis was performed.  Sagittal and coronal reformats were performed.    FINDINGS:  LOWER CHEST: Interval increase of the small left pleural effusion and new   small right pleural effusion. Coronary artery stents.    LIVER: Cirrhosis. Innumerable hypoattenuating lesions. Stable segment 2   and 6 posttreatment cavities.  BILE DUCTS: Normal caliber.  GALLBLADDER: Cholelithiasis. Nondistended gallbladder with mural   thickening, likely associated with hepatic disease.  SPLEEN: Within normal limits.  PANCREAS: Within normal limits.  ADRENALS: Within normal limits.  KIDNEYS/URETERS: No hydronephrosis. Nonobstructive right renal stone   measuring 0.2 cm. Symmetric renal enhancement.      BLADDER: Within normal limits.  REPRODUCTIVE ORGANS: Question endometrial thickening. No adnexal lesion.    BOWEL: Colonic diverticulosis. Mild mural thickening of the ascending   colon likely associated portal hypertension. Small hiatal hernia. No   bowel obstruction.  PERITONEUM/RETROPERITONEUM: Interval increase in mild to moderate ascites.  VESSELS: New left portal venous thrombosis (3/42). Paraesophageal   varices. Atherosclerotic changes.  LYMPH NODES: No lymphadenopathy.  ABDOMINAL WALL: Small anterior periumbilical hernia containing free   fluid. Mild anasarca.  BONES: Degenerative changes.    IMPRESSION:  1.  New left portal venous thrombosis.  2.  Interval increase in pleural effusion, ascites, and new anasarca,   consistent with fluid overload state.  3.  Cirrhosis with multiple hypoattenuating hepatic lesions. Stable   treatment cavities. Portal hypertension.    Findings were discussed with Dr. Juárez 7/29/2024 4:30 PM by Dr. Butcher   with read back confirmation.    --- End of Report ---    ***Please see the addendum at the top of this report. It may contain   additional important information or changes.****        JUSTIN BUTCHER MD; Attending Radiologist  This document has been electronically signed. Jul 29 2024  4:30PM  1st Addendum: JUSTIN BUTCHER MD; Attending Radiologist  The first addendum was electronically signed on: Jul 29 2024  4:57PM.  07-29-24 @ 15:45       Chief Complaint:  Patient is a 84y old  Female who presents with a chief complaint of Decompensated cirrhosis   Hypercalcemia (30 Jul 2024 13:45).     HPI:  This is a 84 year old female with a PMH significant for Cirrhosis, HCC s/p Y90 (3/31/23), HLD, HTN, DM, and CAD s/p stent who presents to [facility] for abdominal pain, nausea and vomiting that started yesterday. Patient was recently admitted to [facility] 7/7 for generalized weakness, s/p paracentesis at that time and discharged with home care. History obtained from daughter at bedside, patient slightly lethargic upon assessment. Daughter states yesterday morning patient has 2 episodes of NBNB emesis with associated bilateral upper abdominal pain. She also reports patient had some blood in her stool yesterday, after being constipated for several days, similar episode 10 days ago. Daughter reports she noticed patient progressively started turning "yellow" about 2 weeks ago. CT of A/P with new left portal venous thrombosis, ascites, and portal hypertension. Last paracentesis July 17 with removal of 900cc fluid. Denies history of smoking, drinking, and illicit drug use. Last EGD/colonoscopy Jan 2023 esophageal hiatal hernia and 5mm polyp in the mid-transverse colon.     [id number] [place]     PAST MEDICAL & SURGICAL HISTORY:  Hypertension  Hypothyroidism  GERD (gastroesophageal reflux disease)  Hypercholesterolemia  Diabetes  Heart murmur  History of cirrhosis of liver  HCC (hepatocellular carcinoma)  CAD (coronary artery disease)  S/P tubal ligation  History of vaginal surgery  History of total left knee replacement  History of cardiac cath    REVIEW OF SYSTEMS:   General: Negative  HEENT: Negative  CV: Negative  Respiratory: Negative  GI: See HPI  : Negative  MSK: Negative  Hematologic: Negative  Skin: Negative    MEDICATIONS:   MEDICATIONS  (STANDING):  albumin human 25% IVPB 50 milliLiter(s) IV Intermittent every 6 hours  carvedilol 12.5 milliGRAM(s) Oral every 12 hours  dextrose 5%. 1000 milliLiter(s) (100 mL/Hr) IV Continuous <Continuous>  dextrose 5%. 1000 milliLiter(s) (50 mL/Hr) IV Continuous <Continuous>  dextrose 50% Injectable 25 Gram(s) IV Push once  dextrose 50% Injectable 12.5 Gram(s) IV Push once  dextrose 50% Injectable 25 Gram(s) IV Push once  glucagon  Injectable 1 milliGRAM(s) IntraMuscular once  heparin   Injectable 5000 Unit(s) SubCutaneous every 12 hours  hydrALAZINE 50 milliGRAM(s) Oral two times a day  insulin lispro (ADMELOG) corrective regimen sliding scale   SubCutaneous Before meals and at bedtime  lactulose Syrup 10 Gram(s) Oral every 8 hours  ondansetron Injectable 4 milliGRAM(s) IV Push once  pantoprazole    Tablet 40 milliGRAM(s) Oral before breakfast  rifAXIMin 550 milliGRAM(s) Oral two times a day  sodium chloride 0.9%. 1000 milliLiter(s) (75 mL/Hr) IV Continuous <Continuous>  spironolactone 25 milliGRAM(s) Oral daily    MEDICATIONS  (PRN):  acetaminophen     Tablet .. 650 milliGRAM(s) Oral every 6 hours PRN Temp greater or equal to 38C (100.4F), Mild Pain (1 - 3)  aluminum hydroxide/magnesium hydroxide/simethicone Suspension 30 milliLiter(s) Oral every 4 hours PRN Dyspepsia  dextrose Oral Gel 15 Gram(s) Oral once PRN Blood Glucose LESS THAN 70 milliGRAM(s)/deciliter  melatonin 3 milliGRAM(s) Oral at bedtime PRN Insomnia    DIET:  Diet, Easy to Chew:   Consistent Carbohydrate Evening Snack (CSTCHOSN)  DASH/TLC Sodium & Cholesterol Restricted (DASH) (07-30-24 @ 08:50) [Active]    ALLERGIES:   Allergies  amlodipine (Swelling)    VITAL SIGNS:   Vital Signs Last 24 Hrs  T(C): 36.4 (30 Jul 2024 12:07), Max: 36.9 (29 Jul 2024 23:40)  T(F): 97.5 (30 Jul 2024 12:07), Max: 98.5 (29 Jul 2024 23:40)  HR: 66 (30 Jul 2024 12:07) (58 - 67)  BP: 105/49 (30 Jul 2024 12:07) (102/53 - 121/68)  BP(mean): --  RR: 18 (30 Jul 2024 12:07) (18 - 18)  SpO2: 98% (30 Jul 2024 12:07) (98% - 98%)    Parameters below as of 30 Jul 2024 12:07  Patient On (Oxygen Delivery Method): room air    I&O's Summary    PHYSICAL EXAM:   GENERAL:  No acute distress  HEENT:  NC/AT, conjunctiva clear, + sclera icteric  CHEST:  No increased effort  HEART:  Regular rate  ABDOMEN:  Soft, non-tender, + distended, normoactive bowel sounds, no rebound or guarding  EXTREMITIES: + 2 pedal edema  SKIN:  Warm, dry, jaundiced  NEURO:  Calm, cooperative    LABS:               8.4    10.06 )-----------( 157      ( 30 Jul 2024 04:33 )             23.7     Hemoglobin: 8.4 g/dL (07-30-24 @ 04:33)  Hemoglobin: 8.5 g/dL (07-29-24 @ 12:51)    129<L>  |  93<L>  |  37.4<H>  ----------------------------<  94  5.3   |  20.0<L>  |  1.52<H>    Ca    10.2      30 Jul 2024 04:33  Phos  4.4     07-30  Mg     1.5     07-30    TPro  5.4<L>  /  Alb  2.2<L>  /  TBili  11.2<H>  /  DBili  x   /  AST  209<H>  /  ALT  80<H>  /  AlkPhos  615<H>  07-30    LIVER FUNCTIONS - ( 30 Jul 2024 04:33 )  Alb: 2.2 g/dL / Pro: 5.4 g/dL / ALK PHOS: 615 U/L / ALT: 80 U/L / AST: 209 U/L / GGT: x         PT/INR - ( 29 Jul 2024 13:57 )   PT: 15.9 sec;   INR: 1.45 ratio        RADIOLOGY & ADDITIONAL STUDIES:    ACC: [id number] EXAM:  CT ABDOMEN AND PELVIS IC   ORDERED BY: MAGAN BUTCHER     *** ADDENDUM # 1 ***    Question endometrial thickening. Recommend pelvic sonogram if clinically   warranted.    --- End of Report ---    *** END OF ADDENDUM # 1 ***    PROCEDURE DATE:  07/29/2024      INTERPRETATION:  CLINICAL INFORMATION: Worsening abdominal pain. History   of liver cancer.    COMPARISON: CT chest abdomen pelvis 7/6/2024.    CONTRAST/COMPLICATIONS:  IV Contrast: Omnipaque 350  90 cc administered   10 cc discarded  Oral Contrast: NONE  Complications: None reported at time of study completion    PROCEDURE:  CT of the Abdomen and Pelvis was performed.  Sagittal and coronal reformats were performed.    FINDINGS:  LOWER CHEST: Interval increase of the small left pleural effusion and new   small right pleural effusion. Coronary artery stents.    LIVER: Cirrhosis. Innumerable hypoattenuating lesions. Stable segment 2   and 6 posttreatment cavities.  BILE DUCTS: Normal caliber.  GALLBLADDER: Cholelithiasis. Nondistended gallbladder with mural   thickening, likely associated with hepatic disease.  SPLEEN: Within normal limits.  PANCREAS: Within normal limits.  ADRENALS: Within normal limits.  KIDNEYS/URETERS: No hydronephrosis. Nonobstructive right renal stone   measuring 0.2 cm. Symmetric renal enhancement.      BLADDER: Within normal limits.  REPRODUCTIVE ORGANS: Question endometrial thickening. No adnexal lesion.    BOWEL: Colonic diverticulosis. Mild mural thickening of the ascending   colon likely associated portal hypertension. Small hiatal hernia. No   bowel obstruction.  PERITONEUM/RETROPERITONEUM: Interval increase in mild to moderate ascites.  VESSELS: New left portal venous thrombosis (3/42). Paraesophageal   varices. Atherosclerotic changes.  LYMPH NODES: No lymphadenopathy.  ABDOMINAL WALL: Small anterior periumbilical hernia containing free   fluid. Mild anasarca.  BONES: Degenerative changes.    IMPRESSION:  1.  New left portal venous thrombosis.  2.  Interval increase in pleural effusion, ascites, and new anasarca,   consistent with fluid overload state.  3.  Cirrhosis with multiple hypoattenuating hepatic lesions. Stable   treatment cavities. Portal hypertension.    Findings were discussed with Dr. Juárez 7/29/2024 4:30 PM by Dr. Butcher   with read back confirmation.    --- End of Report ---    ***Please see the addendum at the top of this report. It may contain   additional important information or changes.****    JUSTIN BUTCHER MD; Attending Radiologist  This document has been electronically signed. Jul 29 2024  4:30PM  1st Addendum: JUSTIN BUTCHER MD; Attending Radiologist  The first addendum was electronically signed on: Jul 29 2024  4:57PM.  07-29-24 @ 15:45

## 2024-07-30 NOTE — SWALLOW BEDSIDE ASSESSMENT ADULT - COMMENTS
As per MD note: "85 y/o female with history of hepatocellular carcinoma/portal htn w/ ascites, Pleural effusion, DM-2, HTN, HLD, CAD with stents presents due to abdominal pain and vomiting."

## 2024-07-30 NOTE — CONSULT NOTE ADULT - ASSESSMENT
This is a 84 year old female with a PMH significant for Cirrhosis, HCC s/p Y90 (3/31/23), HLD, HTN, DM, and CAD s/p stent who presents to [facility] for abdominal pain, nausea and vomiting that started yesterday.    EGD/colonoscopy (01.5.23) Esophageal hiatal hernia. Normal mucosa in the antrum and stomach body.   Irregular Z line.  Colonoscopy Impressions:  Moderate diverticulosis of the sigmoid colon.  Grade/Stage II internal hemorrhoids.  Polyp (5 mm) in the mid-transverse colon.      #decompensated cirrhosis  #new left portal vein thrombosis   CT A/P w/ IC (07.29.24) New left portal venous thrombosis. Interval increase in pleural effusion, ascites, and new anasarca, consistent with fluid overload state.   Cirrhosis with multiple hypoattenuating hepatic lesions. Stable treatment cavities. Portal hypertension.  + hypoalbuminemia   MELD 3.0 on admission 26 points; 85.4% 90-day survival   Tbili 11.2  AST//80  Alk phos 615  +UTI  S/p paracentesis 7/16 -900cc fluid, -SBP    - Trend renal function, LFTs, electrolytes, coags daily  - Xifaxan 550 mg BID  - Monitor neurological function  - Avoid NSAIDs, hepatotoxic drugs  - MVI, thiamine and folic acid. Optimize nutrition, ensure adequate protein intake, low sodium, avoid raw shellfish.   - Continue to abstain from alcohol and illicit drug    - Can take up to 2G Tylenol per day. NO NSAIDs as these can lead to diuretic resistance and precipitate renal dysfunction in patients with advanced liver disease.  - Ascites: IR for therapeutic/diagnostic paracentesis. Monitor renal function. Albumin after LVP  - Initiate aggressive infectious workup  - Recommend hematology consult for new left PVT  - Follow up outpatient with hepatologist Dr. Kelley   _________________________________________________________________  Assessment and recommendations are final when note is signed by the attending physician.

## 2024-07-31 LAB
ALBUMIN SERPL ELPH-MCNC: 2.8 G/DL — LOW (ref 3.3–5.2)
ALP SERPL-CCNC: 539 U/L — HIGH (ref 40–120)
ALT FLD-CCNC: 78 U/L — HIGH
ANION GAP SERPL CALC-SCNC: 16 MMOL/L — SIGNIFICANT CHANGE UP (ref 5–17)
AST SERPL-CCNC: 204 U/L — HIGH
BILIRUB SERPL-MCNC: 12.9 MG/DL — HIGH (ref 0.4–2)
BUN SERPL-MCNC: 45.9 MG/DL — HIGH (ref 8–20)
CALCIUM SERPL-MCNC: 9.8 MG/DL — SIGNIFICANT CHANGE UP (ref 8.4–10.5)
CHLORIDE SERPL-SCNC: 93 MMOL/L — LOW (ref 96–108)
CO2 SERPL-SCNC: 20 MMOL/L — LOW (ref 22–29)
CREAT SERPL-MCNC: 1.79 MG/DL — HIGH (ref 0.5–1.3)
EGFR: 27 ML/MIN/1.73M2 — LOW
GLUCOSE BLDC GLUCOMTR-MCNC: 149 MG/DL — HIGH (ref 70–99)
GLUCOSE BLDC GLUCOMTR-MCNC: 169 MG/DL — HIGH (ref 70–99)
GLUCOSE BLDC GLUCOMTR-MCNC: 182 MG/DL — HIGH (ref 70–99)
GLUCOSE BLDC GLUCOMTR-MCNC: 192 MG/DL — HIGH (ref 70–99)
GLUCOSE SERPL-MCNC: 116 MG/DL — HIGH (ref 70–99)
HCT VFR BLD CALC: 20 % — CRITICAL LOW (ref 34.5–45)
HCT VFR BLD CALC: 23.8 % — LOW (ref 34.5–45)
HGB BLD-MCNC: 7.2 G/DL — LOW (ref 11.5–15.5)
HGB BLD-MCNC: 8.6 G/DL — LOW (ref 11.5–15.5)
MAGNESIUM SERPL-MCNC: 1.7 MG/DL — SIGNIFICANT CHANGE UP (ref 1.6–2.6)
MCHC RBC-ENTMCNC: 34 PG — SIGNIFICANT CHANGE UP (ref 27–34)
MCHC RBC-ENTMCNC: 34.8 PG — HIGH (ref 27–34)
MCHC RBC-ENTMCNC: 36 GM/DL — SIGNIFICANT CHANGE UP (ref 32–36)
MCHC RBC-ENTMCNC: 36.1 GM/DL — HIGH (ref 32–36)
MCV RBC AUTO: 94.1 FL — SIGNIFICANT CHANGE UP (ref 80–100)
MCV RBC AUTO: 96.6 FL — SIGNIFICANT CHANGE UP (ref 80–100)
PLATELET # BLD AUTO: 118 K/UL — LOW (ref 150–400)
PLATELET # BLD AUTO: 120 K/UL — LOW (ref 150–400)
POTASSIUM SERPL-MCNC: 4.9 MMOL/L — SIGNIFICANT CHANGE UP (ref 3.5–5.3)
POTASSIUM SERPL-SCNC: 4.9 MMOL/L — SIGNIFICANT CHANGE UP (ref 3.5–5.3)
PROT SERPL-MCNC: 5.6 G/DL — LOW (ref 6.6–8.7)
RBC # BLD: 2.07 M/UL — LOW (ref 3.8–5.2)
RBC # BLD: 2.53 M/UL — LOW (ref 3.8–5.2)
RBC # FLD: 22.9 % — HIGH (ref 10.3–14.5)
RBC # FLD: 23.2 % — HIGH (ref 10.3–14.5)
SODIUM SERPL-SCNC: 129 MMOL/L — LOW (ref 135–145)
WBC # BLD: 5.12 K/UL — SIGNIFICANT CHANGE UP (ref 3.8–10.5)
WBC # BLD: 6.21 K/UL — SIGNIFICANT CHANGE UP (ref 3.8–10.5)
WBC # FLD AUTO: 5.12 K/UL — SIGNIFICANT CHANGE UP (ref 3.8–10.5)
WBC # FLD AUTO: 6.21 K/UL — SIGNIFICANT CHANGE UP (ref 3.8–10.5)

## 2024-07-31 PROCEDURE — 99233 SBSQ HOSP IP/OBS HIGH 50: CPT

## 2024-07-31 PROCEDURE — 99497 ADVNCD CARE PLAN 30 MIN: CPT

## 2024-07-31 RX ADMIN — SPIRONOLACTONE 25 MILLIGRAM(S): 50 TABLET, FILM COATED ORAL at 05:05

## 2024-07-31 RX ADMIN — PANTOPRAZOLE SODIUM 40 MILLIGRAM(S): 20 TABLET, DELAYED RELEASE ORAL at 05:06

## 2024-07-31 RX ADMIN — Medication 650 MILLIGRAM(S): at 09:19

## 2024-07-31 RX ADMIN — HYDRALAZINE HYDROCHLORIDE 50 MILLIGRAM(S): 100 TABLET ORAL at 17:20

## 2024-07-31 RX ADMIN — HEPARIN SODIUM 5000 UNIT(S): 1000 INJECTION, SOLUTION INTRAVENOUS; SUBCUTANEOUS at 05:04

## 2024-07-31 RX ADMIN — Medication 2: at 13:05

## 2024-07-31 RX ADMIN — Medication 10 GRAM(S): at 13:06

## 2024-07-31 RX ADMIN — Medication 2: at 17:19

## 2024-07-31 RX ADMIN — Medication 10 GRAM(S): at 21:47

## 2024-07-31 RX ADMIN — Medication 2: at 22:44

## 2024-07-31 RX ADMIN — HYDRALAZINE HYDROCHLORIDE 50 MILLIGRAM(S): 100 TABLET ORAL at 05:05

## 2024-07-31 RX ADMIN — Medication 10 GRAM(S): at 05:04

## 2024-07-31 RX ADMIN — HEPARIN SODIUM 5000 UNIT(S): 1000 INJECTION, SOLUTION INTRAVENOUS; SUBCUTANEOUS at 17:20

## 2024-07-31 RX ADMIN — Medication 12.5 MILLIGRAM(S): at 05:05

## 2024-07-31 RX ADMIN — Medication 650 MILLIGRAM(S): at 08:23

## 2024-07-31 RX ADMIN — Medication 12.5 MILLIGRAM(S): at 17:20

## 2024-07-31 NOTE — PROGRESS NOTE ADULT - ASSESSMENT
85 y/o female with history of hepatocellular carcinoma/portal htn w/ ascites, Pleural effusion, DM-2, HTN, HLD, CAD with stents presents due to abdominal pain and vomiting.     #Abdominal pain  #Ascites  #Portal vein thrombosis  CT abdomen with increasing ascites, Portal vein thrombosis  GI consult  AC per GI - Defers to Heme onc  IR for paracentesis - after transfusion    #Anasarca likely 2/2 hypoalbuminemia/portal htn  #hypercalcemia due to underlying malignancy   IV fluids stopped  Lasix held for today  Repeat labs    #HCC  #Cirrhosis   Diagnosed with HCC in 2022  Lasix was held  Aldactone  Prior Heme/onc records noted, not candidate for chemo, declined trying other therapies  Hospice appropriate, daughter is debating hospice  Palliative care on board     #Hyponatremia  likely 2/2 hypervolemia 2/2 cirrhosis/anasarca, diuretic use   Hold Lasix for now pending Na trend  continue to monitor    #Pleural effusion:  Likely Malignant effusion  Not hypoxic, no increase WOB at this time  Monitor sats  May need therapeutic thoracentesis if worsens    #Anemia  #thrombocytopenia  Hbg stable compared to prior admission, likely multi-factorial   No reports of bleeding  DAPT on hold from last admission due to risk of bleeding   FA/MVI  Transfuse 1 unit     #Hx of type II diabetes  patient was on metformin previously  HgA1c last admission was 4.6, metformin discontinued  ADA diet, Accu checks AC/HS    #HTN  continue carvedilol 12.5 q12  continue hydralazine 50 mg BID  DASH diet     Dispo: Pending improvement, transfusion, paracentesis  Palliative care consult     Spoke with daughter at bedside

## 2024-07-31 NOTE — PROGRESS NOTE ADULT - SUBJECTIVE AND OBJECTIVE BOX
Hospitalist Daily Progress Note    Chief Complaint:  Patient is a 84y old  Female who presents with a chief complaint of Decompensated cirrhosis   Hypercalcemia (30 Jul 2024 15:19)      SUBJECTIVE / OVERNIGHT EVENTS:  Patient was seen and examined at bedside.   Anemic overnight - Transfusion ordered.   Romansh translated by bedside     Patient denies chest pain, SOB, abd pain, N/V, fever, chills, dysuria or any other complaints. All remainder ROS negative.     MEDICATIONS  (STANDING):  carvedilol 12.5 milliGRAM(s) Oral every 12 hours  dextrose 5%. 1000 milliLiter(s) (50 mL/Hr) IV Continuous <Continuous>  dextrose 5%. 1000 milliLiter(s) (100 mL/Hr) IV Continuous <Continuous>  dextrose 50% Injectable 12.5 Gram(s) IV Push once  dextrose 50% Injectable 25 Gram(s) IV Push once  dextrose 50% Injectable 25 Gram(s) IV Push once  glucagon  Injectable 1 milliGRAM(s) IntraMuscular once  heparin   Injectable 5000 Unit(s) SubCutaneous every 12 hours  hydrALAZINE 50 milliGRAM(s) Oral two times a day  insulin lispro (ADMELOG) corrective regimen sliding scale   SubCutaneous Before meals and at bedtime  lactulose Syrup 10 Gram(s) Oral every 8 hours  ondansetron Injectable 4 milliGRAM(s) IV Push once  pantoprazole    Tablet 40 milliGRAM(s) Oral before breakfast  rifAXIMin 550 milliGRAM(s) Oral two times a day  spironolactone 25 milliGRAM(s) Oral daily    MEDICATIONS  (PRN):  acetaminophen     Tablet .. 650 milliGRAM(s) Oral every 6 hours PRN Temp greater or equal to 38C (100.4F), Mild Pain (1 - 3)  aluminum hydroxide/magnesium hydroxide/simethicone Suspension 30 milliLiter(s) Oral every 4 hours PRN Dyspepsia  dextrose Oral Gel 15 Gram(s) Oral once PRN Blood Glucose LESS THAN 70 milliGRAM(s)/deciliter  melatonin 3 milliGRAM(s) Oral at bedtime PRN Insomnia        I&O's Summary      PHYSICAL EXAM:  Vital Signs Last 24 Hrs  T(C): 36.3 (31 Jul 2024 11:55), Max: 36.9 (30 Jul 2024 19:26)  T(F): 97.4 (31 Jul 2024 11:55), Max: 98.5 (30 Jul 2024 19:26)  HR: 55 (31 Jul 2024 11:55) (55 - 70)  BP: 107/59 (31 Jul 2024 11:55) (103/55 - 120/71)  BP(mean): 75 (30 Jul 2024 22:00) (75 - 75)  RR: 18 (31 Jul 2024 11:55) (17 - 18)  SpO2: 98% (31 Jul 2024 11:55) (96% - 100%)    Parameters below as of 31 Jul 2024 11:55  Patient On (Oxygen Delivery Method): nasal cannula  O2 Flow (L/min): 2      Constitutional: NAD, Resting, Chronically ill appearing female, icterus, Jaundice   ENT: Supple, No JVD  Lungs: CTA B/L, Non-labored breathing  Cardio: RRR, S1/S2, No murmur  Abdomen: Soft, Nontender, Nondistended; Bowel sounds present  Extremities: No calf tenderness, B/L Pitting edema  Musculoskeletal:   No joint swelling  Psych: Calm, cooperative affect appropriate  Neuro: Awake and alert, oriented to name,  moves extremities  Skin: No rashes; no palpable lesions    LABS:                        7.2    6.21  )-----------( 120      ( 31 Jul 2024 04:10 )             20.0     07-31    129<L>  |  93<L>  |  45.9<H>  ----------------------------<  116<H>  4.9   |  20.0<L>  |  1.79<H>    Ca    9.8      31 Jul 2024 04:10  Phos  4.4     07-30  Mg     1.7     07-31    TPro  5.6<L>  /  Alb  2.8<L>  /  TBili  12.9<H>  /  DBili  x   /  AST  204<H>  /  ALT  78<H>  /  AlkPhos  539<H>  07-31          Urinalysis Basic - ( 31 Jul 2024 04:10 )    Color: x / Appearance: x / SG: x / pH: x  Gluc: 116 mg/dL / Ketone: x  / Bili: x / Urobili: x   Blood: x / Protein: x / Nitrite: x   Leuk Esterase: x / RBC: x / WBC x   Sq Epi: x / Non Sq Epi: x / Bacteria: x        CAPILLARY BLOOD GLUCOSE      POCT Blood Glucose.: 169 mg/dL (31 Jul 2024 12:10)  POCT Blood Glucose.: 149 mg/dL (31 Jul 2024 08:39)  POCT Blood Glucose.: 179 mg/dL (30 Jul 2024 21:19)  POCT Blood Glucose.: 205 mg/dL (30 Jul 2024 17:08)        RADIOLOGY REVIEWED

## 2024-08-01 LAB
ACANTHOCYTES BLD QL SMEAR: SIGNIFICANT CHANGE UP
ANION GAP SERPL CALC-SCNC: 13 MMOL/L — SIGNIFICANT CHANGE UP (ref 5–17)
ANISOCYTOSIS BLD QL: SIGNIFICANT CHANGE UP
BASOPHILS # BLD AUTO: 0 K/UL — SIGNIFICANT CHANGE UP (ref 0–0.2)
BASOPHILS NFR BLD AUTO: 0 % — SIGNIFICANT CHANGE UP (ref 0–2)
BUN SERPL-MCNC: 47.1 MG/DL — HIGH (ref 8–20)
CALCIUM SERPL-MCNC: 9.6 MG/DL — SIGNIFICANT CHANGE UP (ref 8.4–10.5)
CHLORIDE SERPL-SCNC: 90 MMOL/L — LOW (ref 96–108)
CO2 SERPL-SCNC: 20 MMOL/L — LOW (ref 22–29)
CREAT SERPL-MCNC: 1.74 MG/DL — HIGH (ref 0.5–1.3)
DACRYOCYTES BLD QL SMEAR: SLIGHT — SIGNIFICANT CHANGE UP
EGFR: 28 ML/MIN/1.73M2 — LOW
EOSINOPHIL # BLD AUTO: 0 K/UL — SIGNIFICANT CHANGE UP (ref 0–0.5)
EOSINOPHIL NFR BLD AUTO: 0 % — SIGNIFICANT CHANGE UP (ref 0–6)
GIANT PLATELETS BLD QL SMEAR: PRESENT — SIGNIFICANT CHANGE UP
GLUCOSE BLDC GLUCOMTR-MCNC: 115 MG/DL — HIGH (ref 70–99)
GLUCOSE BLDC GLUCOMTR-MCNC: 125 MG/DL — HIGH (ref 70–99)
GLUCOSE BLDC GLUCOMTR-MCNC: 163 MG/DL — HIGH (ref 70–99)
GLUCOSE BLDC GLUCOMTR-MCNC: 182 MG/DL — HIGH (ref 70–99)
GLUCOSE SERPL-MCNC: 113 MG/DL — HIGH (ref 70–99)
HCT VFR BLD CALC: 22.3 % — LOW (ref 34.5–45)
HGB BLD-MCNC: 8.2 G/DL — LOW (ref 11.5–15.5)
LYMPHOCYTES # BLD AUTO: 0.35 K/UL — LOW (ref 1–3.3)
LYMPHOCYTES # BLD AUTO: 7 % — LOW (ref 13–44)
MACROCYTES BLD QL: SIGNIFICANT CHANGE UP
MANUAL SMEAR VERIFICATION: SIGNIFICANT CHANGE UP
MCHC RBC-ENTMCNC: 34.3 PG — HIGH (ref 27–34)
MCHC RBC-ENTMCNC: 36.8 GM/DL — HIGH (ref 32–36)
MCV RBC AUTO: 93.3 FL — SIGNIFICANT CHANGE UP (ref 80–100)
MONOCYTES # BLD AUTO: 0.44 K/UL — SIGNIFICANT CHANGE UP (ref 0–0.9)
MONOCYTES NFR BLD AUTO: 8.8 % — SIGNIFICANT CHANGE UP (ref 2–14)
MRSA PCR RESULT.: SIGNIFICANT CHANGE UP
NEUTROPHILS # BLD AUTO: 4.08 K/UL — SIGNIFICANT CHANGE UP (ref 1.8–7.4)
NEUTROPHILS NFR BLD AUTO: 80.7 % — HIGH (ref 43–77)
OVALOCYTES BLD QL SMEAR: SLIGHT — SIGNIFICANT CHANGE UP
PLAT MORPH BLD: NORMAL — SIGNIFICANT CHANGE UP
PLATELET # BLD AUTO: 125 K/UL — LOW (ref 150–400)
POIKILOCYTOSIS BLD QL AUTO: SIGNIFICANT CHANGE UP
POLYCHROMASIA BLD QL SMEAR: SIGNIFICANT CHANGE UP
POTASSIUM SERPL-MCNC: 4.9 MMOL/L — SIGNIFICANT CHANGE UP (ref 3.5–5.3)
POTASSIUM SERPL-SCNC: 4.9 MMOL/L — SIGNIFICANT CHANGE UP (ref 3.5–5.3)
RAPID RVP RESULT: SIGNIFICANT CHANGE UP
RBC # BLD: 2.39 M/UL — LOW (ref 3.8–5.2)
RBC # FLD: 23.3 % — HIGH (ref 10.3–14.5)
RBC BLD AUTO: ABNORMAL
S AUREUS DNA NOSE QL NAA+PROBE: SIGNIFICANT CHANGE UP
SARS-COV-2 RNA SPEC QL NAA+PROBE: SIGNIFICANT CHANGE UP
SMUDGE CELLS # BLD: PRESENT — SIGNIFICANT CHANGE UP
SODIUM SERPL-SCNC: 123 MMOL/L — LOW (ref 135–145)
TARGETS BLD QL SMEAR: SIGNIFICANT CHANGE UP
VARIANT LYMPHS # BLD: 3.5 % — SIGNIFICANT CHANGE UP (ref 0–6)
WBC # BLD: 5.05 K/UL — SIGNIFICANT CHANGE UP (ref 3.8–10.5)
WBC # FLD AUTO: 5.05 K/UL — SIGNIFICANT CHANGE UP (ref 3.8–10.5)

## 2024-08-01 PROCEDURE — 99233 SBSQ HOSP IP/OBS HIGH 50: CPT

## 2024-08-01 PROCEDURE — 76705 ECHO EXAM OF ABDOMEN: CPT | Mod: 26

## 2024-08-01 RX ORDER — SPIRONOLACTONE 50 MG/1
50 TABLET, FILM COATED ORAL DAILY
Refills: 0 | Status: DISCONTINUED | OUTPATIENT
Start: 2024-08-01 | End: 2024-08-03

## 2024-08-01 RX ORDER — GUAIFENESIN 100 MG/5ML
100 SOLUTION ORAL ONCE
Refills: 0 | Status: COMPLETED | OUTPATIENT
Start: 2024-08-01 | End: 2024-08-01

## 2024-08-01 RX ORDER — CHLORHEXIDINE GLUCONATE 500 MG/1
1 CLOTH TOPICAL
Refills: 0 | Status: DISCONTINUED | OUTPATIENT
Start: 2024-08-01 | End: 2024-08-02

## 2024-08-01 RX ADMIN — Medication 12.5 MILLIGRAM(S): at 06:01

## 2024-08-01 RX ADMIN — HYDRALAZINE HYDROCHLORIDE 50 MILLIGRAM(S): 100 TABLET ORAL at 06:01

## 2024-08-01 RX ADMIN — CHLORHEXIDINE GLUCONATE 1 APPLICATION(S): 500 CLOTH TOPICAL at 12:02

## 2024-08-01 RX ADMIN — Medication 650 MILLIGRAM(S): at 12:03

## 2024-08-01 RX ADMIN — Medication 12.5 MILLIGRAM(S): at 18:00

## 2024-08-01 RX ADMIN — SPIRONOLACTONE 25 MILLIGRAM(S): 50 TABLET, FILM COATED ORAL at 06:02

## 2024-08-01 RX ADMIN — Medication 2: at 17:38

## 2024-08-01 RX ADMIN — Medication 2: at 12:00

## 2024-08-01 RX ADMIN — PANTOPRAZOLE SODIUM 40 MILLIGRAM(S): 20 TABLET, DELAYED RELEASE ORAL at 06:01

## 2024-08-01 RX ADMIN — Medication 10 GRAM(S): at 06:01

## 2024-08-01 RX ADMIN — HEPARIN SODIUM 5000 UNIT(S): 1000 INJECTION, SOLUTION INTRAVENOUS; SUBCUTANEOUS at 17:39

## 2024-08-01 RX ADMIN — Medication 650 MILLIGRAM(S): at 12:35

## 2024-08-01 RX ADMIN — HEPARIN SODIUM 5000 UNIT(S): 1000 INJECTION, SOLUTION INTRAVENOUS; SUBCUTANEOUS at 06:01

## 2024-08-01 RX ADMIN — GUAIFENESIN 100 MILLIGRAM(S): 100 SOLUTION ORAL at 01:56

## 2024-08-01 RX ADMIN — Medication 10 GRAM(S): at 21:54

## 2024-08-01 RX ADMIN — SPIRONOLACTONE 50 MILLIGRAM(S): 50 TABLET, FILM COATED ORAL at 17:39

## 2024-08-01 RX ADMIN — HYDRALAZINE HYDROCHLORIDE 50 MILLIGRAM(S): 100 TABLET ORAL at 17:39

## 2024-08-01 NOTE — PROGRESS NOTE ADULT - ATTENDING COMMENTS
Patient seen, chart reviewed, prior admissions noted. Patient with multiple admission due to complications of HCC and not candidate for treatment. Family aware disease will cont. to progress due to lack of treatment options, advanced age, and multiple comorbidities. Now with PVT due to malignancy, recurrent ascites/pleural effusion likely from severe/protein malnutrition and liver failure from infiltrate disease/cancer and low oncotic pressure. Also now with hypercalcemia from malignancy, and worsening hyperbilirubinemia. All expected due to progression of disease. Family agreeable to Palliative to reconsult but not interested in Hospice. Will cont. with conservative measures as noted above. IR for therapeutic paracentesis for comfort, no evidence of SBP clinically. May need thoracentesis pending clinical course. MICU eval noted and episode or resp. distress transient and related to lying supine for CT scan causing decreased diaphragmatic excursion. No episodes since and normal sat on RA while sitting up. Over all prognosis very poor.

## 2024-08-01 NOTE — CHART NOTE - NSCHARTNOTEFT_GEN_A_CORE
Called by RN w/ pt's Hgb of 7.2 trending down from 8.4  Pt w/ known hx of Anemia.  Asx, VSS  W/ hx of CAD w/ stents, will transfuse 1u OF PRBC to keep Hgb >8  RN to monitor and escalate prn.
patient brought to IR dept for paracentesis  all 4 qaudrant were scanned, imaging saved to PACS  RUQ/RLQ: no ascites  LUQ: trace ascites  LLQ: trace to small ascites    given these findings paracentesis was deferred at this time.

## 2024-08-01 NOTE — PHYSICAL THERAPY INITIAL EVALUATION ADULT - PERTINENT HX OF CURRENT PROBLEM, REHAB EVAL
85 y/o female who follows Dr Dave for hepatocellular carcinoma was on Sorafenib but stopped due to side effects VNS is in place, however patient and her family are still declining hospice and multifactorial anemia of CKD and iron deficiency receives IV iron PRN  presents due abdominal pain and vomiting. Patient was recently admitted to hospital for worsening shortness of breath and bilateral lower extremity swelling. Patient received therapeutic paracentesis and was discharged with home care.  According to patient daughter, patient has been lethargic for the previous two days. Last night patient began complaining of abdominal pain that she rates as 10/10. this morning patient began vomiting on and off for 30-60 minutes. Patient was taken to ED by family at this time. Patient was sent for CT scan and became hypoxic and tachypneic. MICU was consulted for patient was placed on NC supplemental oxygen and patient improved. Patient also complained of itching for over a week. States itching comes sporadically and is not associated with any notable triggers.  Patient denies chest pain, SoB, dysuria, syncope, HA.

## 2024-08-01 NOTE — PROGRESS NOTE ADULT - SUBJECTIVE AND OBJECTIVE BOX
CC: Follow up     INTERVAL HPI/OVERNIGHT EVENTS: Patient seen and examined,       Vital Signs Last 24 Hrs  T(C): 36.4 (01 Aug 2024 12:02), Max: 36.6 (31 Jul 2024 19:58)  T(F): 97.5 (01 Aug 2024 12:02), Max: 97.9 (31 Jul 2024 19:58)  HR: 59 (01 Aug 2024 12:02) (56 - 62)  BP: 100/56 (01 Aug 2024 12:02) (100/56 - 118/60)  BP(mean): --  RR: 18 (01 Aug 2024 12:02) (14 - 18)  SpO2: 99% (01 Aug 2024 12:02) (97% - 99%)    Parameters below as of 01 Aug 2024 08:51  Patient On (Oxygen Delivery Method): nasal cannula  O2 Flow (L/min): 2      PHYSICAL EXAM:    GENERAL: NAD  HEAD:  Atraumatic, Normocephalic  EYES:  conjunctiva and sclera icteric   ENMT: Moist mucous membranes  CHEST/LUNG: Clear to auscultation bilaterally; No rales, rhonchi, wheezing, or rubs  HEART: Regular rate and rhythm; No murmurs, rubs, or gallops  ABDOMEN: Soft, Nontender, +distended; Bowel sounds present  EXTREMITIES:  2+ Peripheral Pulses, No clubbing, cyanosis, or edema        MEDICATIONS  (STANDING):  carvedilol 12.5 milliGRAM(s) Oral every 12 hours  chlorhexidine 2% Cloths 1 Application(s) Topical <User Schedule>  dextrose 5%. 1000 milliLiter(s) (50 mL/Hr) IV Continuous <Continuous>  dextrose 5%. 1000 milliLiter(s) (100 mL/Hr) IV Continuous <Continuous>  dextrose 50% Injectable 12.5 Gram(s) IV Push once  dextrose 50% Injectable 25 Gram(s) IV Push once  dextrose 50% Injectable 25 Gram(s) IV Push once  glucagon  Injectable 1 milliGRAM(s) IntraMuscular once  heparin   Injectable 5000 Unit(s) SubCutaneous every 12 hours  hydrALAZINE 50 milliGRAM(s) Oral two times a day  insulin lispro (ADMELOG) corrective regimen sliding scale   SubCutaneous Before meals and at bedtime  lactulose Syrup 10 Gram(s) Oral every 8 hours  ondansetron Injectable 4 milliGRAM(s) IV Push once  pantoprazole    Tablet 40 milliGRAM(s) Oral before breakfast  rifAXIMin 550 milliGRAM(s) Oral two times a day  spironolactone 25 milliGRAM(s) Oral daily    MEDICATIONS  (PRN):  acetaminophen     Tablet .. 650 milliGRAM(s) Oral every 6 hours PRN Temp greater or equal to 38C (100.4F), Mild Pain (1 - 3)  aluminum hydroxide/magnesium hydroxide/simethicone Suspension 30 milliLiter(s) Oral every 4 hours PRN Dyspepsia  dextrose Oral Gel 15 Gram(s) Oral once PRN Blood Glucose LESS THAN 70 milliGRAM(s)/deciliter  melatonin 3 milliGRAM(s) Oral at bedtime PRN Insomnia      Allergies    amlodipine (Swelling)    Intolerances          LABS:                          8.2    5.05  )-----------( 125      ( 01 Aug 2024 03:32 )             22.3     08-01    123<L>  |  90<L>  |  47.1<H>  ----------------------------<  113<H>  4.9   |  20.0<L>  |  1.74<H>    Ca    9.6      01 Aug 2024 03:32  Mg     1.7     07-31    TPro  5.6<L>  /  Alb  2.8<L>  /  TBili  12.9<H>  /  DBili  x   /  AST  204<H>  /  ALT  78<H>  /  AlkPhos  539<H>  07-31      Urinalysis Basic - ( 01 Aug 2024 03:32 )    Color: x / Appearance: x / SG: x / pH: x  Gluc: 113 mg/dL / Ketone: x  / Bili: x / Urobili: x   Blood: x / Protein: x / Nitrite: x   Leuk Esterase: x / RBC: x / WBC x   Sq Epi: x / Non Sq Epi: x / Bacteria: x        RADIOLOGY & ADDITIONAL TESTS:   CC: Follow up     INTERVAL HPI/OVERNIGHT EVENTS: Patient seen and examined with  with son and daughter Daisy at the bedside. Patient requiring 2 liters NC,       Vital Signs Last 24 Hrs  T(C): 36.4 (01 Aug 2024 12:02), Max: 36.6 (31 Jul 2024 19:58)  T(F): 97.5 (01 Aug 2024 12:02), Max: 97.9 (31 Jul 2024 19:58)  HR: 59 (01 Aug 2024 12:02) (56 - 62)  BP: 100/56 (01 Aug 2024 12:02) (100/56 - 118/60)  BP(mean): --  RR: 18 (01 Aug 2024 12:02) (14 - 18)  SpO2: 99% (01 Aug 2024 12:02) (97% - 99%)    Parameters below as of 01 Aug 2024 08:51  Patient On (Oxygen Delivery Method): nasal cannula  O2 Flow (L/min): 2      PHYSICAL EXAM:    GENERAL: NAD AOX 3 able to tell me her name location and year  HEAD:  Atraumatic, Normocephalic  EYES:  conjunctiva and sclera icteric   ENMT: Moist mucous membranes  CHEST/LUNG: Clear to auscultation bilaterally; No rales, rhonchi, wheezing, or rubs  HEART: Regular rate and rhythm; No murmurs, rubs, or gallops  ABDOMEN: Soft, Nontender, +distended; Bowel sounds present  EXTREMITIES:  2+ Peripheral Pulses, No clubbing, cyanosis, or edema        MEDICATIONS  (STANDING):  carvedilol 12.5 milliGRAM(s) Oral every 12 hours  chlorhexidine 2% Cloths 1 Application(s) Topical <User Schedule>  dextrose 5%. 1000 milliLiter(s) (50 mL/Hr) IV Continuous <Continuous>  dextrose 5%. 1000 milliLiter(s) (100 mL/Hr) IV Continuous <Continuous>  dextrose 50% Injectable 12.5 Gram(s) IV Push once  dextrose 50% Injectable 25 Gram(s) IV Push once  dextrose 50% Injectable 25 Gram(s) IV Push once  glucagon  Injectable 1 milliGRAM(s) IntraMuscular once  heparin   Injectable 5000 Unit(s) SubCutaneous every 12 hours  hydrALAZINE 50 milliGRAM(s) Oral two times a day  insulin lispro (ADMELOG) corrective regimen sliding scale   SubCutaneous Before meals and at bedtime  lactulose Syrup 10 Gram(s) Oral every 8 hours  ondansetron Injectable 4 milliGRAM(s) IV Push once  pantoprazole    Tablet 40 milliGRAM(s) Oral before breakfast  rifAXIMin 550 milliGRAM(s) Oral two times a day  spironolactone 25 milliGRAM(s) Oral daily    MEDICATIONS  (PRN):  acetaminophen     Tablet .. 650 milliGRAM(s) Oral every 6 hours PRN Temp greater or equal to 38C (100.4F), Mild Pain (1 - 3)  aluminum hydroxide/magnesium hydroxide/simethicone Suspension 30 milliLiter(s) Oral every 4 hours PRN Dyspepsia  dextrose Oral Gel 15 Gram(s) Oral once PRN Blood Glucose LESS THAN 70 milliGRAM(s)/deciliter  melatonin 3 milliGRAM(s) Oral at bedtime PRN Insomnia      Allergies    amlodipine (Swelling)    Intolerances          LABS:                          8.2    5.05  )-----------( 125      ( 01 Aug 2024 03:32 )             22.3     08-01    123<L>  |  90<L>  |  47.1<H>  ----------------------------<  113<H>  4.9   |  20.0<L>  |  1.74<H>    Ca    9.6      01 Aug 2024 03:32  Mg     1.7     07-31    TPro  5.6<L>  /  Alb  2.8<L>  /  TBili  12.9<H>  /  DBili  x   /  AST  204<H>  /  ALT  78<H>  /  AlkPhos  539<H>  07-31      Urinalysis Basic - ( 01 Aug 2024 03:32 )    Color: x / Appearance: x / SG: x / pH: x  Gluc: 113 mg/dL / Ketone: x  / Bili: x / Urobili: x   Blood: x / Protein: x / Nitrite: x   Leuk Esterase: x / RBC: x / WBC x   Sq Epi: x / Non Sq Epi: x / Bacteria: x        RADIOLOGY & ADDITIONAL TESTS:

## 2024-08-01 NOTE — PROGRESS NOTE ADULT - ASSESSMENT
83 y/o female with history of hepatocellular carcinoma/portal htn w/ ascites, Pleural effusion, DM-2, HTN, HLD, CAD with stents presents due to abdominal pain and vomiting.    Assessment/Plan:    #Abdominal pain  #Ascites  #Portal vein thrombosis  CT abdomen with increasing ascites, Portal vein thrombosis  AC per GI - Defers to Heme onc  Seen by IR this AM, fluid for drainage noted     #Anasarca likely 2/2 hypoalbuminemia/portal htn  #hypercalcemia due to underlying malignancy - Improved    #HCC  #Cirrhosis   Diagnosed with HCC in 2022  Increase aldactone to 50mg PO OD   Prior Heme/onc records noted, not candidate for chemo, declined trying other therapies  Hospice appropriate  Palliative care on board     #Hyponatremia  likely 2/2 hypervolemia 2/2 cirrhosis/anasarca, diuretic use   Hold Lasix for now pending Na trend  continue to monitor    #Pleural effusion:  Likely Malignant effusion  Not hypoxic, no increase WOB at this time  Monitor sats  May need therapeutic thoracentesis if worsens    #Anemia  #thrombocytopenia  Hbg stable compared to prior admission, likely multi-factorial   No reports of bleeding  DAPT on hold from last admission due to risk of bleeding   FA/MVI  Transfused 1 unit     #Hx of type II diabetes  patient was on metformin previously  HgA1c last admission was 4.6, metformin discontinued  ADA diet, Accu checks AC/HS    #HTN  continue carvedilol 12.5 q12  continue hydralazine 50 mg BID  DASH diet     Dispo: Pending improvement, transfusion, paracentesis  Palliative care consult     Poor prognosis

## 2024-08-01 NOTE — PROGRESS NOTE ADULT - SUBJECTIVE AND OBJECTIVE BOX
85 y/o female who follows Dr Dave for hepatocellular carcinoma was on Sorafenib but stopped due to side effects VNS is in place, however patient and her family are still declining hospice and multifactorial anemia of CKD and iron deficiency receives IV iron PRN  presents due abdominal pain and vomiting. Patient was recently admitted to hospital for worsening shortness of breath and bilateral lower extremity swelling. Patient received therapeutic paracentesis and was discharged with home care.  According to patient daughter, patient has been lethargic for the previous two days. Last night patient began complaining of abdominal pain that she rates as 10/10. this morning patient began vomiting on and off for 30-60 minutes. Patient was taken to ED by family at this time. Patient was sent for CT scan and became hypoxic and tachypneic. MICU was consulted for patient was placed on NC supplemental oxygen and patient improved. Patient also complained of itching for over a week. States itching comes sporadically and is not associated with any notable triggers.  Patient denies chest pain, SoB, dysuria, syncope, HA.     PAST MEDICAL & SURGICAL HISTORY:  Hypertension  Hypothyroidism  GERD (gastroesophageal reflux disease)  Hypercholesterolemia  Diabetes  Heart murmur  History of cirrhosis of liver  HCC (hepatocellular carcinoma)  CAD (coronary artery disease)  S/P tubal ligation  History of vaginal surgery  History of total left knee replacement  History of cardiac cath    Allergies  amlodipine (Swelling)    MEDICATIONS  (STANDING):  carvedilol 12.5 milliGRAM(s) Oral every 12 hours  dextrose 5%. 1000 milliLiter(s) (100 mL/Hr) IV Continuous <Continuous>  dextrose 5%. 1000 milliLiter(s) (50 mL/Hr) IV Continuous <Continuous>  dextrose 50% Injectable 25 Gram(s) IV Push once  dextrose 50% Injectable 12.5 Gram(s) IV Push once  dextrose 50% Injectable 25 Gram(s) IV Push once  glucagon  Injectable 1 milliGRAM(s) IntraMuscular once  heparin   Injectable 5000 Unit(s) SubCutaneous every 12 hours  hydrALAZINE 50 milliGRAM(s) Oral two times a day  insulin lispro (ADMELOG) corrective regimen sliding scale   SubCutaneous Before meals and at bedtime  lactulose Syrup 10 Gram(s) Oral every 8 hours  ondansetron Injectable 4 milliGRAM(s) IV Push once  pantoprazole    Tablet 40 milliGRAM(s) Oral before breakfast  rifAXIMin 550 milliGRAM(s) Oral two times a day  spironolactone 25 milliGRAM(s) Oral daily    MEDICATIONS  (PRN):  acetaminophen     Tablet .. 650 milliGRAM(s) Oral every 6 hours PRN Temp greater or equal to 38C (100.4F), Mild Pain (1 - 3)  aluminum hydroxide/magnesium hydroxide/simethicone Suspension 30 milliLiter(s) Oral every 4 hours PRN Dyspepsia  dextrose Oral Gel 15 Gram(s) Oral once PRN Blood Glucose LESS THAN 70 milliGRAM(s)/deciliter  melatonin 3 milliGRAM(s) Oral at bedtime PRN Insomnia    Vital Signs Last 24 Hrs  T(C): 36.3 (01 Aug 2024 04:21), Max: 36.6 (31 Jul 2024 19:58)  T(F): 97.3 (01 Aug 2024 04:21), Max: 97.9 (31 Jul 2024 19:58)  HR: 62 (01 Aug 2024 08:51) (55 - 62)  BP: 109/60 (01 Aug 2024 04:21) (107/59 - 118/60)  BP(mean): --  RR: 17 (01 Aug 2024 04:21) (14 - 18)  SpO2: 99% (01 Aug 2024 08:51) (97% - 99%)    Parameters below as of 01 Aug 2024 08:51  Patient On (Oxygen Delivery Method): nasal cannula  O2 Flow (L/min): 2    CONSTITUTIONAL: Well groomed, no apparent distress  EYES: PERRLA and symmetric, EOMI. scleral icterus  ENMT: Oral mucosa with moist membranes.    RESP: No respiratory distress, CTA b/l,   CV: RRR, +S1S2 no peripheral edema  GI: Soft,  distended,   MSK: pitting edema bilaterally Normal ROM   SKIN: No rashes or ulcers noted  NEURO: A&O  PSYCH:  mood and affect appropriate     CBC                          8.2    5.05  )-----------( 125      ( 01 Aug 2024 03:32 )             22.3     CHEM     83 y/o female who follows Dr Dave for hepatocellular carcinoma was on Sorafenib but stopped due to side effects  and multifactorial anemia of CKD and iron deficiency receives IV iron PRN  VNS is in place, however patient and her family are still declining hospice presents due abdominal pain and vomiting. Patient was recently admitted to hospital for worsening shortness of breath and bilateral lower extremity swelling. Patient received therapeutic paracentesis and was discharged with home care.  According to patient daughter, patient has been lethargic for the previous two days. Last night patient began complaining of abdominal pain that she rates as 10/10. this morning patient began vomiting on and off for 30-60 minutes. Patient was taken to ED by family at this time. Patient was sent for CT scan and became hypoxic and tachypneic. MICU was consulted for patient was placed on NC supplemental oxygen and patient improved. Patient also complained of itching for over a week. States itching comes sporadically and is not associated with any notable triggers.  Patient denies chest pain, SoB, dysuria, syncope, HA.     PAST MEDICAL & SURGICAL HISTORY:  Hypertension  Hypothyroidism  GERD (gastroesophageal reflux disease)  Hypercholesterolemia  Diabetes  Heart murmur  History of cirrhosis of liver  HCC (hepatocellular carcinoma)  CAD (coronary artery disease)  S/P tubal ligation  History of vaginal surgery  History of total left knee replacement  History of cardiac cath    Allergies  amlodipine (Swelling)    MEDICATIONS  (STANDING):  carvedilol 12.5 milliGRAM(s) Oral every 12 hours  dextrose 5%. 1000 milliLiter(s) (100 mL/Hr) IV Continuous <Continuous>  dextrose 5%. 1000 milliLiter(s) (50 mL/Hr) IV Continuous <Continuous>  dextrose 50% Injectable 25 Gram(s) IV Push once  dextrose 50% Injectable 12.5 Gram(s) IV Push once  dextrose 50% Injectable 25 Gram(s) IV Push once  glucagon  Injectable 1 milliGRAM(s) IntraMuscular once  heparin   Injectable 5000 Unit(s) SubCutaneous every 12 hours  hydrALAZINE 50 milliGRAM(s) Oral two times a day  insulin lispro (ADMELOG) corrective regimen sliding scale   SubCutaneous Before meals and at bedtime  lactulose Syrup 10 Gram(s) Oral every 8 hours  ondansetron Injectable 4 milliGRAM(s) IV Push once  pantoprazole    Tablet 40 milliGRAM(s) Oral before breakfast  rifAXIMin 550 milliGRAM(s) Oral two times a day  spironolactone 25 milliGRAM(s) Oral daily    MEDICATIONS  (PRN):  acetaminophen     Tablet .. 650 milliGRAM(s) Oral every 6 hours PRN Temp greater or equal to 38C (100.4F), Mild Pain (1 - 3)  aluminum hydroxide/magnesium hydroxide/simethicone Suspension 30 milliLiter(s) Oral every 4 hours PRN Dyspepsia  dextrose Oral Gel 15 Gram(s) Oral once PRN Blood Glucose LESS THAN 70 milliGRAM(s)/deciliter  melatonin 3 milliGRAM(s) Oral at bedtime PRN Insomnia    Vital Signs Last 24 Hrs  T(C): 36.3 (01 Aug 2024 04:21), Max: 36.6 (31 Jul 2024 19:58)  T(F): 97.3 (01 Aug 2024 04:21), Max: 97.9 (31 Jul 2024 19:58)  HR: 62 (01 Aug 2024 08:51) (55 - 62)  BP: 109/60 (01 Aug 2024 04:21) (107/59 - 118/60)  BP(mean): --  RR: 17 (01 Aug 2024 04:21) (14 - 18)  SpO2: 99% (01 Aug 2024 08:51) (97% - 99%)    Parameters below as of 01 Aug 2024 08:51  Patient On (Oxygen Delivery Method): nasal cannula  O2 Flow (L/min): 2    CONSTITUTIONAL: Well groomed, no apparent distress  EYES: PERRLA and symmetric, EOMI. scleral icterus  ENMT: Oral mucosa with moist membranes.    RESP: No respiratory distress, CTA b/l,   CV: RRR, +S1S2 no peripheral edema  GI: Soft,  distended,   MSK: pitting edema bilaterally Normal ROM   SKIN: No rashes or ulcers noted  NEURO: A&O  PSYCH:  mood and affect appropriate     CBC                          8.2    5.05  )-----------( 125      ( 01 Aug 2024 03:32 )             22.3     CHEM

## 2024-08-01 NOTE — PHYSICAL THERAPY INITIAL EVALUATION ADULT - ADDITIONAL COMMENTS
history per daughter, carlo. pt lives with her in a house with 6 steps to enter (+rail) then 5 inside (+rail). pt was amb with RW and assist or using wc, carried up steps. family present 24/7 to assist.

## 2024-08-01 NOTE — PHYSICAL THERAPY INITIAL EVALUATION ADULT - GENERAL OBSERVATIONS, REHAB EVAL
pt asleep in bed on 2L/min O2 via nc, +telemonitor. daughter, carlo, presnt and agreeable to pt being aroused for eval. , [phone]), in use throughout.

## 2024-08-01 NOTE — PROVIDER CONTACT NOTE (OTHER) - SITUATION
pt received last night from ER, on skin assessment a stage II pressure injury was noted at coccyx. MD notified during Interdisciplinary rounds

## 2024-08-01 NOTE — PROGRESS NOTE ADULT - ASSESSMENT
85 y/o female who follows Dr Dave for hepatocellular carcinoma was on Sorafenib but stopped due to side effects VNS is in place, however patient and her family are still declining hospice and multifactorial anemia of CKD and iron deficiency receives IV iron PRN   presents due to abdominal pain and vomiting.     abdominal pain, vomiting  -Recurrent ascites, no hx of SBP  -New left portal venous thrombosis causing pain? provoked from malignancy   -holding  full dose anticoagulation due to anemia/risk of bleeding   -plan for IR eval for therapeutic paracentesis     Anasarca likely 2/2 hypoalbuminemia/portal htn  hypercalcemia due to underlying malignancy   -albumin 2.0  -corrected calcium 12.2  -NS IV started 75 mL/hr after 1 liter NS  -Hold Lasix   -Repeat calcium post hydration, may need to initiate bisphosphonate/Calcitonin      HCC/Cirrhosis    - follows Dr Dave   -diagnosed with HCC in 2022  - was on Sorafenib but stopped due to side effects   -VNS is in place, however patient and her family are still declining hospice   -discontinued chemotherapy 2/2 side effects    multifactorial anemia /thrombocytopenia  -History of CKD  -History  iron deficiency   -receives IV iron PRN  -DAPT on hold from last admission due to risk of bleeding     Pleural effusion:  -Malignant effusion  -Monitor sats  -May need therapeutic thoracentesis    Palliative care to eval    Dispo: Remains acute due to above, Hospice appropriate, await Palliative care eval. Anticipated LOS 3-4 days  83 y/o female who follows Dr Dave for hepatocellular carcinoma was on Sorafenib but stopped due to side effects VNS is in place, however patient and her family are still declining hospice and multifactorial anemia of CKD and iron deficiency receives IV iron PRN   presents due to abdominal pain and vomiting.     abdominal pain, vomiting  -Recurrent ascites, no hx of SBP  -New left portal venous thrombosis causing pain? provoked from malignancy   -holding  full dose anticoagulation due to anemia/risk of bleeding   - patient brought to IR dept for paracentesis all 4 qaudrant were scanned, imaging saved to PACS RUQ/RLQ: no ascites LUQ: trace ascites LLQ: trace to small ascites   given these findings paracentesis was deferred at this time.    Anasarca likely 2/2 hypoalbuminemia/portal htn  hypercalcemia due to underlying malignancy   -albumin 2.0  -corrected calcium 12.2  -NS IV started 75 mL/hr after 1 liter NS  -Hold Lasix   -Repeat calcium post hydration, may need to initiate bisphosphonate/Calcitonin      HCC/Cirrhosis    - follows Dr Dave   -diagnosed with HCC in 2022  - was on Sorafenib but stopped due to side effects   -VNS is in place, however patient and her family are still declining hospice   -discontinued chemotherapy 2/2 side effects    multifactorial anemia /thrombocytopenia  -History of CKD  -History  iron deficiency   -receives IV iron PRN  -DAPT on hold from last admission due to risk of bleeding     Pleural effusion:  -Malignant effusion  -Monitor sats  -May need therapeutic thoracentesis    Palliative care to eval    Dispo: Remains acute due to above, Hospice appropriate, await Palliative care eval. Anticipated LOS 3-4 days  85 y/o female who follows Dr Dave for hepatocellular carcinoma was on Sorafenib but stopped due to side effects VNS is in place, however patient and her family are still declining hospice and multifactorial anemia of CKD and iron deficiency receives IV iron PRN   presents due to abdominal pain and vomiting.     PVT  - abdominal pain, vomiting  -Recurrent ascites, no hx of SBP  -New left portal venous thrombosis provoked from malignancy   -holding  full dose anticoagulation due to anemia/risk of bleeding   - Recommend ppx dose Lovenox 40mg daily  - hold if plt < 50k    - patient brought to IR dept for paracentesis all 4 qaudrant were scanned, imaging saved to PACS RUQ/RLQ: no ascites LUQ: trace ascites LLQ: trace to small ascites   given these findings paracentesis was deferred at this time.    Anasarca likely 2/2 hypoalbuminemia/portal htn  hypercalcemia due to underlying malignancy   -albumin 2.0  -corrected calcium 12.2  -Hold Lasix     HCC/Cirrhosis    - follows Dr Dave   -diagnosed with HCC in 2022  - was on Sorafenib but stopped due to side effects   -VNS is in place, however patient and her family are still declining hospice     multifactorial anemia /thrombocytopenia  -History of CKD  -History  iron deficiency   -receives IV iron PRN  -DAPT on hold from last admission due to risk of bleeding     Pleural effusion:  -Malignant effusion  -Monitor sats  -May need therapeutic thoracentesis    Palliative care to eval    Dispo: Remains acute due to above, Hospice appropriate, await Palliative care eval. Anticipated LOS 3-4 days  85 y/o female who follows Dr Dave for hepatocellular carcinoma was on Sorafenib but stopped due to side effects VNS is in place, however patient and her family are still declining hospice and multifactorial anemia of CKD and iron deficiency receives IV iron PRN   presents due to abdominal pain and vomiting.     PVT  - abdominal pain, vomiting  -Recurrent ascites, no hx of SBP  -New left portal venous thrombosis provoked from malignancy   -holding  full dose anticoagulation due to anemia/risk of bleeding   - Recommend ppx dose Lovenox 40mg daily  - hold if plt < 50k    - patient brought to IR dept for paracentesis all 4 qaudrant were scanned, imaging saved to PACS RUQ/RLQ: no ascites LUQ: trace ascites LLQ: trace to small ascites   given these findings paracentesis was deferred at this time.    Anasarca likely 2/2 hypoalbuminemia/portal htn  hypercalcemia due to underlying malignancy   -albumin 2.0  -corrected calcium 12.2  -Hold Lasix     HCC/Cirrhosis    - follows Dr Dave   -diagnosed with HCC in 2022  - was on Sorafenib but stopped due to side effects   -VNS is in place, however patient and her family are still declining hospice     multifactorial anemia /thrombocytopenia  -History of CKD  -History  iron deficiency   -receives IV iron PRN  -DAPT on hold from last admission due to risk of bleeding   - Hgb 8.2 plt 125k    Pleural effusion:  -Malignant effusion  -Monitor sats  -May need therapeutic thoracentesis    Palliative care to eval    Dispo: Remains acute due to above, Hospice appropriate, await Palliative care eval. Anticipated LOS 3-4 days

## 2024-08-02 ENCOUNTER — TRANSCRIPTION ENCOUNTER (OUTPATIENT)
Age: 84
End: 2024-08-02

## 2024-08-02 LAB
ALBUMIN SERPL ELPH-MCNC: 2.6 G/DL — LOW (ref 3.3–5.2)
ALP SERPL-CCNC: 578 U/L — HIGH (ref 40–120)
ALT FLD-CCNC: 68 U/L — HIGH
ANION GAP SERPL CALC-SCNC: 14 MMOL/L — SIGNIFICANT CHANGE UP (ref 5–17)
AST SERPL-CCNC: 174 U/L — HIGH
BASOPHILS # BLD AUTO: 0.02 K/UL — SIGNIFICANT CHANGE UP (ref 0–0.2)
BASOPHILS NFR BLD AUTO: 0.4 % — SIGNIFICANT CHANGE UP (ref 0–2)
BILIRUB SERPL-MCNC: 14.1 MG/DL — HIGH (ref 0.4–2)
BUN SERPL-MCNC: 48.1 MG/DL — HIGH (ref 8–20)
CALCIUM SERPL-MCNC: 9.4 MG/DL — SIGNIFICANT CHANGE UP (ref 8.4–10.5)
CHLORIDE SERPL-SCNC: 93 MMOL/L — LOW (ref 96–108)
CO2 SERPL-SCNC: 19 MMOL/L — LOW (ref 22–29)
CREAT SERPL-MCNC: 1.92 MG/DL — HIGH (ref 0.5–1.3)
EGFR: 25 ML/MIN/1.73M2 — LOW
EOSINOPHIL # BLD AUTO: 0.04 K/UL — SIGNIFICANT CHANGE UP (ref 0–0.5)
EOSINOPHIL NFR BLD AUTO: 0.8 % — SIGNIFICANT CHANGE UP (ref 0–6)
GLUCOSE BLDC GLUCOMTR-MCNC: 124 MG/DL — HIGH (ref 70–99)
GLUCOSE SERPL-MCNC: 98 MG/DL — SIGNIFICANT CHANGE UP (ref 70–99)
HCT VFR BLD CALC: 23.3 % — LOW (ref 34.5–45)
HGB BLD-MCNC: 8.2 G/DL — LOW (ref 11.5–15.5)
IMM GRANULOCYTES NFR BLD AUTO: 0.8 % — SIGNIFICANT CHANGE UP (ref 0–0.9)
INR BLD: 1.54 RATIO — HIGH (ref 0.85–1.18)
LYMPHOCYTES # BLD AUTO: 0.6 K/UL — LOW (ref 1–3.3)
LYMPHOCYTES # BLD AUTO: 12 % — LOW (ref 13–44)
MCHC RBC-ENTMCNC: 33.5 PG — SIGNIFICANT CHANGE UP (ref 27–34)
MCHC RBC-ENTMCNC: 35.2 GM/DL — SIGNIFICANT CHANGE UP (ref 32–36)
MCV RBC AUTO: 95.1 FL — SIGNIFICANT CHANGE UP (ref 80–100)
MONOCYTES # BLD AUTO: 0.72 K/UL — SIGNIFICANT CHANGE UP (ref 0–0.9)
MONOCYTES NFR BLD AUTO: 14.3 % — HIGH (ref 2–14)
NEUTROPHILS # BLD AUTO: 3.6 K/UL — SIGNIFICANT CHANGE UP (ref 1.8–7.4)
NEUTROPHILS NFR BLD AUTO: 71.7 % — SIGNIFICANT CHANGE UP (ref 43–77)
PLATELET # BLD AUTO: 118 K/UL — LOW (ref 150–400)
POTASSIUM SERPL-MCNC: 4.9 MMOL/L — SIGNIFICANT CHANGE UP (ref 3.5–5.3)
POTASSIUM SERPL-SCNC: 4.9 MMOL/L — SIGNIFICANT CHANGE UP (ref 3.5–5.3)
PROT SERPL-MCNC: 5.8 G/DL — LOW (ref 6.6–8.7)
PROTHROM AB SERPL-ACNC: 16.9 SEC — HIGH (ref 9.5–13)
RBC # BLD: 2.45 M/UL — LOW (ref 3.8–5.2)
RBC # FLD: 22.6 % — HIGH (ref 10.3–14.5)
SODIUM SERPL-SCNC: 126 MMOL/L — LOW (ref 135–145)
WBC # BLD: 5.02 K/UL — SIGNIFICANT CHANGE UP (ref 3.8–10.5)
WBC # FLD AUTO: 5.02 K/UL — SIGNIFICANT CHANGE UP (ref 3.8–10.5)

## 2024-08-02 PROCEDURE — 99233 SBSQ HOSP IP/OBS HIGH 50: CPT

## 2024-08-02 PROCEDURE — 99497 ADVNCD CARE PLAN 30 MIN: CPT | Mod: 25

## 2024-08-02 PROCEDURE — 99232 SBSQ HOSP IP/OBS MODERATE 35: CPT

## 2024-08-02 RX ORDER — ACETAMINOPHEN 500 MG
2 TABLET ORAL
Qty: 0 | Refills: 0 | DISCHARGE
Start: 2024-08-02

## 2024-08-02 RX ORDER — OXYCODONE HYDROCHLORIDE 30 MG/1
5 TABLET ORAL EVERY 6 HOURS
Refills: 0 | Status: DISCONTINUED | OUTPATIENT
Start: 2024-08-02 | End: 2024-08-03

## 2024-08-02 RX ORDER — FUROSEMIDE 10 MG/ML
40 INJECTION, SOLUTION INTRAVENOUS DAILY
Refills: 0 | Status: DISCONTINUED | OUTPATIENT
Start: 2024-08-02 | End: 2024-08-03

## 2024-08-02 RX ADMIN — CHLORHEXIDINE GLUCONATE 1 APPLICATION(S): 500 CLOTH TOPICAL at 05:19

## 2024-08-02 RX ADMIN — HEPARIN SODIUM 5000 UNIT(S): 1000 INJECTION, SOLUTION INTRAVENOUS; SUBCUTANEOUS at 05:18

## 2024-08-02 RX ADMIN — OXYCODONE HYDROCHLORIDE 5 MILLIGRAM(S): 30 TABLET ORAL at 16:30

## 2024-08-02 RX ADMIN — SPIRONOLACTONE 50 MILLIGRAM(S): 50 TABLET, FILM COATED ORAL at 05:19

## 2024-08-02 RX ADMIN — Medication 10 GRAM(S): at 05:18

## 2024-08-02 RX ADMIN — OXYCODONE HYDROCHLORIDE 5 MILLIGRAM(S): 30 TABLET ORAL at 15:53

## 2024-08-02 RX ADMIN — FUROSEMIDE 40 MILLIGRAM(S): 10 INJECTION, SOLUTION INTRAVENOUS at 09:36

## 2024-08-02 RX ADMIN — Medication 650 MILLIGRAM(S): at 12:20

## 2024-08-02 RX ADMIN — HYDRALAZINE HYDROCHLORIDE 50 MILLIGRAM(S): 100 TABLET ORAL at 05:19

## 2024-08-02 RX ADMIN — PANTOPRAZOLE SODIUM 40 MILLIGRAM(S): 20 TABLET, DELAYED RELEASE ORAL at 05:20

## 2024-08-02 RX ADMIN — Medication 650 MILLIGRAM(S): at 11:47

## 2024-08-02 RX ADMIN — Medication 12.5 MILLIGRAM(S): at 05:19

## 2024-08-02 NOTE — DISCHARGE NOTE PROVIDER - NSDCCPCAREPLAN_GEN_ALL_CORE_FT
PRINCIPAL DISCHARGE DIAGNOSIS  Diagnosis: HCC (hepatocellular carcinoma)  Assessment and Plan of Treatment:      PRINCIPAL DISCHARGE DIAGNOSIS  Diagnosis: HCC (hepatocellular carcinoma)  Assessment and Plan of Treatment: Hospice

## 2024-08-02 NOTE — DISCHARGE NOTE NURSING/CASE MANAGEMENT/SOCIAL WORK - NSDCPEFALRISK_GEN_ALL_CORE
For information on Fall & Injury Prevention, visit: [email or web address] OR  [email or web address] OR  https://www.cdc.gov/steadi/patient.html

## 2024-08-02 NOTE — DISCHARGE NOTE PROVIDER - HOSPITAL COURSE
85 y/o female history of hepatocellular carcinoma, diabetes HTN, HLD, CAD with stents presents due abdominal pain and vomiting. Patient was recently admitted to the  hospital for worsening shortness of breath and bilateral lower extremity swelling. Patient received therapeutic paracentesis and was discharged with home care.  Patient was brought back to the ER for worsening lethargy and abdominal pain with vomiting. While in the ER, patient was sent for CT scan and became hypoxic and tachypneic. MICU was consulted for patient was placed on NC supplemental oxygen and patient improved.  CT of the abdomen and pelvis noted New left portal venous thrombosis; Interval increase in pleural effusion, ascites, and new anasarca; Cirrhosis with multiple hypoattenuating lesions. Course complicated by anemia requiring PRBC. GI and oncology were consulted. Patient noted to have elevated MELD score with developing hepato renal syndrome. Oncology was consulted and given patient's overall poor functional status and clinical decline with worsening symptoms deemed hospice appropriate with poor overall survival rate. IR was consulted for therapeutic paracentesis however no drainable site was noted on ultrasound. GOC were discussed with the patient, her son and daughter at length. MOLST with DNR/DNI with discharge home with hospice services.

## 2024-08-02 NOTE — PROGRESS NOTE ADULT - ASSESSMENT
83 y/o female history of hepatocellular carcinoma, diabetes HTN, HLD, CAD with stents presents due abdominal pain and vomiting. Patient was recently admitted to the  hospital for worsening shortness of breath and bilateral lower extremity swelling. Patient received therapeutic paracentesis and was discharged with home care.  Patient was brought back to the ER for worsening lethargy and abdominal pain with vomiting. While in the ER, patient was sent for CT scan and became hypoxic and tachypneic. MICU was consulted for patient was placed on NC supplemental oxygen and patient improved.  CT of the abdomen and pelvis noted New left portal venous thrombosis; Interval increase in pleural effusion, ascites, and new anasarca; Cirrhosis with multiple hypoattenuating lesions. Course complicated by anemia requiring PRBC. GI and oncology were consulted. Patient noted to have elevated MELD score with developing hepato renal syndrome. Oncology was consulted and given patient's overall poor functional status and clinical decline with worsening symptoms deemed hospice appropriate with poor overall survival rate. IR was consulted for therapeutic paracentesis however no drainable site was noted on ultrasound. GOC were discussed with the patient, her son and daughter at length. MOLST with DNR/DNI with discharge home with hospice services.     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 Now DNR/DNI and agreeable to home hospice. Anticipate discharge home with hospice today if arraignments can be made by hospice team    85 y/o female history of hepatocellular carcinoma, diabetes HTN, HLD, CAD with stents presents due abdominal pain and vomiting. Patient was recently admitted to the  hospital for worsening shortness of breath and bilateral lower extremity swelling. Patient received therapeutic paracentesis and was discharged with home care.  Patient was brought back to the ER for worsening lethargy and abdominal pain with vomiting. While in the ER, patient was sent for CT scan and became hypoxic and tachypneic. MICU was consulted for patient was placed on NC supplemental oxygen and patient improved.  CT of the abdomen and pelvis noted New left portal venous thrombosis; Interval increase in pleural effusion, ascites, and new anasarca; Cirrhosis with multiple hypoattenuating lesions. Course complicated by anemia requiring PRBC. GI and oncology were consulted. Patient noted to have elevated MELD score with developing hepato renal syndrome. Oncology was consulted and given patient's overall poor functional status and clinical decline with worsening symptoms deemed hospice appropriate with poor overall survival rate. IR was consulted for therapeutic paracentesis however no drainable site was noted on ultrasound. GOC were discussed with the patient, her son and daughter at length. MOLST with DNR/DNI with discharge home with hospice services.     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 Now DNR/DNI and agreeable to home hospice. Anticipate discharge home with hospice today if arraignments can be made by hospice team    83 y/o female history of hepatocellular carcinoma, diabetes HTN, HLD, CAD with stents presents due abdominal pain and vomiting. Patient was recently admitted to the  hospital for worsening shortness of breath and bilateral lower extremity swelling. Patient received therapeutic paracentesis and was discharged with home care.  Patient was brought back to the ER for worsening lethargy and abdominal pain with vomiting. While in the ER, patient was sent for CT scan and became hypoxic and tachypneic. MICU was consulted for patient was placed on NC supplemental oxygen and patient improved.  CT of the abdomen and pelvis noted New left portal venous thrombosis; Interval increase in pleural effusion, ascites, and new anasarca; Cirrhosis with multiple hypoattenuating lesions. Course complicated by anemia requiring PRBC. GI and oncology were consulted. Patient noted to have elevated MELD score with developing hepato renal syndrome. Oncology was consulted and given patient's overall poor functional status and clinical decline with worsening symptoms deemed hospice appropriate with poor overall survival rate. IR was consulted for therapeutic paracentesis however no drainable site was noted on ultrasound. GOC were discussed with the patient, her son and daughter at length. MOLST with DNR/DNI with discharge home with hospice services.     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 Now DNR/DNI and agreeable to home hospice. Spoke with hospice SW< DME to be delivered tonight for discharge home with hospice at 10am tomorrow.

## 2024-08-02 NOTE — PROGRESS NOTE ADULT - CONVERSATION/DISCUSSION
Diagnosis/Prognosis/MOLST Discussed
Diagnosis/Prognosis/MOLST Discussed/Treatment Options/Hospice Referral
Diagnosis/Prognosis/Treatment Options

## 2024-08-02 NOTE — DISCHARGE NOTE NURSING/CASE MANAGEMENT/SOCIAL WORK - PATIENT PORTAL LINK FT
You can access the FollowMyHealth Patient Portal offered by [facility] by registering at the following website: [email or web address]. By joining [facility]’s FollowMyHealth portal, you will also be able to view your health information using other applications (apps) compatible with our system.

## 2024-08-02 NOTE — PROGRESS NOTE ADULT - CONVERSATION DETAILS
Spoke with family who confirm yesterday's discussion with Dr. Jones. They have spoke together as a family and agreeable to  hospice care.  They would like to take her home today.    We  discussed advanced directives - CPR, intubation  and mechanical ventilation.  They are in agreement DNR and DNI would be apppropriate.  MOLST completed.
Extensive GOC with interpretter at bedside. Explained MOLST form.   Family reports that patient is no longer on any treatment and has been decompensating further. Explained that this will unfortunately continue to happen.  Daughter is considering DNR/DNI and Hospice but is not sure if she wants to make that decision right now. She reports that she will speak to her sister and respond. Patient to remain full code for now.
Skilled Nursing Facility
Met with patient, daughter Daisy and son at bedside with a . We spoke about the patient's diagnosis including processive worsening hepato renal syndrome with HCC and cirrhosis. We spoke about her current condition, I discussed the recommendations of the oncologist including palliative care and hospice. I discussed her overall poor prognosis and given her elevated MELD score projected poor short term survival. Dasiy asked about hospice services. I explained hospice philosophy and she agreed her mother would like to spend the remainder of her time at home with her family  Discussed CPR and intubation.   [place] back with her in AM regarding further goals of care. Hospice consulted.

## 2024-08-02 NOTE — PROGRESS NOTE ADULT - SUBJECTIVE AND OBJECTIVE BOX
85 y/o female who follows Dr Dave for hepatocellular carcinoma was on Sorafenib but stopped due to side effects  and multifactorial anemia of CKD and iron deficiency receives IV iron PRN  VNS is in place, however patient and her family are still declining hospice presents due abdominal pain and vomiting. Patient was recently admitted to hospital for worsening shortness of breath and bilateral lower extremity swelling. Patient received therapeutic paracentesis and was discharged with home care.  According to patient daughter, patient has been lethargic for the previous two days. Last night patient began complaining of abdominal pain that she rates as 10/10. this morning patient began vomiting on and off for 30-60 minutes. Patient was taken to ED by family at this time. Patient was sent for CT scan and became hypoxic and tachypneic. MICU was consulted for patient was placed on NC supplemental oxygen and patient improved. Patient also complained of itching for over a week. States itching comes sporadically and is not associated with any notable triggers.  Patient denies chest pain, SoB, dysuria, syncope, HA.     PAST MEDICAL & SURGICAL HISTORY:  Hypertension  Hypothyroidism  GERD (gastroesophageal reflux disease)  Hypercholesterolemia  Diabetes  Heart murmur  History of cirrhosis of liver  HCC (hepatocellular carcinoma)  CAD (coronary artery disease)  S/P tubal ligation  History of vaginal surgery  History of total left knee replacement  History of cardiac cath    Allergies  amlodipine (Swelling)    MEDICATIONS  (STANDING):  carvedilol 12.5 milliGRAM(s) Oral every 12 hours  dextrose 5%. 1000 milliLiter(s) (100 mL/Hr) IV Continuous <Continuous>  dextrose 5%. 1000 milliLiter(s) (50 mL/Hr) IV Continuous <Continuous>  dextrose 50% Injectable 25 Gram(s) IV Push once  dextrose 50% Injectable 12.5 Gram(s) IV Push once  dextrose 50% Injectable 25 Gram(s) IV Push once  glucagon  Injectable 1 milliGRAM(s) IntraMuscular once  heparin   Injectable 5000 Unit(s) SubCutaneous every 12 hours  hydrALAZINE 50 milliGRAM(s) Oral two times a day  insulin lispro (ADMELOG) corrective regimen sliding scale   SubCutaneous Before meals and at bedtime  lactulose Syrup 10 Gram(s) Oral every 8 hours  ondansetron Injectable 4 milliGRAM(s) IV Push once  pantoprazole    Tablet 40 milliGRAM(s) Oral before breakfast  rifAXIMin 550 milliGRAM(s) Oral two times a day  spironolactone 25 milliGRAM(s) Oral daily    MEDICATIONS  (PRN):  acetaminophen     Tablet .. 650 milliGRAM(s) Oral every 6 hours PRN Temp greater or equal to 38C (100.4F), Mild Pain (1 - 3)  aluminum hydroxide/magnesium hydroxide/simethicone Suspension 30 milliLiter(s) Oral every 4 hours PRN Dyspepsia  dextrose Oral Gel 15 Gram(s) Oral once PRN Blood Glucose LESS THAN 70 milliGRAM(s)/deciliter  melatonin 3 milliGRAM(s) Oral at bedtime PRN Insomnia    Vital Signs Last 24 Hrs  T(C): 36.4 (02 Aug 2024 04:32), Max: 36.4 (01 Aug 2024 12:02)  T(F): 97.6 (02 Aug 2024 04:32), Max: 97.6 (02 Aug 2024 04:32)  HR: 60 (02 Aug 2024 04:32) (55 - 60)  BP: 106/58 (02 Aug 2024 04:32) (100/56 - 117/62)  BP(mean): --  RR: 17 (02 Aug 2024 04:32) (14 - 18)  SpO2: 99% (02 Aug 2024 04:32) (96% - 99%)    Parameters below as of 02 Aug 2024 04:32  Patient On (Oxygen Delivery Method): nasal cannula  O2 Flow (L/min): 2      CONSTITUTIONAL: Well groomed, no apparent distress  EYES: PERRLA and symmetric, EOMI. scleral icterus  ENMT: Oral mucosa with moist membranes.    RESP: No respiratory distress, CTA b/l,   CV: RRR, +S1S2 no peripheral edema  GI: Soft,  distended,   MSK: pitting edema bilaterally Normal ROM   SKIN: No rashes or ulcers noted  NEURO: A&O  PSYCH:  mood and affect appropriate     CBC                          8.2    5.02  )-----------( 118      ( 02 Aug 2024 05:06 )             23.3                             8.2    5.05  )-----------( 125      ( 01 Aug 2024 03:32 )             22.3     CHEM    08-02    126<L>  |  93<L>  |  48.1<H>  ----------------------------<  98  4.9   |  19.0<L>  |  1.92<H>    Ca    9.4      02 Aug 2024 05:06    TPro  5.8<L>  /  Alb  2.6<L>  /  TBili  14.1<H>  /  DBili  x   /  AST  174<H>  /  ALT  68<H>  /  AlkPhos  578<H>  08-02

## 2024-08-02 NOTE — PROGRESS NOTE ADULT - TIME BILLING
Time spent with review of chart documents, labs, imaging. Direct patient assessment,  formulation of care plan. Discussion with  Interdisciplinary  team Dr. Jones  with an additional  ACP  of 25     minutes with   MOLST    completion.  This time is exclusive of the encounter
Reviewing prior medical record, EMS events, ED course, independently reviewing labs/imaging studies, discussing case with ED attending, examining patient, furnishing H&P, orders, doing medication reconciliation, discussing plan of care with Patient/family using  and answering all their questions.

## 2024-08-02 NOTE — PROGRESS NOTE ADULT - SUBJECTIVE AND OBJECTIVE BOX
CC: Follow up     INTERVAL HPI/OVERNIGHT EVENTS: Patient seen and examined, on 2 liters NC Spo2 in the high 90s. Complaints of feeling short of breath, given a dose of lasix x 1     Vital Signs Last 24 Hrs  T(C): 36.4 (02 Aug 2024 04:32), Max: 36.4 (01 Aug 2024 12:02)  T(F): 97.6 (02 Aug 2024 04:32), Max: 97.6 (02 Aug 2024 04:32)  HR: 60 (02 Aug 2024 04:32) (55 - 60)  BP: 106/58 (02 Aug 2024 04:32) (100/56 - 117/62)  BP(mean): --  RR: 17 (02 Aug 2024 04:32) (14 - 18)  SpO2: 99% (02 Aug 2024 04:32) (96% - 99%)    Parameters below as of 02 Aug 2024 04:32  Patient On (Oxygen Delivery Method): nasal cannula  O2 Flow (L/min): 2      PHYSICAL EXAM:    GENERAL: NAD, AOX2/3 Icteric   EYES:  conjunctiva and sclera icteric   ENMT: Moist mucous membranes  CHEST/LUNG: Clear to auscultation bilaterally; No rales, rhonchi, wheezing, or rubs  HEART: Regular rate and rhythm; No murmurs, rubs, or gallops  ABDOMEN: Soft, Nontender, +distended; Bowel sounds present  EXTREMITIES:  2+ Peripheral Pulses, No clubbing, cyanosis,   Generalized anasarca  SKIN: STage 2 sacral decubitus         MEDICATIONS  (STANDING):  carvedilol 12.5 milliGRAM(s) Oral every 12 hours  chlorhexidine 2% Cloths 1 Application(s) Topical <User Schedule>  dextrose 5%. 1000 milliLiter(s) (50 mL/Hr) IV Continuous <Continuous>  dextrose 5%. 1000 milliLiter(s) (100 mL/Hr) IV Continuous <Continuous>  dextrose 50% Injectable 25 Gram(s) IV Push once  dextrose 50% Injectable 12.5 Gram(s) IV Push once  dextrose 50% Injectable 25 Gram(s) IV Push once  furosemide    Tablet 40 milliGRAM(s) Oral daily  glucagon  Injectable 1 milliGRAM(s) IntraMuscular once  heparin   Injectable 5000 Unit(s) SubCutaneous every 12 hours  hydrALAZINE 50 milliGRAM(s) Oral two times a day  insulin lispro (ADMELOG) corrective regimen sliding scale   SubCutaneous Before meals and at bedtime  lactulose Syrup 10 Gram(s) Oral every 8 hours  ondansetron Injectable 4 milliGRAM(s) IV Push once  pantoprazole    Tablet 40 milliGRAM(s) Oral before breakfast  rifAXIMin 550 milliGRAM(s) Oral two times a day  spironolactone 50 milliGRAM(s) Oral daily    MEDICATIONS  (PRN):  acetaminophen     Tablet .. 650 milliGRAM(s) Oral every 6 hours PRN Temp greater or equal to 38C (100.4F), Mild Pain (1 - 3)  aluminum hydroxide/magnesium hydroxide/simethicone Suspension 30 milliLiter(s) Oral every 4 hours PRN Dyspepsia  dextrose Oral Gel 15 Gram(s) Oral once PRN Blood Glucose LESS THAN 70 milliGRAM(s)/deciliter  melatonin 3 milliGRAM(s) Oral at bedtime PRN Insomnia      Allergies    amlodipine (Swelling)    Intolerances          LABS:                          8.2    5.02  )-----------( 118      ( 02 Aug 2024 05:06 )             23.3     08-02    126<L>  |  93<L>  |  48.1<H>  ----------------------------<  98  4.9   |  19.0<L>  |  1.92<H>    Ca    9.4      02 Aug 2024 05:06    TPro  5.8<L>  /  Alb  2.6<L>  /  TBili  14.1<H>  /  DBili  x   /  AST  174<H>  /  ALT  68<H>  /  AlkPhos  578<H>  08-02    PT/INR - ( 02 Aug 2024 05:06 )   PT: 16.9 sec;   INR: 1.54 ratio           Urinalysis Basic - ( 02 Aug 2024 05:06 )    Color: x / Appearance: x / SG: x / pH: x  Gluc: 98 mg/dL / Ketone: x  / Bili: x / Urobili: x   Blood: x / Protein: x / Nitrite: x   Leuk Esterase: x / RBC: x / WBC x   Sq Epi: x / Non Sq Epi: x / Bacteria: x        RADIOLOGY & ADDITIONAL TESTS:

## 2024-08-02 NOTE — PROGRESS NOTE ADULT - REASON FOR ADMISSION
Decompensated cirrhosis   Hypercalcemia

## 2024-08-02 NOTE — PROGRESS NOTE ADULT - SUBJECTIVE AND OBJECTIVE BOX
CC:  Follow up GOC , Symptoms    OVERNIGHT EVENTS:  events noted     Present Symptoms:   Dyspnea:  No    Nausea/Vomiting:  No   Anxiety:  No      Depression:  No    Fatigue:  Yes    Loss of appetite: Yes   Constipation: Not Reported    Pain: mild abdomen            Location            Duration            Character            Severity            Factors            Effect    Pain AD Score:  [email or web address] (press ctrl + left click to view)    Review of Systems: Reviewed as above  All others negative    MEDICATIONS  (STANDING):  carvedilol 12.5 milliGRAM(s) Oral every 12 hours  furosemide    Tablet 40 milliGRAM(s) Oral daily  lactulose Syrup 10 Gram(s) Oral every 8 hours  ondansetron Injectable 4 milliGRAM(s) IV Push once  pantoprazole    Tablet 40 milliGRAM(s) Oral before breakfast  rifAXIMin 550 milliGRAM(s) Oral two times a day  spironolactone 50 milliGRAM(s) Oral daily    MEDICATIONS  (PRN):  acetaminophen     Tablet .. 650 milliGRAM(s) Oral every 6 hours PRN Temp greater or equal to 38C (100.4F), Mild Pain (1 - 3)  aluminum hydroxide/magnesium hydroxide/simethicone Suspension 30 milliLiter(s) Oral every 4 hours PRN Dyspepsia  melatonin 3 milliGRAM(s) Oral at bedtime PRN Insomnia      PHYSICAL EXAM:    Vital Signs Last 24 Hrs  T(C): 36.4 (02 Aug 2024 11:19), Max: 36.4 (01 Aug 2024 21:00)  T(F): 97.5 (02 Aug 2024 11:19), Max: 97.6 (02 Aug 2024 04:32)  HR: 60 (02 Aug 2024 11:19) (55 - 60)  BP: 114/54 (02 Aug 2024 11:19) (106/58 - 117/62)  BP(mean): --  RR: 18 (02 Aug 2024 11:19) (14 - 18)  SpO2: 98% (02 Aug 2024 11:19) (96% - 99%)    Parameters below as of 02 Aug 2024 04:32  Patient On (Oxygen Delivery Method): nasal cannula  O2 Flow (L/min): 2    Karnofsky: 30 %  General: Elder woman frail awake NAD    HEENT:  NCAT    Lungs: comfortable  non labored  CV:  RR  GI:  soft NTND  MSK: weak bedbound  Skin:  warm/dry    - jaundice  Neuro  no focal deficits  Psych  calm cooperative    LABS:                          8.2    5.02  )-----------( 118      ( 02 Aug 2024 05:06 )             23.3     08-02    126<L>  |  93<L>  |  48.1<H>  ----------------------------<  98  4.9   |  19.0<L>  |  1.92<H>    Ca    9.4      02 Aug 2024 05:06    TPro  5.8<L>  /  Alb  2.6<L>  /  TBili  14.1<H>  /  DBili  x   /  AST  174<H>  /  ALT  68<H>  /  AlkPhos  578<H>  08-02    PT/INR - ( 02 Aug 2024 05:06 )   PT: 16.9 sec;   INR: 1.54 ratio           Urinalysis Basic - ( 02 Aug 2024 05:06 )    Color: x / Appearance: x / SG: x / pH: x  Gluc: 98 mg/dL / Ketone: x  / Bili: x / Urobili: x   Blood: x / Protein: x / Nitrite: x   Leuk Esterase: x / RBC: x / WBC x   Sq Epi: x / Non Sq Epi: x / Bacteria: x      I&O's Summary    01 Aug 2024 07:01  -  02 Aug 2024 07:00  --------------------------------------------------------  IN: 800 mL / OUT: 0 mL / NET: 800 mL

## 2024-08-02 NOTE — DISCHARGE NOTE PROVIDER - ATTENDING DISCHARGE PHYSICAL EXAMINATION:
GENERAL: NAD, AOX2/3 Icteric   EYES:  conjunctiva and sclera icteric   ENMT: Moist mucous membranes  CHEST/LUNG: Clear to auscultation bilaterally; No rales, rhonchi, wheezing, or rubs  HEART: Regular rate and rhythm; No murmurs, rubs, or gallops  ABDOMEN: Soft, Nontender, +distended; Bowel sounds present  EXTREMITIES:  2+ Peripheral Pulses, No clubbing, cyanosis,   Generalized anasarca  SKIN: STage 2 sacral decubitus

## 2024-08-02 NOTE — PROGRESS NOTE ADULT - ASSESSMENT
83 y/o female who follows Dr Dave for hepatocellular carcinoma was on Sorafenib but stopped due to side effects VNS is in place, however patient and her family are still declining hospice and multifactorial anemia of CKD and iron deficiency receives IV iron PRN   presents due to abdominal pain and vomiting.     PVT  - abdominal pain, vomiting  -Recurrent ascites, no hx of SBP  -New left portal venous thrombosis provoked from malignancy   -holding  full dose anticoagulation due to anemia/risk of bleeding   - Recommend ppx dose Lovenox 40mg daily  - hold if plt < 50k    - patient brought to IR dept for paracentesis all 4 qaudrant were scanned, imaging saved to PACS RUQ/RLQ: no ascites LUQ: trace ascites LLQ: trace to small ascites   given these findings paracentesis was deferred at this time.    HCC/Cirrhosis    - follows Dr Dave   -diagnosed with HCC in 2022  - was on Sorafenib but stopped due to side effects   -VNS is in place, however patient and her family are still declining hospice     multifactorial anemia /thrombocytopenia  -History of CKD  -History  iron deficiency   -receives IV iron PRN  -DAPT on hold from last admission due to risk of bleeding   - Hgb 8.2 plt 118k    Pleural effusion:  -Malignant effusion  -Monitor sats  -May need therapeutic thoracentesis    Palliative care following -Now DNR/DNI and agreeable to home hospice. Anticipate discharge home with hospice today if arraignments can be made by hospice team

## 2024-08-02 NOTE — PROGRESS NOTE ADULT - ASSESSMENT
84yr woman  with history of hepatocellular carcinoma/portal htn w/ ascites, Pleural effusion, DM-2, HTN, HLD, CAD presented with abdominal pain and vomiting found to have new PVT, recurrent ascites    Abdominal Pain   Portal Vein Thrombosis  patient to go home on hospice  Monitor for pain   Rec Oxycodone 5mg po q6 PRN    HCC   Ascites  paracentesis on last admission  Evaluated by IR - NO significant ascites     Pleural Effusion  Rec Oxycodone dyspnea    KAYLIN  monitor avoid nephrotoxic agents  Rec Oxycodone 5mg solution on d/c    Debility  Assist with care  Turn/reposition  Safety precaution    Encounter for Palliative Care  Palliative Care consulted to assist with goals of care  See [facility] note above for details.  In summary  1. DNR/DNI  2. Home hospice - family wishes to take her home today

## 2024-08-02 NOTE — DISCHARGE NOTE PROVIDER - NSDCMRMEDTOKEN_GEN_ALL_CORE_FT
acetaminophen 325 mg oral tablet: 2 tab(s) orally every 6 hours As needed Temp greater or equal to 38C (100.4F), Mild Pain (1 - 3)  carvedilol 12.5 mg oral tablet: 1 tab(s) orally every 12 hours  furosemide 40 mg oral tablet: 1 tab(s) orally once a day  hydrALAZINE 50 mg oral tablet: 1 tab(s) orally 2 times a day  lactulose 10 g/15 mL oral syrup: 15 milliliter(s) orally every 8 hours titrate to 2-3 soft stools/day  pantoprazole 40 mg oral delayed release tablet: 1 tab(s) orally once a day (before a meal)  spironolactone 25 mg oral tablet: 1 tab(s) orally once a day  vitamin c daily:   vitamin d3 daily:   Xifaxan 550 mg oral tablet: 1 tab(s) orally 2 times a day   acetaminophen 325 mg oral tablet: 2 tab(s) orally every 6 hours As needed Temp greater or equal to 38C (100.4F), Mild Pain (1 - 3)  carvedilol 12.5 mg oral tablet: 1 tab(s) orally every 12 hours  furosemide 40 mg oral tablet: 1 tab(s) orally once a day  lactulose 10 g/15 mL oral syrup: 15 milliliter(s) orally every 8 hours titrate to 2-3 soft stools/day  spironolactone 25 mg oral tablet: 1 tab(s) orally once a day  Xifaxan 550 mg oral tablet: 1 tab(s) orally 2 times a day

## 2024-08-03 VITALS
DIASTOLIC BLOOD PRESSURE: 41 MMHG | OXYGEN SATURATION: 96 % | SYSTOLIC BLOOD PRESSURE: 87 MMHG | RESPIRATION RATE: 18 BRPM | TEMPERATURE: 97 F | HEART RATE: 44 BPM

## 2024-08-03 PROCEDURE — 86900 BLOOD TYPING SEROLOGIC ABO: CPT

## 2024-08-03 PROCEDURE — 97163 PT EVAL HIGH COMPLEX 45 MIN: CPT

## 2024-08-03 PROCEDURE — 87640 STAPH A DNA AMP PROBE: CPT

## 2024-08-03 PROCEDURE — 36430 TRANSFUSION BLD/BLD COMPNT: CPT

## 2024-08-03 PROCEDURE — 74177 CT ABD & PELVIS W/CONTRAST: CPT | Mod: MC

## 2024-08-03 PROCEDURE — P9047: CPT

## 2024-08-03 PROCEDURE — 99285 EMERGENCY DEPT VISIT HI MDM: CPT | Mod: 25

## 2024-08-03 PROCEDURE — 85610 PROTHROMBIN TIME: CPT

## 2024-08-03 PROCEDURE — 86850 RBC ANTIBODY SCREEN: CPT

## 2024-08-03 PROCEDURE — 80053 COMPREHEN METABOLIC PANEL: CPT

## 2024-08-03 PROCEDURE — 83735 ASSAY OF MAGNESIUM: CPT

## 2024-08-03 PROCEDURE — P9016: CPT

## 2024-08-03 PROCEDURE — 86923 COMPATIBILITY TEST ELECTRIC: CPT

## 2024-08-03 PROCEDURE — 99239 HOSP IP/OBS DSCHRG MGMT >30: CPT

## 2024-08-03 PROCEDURE — 85027 COMPLETE CBC AUTOMATED: CPT

## 2024-08-03 PROCEDURE — 0225U NFCT DS DNA&RNA 21 SARSCOV2: CPT

## 2024-08-03 PROCEDURE — 92610 EVALUATE SWALLOWING FUNCTION: CPT

## 2024-08-03 PROCEDURE — 83036 HEMOGLOBIN GLYCOSYLATED A1C: CPT

## 2024-08-03 PROCEDURE — 83690 ASSAY OF LIPASE: CPT

## 2024-08-03 PROCEDURE — 87641 MR-STAPH DNA AMP PROBE: CPT

## 2024-08-03 PROCEDURE — 80048 BASIC METABOLIC PNL TOTAL CA: CPT

## 2024-08-03 PROCEDURE — 82962 GLUCOSE BLOOD TEST: CPT

## 2024-08-03 PROCEDURE — 85025 COMPLETE CBC W/AUTO DIFF WBC: CPT

## 2024-08-03 PROCEDURE — 84100 ASSAY OF PHOSPHORUS: CPT

## 2024-08-03 PROCEDURE — 86901 BLOOD TYPING SEROLOGIC RH(D): CPT

## 2024-08-03 PROCEDURE — C1729: CPT

## 2024-08-03 PROCEDURE — 81001 URINALYSIS AUTO W/SCOPE: CPT

## 2024-08-03 PROCEDURE — T1013: CPT

## 2024-08-03 PROCEDURE — 36415 COLL VENOUS BLD VENIPUNCTURE: CPT

## 2024-08-03 RX ADMIN — FUROSEMIDE 40 MILLIGRAM(S): 10 INJECTION, SOLUTION INTRAVENOUS at 05:26

## 2024-08-03 RX ADMIN — Medication 12.5 MILLIGRAM(S): at 05:26

## 2024-08-03 RX ADMIN — PANTOPRAZOLE SODIUM 40 MILLIGRAM(S): 20 TABLET, DELAYED RELEASE ORAL at 05:27

## 2024-08-03 RX ADMIN — SPIRONOLACTONE 50 MILLIGRAM(S): 50 TABLET, FILM COATED ORAL at 05:25

## 2024-12-02 ENCOUNTER — APPOINTMENT (OUTPATIENT)
Dept: GASTROENTEROLOGY | Facility: CLINIC | Age: 84
End: 2024-12-02

## 2025-01-08 NOTE — PHYSICAL THERAPY INITIAL EVALUATION ADULT - PERTINENT HX OF CURRENT PROBLEM, REHAB EVAL
Administered By (Optional): Nikki Hughes MA J-Code:  Use Enhanced Ndc?: Yes Ndc (300mg Pen): [address]-64 Cosentyx Amount: 300 mg Syringe Size Used (Required For Enhanced Ndc): 300mg/2ml Prefilled Pen Treatment Number (Optional): 3 Was The Medication Purchased By The Clinic?: No Lot # (Optional): [id number] Detail Level: None Ndc (150mg Syringe): [address]-81 Other Time Frame Value: 1 week Expiration Date (Optional): 2026-Jun-30 Ndc (75mg Syringe): [address]-04 Additional Comments: Pt tolerated well Ndc (150mg Pen): [address]-47 Consent: The risks of pain and injection site reactions were reviewed with the patient prior to the injection. Date Of Next Injection: Other Time Frame (Enter Below) 83 y/o female with PMH of HCC, DM-2, HTN, HLD, CAD s/p 4 stents came to the ED complaining of generalized weakness and decrease PO intake. As per patient and daughters (Daisy at bedside;  Dolly and Kristi on the phone at bedside) patient has been very weak, in the last 3 days, has decrease appetite, difficulty with ambulation. Patient follows with Dr. Dave (oncology), she was placed on oral chemo which was stopped last week because she was not getting better on the medication. She reported chronic abdominal pain otherwise no nausea, vomiting, fever, chills, chest pain, shortness of breath, palpitation, change in bowel/urinary habit, melena, hematochezia. CT C/A/P: Small left pleural effusion with atelectasis, slightly increased from prior. Known cirrhosis with multifocal HCC and portal hypertension. Distended gallbladder containing stones.

## 2025-03-04 NOTE — PATIENT PROFILE ADULT. - TEACHING/LEARNING EDUCATIONAL LEVEL
PROVIDER:[TOKEN:[[id number]]],PROVIDER:[TOKEN:[[id number]]] PROVIDER:[TOKEN:[[id number]],FOLLOWUP:[1 week]],PROVIDER:[TOKEN:[[id number]],FOLLOWUP:[1 week]],PROVIDER:[TOKEN:[[id number]],FOLLOWUP:[1 week]] 5 th grade/elementary

## 2025-07-14 NOTE — ED PROVIDER NOTE - PROGRESS NOTE DETAILS
No
plan for therapeutic paracentesis in am and case management consult for possible hospice - pt is still full code - will admit to medicine
